# Patient Record
Sex: FEMALE | Race: WHITE | Employment: OTHER | URBAN - METROPOLITAN AREA
[De-identification: names, ages, dates, MRNs, and addresses within clinical notes are randomized per-mention and may not be internally consistent; named-entity substitution may affect disease eponyms.]

---

## 2017-02-07 ENCOUNTER — HOSPITAL ENCOUNTER (OUTPATIENT)
Dept: CT IMAGING | Age: 77
Discharge: HOME | End: 2017-02-07
Attending: INTERNAL MEDICINE
Payer: MEDICARE

## 2017-02-07 DIAGNOSIS — R05.9 COUGH: ICD-10-CM

## 2017-02-07 LAB — CREAT BLD-MCNC: 1 MG/DL (ref 0.6–1)

## 2017-02-07 PROCEDURE — 74011636320 HC RX REV CODE- 636/320: Performed by: INTERNAL MEDICINE

## 2017-02-07 PROCEDURE — 74011000258 HC RX REV CODE- 258: Performed by: INTERNAL MEDICINE

## 2017-02-07 PROCEDURE — 71260 CT THORAX DX C+: CPT

## 2017-02-07 PROCEDURE — 82565 ASSAY OF CREATININE: CPT

## 2017-02-07 RX ORDER — SODIUM CHLORIDE 0.9 % (FLUSH) 0.9 %
10 SYRINGE (ML) INJECTION
Status: COMPLETED | OUTPATIENT
Start: 2017-02-07 | End: 2017-02-07

## 2017-02-07 RX ADMIN — Medication 10 ML: at 09:21

## 2017-02-07 RX ADMIN — SODIUM CHLORIDE 80 ML: 900 INJECTION, SOLUTION INTRAVENOUS at 09:21

## 2017-02-07 RX ADMIN — IOPAMIDOL 80 ML: 755 INJECTION, SOLUTION INTRAVENOUS at 09:24

## 2017-07-14 PROBLEM — I51.89 DIASTOLIC DYSFUNCTION: Status: ACTIVE | Noted: 2017-07-14

## 2017-08-22 ENCOUNTER — HOSPITAL ENCOUNTER (OUTPATIENT)
Dept: PHYSICAL THERAPY | Age: 77
Discharge: HOME | End: 2017-08-22
Attending: INTERNAL MEDICINE
Payer: MEDICARE

## 2017-08-22 DIAGNOSIS — Z91.81 HISTORY OF FALL: ICD-10-CM

## 2017-08-22 PROCEDURE — 97016 VASOPNEUMATIC DEVICE THERAPY: CPT

## 2017-08-22 PROCEDURE — 97161 PT EVAL LOW COMPLEX 20 MIN: CPT

## 2017-08-22 PROCEDURE — G8978 MOBILITY CURRENT STATUS: HCPCS

## 2017-08-22 PROCEDURE — G8979 MOBILITY GOAL STATUS: HCPCS

## 2017-08-22 NOTE — PROGRESS NOTES
Douglas Nj  : [facility]  [address], [address], [place]  [phone]   [phone]          OUTPATIENT PHYSICAL THERAPY:Initial Assessment 2017    ICD-10: Treatment Diagnosis: Difficulty in walking, not elsewhere classified (R26.2); Pain in left knee (M25.562)  Precautions/Allergies:   Advair diskus [fluticasone-salmeterol]; Aspirin; and Codeine   Fall Risk Score: 5 (? 5 = High Risk)  MD Orders: evaluate and treat MEDICAL/REFERRING DIAGNOSIS:  History of fall [Z91.81]   DATE OF ONSET: fall 3 weeks ago  REFERRING PHYSICIAN:  Dr. Camilo Garza: not set     INITIAL ASSESSMENT:  Ms. Ben Byrd presents in therapy today roughly 3 weeks after a fall around her home. She was diagnosed with a L knee contusion, negative imaging for fracture. She will benefit from skilled PT in order to improve her L knee pain, strength, and ROM for optimum safety during functional mobility and ambulation. PROBLEM LIST (Impacting functional limitations):  1. Decreased Strength  2. Decreased ADL/Functional Activities  3. Decreased Ambulation Ability/Technique  4. Decreased Balance  5. Increased Pain  6. Decreased Activity Tolerance  7. Decreased Flexibility/Joint Mobility  8. Edema/Girth  9. Decreased Knowledge of Precautions  10. Decreased [place] with Home Exercise Program INTERVENTIONS PLANNED:  1. Balance Exercise  2. Cold  3. Cryotherapy  4. Gait Training  5. Home Exercise Program (HEP)  6. Manual Therapy  7. Neuromuscular Re-education/Strengthening  8. Range of Motion (ROM)  9. Therapeutic Activites  10. Therapeutic Exercise/Strengthening   TREATMENT PLAN:  Effective Dates: 17 TO 10/22/17. Frequency/Duration: 2 times a week for 6 weeks  GOALS: (Goals have been discussed and agreed upon with patient.)     SHORT-TERM FUNCTIONAL GOALS: Time Frame: 3 weeks  1.   Patient will be compliant with HEP focused on lower extremity strengthening and ROM. 2.  Patient will rate L LE pain no greater than 3/10 for improved tolerance to daily and work duties. DISCHARGE GOALS: Time Frame: 6 weeks  1. Patient will be independent with comprehensive HEP focused on continued performance of lower extremity strengthening and ROM activities. 2.  Patient will rate L LE pain no greater than 1-2/10 and which does not significantly interfere with daily or work duties for return to previous level of function. 3.  Patient will demonstrate near symmetrical bilateral LE AROM for optimum functional mobility with daily and work duties. 4.  Patient will demonstrate near symmetrical bilateral LE strength grades in the above tested planes for optimum performance and safety with daily and work duties. Rehabilitation Potential For Stated Goals: Good  Regarding Kamilla Scott's therapy, I certify that the treatment plan above will be carried out by a therapist or under their direction. Thank you for this referral,  Ruth Turner, PT, DPT     Referring Physician Signature:     Dr. Stephan Arroyo                Date                    The information in this section was collected on 8-22-17 (except where otherwise noted). HISTORY:   History of Present Injury/Illness (Reason for Referral):  Patient reports a fall roughly 3 weeks ago, while walking on her sidewalk. She denies immediate medical attention. She saw Dr. Stephan Arroyo, and was diagnosed with L knee contusion after her x-ray was negative for fracture. She reports a chronic history of falls and difficulty clearing her LLE when walking. Past Medical History/Comorbidities:   Ms. Corinne Monte  has a past medical history of Abdominal pain (9/29/2013); DJD (degenerative joint disease) (9/29/2013); Esophageal reflux (9/29/2013); HTN (hypertension) (9/29/2013); Hypercholesterolemia; Hypertension; Other and unspecified hyperlipidemia (9/29/2013);  Pyelonephritis (9/29/2013); and UTI (lower urinary tract infection) (9/29/2013). Ms. Ben Byrd  has a past surgical history that includes pacemaker; chest surgery procedure unlisted; lap,cholecystectomy; and hysterectomy. Social History/Living Environment:     Independent - lives alone   Prior Level of Function/Work/Activity:  Independent - Retired   Personal Factors:          Sex:  female        Age:  68 y.o. Current Medications:       Current Outpatient Prescriptions:     atorvastatin (LIPITOR) 80 mg tablet, Take 1 Tab by mouth daily. , Disp: 90 Tab, Rfl: 3    clorazepate (TRANXENE) 7.5 mg tablet, TAKE  ONE TABLET BY MOUTH TWICE DAILY, Disp: 60 Tab, Rfl: 5    lansoprazole (PREVACID) 30 mg capsule, Take 1 Cap by mouth Daily (before breakfast). , Disp: 30 Cap, Rfl: 5    losartan-hydroCHLOROthiazide (HYZAAR) 100-12.5 mg per tablet, Take 1 Tab by mouth daily. , Disp: 90 Tab, Rfl: 4    bumetanide (BUMEX) 1 mg tablet, Take  by mouth daily. , Disp: , Rfl:     albuterol (VENTOLIN HFA) 90 mcg/actuation inhaler, Take 2 Puffs by inhalation every four (4) hours as needed. , Disp: , Rfl:     mometasone-formoterol (DULERA) 100-5 mcg/actuation HFA inhaler, Take 2 Puffs by inhalation two (2) times a day., Disp: , Rfl:     metoprolol tartrate (LOPRESSOR) 25 mg tablet, Take 25 mg by mouth., Disp: , Rfl:     escitalopram oxalate (LEXAPRO) 10 mg tablet, Take 1 Tab by mouth daily. , Disp: 90 Tab, Rfl: 3    amLODIPine (NORVASC) 5 mg tablet, Take 1 Tab by mouth daily. , Disp: 90 Tab, Rfl: 3    flecainide (TAMBOCOR) 50 mg tablet, TAKE 1 TABLET TWICE DAILY, Disp: 180 Tab, Rfl: 3    allopurinol (ZYLOPRIM) 100 mg tablet, Take 1 Tab by mouth daily. , Disp: 90 Tab, Rfl: 3    fexofenadine (ALLEGRA) 180 mg tablet, Take 1 Tab by mouth daily. , Disp: 30 Tab, Rfl: 4    montelukast (SINGULAIR) 10 mg tablet, Take 1 Tab by mouth daily. , Disp: 90 Tab, Rfl: 4    aspirin delayed-release 81 mg tablet, Take  by mouth daily. , Disp: , Rfl:    Date Last Reviewed:  8/22/2017     Number of Personal Factors/Comorbidities that affect the Plan of Care: 1-2: MODERATE COMPLEXITY   EXAMINATION:   Observation/Orthostatic Postural Assessment:          Patient ambulates with rolling walker, good gait speed and quality, slight decrease in weight bearing through LLE with shortened R step length   Palpation:          Mild tenderness at L patellar region, no tenderness at L knee posterior capsule. Mild-to-moderate L patellar edema and increased warmth. ROM:          L knee AROM: 0-110; R knee AROM: 0-115  Strength:          L knee extension 4/5, L knee flexion 4/5, L knee DF 4/5; All R LE MMT are 4+/5     Body Structures Involved:  1. Nerves  2. Bones  3. Joints  4. Muscles  5. Ligaments Body Functions Affected:  1. Sensory/Pain  2. Movement Related Activities and Participation Affected:  1. General Tasks and Demands  2. Mobility  3. Domestic Life  4. Interpersonal Interactions and Relationships  5. Community, Social and [place]   Number of elements (examined above) that affect the Plan of Care: 1-2: LOW COMPLEXITY   CLINICAL PRESENTATION:   Presentation: Stable and uncomplicated: LOW COMPLEXITY   CLINICAL DECISION MAKING:   Outcome Measure: Tool Used: Lower Extremity Functional Scale (LEFS)  Score:  Initial: 24/80 Most Recent: X/80 (Date: -- )   Interpretation of Score: 20 questions each scored on a 5 point scale with 0 representing \"extreme difficulty or unable to perform\" and 4 representing \"no difficulty\". The lower the score, the greater the functional disability. 80/80 represents no disability. Minimal detectable change is 9 points. Score 80 79-63 62-48 47-32 31-16 15-1 0   Modifier CH CI CJ CK CL CM CN     ?  Mobility - Walking and Moving Around:     - CURRENT STATUS: CL - 60%-79% impaired, limited or restricted    - GOAL STATUS: CJ - 20%-39% impaired, limited or restricted    - D/C STATUS:  ---------------To be determined---------------      · Medical Necessity: Patient is expected to demonstrate progress in strength, range of motion and balance to improve safety during functional mobility. Reason for Services/Other Comments:  · Patient continues to require skilled intervention due to not reaching long term goals. Use of outcome tool(s) and clinical judgement create a POC that gives a: Clear prediction of patient's progress: LOW COMPLEXITY            TREATMENT:   (In addition to Assessment/Re-Assessment sessions the following treatments were rendered)  Pre-treatment Symptoms/Complaints:  Patient reports L knee pain and swelling, rated as 4-5/10 at worst. She states she was not walking with any assistive device prior to her fall 3 weeks ago. She states she does not walk with an AD around her home, but uses RW for long or community distances. Pain: Initial:   2/10 Post Session:  0-1/10     THERAPEUTIC EXERCISE: (0 minutes):  Exercises per grid below to improve mobility, strength and balance. Required minimal verbal cues to promote proper body alignment, promote proper body posture and promote proper body mechanics. Progressed complexity of movement as indicated. Date:   Date:   Date:     Activity/Exercise Parameters Parameters Parameters                                               Patient received vasopneumatic compression via GameReady to L knee, patient supine with LEs supported under wedge, medium compression, 40 degrees, 10 minutes, performed to decrease edema and inflammation at the LLE    Treatment/Session Assessment:    · Response to Treatment:  Patient responded well to vasopneumatic compression today, and verbalized understanding of HEP consisting of LLE strengthening exercises. Will await insurance authorization before scheduling follow-up visits. · Compliance with Program/Exercises: Will assess as treatment progresses. · Recommendations/Intent for next treatment session: \"Next visit will focus on advancements to more challenging activities\".   Total Treatment Duration:  PT Patient Time In/Time Out  Time In: 1430  Time Out: 1530    Roger Saint, PT, DPT

## 2017-08-22 NOTE — PROGRESS NOTES
Ambulatory/Rehab Services H2 Model Falls Risk Assessment    Risk Factor Pts. ·   Confusion/Disorientation/Impulsivity  []    4 ·   Symptomatic Depression  []   2 ·   Altered Elimination  []   1 ·   Dizziness/Vertigo  []   1 ·   Gender (Male)  []   1 ·   Any administered antiepileptics (anticonvulsants):  []   2 ·   Any administered benzodiazepines:  []   1 ·   Visual Impairment (specify):  []   1 ·   Portable Oxygen Use  []   1 ·   Orthostatic ? BP  []   1 ·   History of Recent Falls (within 3 mos.)  [x]   5     Ability to Rise from Chair (choose one) Pts. ·   Ability to rise in a single movement  [x]   0 ·   Pushes up, successful in one attempt  []   1 ·   Multiple attempts, but successful  []   3 ·   Unable to rise without assistance  []   4   Total: (5 or greater = High Risk) 5     Falls Prevention Plan:   []                Physical Limitations to Exercise (specify):   []                Mobility Assistance Device (type):   [x]                Exercise/Equipment Adaptation (specify): All standing exercises will be under close supervision/assistance by PT    ©2010 [facility] [address] [place] [address] Patent #[id number].  Federal Law prohibits the replication, distribution or use without written permission from [employer] of [employer]

## 2017-08-30 PROBLEM — I49.5 SICK SINUS SYNDROME (HCC): Status: ACTIVE | Noted: 2017-08-30

## 2017-08-30 PROBLEM — E66.9 OBESITY (BMI 30.0-34.9): Status: ACTIVE | Noted: 2017-08-30

## 2017-08-30 PROBLEM — Z95.0 STATUS CARDIAC PACEMAKER: Status: ACTIVE | Noted: 2017-08-30

## 2017-09-14 ENCOUNTER — HOSPITAL ENCOUNTER (OUTPATIENT)
Dept: PHYSICAL THERAPY | Age: 77
Discharge: HOME | End: 2017-09-14
Attending: INTERNAL MEDICINE
Payer: MEDICARE

## 2017-09-14 PROCEDURE — 97016 VASOPNEUMATIC DEVICE THERAPY: CPT

## 2017-09-14 PROCEDURE — 97110 THERAPEUTIC EXERCISES: CPT

## 2017-09-14 PROCEDURE — 97140 MANUAL THERAPY 1/> REGIONS: CPT

## 2017-09-14 NOTE — PROGRESS NOTES
Juan Cotto  : 1940 [facility]  [address], [address], [place]  [phone]   [phone]          OUTPATIENT PHYSICAL THERAPY:Daily Note 2017    ICD-10: Treatment Diagnosis: Difficulty in walking, not elsewhere classified (R26.2); Pain in left knee (M25.562)  Precautions/Allergies:   Advair diskus [fluticasone-salmeterol]; Aspirin; and Codeine   Fall Risk Score: 5 (? 5 = High Risk)  MD Orders: evaluate and treat MEDICAL/REFERRING DIAGNOSIS:  Left Knee Contusion    DATE OF ONSET: fall 3 weeks ago  REFERRING PHYSICIAN:  Dr. Kd Miranda: not set     INITIAL ASSESSMENT:  Ms. Alirio Guzmán presents in therapy today roughly 3 weeks after a fall around her home. She was diagnosed with a L knee contusion, negative imaging for fracture. She will benefit from skilled PT in order to improve her L knee pain, strength, and ROM for optimum safety during functional mobility and ambulation. PROBLEM LIST (Impacting functional limitations):  1. Decreased Strength  2. Decreased ADL/Functional Activities  3. Decreased Ambulation Ability/Technique  4. Decreased Balance  5. Increased Pain  6. Decreased Activity Tolerance  7. Decreased Flexibility/Joint Mobility  8. Edema/Girth  9. Decreased Knowledge of Precautions  10. Decreased [place] with Home Exercise Program INTERVENTIONS PLANNED:  1. Balance Exercise  2. Cold  3. Cryotherapy  4. Gait Training  5. Home Exercise Program (HEP)  6. Manual Therapy  7. Neuromuscular Re-education/Strengthening  8. Range of Motion (ROM)  9. Therapeutic Activites  10. Therapeutic Exercise/Strengthening   TREATMENT PLAN:  Effective Dates: 17 TO 10/22/17. Frequency/Duration: 2 times a week for 6 weeks  GOALS: (Goals have been discussed and agreed upon with patient.)     SHORT-TERM FUNCTIONAL GOALS: Time Frame: 3 weeks  1.   Patient will be compliant with HEP focused on lower extremity strengthening and ROM. 2.  Patient will rate L LE pain no greater than 3/10 for improved tolerance to daily and work duties. DISCHARGE GOALS: Time Frame: 6 weeks  1. Patient will be independent with comprehensive HEP focused on continued performance of lower extremity strengthening and ROM activities. 2.  Patient will rate L LE pain no greater than 1-2/10 and which does not significantly interfere with daily or work duties for return to previous level of function. 3.  Patient will demonstrate near symmetrical bilateral LE AROM for optimum functional mobility with daily and work duties. 4.  Patient will demonstrate near symmetrical bilateral LE strength grades in the above tested planes for optimum performance and safety with daily and work duties. Rehabilitation Potential For Stated Goals: Good  Regarding Lori Scott's therapy, I certify that the treatment plan above will be carried out by a therapist or under their direction. Thank you for this referral,  Alexander Kumar, PT, DPT     Referring Physician Signature:     Dr. Mariella Prather                Date                    The information in this section was collected on 8-22-17 (except where otherwise noted). HISTORY:   History of Present Injury/Illness (Reason for Referral):  Patient reports a fall roughly 3 weeks ago, while walking on her sidewalk. She denies immediate medical attention. She saw Dr. Mariella Prather, and was diagnosed with L knee contusion after her x-ray was negative for fracture. She reports a chronic history of falls and difficulty clearing her LLE when walking. Past Medical History/Comorbidities:   Ms. Celia Cuadra  has a past medical history of Abdominal pain (9/29/2013); COPD (chronic obstructive pulmonary disease) ([facility] [address] DJD (degenerative joint disease) (9/29/2013); Esophageal reflux (9/29/2013); HTN (hypertension) (9/29/2013); Hypercholesterolemia; Hypertension; Other and unspecified hyperlipidemia (9/29/2013);  Pyelonephritis (9/29/2013); and UTI (lower urinary tract infection) (9/29/2013). Ms. Rajesh Garrido  has a past surgical history that includes pacemaker; chest surgery procedure unlisted; lap,cholecystectomy; hysterectomy; and cardiac surg procedure unlist (2/05 8/2015). Social History/Living Environment:     Independent - lives alone   Prior Level of Function/Work/Activity:  Independent - Retired   Personal Factors:          Sex:  female        Age:  68 y.o. Current Medications:       Current Outpatient Prescriptions:     bumetanide (BUMEX) 1 mg tablet, Take 1 Tab by mouth five (5) days a week., Disp: 90 Tab, Rfl: 4    Oxygen, 2 liters at night only, Disp: , Rfl:     atorvastatin (LIPITOR) 80 mg tablet, Take 1 Tab by mouth daily. , Disp: 90 Tab, Rfl: 3    clorazepate (TRANXENE) 7.5 mg tablet, TAKE  ONE TABLET BY MOUTH TWICE DAILY, Disp: 60 Tab, Rfl: 5    lansoprazole (PREVACID) 30 mg capsule, Take 1 Cap by mouth Daily (before breakfast). , Disp: 30 Cap, Rfl: 5    losartan-hydroCHLOROthiazide (HYZAAR) 100-12.5 mg per tablet, Take 1 Tab by mouth daily. , Disp: 90 Tab, Rfl: 4    albuterol (VENTOLIN HFA) 90 mcg/actuation inhaler, Take 2 Puffs by inhalation every four (4) hours as needed. , Disp: , Rfl:     mometasone-formoterol (DULERA) 100-5 mcg/actuation HFA inhaler, Take 2 Puffs by inhalation two (2) times daily as needed. , Disp: , Rfl:     metoprolol tartrate (LOPRESSOR) 25 mg tablet, Take 25 mg by mouth., Disp: , Rfl:     escitalopram oxalate (LEXAPRO) 10 mg tablet, Take 1 Tab by mouth daily. , Disp: 90 Tab, Rfl: 3    amLODIPine (NORVASC) 5 mg tablet, Take 1 Tab by mouth daily. , Disp: 90 Tab, Rfl: 3    flecainide (TAMBOCOR) 50 mg tablet, TAKE 1 TABLET TWICE DAILY, Disp: 180 Tab, Rfl: 3    allopurinol (ZYLOPRIM) 100 mg tablet, Take 1 Tab by mouth daily. , Disp: 90 Tab, Rfl: 3    fexofenadine (ALLEGRA) 180 mg tablet, Take 1 Tab by mouth daily. , Disp: 30 Tab, Rfl: 4    montelukast (SINGULAIR) 10 mg tablet, Take 1 Tab by mouth daily. , Disp: 90 Tab, Rfl: 4    aspirin delayed-release 81 mg tablet, Take  by mouth daily. , Disp: , Rfl:    Date Last Reviewed:  9/14/2017     Number of Personal Factors/Comorbidities that affect the Plan of Care: 1-2: MODERATE COMPLEXITY   EXAMINATION:   Observation/Orthostatic Postural Assessment:          Patient ambulates with rolling walker, good gait speed and quality, slight decrease in weight bearing through LLE with shortened R step length   Palpation:          Mild tenderness at L patellar region, no tenderness at L knee posterior capsule. Mild-to-moderate L patellar edema and increased warmth. ROM:          L knee AROM: 0-110; R knee AROM: 0-115  Strength:          L knee extension 4/5, L knee flexion 4/5, L knee DF 4/5; All R LE MMT are 4+/5     Body Structures Involved:  1. Nerves  2. Bones  3. Joints  4. Muscles  5. Ligaments Body Functions Affected:  1. Sensory/Pain  2. Movement Related Activities and Participation Affected:  1. General Tasks and Demands  2. Mobility  3. Domestic Life  4. Interpersonal Interactions and Relationships  5. Community, Social and [place]   Number of elements (examined above) that affect the Plan of Care: 1-2: LOW COMPLEXITY   CLINICAL PRESENTATION:   Presentation: Stable and uncomplicated: LOW COMPLEXITY   CLINICAL DECISION MAKING:   Outcome Measure: Tool Used: Lower Extremity Functional Scale (LEFS)  Score:  Initial: 24/80 Most Recent: X/80 (Date: -- )   Interpretation of Score: 20 questions each scored on a 5 point scale with 0 representing \"extreme difficulty or unable to perform\" and 4 representing \"no difficulty\". The lower the score, the greater the functional disability. 80/80 represents no disability. Minimal detectable change is 9 points. Score 80 79-63 62-48 47-32 31-16 15-1 0   Modifier CH CI CJ CK CL CM CN     ?  Mobility - Walking and Moving Around:     - CURRENT STATUS: CL - 60%-79% impaired, limited or restricted    - GOAL STATUS: CJ - 20%-39% impaired, limited or restricted    - D/C STATUS:  ---------------To be determined---------------      · Medical Necessity: Patient is expected to demonstrate progress in strength, range of motion and balance to improve safety during functional mobility. Reason for Services/Other Comments:  · Patient continues to require skilled intervention due to not reaching long term goals. Use of outcome tool(s) and clinical judgement create a POC that gives a: Clear prediction of patient's progress: LOW COMPLEXITY            TREATMENT:   (In addition to Assessment/Re-Assessment sessions the following treatments were rendered)  Pre-treatment Symptoms/Complaints:  Patient reports her pain has improved, but is still present. Pain: Initial:   2/10 Post Session:  0-1/10     THERAPEUTIC EXERCISE: (15 minutes):  Exercises per grid below to improve mobility, strength and balance. Required minimal verbal cues to promote proper body alignment, promote proper body posture and promote proper body mechanics. Progressed complexity of movement as indicated. Date:  9-14-17 Date:   Date:     Activity/Exercise Parameters Parameters Parameters   Recumbent stepper   10 minutes  Level 1     Quad set   x10     Heel slides    x10                                       Patient received vasopneumatic compression via GameReady to L knee, patient supine with LEs supported under wedge, medium compression, 40 degrees, 10 minutes, performed to decrease edema and inflammation at the LLE    Manual therapy (15 minutes): Patient received STM to L knee and contusion site, gentle to medium pressure focused on edema reduction and muscle relaxation, patient supine    Treatment/Session Assessment:    · Response to Treatment:  Patient responded well to today's treatment and is progressing very well at this time. · Compliance with Program/Exercises: Will assess as treatment progresses.   · Recommendations/Intent for next treatment session: \"Next visit will focus on advancements to more challenging activities\".   Total Treatment Duration:  PT Patient Time In/Time Out  Time In: 1430  Time Out: 2745 Dre Pagan, PT, DPT

## 2017-09-21 PROBLEM — Z95.0 PACEMAKER: Status: ACTIVE | Noted: 2017-09-21

## 2018-01-03 PROBLEM — I25.10 ATHEROSCLEROSIS OF CORONARY ARTERY: Status: ACTIVE | Noted: 2017-05-18

## 2018-01-03 PROBLEM — I08.0 MITRAL AND AORTIC REGURGITATION: Status: ACTIVE | Noted: 2018-01-03

## 2018-01-03 PROBLEM — K21.9 LARYNGOPHARYNGEAL REFLUX (LPR): Status: ACTIVE | Noted: 2018-01-03

## 2018-01-03 PROBLEM — M10.9 GOUT INVOLVING TOE: Status: ACTIVE | Noted: 2018-01-03

## 2018-05-03 PROBLEM — J30.9 ALLERGIC RHINITIS: Status: ACTIVE | Noted: 2018-05-03

## 2018-05-03 PROBLEM — F32.A MILD DEPRESSION: Status: ACTIVE | Noted: 2018-05-03

## 2018-06-13 ENCOUNTER — HOSPITAL ENCOUNTER (OUTPATIENT)
Dept: GENERAL RADIOLOGY | Age: 78
Discharge: HOME | End: 2018-06-13
Payer: MEDICARE

## 2018-06-13 DIAGNOSIS — M70.72 BURSITIS OF LEFT HIP, UNSPECIFIED BURSA: ICD-10-CM

## 2018-06-13 PROCEDURE — 73502 X-RAY EXAM HIP UNI 2-3 VIEWS: CPT

## 2018-09-25 ENCOUNTER — HOSPITAL ENCOUNTER (OUTPATIENT)
Dept: MAMMOGRAPHY | Age: 78
Discharge: HOME | End: 2018-09-25
Attending: INTERNAL MEDICINE
Payer: MEDICARE

## 2018-09-25 DIAGNOSIS — M89.9 DISORDER OF BONE AND CARTILAGE: ICD-10-CM

## 2018-09-25 DIAGNOSIS — M94.9 DISORDER OF BONE AND CARTILAGE: ICD-10-CM

## 2018-09-25 PROCEDURE — 77080 DXA BONE DENSITY AXIAL: CPT

## 2018-12-13 PROBLEM — Z01.810 PREOP CARDIOVASCULAR EXAM: Status: ACTIVE | Noted: 2018-12-13

## 2019-01-24 PROBLEM — Z17.0 MALIGNANT NEOPLASM OF UPPER-OUTER QUADRANT OF RIGHT BREAST IN FEMALE, ESTROGEN RECEPTOR POSITIVE (HCC): Status: ACTIVE | Noted: 2018-12-31

## 2019-01-24 PROBLEM — F32.A MILD DEPRESSION: Status: RESOLVED | Noted: 2018-05-03 | Resolved: 2019-01-24

## 2019-01-24 PROBLEM — C50.411 MALIGNANT NEOPLASM OF UPPER-OUTER QUADRANT OF RIGHT BREAST IN FEMALE, ESTROGEN RECEPTOR POSITIVE (HCC): Status: ACTIVE | Noted: 2018-12-31

## 2019-01-24 PROBLEM — Z98.890 POST-OPERATIVE STATE: Status: RESOLVED | Noted: 2019-01-17 | Resolved: 2019-01-24

## 2019-01-24 PROBLEM — F33.0 MILD EPISODE OF RECURRENT MAJOR DEPRESSIVE DISORDER (HCC): Status: ACTIVE | Noted: 2019-01-24

## 2019-01-24 PROBLEM — Z01.810 PREOP CARDIOVASCULAR EXAM: Status: RESOLVED | Noted: 2018-12-13 | Resolved: 2019-01-24

## 2019-01-24 PROBLEM — Z98.890 POST-OPERATIVE STATE: Status: ACTIVE | Noted: 2019-01-17

## 2019-03-12 PROBLEM — F33.42 RECURRENT MAJOR DEPRESSIVE DISORDER, IN FULL REMISSION (HCC): Status: ACTIVE | Noted: 2019-03-12

## 2019-03-12 PROBLEM — R10.32 LLQ PAIN: Status: ACTIVE | Noted: 2019-03-09

## 2019-05-14 ENCOUNTER — HOSPITAL ENCOUNTER (OUTPATIENT)
Dept: GENERAL RADIOLOGY | Age: 79
Discharge: HOME | End: 2019-05-14

## 2019-05-14 DIAGNOSIS — R07.81 PLEURITIC CHEST PAIN: ICD-10-CM

## 2019-05-14 DIAGNOSIS — R05.9 COUGH: ICD-10-CM

## 2019-05-14 NOTE — PROGRESS NOTES
Patient notified of CXR results and already has a f/u with you in 10 days instead of a week, is this ok? /TD

## 2019-05-24 PROBLEM — R10.32 LLQ PAIN: Status: RESOLVED | Noted: 2019-03-09 | Resolved: 2019-05-24

## 2019-10-04 ENCOUNTER — HOSPITAL ENCOUNTER (OUTPATIENT)
Dept: GENERAL RADIOLOGY | Age: 79
Discharge: HOME | End: 2019-10-04

## 2019-10-04 DIAGNOSIS — M25.561 ACUTE PAIN OF RIGHT KNEE: ICD-10-CM

## 2020-07-30 ENCOUNTER — HOSPITAL ENCOUNTER (OUTPATIENT)
Dept: CT IMAGING | Age: 80
Discharge: HOME | End: 2020-07-30
Attending: NURSE PRACTITIONER
Payer: MEDICARE

## 2020-07-30 DIAGNOSIS — R93.89 ABNORMAL CT OF THE CHEST: ICD-10-CM

## 2020-07-30 PROCEDURE — 71250 CT THORAX DX C-: CPT

## 2020-07-31 NOTE — PROGRESS NOTES
CT shows improvement, only few densities remaining and those are smaller or stable. I called her with result, left message on VM.

## 2020-08-26 ENCOUNTER — HOSPITAL ENCOUNTER (OUTPATIENT)
Dept: MRI IMAGING | Age: 80
Discharge: HOME | End: 2020-08-26
Attending: INTERNAL MEDICINE
Payer: MEDICARE

## 2020-08-26 DIAGNOSIS — R26.9 GAIT ABNORMALITY: ICD-10-CM

## 2020-08-26 DIAGNOSIS — R25.1 TREMOR: ICD-10-CM

## 2020-08-26 PROCEDURE — 70551 MRI BRAIN STEM W/O DYE: CPT

## 2020-08-26 NOTE — PROGRESS NOTES
Some changes of small vessel circulation common for age and thinning of brain tissue--no changes of stroke or tumor in balance center

## 2020-11-09 ENCOUNTER — HOSPITAL ENCOUNTER (OUTPATIENT)
Dept: LAB | Age: 80
Discharge: HOME | End: 2020-11-09
Payer: MEDICARE

## 2020-11-09 DIAGNOSIS — I48.0 PAROXYSMAL ATRIAL FIBRILLATION (HCC): ICD-10-CM

## 2020-11-09 LAB
ALBUMIN SERPL-MCNC: 3.5 G/DL (ref 3.2–4.6)
ALBUMIN/GLOB SERPL: 1 {RATIO} (ref 1.2–3.5)
ALP SERPL-CCNC: 56 U/L (ref 50–136)
ALT SERPL-CCNC: 15 U/L (ref 12–65)
AST SERPL-CCNC: 12 U/L (ref 15–37)
BILIRUB DIRECT SERPL-MCNC: <0.1 MG/DL
BILIRUB SERPL-MCNC: 0.3 MG/DL (ref 0.2–1.1)
GLOBULIN SER CALC-MCNC: 3.4 G/DL (ref 2.3–3.5)
PROT SERPL-MCNC: 6.9 G/DL (ref 6.3–8.2)

## 2020-11-09 PROCEDURE — 80076 HEPATIC FUNCTION PANEL: CPT

## 2020-11-09 PROCEDURE — 36415 COLL VENOUS BLD VENIPUNCTURE: CPT

## 2020-11-23 ENCOUNTER — HOSPITAL ENCOUNTER (EMERGENCY)
Age: 80
Discharge: HOME | End: 2020-11-23
Attending: EMERGENCY MEDICINE
Payer: MEDICARE

## 2020-11-23 ENCOUNTER — APPOINTMENT (OUTPATIENT)
Dept: CT IMAGING | Age: 80
End: 2020-11-23
Attending: EMERGENCY MEDICINE
Payer: MEDICARE

## 2020-11-23 VITALS
OXYGEN SATURATION: 95 % | WEIGHT: 161 LBS | RESPIRATION RATE: 16 BRPM | SYSTOLIC BLOOD PRESSURE: 201 MMHG | TEMPERATURE: 98.2 F | HEART RATE: 70 BPM | DIASTOLIC BLOOD PRESSURE: 83 MMHG | HEIGHT: 62 IN | BODY MASS INDEX: 29.63 KG/M2

## 2020-11-23 DIAGNOSIS — S00.03XA CONTUSION OF SCALP, INITIAL ENCOUNTER: Primary | ICD-10-CM

## 2020-11-23 DIAGNOSIS — I10 HYPERTENSION, UNSPECIFIED TYPE: ICD-10-CM

## 2020-11-23 PROCEDURE — 70450 CT HEAD/BRAIN W/O DYE: CPT

## 2020-11-23 PROCEDURE — 99282 EMERGENCY DEPT VISIT SF MDM: CPT

## 2020-11-23 PROCEDURE — 99283 EMERGENCY DEPT VISIT LOW MDM: CPT

## 2020-11-23 NOTE — ED NOTES
Pt alert and orientedx4 with even and unlabored respirations. Denies pain at this time. No acute signs of symptoms of distress witnessed. Pt states that she is going to take her b/P medications after leaving MD. MD notified of discharge vitals. Md verbalized understanding.

## 2020-11-23 NOTE — ED TRIAGE NOTES
Pt states she fell getting out of the bathtub on Sat and hit left side of head. Bruising to head and now having some headaches. States he daughter wanted her to come when she saw it today. No open wounds. Pt has a mask in triage.

## 2020-11-23 NOTE — ED PROVIDER NOTES
Patient has a history of hypertension, hyperlipidemia, COPD and GERD complaining that she fell in her bathtub on Saturday. She states she struck her head, denies any loss of consciousness. She says she was recently started on a blood thinner though she does not know which one. She denies any loss of consciousness, states she has had a headache since her fall. A family member came over today and saw her head bruising and wanted to come here and get evaluated.            Past Medical History:   Diagnosis Date    Abdominal pain 9/29/2013    COPD (chronic obstructive pulmonary disease) (HCC)     DJD (degenerative joint disease) 9/29/2013    Esophageal reflux 9/29/2013    HTN (hypertension) 9/29/2013    Hypercholesterolemia     Hypertension     Other and unspecified hyperlipidemia 9/29/2013    Pyelonephritis 9/29/2013    UTI (lower urinary tract infection) 9/29/2013       Past Surgical History:   Procedure Laterality Date    BREAST SURGERY PROCEDURE UNLISTED  01/07/2019    right breast cancer-lumpectomy    CARDIAC SURG PROCEDURE UNLIST  2/05 8/2015    pacemaker    CHEST SURGERY PROCEDURE UNLISTED      HX HYSTERECTOMY      HX PACEMAKER      LAP,CHOLECYSTECTOMY           Family History:   Problem Relation Age of Onset    Heart Disease Mother     Hypertension Mother     Heart Disease Father     Hypertension Father        Social History     Socioeconomic History    Marital status:      Spouse name: Not on file    Number of children: Not on file    Years of education: Not on file    Highest education level: Not on file   Occupational History    Not on file   Social Needs    Financial resource strain: Not on file    Food insecurity     Worry: Not on file     Inability: Not on file    Transportation needs     Medical: Not on file     Non-medical: Not on file   Tobacco Use    Smoking status: Former Smoker     Packs/day: 1.00     Years: 22.00     Pack years: 22.00     Types: Cigarettes Last attempt to quit: 1989     Years since quittin.9    Smokeless tobacco: Never Used   Substance and Sexual Activity    Alcohol use: No    Drug use: No    Sexual activity: Never   Lifestyle    Physical activity     Days per week: Not on file     Minutes per session: Not on file    Stress: Not on file   Relationships    Social connections     Talks on phone: Not on file     Gets together: Not on file     Attends Mormonism service: Not on file     Active member of club or organization: Not on file     Attends meetings of clubs or organizations: Not on file     Relationship status: Not on file    Intimate partner violence     Fear of current or ex partner: Not on file     Emotionally abused: Not on file     Physically abused: Not on file     Forced sexual activity: Not on file   Other Topics Concern    Not on file   Social History Narrative    Not on file         ALLERGIES: Advair diskus [fluticasone propion-salmeterol]; Aspirin; Codeine; and Lipitor [atorvastatin]    Review of Systems   Constitutional: Negative for chills and fever. Gastrointestinal: Negative for nausea and vomiting. All other systems reviewed and are negative. Vitals:    20 1630   BP: (!) 198/96   Pulse: 84   Resp: 16   Temp: 98.2 °F (36.8 °C)   SpO2: 95%   Weight: 73 kg (161 lb)   Height: 5' 2\" (1.575 m)            Physical Exam  Vitals signs and nursing note reviewed. Constitutional:       Appearance: Normal appearance. She is well-developed. HENT:      Head: Normocephalic. Comments: Ecchymosis with mild swelling left frontal scalp as indicated  Eyes:      Conjunctiva/sclera: Conjunctivae normal.      Pupils: Pupils are equal, round, and reactive to light. Neck:      Musculoskeletal: Normal range of motion and neck supple. Pulmonary:      Effort: Pulmonary effort is normal. No respiratory distress. Musculoskeletal:         General: No tenderness. Skin:     General: Skin is warm and dry. Neurological:      Mental Status: She is alert and oriented to person, place, and time. Psychiatric:         Behavior: Behavior normal.          MDM  Number of Diagnoses or Management Options  Contusion of scalp, initial encounter: new and does not require workup  Hypertension, unspecified type:   Diagnosis management comments: 6:02 PM discussed results with patient, unremarkable CT findings.        Amount and/or Complexity of Data Reviewed  Tests in the radiology section of CPT®: ordered and reviewed    Risk of Complications, Morbidity, and/or Mortality  Presenting problems: moderate  Diagnostic procedures: low  Management options: low    Patient Progress  Patient progress: stable         Procedures

## 2020-11-23 NOTE — ED NOTES
I have reviewed discharge instructions with the patient. The patient verbalized understanding. Patient left ED via Discharge Method: ambulatory to Home with family. Opportunity for questions and clarification provided. Patient given 0 scripts. To continue your aftercare when you leave the hospital, you may receive an automated call from our care team to check in on how you are doing. This is a free service and part of our promise to provide the best care and service to meet your aftercare needs.  If you have questions, or wish to unsubscribe from this service please call [phone]. Thank you for Choosing our [facility] Emergency Department.

## 2020-11-25 ENCOUNTER — PATIENT OUTREACH (OUTPATIENT)
Dept: CASE MANAGEMENT | Age: 80
End: 2020-11-25

## 2020-11-25 NOTE — PROGRESS NOTES
Transitions of Care outreach in follow up to ED visit 11/23 - Monday  Presented to ED subsequent to a fall in the bathroom/bathtub  Imaging studies unremarkable    Patient states this morning that the \"problem is my legs. \"  Reports 3 [additional] falls yesterday. When clarifying patient confirms that her legs are wobbly underneath her. She has a lot of trouble getting up when she falls - must call for help. Subsequent call to patient's daughter - Brett Chavarria has been in touch with neurologist and is interested in getting some PT for her mother/patient  Neurology appointment set for Monday.   Daughter agreeable to [facility] physical therapy referral    PLAN:  Message sent to PCP's nurse - Donald Fernando LPN requesting PCP to place referral to [facility] for PT  Concerns for:  · High fall risk  · Home safety - lives alone, rarely drives  · Patient has a walker

## 2020-12-03 ENCOUNTER — HOME HEALTH ADMISSION (OUTPATIENT)
Dept: HOME HEALTH SERVICES | Facility: HOME HEALTH | Age: 80
End: 2020-12-03
Payer: MEDICARE

## 2020-12-03 PROBLEM — Z74.09 IMPAIRED FUNCTIONAL MOBILITY, BALANCE, GAIT, AND ENDURANCE: Status: ACTIVE | Noted: 2020-12-03

## 2020-12-04 ENCOUNTER — HOME CARE VISIT (OUTPATIENT)
Dept: SCHEDULING | Facility: HOME HEALTH | Age: 80
End: 2020-12-04
Payer: MEDICARE

## 2020-12-04 VITALS
DIASTOLIC BLOOD PRESSURE: 88 MMHG | SYSTOLIC BLOOD PRESSURE: 134 MMHG | OXYGEN SATURATION: 94 % | HEART RATE: 75 BPM | TEMPERATURE: 98.4 F | RESPIRATION RATE: 18 BRPM

## 2020-12-04 PROCEDURE — 3331090001 HH PPS REVENUE CREDIT

## 2020-12-04 PROCEDURE — G0151 HHCP-SERV OF PT,EA 15 MIN: HCPCS

## 2020-12-04 PROCEDURE — 400013 HH SOC

## 2020-12-04 PROCEDURE — 3331090002 HH PPS REVENUE DEBIT

## 2020-12-05 PROCEDURE — 3331090001 HH PPS REVENUE CREDIT

## 2020-12-05 PROCEDURE — 3331090002 HH PPS REVENUE DEBIT

## 2020-12-06 PROCEDURE — 3331090002 HH PPS REVENUE DEBIT

## 2020-12-06 PROCEDURE — 3331090001 HH PPS REVENUE CREDIT

## 2020-12-07 PROCEDURE — 3331090001 HH PPS REVENUE CREDIT

## 2020-12-07 PROCEDURE — 3331090002 HH PPS REVENUE DEBIT

## 2020-12-08 ENCOUNTER — HOME CARE VISIT (OUTPATIENT)
Dept: SCHEDULING | Facility: HOME HEALTH | Age: 80
End: 2020-12-08
Payer: MEDICARE

## 2020-12-08 VITALS
RESPIRATION RATE: 18 BRPM | TEMPERATURE: 97.2 F | DIASTOLIC BLOOD PRESSURE: 68 MMHG | OXYGEN SATURATION: 96 % | HEART RATE: 76 BPM | SYSTOLIC BLOOD PRESSURE: 136 MMHG

## 2020-12-08 PROCEDURE — 3331090002 HH PPS REVENUE DEBIT

## 2020-12-08 PROCEDURE — G0157 HHC PT ASSISTANT EA 15: HCPCS

## 2020-12-08 PROCEDURE — 3331090001 HH PPS REVENUE CREDIT

## 2020-12-09 PROCEDURE — 3331090001 HH PPS REVENUE CREDIT

## 2020-12-09 PROCEDURE — 3331090002 HH PPS REVENUE DEBIT

## 2020-12-10 ENCOUNTER — HOME CARE VISIT (OUTPATIENT)
Dept: SCHEDULING | Facility: HOME HEALTH | Age: 80
End: 2020-12-10
Payer: MEDICARE

## 2020-12-10 VITALS
SYSTOLIC BLOOD PRESSURE: 104 MMHG | TEMPERATURE: 97.3 F | OXYGEN SATURATION: 97 % | HEART RATE: 71 BPM | RESPIRATION RATE: 18 BRPM | DIASTOLIC BLOOD PRESSURE: 60 MMHG

## 2020-12-10 PROCEDURE — G0157 HHC PT ASSISTANT EA 15: HCPCS

## 2020-12-10 PROCEDURE — 3331090002 HH PPS REVENUE DEBIT

## 2020-12-10 PROCEDURE — 3331090001 HH PPS REVENUE CREDIT

## 2020-12-11 PROCEDURE — 3331090002 HH PPS REVENUE DEBIT

## 2020-12-11 PROCEDURE — 3331090001 HH PPS REVENUE CREDIT

## 2020-12-12 PROCEDURE — 3331090002 HH PPS REVENUE DEBIT

## 2020-12-12 PROCEDURE — 3331090001 HH PPS REVENUE CREDIT

## 2020-12-13 PROCEDURE — 3331090002 HH PPS REVENUE DEBIT

## 2020-12-13 PROCEDURE — 3331090001 HH PPS REVENUE CREDIT

## 2020-12-14 PROCEDURE — 3331090002 HH PPS REVENUE DEBIT

## 2020-12-14 PROCEDURE — 3331090001 HH PPS REVENUE CREDIT

## 2020-12-15 ENCOUNTER — HOME CARE VISIT (OUTPATIENT)
Dept: SCHEDULING | Facility: HOME HEALTH | Age: 80
End: 2020-12-15
Payer: MEDICARE

## 2020-12-15 VITALS
DIASTOLIC BLOOD PRESSURE: 60 MMHG | HEART RATE: 73 BPM | TEMPERATURE: 97.2 F | SYSTOLIC BLOOD PRESSURE: 118 MMHG | RESPIRATION RATE: 18 BRPM | OXYGEN SATURATION: 96 %

## 2020-12-15 PROCEDURE — 3331090001 HH PPS REVENUE CREDIT

## 2020-12-15 PROCEDURE — G0157 HHC PT ASSISTANT EA 15: HCPCS

## 2020-12-15 PROCEDURE — 3331090002 HH PPS REVENUE DEBIT

## 2020-12-16 PROCEDURE — 3331090002 HH PPS REVENUE DEBIT

## 2020-12-16 PROCEDURE — 3331090001 HH PPS REVENUE CREDIT

## 2020-12-17 PROCEDURE — 3331090002 HH PPS REVENUE DEBIT

## 2020-12-17 PROCEDURE — 3331090001 HH PPS REVENUE CREDIT

## 2020-12-18 PROCEDURE — 3331090002 HH PPS REVENUE DEBIT

## 2020-12-18 PROCEDURE — 3331090001 HH PPS REVENUE CREDIT

## 2020-12-19 PROCEDURE — 3331090001 HH PPS REVENUE CREDIT

## 2020-12-19 PROCEDURE — 3331090002 HH PPS REVENUE DEBIT

## 2020-12-20 PROCEDURE — 3331090001 HH PPS REVENUE CREDIT

## 2020-12-20 PROCEDURE — 3331090002 HH PPS REVENUE DEBIT

## 2020-12-21 ENCOUNTER — HOME CARE VISIT (OUTPATIENT)
Dept: SCHEDULING | Facility: HOME HEALTH | Age: 80
End: 2020-12-21
Payer: MEDICARE

## 2020-12-21 VITALS
HEART RATE: 74 BPM | DIASTOLIC BLOOD PRESSURE: 76 MMHG | RESPIRATION RATE: 18 BRPM | OXYGEN SATURATION: 98 % | TEMPERATURE: 98.2 F | SYSTOLIC BLOOD PRESSURE: 116 MMHG

## 2020-12-21 PROCEDURE — 3331090002 HH PPS REVENUE DEBIT

## 2020-12-21 PROCEDURE — 3331090001 HH PPS REVENUE CREDIT

## 2020-12-21 PROCEDURE — G0151 HHCP-SERV OF PT,EA 15 MIN: HCPCS

## 2020-12-23 PROBLEM — G62.9 SENSORY NEUROPATHY: Status: ACTIVE | Noted: 2020-12-23

## 2021-01-20 ENCOUNTER — HOSPITAL ENCOUNTER (OUTPATIENT)
Dept: GENERAL RADIOLOGY | Age: 81
Discharge: HOME | End: 2021-01-20

## 2021-01-20 DIAGNOSIS — M79.604 PAIN OF RIGHT LOWER EXTREMITY: ICD-10-CM

## 2021-02-01 ENCOUNTER — TRANSCRIBE ORDER (OUTPATIENT)
Dept: SCHEDULING | Age: 81
End: 2021-02-01

## 2021-02-01 DIAGNOSIS — M25.561 RIGHT KNEE PAIN: Primary | ICD-10-CM

## 2021-02-24 ENCOUNTER — HOSPITAL ENCOUNTER (OUTPATIENT)
Dept: MRI IMAGING | Age: 81
Discharge: HOME | End: 2021-02-24
Attending: ORTHOPAEDIC SURGERY
Payer: MEDICARE

## 2021-02-24 DIAGNOSIS — M25.561 RIGHT KNEE PAIN: ICD-10-CM

## 2021-02-24 PROCEDURE — 73721 MRI JNT OF LWR EXTRE W/O DYE: CPT

## 2021-04-15 PROBLEM — Z01.810 PREOPERATIVE CARDIOVASCULAR EXAMINATION: Status: RESOLVED | Noted: 2018-12-13 | Resolved: 2021-04-15

## 2021-04-18 ENCOUNTER — APPOINTMENT (OUTPATIENT)
Dept: GENERAL RADIOLOGY | Age: 81
End: 2021-04-18
Attending: EMERGENCY MEDICINE
Payer: MEDICARE

## 2021-04-18 ENCOUNTER — HOSPITAL ENCOUNTER (EMERGENCY)
Age: 81
Discharge: HOME | End: 2021-04-18
Attending: EMERGENCY MEDICINE
Payer: MEDICARE

## 2021-04-18 VITALS
HEIGHT: 62 IN | RESPIRATION RATE: 16 BRPM | WEIGHT: 169 LBS | HEART RATE: 73 BPM | OXYGEN SATURATION: 95 % | DIASTOLIC BLOOD PRESSURE: 68 MMHG | TEMPERATURE: 98.7 F | SYSTOLIC BLOOD PRESSURE: 118 MMHG | BODY MASS INDEX: 31.1 KG/M2

## 2021-04-18 DIAGNOSIS — M54.50 ACUTE MIDLINE LOW BACK PAIN WITHOUT SCIATICA: ICD-10-CM
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
## 2021-04-18 PROCEDURE — 74011250637 HC RX REV CODE- 250/637: Performed by: EMERGENCY MEDICINE

## 2021-04-18 PROCEDURE — 99283 EMERGENCY DEPT VISIT LOW MDM: CPT

## 2021-04-18 PROCEDURE — 72100 X-RAY EXAM L-S SPINE 2/3 VWS: CPT

## 2021-04-18 RX ORDER — TRAMADOL HYDROCHLORIDE 50 MG/1
50 TABLET ORAL
Status: COMPLETED | OUTPATIENT
Start: 2021-04-18 | End: 2021-04-18

## 2021-04-18 RX ORDER — TRAMADOL HYDROCHLORIDE 50 MG/1
50 TABLET ORAL
Qty: 11 TAB | Refills: 0 | Status: SHIPPED | OUTPATIENT
Start: 2021-04-18 | End: 2021-04-20 | Stop reason: ALTCHOICE

## 2021-04-18 RX ADMIN — TRAMADOL HYDROCHLORIDE 50 MG: 50 TABLET, FILM COATED ORAL at 13:07

## 2021-04-18 NOTE — ED NOTES
I have reviewed discharge instructions with the patient. The patient verbalized understanding. Patient left ED via Discharge Method: ambulatory to Home with family. Opportunity for questions and clarification provided. Patient given 1 scripts. To continue your aftercare when you leave the hospital, you may receive an automated call from our care team to check in on how you are doing. This is a free service and part of our promise to provide the best care and service to meet your aftercare needs.  If you have questions, or wish to unsubscribe from this service please call [phone]. Thank you for Choosing our [facility] Emergency Department.

## 2021-04-18 NOTE — ED PROVIDER NOTES
63-year-old lady presents with concerns about pain in her left lower back and some in her right lower back after falling yesterday while trying to lift up a couch. She says she did not hit her head and had no loss of consciousness. She has no neck pain. She has had no bowel or bladder problems and no weakness or numbness in her legs. She says that the pain since yesterday has gotten worse. She notes that she took some sort of pain medicine last night that she had leftover from a knee surgery and it did not help her. She is not certain what that medicine was. No other associated symptoms. Elements of this note were created using speech recognition software. As such, errors of speech recognition may be present.            Past Medical History:   Diagnosis Date    Abdominal pain 9/29/2013    COPD (chronic obstructive pulmonary disease) (HCC)     DJD (degenerative joint disease) 9/29/2013    Esophageal reflux 9/29/2013    HTN (hypertension) 9/29/2013    Hypercholesterolemia     Hypertension     Other and unspecified hyperlipidemia 9/29/2013    Pyelonephritis 9/29/2013    Sensory neuropathy 12/23/2020    UTI (lower urinary tract infection) 9/29/2013       Past Surgical History:   Procedure Laterality Date    HX HYSTERECTOMY      HX PACEMAKER      [place] BREAST SURGERY PROCEDURE UNLISTED  01/07/2019    right breast cancer-lumpectomy    [place] CARDIAC SURG PROCEDURE UNLIST  2/05 8/2015    pacemaker    [place] CHEST SURGERY PROCEDURE UNLISTED      [place] LAP,CHOLECYSTECTOMY           Family History:   Problem Relation Age of Onset    Heart Disease Mother     Hypertension Mother     Heart Disease Father     Hypertension Father        Social History     Socioeconomic History    Marital status:      Spouse name: Not on file    Number of children: Not on file    Years of education: Not on file    Highest education level: Not on file   Occupational History    Not on file   Social Needs    Financial resource strain: Not on file    Food insecurity     Worry: Not on file     Inability: Not on file    Transportation needs     Medical: Not on file     Non-medical: Not on file   Tobacco Use    Smoking status: Former Smoker     Packs/day: 1.00     Years: 22.00     Pack years: 22.00     Types: Cigarettes     Quit date: 1989     Years since quittin.3    Smokeless tobacco: Never Used   Substance and Sexual Activity    Alcohol use: No    Drug use: No    Sexual activity: Never   Lifestyle    Physical activity     Days per week: Not on file     Minutes per session: Not on file    Stress: Not on file   Relationships    Social connections     Talks on phone: Not on file     Gets together: Not on file     Attends Amish service: Not on file     Active member of club or organization: Not on file     Attends meetings of clubs or organizations: Not on file     Relationship status: Not on file    Intimate partner violence     Fear of current or ex partner: Not on file     Emotionally abused: Not on file     Physically abused: Not on file     Forced sexual activity: Not on file   Other Topics Concern    Not on file   Social History Narrative    Not on file         ALLERGIES: Advair diskus [fluticasone propion-salmeterol], Aspirin, Codeine, and Lipitor [atorvastatin]    Review of Systems   Constitutional: Negative for chills, diaphoresis and fever. Respiratory: Negative for cough, chest tightness, shortness of breath and wheezing. Cardiovascular: Negative for chest pain and palpitations. Gastrointestinal: Negative for abdominal pain, blood in stool, diarrhea, nausea and vomiting. Genitourinary: Negative for dysuria and hematuria. Musculoskeletal: Positive for back pain and myalgias. Negative for arthralgias and neck stiffness. Skin: Negative for rash. Neurological: Negative for dizziness, weakness and headaches. Psychiatric/Behavioral: Negative for confusion and sleep disturbance.  The patient is not nervous/anxious. Vitals:    04/18/21 1208   BP: 118/68   Pulse: 73   Resp: 16   Temp: 98.7 °F (37.1 °C)   SpO2: 95%   Weight: 76.7 kg (169 lb)   Height: 5' 2\" (1.575 m)            Physical Exam  Vitals signs and nursing note reviewed. Constitutional:       Appearance: Normal appearance. HENT:      Head: Normocephalic and atraumatic. Cardiovascular:      Rate and Rhythm: Normal rate and regular rhythm. Pulmonary:      Effort: Pulmonary effort is normal.      Breath sounds: Normal breath sounds. Musculoskeletal:      Comments: Patient has tenderness in her left paraspinal area. No specific focal bony spinal tenderness. Neurological:      General: No focal deficit present. Mental Status: She is alert and oriented to person, place, and time. MDM  Number of Diagnoses or Management Options  Diagnosis management comments: Given her fall and tenderness I will get an x-ray of her low back.          Procedures

## 2021-04-18 NOTE — ED TRIAGE NOTES
Pt states she was trying to lift couch to fix the bottom and it fell on her yesterday. States she is having pain in lower back and left flank since the incident and urinating more often than normal. Hematoma to right lower leg since the fall also. Masked for triage.

## 2021-04-20 PROBLEM — F09 COGNITIVE DISORDER: Status: ACTIVE | Noted: 2021-04-20

## 2021-07-03 ENCOUNTER — HOSPITAL ENCOUNTER (EMERGENCY)
Age: 81
Discharge: HOME | End: 2021-07-03
Attending: EMERGENCY MEDICINE
Payer: MEDICARE

## 2021-07-03 VITALS
BODY MASS INDEX: 32.1 KG/M2 | TEMPERATURE: 98 F | HEIGHT: 61 IN | OXYGEN SATURATION: 94 % | WEIGHT: 170 LBS | SYSTOLIC BLOOD PRESSURE: 108 MMHG | HEART RATE: 70 BPM | RESPIRATION RATE: 16 BRPM | DIASTOLIC BLOOD PRESSURE: 69 MMHG

## 2021-07-03 DIAGNOSIS — N30.00 ACUTE CYSTITIS WITHOUT HEMATURIA: Primary | ICD-10-CM

## 2021-07-03 LAB
ANION GAP SERPL CALC-SCNC: 6 MMOL/L (ref 7–16)
BACTERIA URNS QL MICRO: ABNORMAL /HPF
BASOPHILS # BLD: 0.1 K/UL (ref 0–0.2)
BASOPHILS NFR BLD: 1 % (ref 0–2)
BUN SERPL-MCNC: 25 MG/DL (ref 8–23)
CALCIUM SERPL-MCNC: 9.7 MG/DL (ref 8.3–10.4)
CASTS URNS QL MICRO: ABNORMAL /LPF
CHLORIDE SERPL-SCNC: 107 MMOL/L (ref 98–107)
CO2 SERPL-SCNC: 27 MMOL/L (ref 21–32)
CREAT SERPL-MCNC: 1.65 MG/DL (ref 0.6–1)
DIFFERENTIAL METHOD BLD: ABNORMAL
EOSINOPHIL # BLD: 0.5 K/UL (ref 0–0.8)
EOSINOPHIL NFR BLD: 7 % (ref 0.5–7.8)
EPI CELLS #/AREA URNS HPF: ABNORMAL /HPF
ERYTHROCYTE [DISTWIDTH] IN BLOOD BY AUTOMATED COUNT: 13.7 % (ref 11.9–14.6)
GLUCOSE SERPL-MCNC: 102 MG/DL (ref 65–100)
HCT VFR BLD AUTO: 34.9 % (ref 35.8–46.3)
HGB BLD-MCNC: 11.2 G/DL (ref 11.7–15.4)
IMM GRANULOCYTES # BLD AUTO: 0.1 K/UL (ref 0–0.5)
IMM GRANULOCYTES NFR BLD AUTO: 1 % (ref 0–5)
LYMPHOCYTES # BLD: 2 K/UL (ref 0.5–4.6)
LYMPHOCYTES NFR BLD: 27 % (ref 13–44)
MAGNESIUM SERPL-MCNC: 1.6 MG/DL (ref 1.8–2.4)
MCH RBC QN AUTO: 29.5 PG (ref 26.1–32.9)
MCHC RBC AUTO-ENTMCNC: 32.1 G/DL (ref 31.4–35)
MCV RBC AUTO: 91.8 FL (ref 79.6–97.8)
MONOCYTES # BLD: 0.6 K/UL (ref 0.1–1.3)
MONOCYTES NFR BLD: 9 % (ref 4–12)
NEUTS SEG # BLD: 4.2 K/UL (ref 1.7–8.2)
NEUTS SEG NFR BLD: 56 % (ref 43–78)
NRBC # BLD: 0 K/UL (ref 0–0.2)
PLATELET # BLD AUTO: 229 K/UL (ref 150–450)
PMV BLD AUTO: 9.5 FL (ref 9.4–12.3)
POTASSIUM SERPL-SCNC: 4 MMOL/L (ref 3.5–5.1)
RBC # BLD AUTO: 3.8 M/UL (ref 4.05–5.2)
RBC #/AREA URNS HPF: ABNORMAL /HPF
SODIUM SERPL-SCNC: 140 MMOL/L (ref 136–145)
WBC # BLD AUTO: 7.6 K/UL (ref 4.3–11.1)
WBC URNS QL MICRO: ABNORMAL /HPF

## 2021-07-03 PROCEDURE — 85025 COMPLETE CBC W/AUTO DIFF WBC: CPT

## 2021-07-03 PROCEDURE — 81003 URINALYSIS AUTO W/O SCOPE: CPT

## 2021-07-03 PROCEDURE — 83735 ASSAY OF MAGNESIUM: CPT

## 2021-07-03 PROCEDURE — 99283 EMERGENCY DEPT VISIT LOW MDM: CPT

## 2021-07-03 PROCEDURE — 80048 BASIC METABOLIC PNL TOTAL CA: CPT

## 2021-07-03 PROCEDURE — 81015 MICROSCOPIC EXAM OF URINE: CPT

## 2021-07-03 RX ORDER — NITROFURANTOIN (MACROCRYSTALS) 100 MG/1
100 CAPSULE ORAL 2 TIMES DAILY
Qty: 14 CAPSULE | Refills: 0 | Status: SHIPPED | OUTPATIENT
Start: 2021-07-03 | End: 2021-07-10

## 2021-07-03 NOTE — ED TRIAGE NOTES
Daughter states pt is being treated for tremors and hallucinations by neurologist but has been having issues with hallucinations for about one week. Increased dose of seroquel but today she started shuffling feet while at [facility]. Pt is able to answer questions and denies hallucinations at this time. Masked for triage.

## 2021-07-03 NOTE — ED NOTES
I have reviewed discharge instructions with the patient. The patient verbalized understanding. Patient left ED via Discharge Method: wheelchair to Home with daughter. Opportunity for questions and clarification provided. Patient given 1 scripts. To continue your aftercare when you leave the hospital, you may receive an automated call from our care team to check in on how you are doing. This is a free service and part of our promise to provide the best care and service to meet your aftercare needs.  If you have questions, or wish to unsubscribe from this service please call [phone]. Thank you for Choosing our [facility] Emergency Department.

## 2021-07-03 NOTE — DISCHARGE INSTRUCTIONS
Return with any fevers, vomiting, difficulty breathing, worsening symptoms, or additional concerns. Follow-up with your primary care doctor, and neurologist, for reevaluation as needed.

## 2021-07-03 NOTE — ED PROVIDER NOTES
49-year-old lady presents with concerns about shuffling her feet while at [employer] today. Patient is here with her daughter. Patient has a history of some mild dementia and is recently an increase in her Seroquel dose related to auditory hallucinations. Daughter says that when they went to [employer] [facility] today her mother began having some shuffling gait. She did not appear to have any other symptoms. She has no weakness. She had no balance problems. Her language and speech comprehension are normal.  She denies any other symptoms. Elements of this note were created using speech recognition software. As such, errors of speech recognition may be present.            Past Medical History:   Diagnosis Date    Abdominal pain 9/29/2013    COPD (chronic obstructive pulmonary disease) (HCC)     DJD (degenerative joint disease) 9/29/2013    Esophageal reflux 9/29/2013    HTN (hypertension) 9/29/2013    Hypercholesterolemia     Hypertension     Other and unspecified hyperlipidemia 9/29/2013    Pyelonephritis 9/29/2013    Sensory neuropathy 12/23/2020    UTI (lower urinary tract infection) 9/29/2013       Past Surgical History:   Procedure Laterality Date    HX HYSTERECTOMY      HX PACEMAKER      [place] BREAST SURGERY PROCEDURE UNLISTED  01/07/2019    right breast cancer-lumpectomy    [place] CARDIAC SUR[place] PROCEDURE UNLIST  2/05 8/2015    pacemaker    [place] CHEST SURGERY PROCEDURE [place]LISTED      [place] LAP,CHOLECYSTECTOMY           Family History:   Problem Relation Age of Onset    Heart Disease Mother     Hypertension Mother     Heart Disease Father     Hypertension Father        Social History     Socioeconomic History    Marital status:      Spouse name: Not on file    Number of children: Not on file    Years of education: Not on file    Highest education level: Not on file   Occupational History    Not on file   Tobacco Use    Smoking status: Former Smoker     Packs/day: 1.00     Years: 22.00     Pack years: 22.00     Types: Cigarettes     Quit date: 1989     Years since quittin.5    Smokeless tobacco: Never Used   Substance and Sexual Activity    Alcohol use: No    Drug use: No    Sexual activity: Never   Other Topics Concern    Not on file   Social History Narrative    Not on file     Social Determinants of Health     Financial Resource Strain:     Difficulty of Paying Living Expenses:    Food Insecurity:     Worried About Running Out of Food in the Last Year:     [address] in the Last Year:    Transportation Needs:     Lack of Transportation (Medical):  Lack of Transportation (Non-Medical):    Physical Activity:     Days of Exercise per Week:     Minutes of Exercise per Session:    Stress:     Feeling of Stress :    Social Connections:     Frequency of Communication with Friends and Family:     Frequency of Social Gatherings with Friends and Family:     Attends Anglican Services:     Active Member of Clubs or Organizations:     Attends Club or Organization Meetings:     Marital Status:    Intimate Partner Violence:     Fear of Current or Ex-Partner:     Emotionally Abused:     Physically Abused:     Sexually Abused: ALLERGIES: Advair diskus [fluticasone propion-salmeterol], Aspirin, Codeine, and Lipitor [atorvastatin]    Review of Systems   Constitutional: Negative for chills, diaphoresis and fever. HENT: Negative for congestion, rhinorrhea and sore throat. Eyes: Negative for redness and visual disturbance. Respiratory: Negative for cough, chest tightness, shortness of breath and wheezing. Cardiovascular: Negative for chest pain and palpitations. Gastrointestinal: Negative for abdominal pain, blood in stool, diarrhea, nausea and vomiting. Endocrine: Negative for polydipsia and polyuria. Genitourinary: Negative for dysuria and hematuria. Musculoskeletal: Positive for gait problem. Negative for arthralgias, myalgias and neck stiffness.    Skin: Negative for rash. Allergic/Immunologic: Negative for environmental allergies and food allergies. Neurological: Negative for dizziness, weakness and headaches. Hematological: Negative for adenopathy. Does not bruise/bleed easily. Psychiatric/Behavioral: Positive for hallucinations. Negative for confusion and sleep disturbance. The patient is not nervous/anxious. Vitals:    07/03/21 1416   BP: 108/69   Pulse: 70   Resp: 16   Temp: 98 °F (36.7 °C)   SpO2: 94%   Weight: 77.1 kg (170 lb)   Height: 5' 1\" (1.549 m)            Physical Exam  Vitals and nursing note reviewed. Constitutional:       Appearance: Normal appearance. HENT:      Head: Normocephalic and atraumatic. Eyes:      Conjunctiva/sclera: Conjunctivae normal.   Cardiovascular:      Rate and Rhythm: Normal rate and regular rhythm. Pulmonary:      Effort: Pulmonary effort is normal.      Breath sounds: Normal breath sounds. Abdominal:      General: Bowel sounds are normal.      Palpations: Abdomen is soft. Skin:     General: Skin is warm. Findings: No rash. Neurological:      General: No focal deficit present. Mental Status: She is alert and oriented to person, place, and time. MDM  Number of Diagnoses or Management Options  Diagnosis management comments: Her symptoms may potentially be related to a side effect from the increase in Seroquel. I will have her check a metabolic panel to screen for electrolyte abnormality. I will check a urine to look for signs of UTI. Do not think she is having an acute ischemic event. ED Course as of Jul 03 1651   Sat Jul 03, 2021   1648 Patient's urine was nitrite positive. No significant white cells on the micro.   Given her age and the nitrite positive I will treat with antibiotics and sent for a culture.    [AC]      ED Course User Index  [AC] Malaika Carrillo MD       Procedures

## 2021-08-09 ENCOUNTER — HOSPITAL ENCOUNTER (OUTPATIENT)
Dept: MRI IMAGING | Age: 81
Discharge: HOME | End: 2021-08-09
Attending: PSYCHIATRY & NEUROLOGY
Payer: MEDICARE
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
## 2021-08-09 DIAGNOSIS — R41.82 ALTERED MENTAL STATUS, UNSPECIFIED ALTERED MENTAL STATUS TYPE: ICD-10-CM

## 2021-08-09 DIAGNOSIS — R26.89 ABNORMALITY OF GAIT DUE TO IMPAIRMENT OF BALANCE: ICD-10-CM

## 2021-08-09 PROCEDURE — 70551 MRI BRAIN STEM W/O DYE: CPT

## 2021-08-18 ENCOUNTER — APPOINTMENT (OUTPATIENT)
Dept: GENERAL RADIOLOGY | Age: 81
End: 2021-08-18
Attending: PHYSICIAN ASSISTANT
Payer: MEDICARE

## 2021-08-18 ENCOUNTER — APPOINTMENT (OUTPATIENT)
Dept: CT IMAGING | Age: 81
End: 2021-08-18
Attending: PHYSICIAN ASSISTANT
Payer: MEDICARE

## 2021-08-18 ENCOUNTER — HOSPITAL ENCOUNTER (OUTPATIENT)
Age: 81
Setting detail: OBSERVATION
Discharge: NURSING, REHAB OR LONG-TERM CARE | End: 2021-08-23
Attending: STUDENT IN AN ORGANIZED HEALTH CARE EDUCATION/TRAINING PROGRAM | Admitting: FAMILY MEDICINE
Payer: MEDICARE

## 2021-08-18 ENCOUNTER — APPOINTMENT (OUTPATIENT)
Dept: CT IMAGING | Age: 81
End: 2021-08-18
Attending: STUDENT IN AN ORGANIZED HEALTH CARE EDUCATION/TRAINING PROGRAM
Payer: MEDICARE

## 2021-08-18 DIAGNOSIS — N39.0 URINARY TRACT INFECTION WITHOUT HEMATURIA, SITE UNSPECIFIED: ICD-10-CM

## 2021-08-18 DIAGNOSIS — N17.9 AKI (ACUTE KIDNEY INJURY) (HCC): ICD-10-CM

## 2021-08-18 DIAGNOSIS — S70.02XA CONTUSION OF LEFT HIP, INITIAL ENCOUNTER: Primary | ICD-10-CM

## 2021-08-18 DIAGNOSIS — S00.83XA CONTUSION OF FACE, INITIAL ENCOUNTER: ICD-10-CM

## 2021-08-18 PROBLEM — R29.6 FREQUENT FALLS: Chronic | Status: ACTIVE | Noted: 2021-08-18

## 2021-08-18 PROBLEM — Z79.01 CURRENT USE OF LONG TERM ANTICOAGULATION: Chronic | Status: ACTIVE | Noted: 2021-08-18

## 2021-08-18 PROBLEM — Z95.0 STATUS CARDIAC PACEMAKER: Chronic | Status: ACTIVE | Noted: 2017-08-30

## 2021-08-18 PROBLEM — F09 COGNITIVE DISORDER: Chronic | Status: ACTIVE | Noted: 2021-04-20

## 2021-08-18 LAB
ALBUMIN SERPL-MCNC: 3.4 G/DL (ref 3.2–4.6)
ALBUMIN/GLOB SERPL: 1 {RATIO} (ref 1.2–3.5)
ALP SERPL-CCNC: 64 U/L (ref 50–136)
ALT SERPL-CCNC: 17 U/L (ref 12–65)
ANION GAP SERPL CALC-SCNC: 6 MMOL/L (ref 7–16)
AST SERPL-CCNC: 15 U/L (ref 15–37)
BACTERIA URNS QL MICRO: ABNORMAL /HPF
BASOPHILS # BLD: 0.1 K/UL (ref 0–0.2)
BASOPHILS NFR BLD: 1 % (ref 0–2)
BILIRUB SERPL-MCNC: 0.4 MG/DL (ref 0.2–1.1)
BUN SERPL-MCNC: 24 MG/DL (ref 8–23)
CALCIUM SERPL-MCNC: 9.5 MG/DL (ref 8.3–10.4)
CASTS URNS QL MICRO: ABNORMAL /LPF
CHLORIDE SERPL-SCNC: 102 MMOL/L (ref 98–107)
CO2 SERPL-SCNC: 30 MMOL/L (ref 21–32)
CREAT SERPL-MCNC: 2.16 MG/DL (ref 0.6–1)
DIFFERENTIAL METHOD BLD: ABNORMAL
EOSINOPHIL # BLD: 0.5 K/UL (ref 0–0.8)
EOSINOPHIL NFR BLD: 5 % (ref 0.5–7.8)
EPI CELLS #/AREA URNS HPF: ABNORMAL /HPF
ERYTHROCYTE [DISTWIDTH] IN BLOOD BY AUTOMATED COUNT: 13.8 % (ref 11.9–14.6)
GLOBULIN SER CALC-MCNC: 3.5 G/DL (ref 2.3–3.5)
GLUCOSE SERPL-MCNC: 99 MG/DL (ref 65–100)
HCT VFR BLD AUTO: 34.2 % (ref 35.8–46.3)
HGB BLD-MCNC: 10.9 G/DL (ref 11.7–15.4)
IMM GRANULOCYTES # BLD AUTO: 0 K/UL (ref 0–0.5)
IMM GRANULOCYTES NFR BLD AUTO: 0 % (ref 0–5)
LYMPHOCYTES # BLD: 2.4 K/UL (ref 0.5–4.6)
LYMPHOCYTES NFR BLD: 24 % (ref 13–44)
MAGNESIUM SERPL-MCNC: 1.6 MG/DL (ref 1.8–2.4)
MCH RBC QN AUTO: 29.3 PG (ref 26.1–32.9)
MCHC RBC AUTO-ENTMCNC: 31.9 G/DL (ref 31.4–35)
MCV RBC AUTO: 91.9 FL (ref 79.6–97.8)
MONOCYTES # BLD: 0.7 K/UL (ref 0.1–1.3)
MONOCYTES NFR BLD: 7 % (ref 4–12)
NEUTS SEG # BLD: 6.4 K/UL (ref 1.7–8.2)
NEUTS SEG NFR BLD: 63 % (ref 43–78)
NRBC # BLD: 0 K/UL (ref 0–0.2)
PLATELET # BLD AUTO: 225 K/UL (ref 150–450)
PMV BLD AUTO: 9.6 FL (ref 9.4–12.3)
POTASSIUM SERPL-SCNC: 4 MMOL/L (ref 3.5–5.1)
PROT SERPL-MCNC: 6.9 G/DL (ref 6.3–8.2)
RBC # BLD AUTO: 3.72 M/UL (ref 4.05–5.2)
RBC #/AREA URNS HPF: ABNORMAL /HPF
SODIUM SERPL-SCNC: 138 MMOL/L (ref 136–145)
TROPONIN-HIGH SENSITIVITY: 11 PG/ML (ref 0–14)
TROPONIN-HIGH SENSITIVITY: 12.8 PG/ML (ref 0–14)
WBC # BLD AUTO: 10.2 K/UL (ref 4.3–11.1)
WBC URNS QL MICRO: ABNORMAL /HPF

## 2021-08-18 PROCEDURE — 87040 BLOOD CULTURE FOR BACTERIA: CPT

## 2021-08-18 PROCEDURE — 80053 COMPREHEN METABOLIC PANEL: CPT

## 2021-08-18 PROCEDURE — 65270000029 HC RM PRIVATE

## 2021-08-18 PROCEDURE — 73030 X-RAY EXAM OF SHOULDER: CPT

## 2021-08-18 PROCEDURE — 83735 ASSAY OF MAGNESIUM: CPT

## 2021-08-18 PROCEDURE — 81003 URINALYSIS AUTO W/O SCOPE: CPT

## 2021-08-18 PROCEDURE — 99283 EMERGENCY DEPT VISIT LOW MDM: CPT

## 2021-08-18 PROCEDURE — 73700 CT LOWER EXTREMITY W/O DYE: CPT

## 2021-08-18 PROCEDURE — 81015 MICROSCOPIC EXAM OF URINE: CPT

## 2021-08-18 PROCEDURE — 65390000012 HC CONDITION CODE 44 OBSERVATION

## 2021-08-18 PROCEDURE — 70450 CT HEAD/BRAIN W/O DYE: CPT

## 2021-08-18 PROCEDURE — 71046 X-RAY EXAM CHEST 2 VIEWS: CPT

## 2021-08-18 PROCEDURE — 73502 X-RAY EXAM HIP UNI 2-3 VIEWS: CPT

## 2021-08-18 PROCEDURE — 72125 CT NECK SPINE W/O DYE: CPT

## 2021-08-18 PROCEDURE — 96365 THER/PROPH/DIAG IV INF INIT: CPT

## 2021-08-18 PROCEDURE — 70486 CT MAXILLOFACIAL W/O DYE: CPT

## 2021-08-18 PROCEDURE — 84484 ASSAY OF TROPONIN QUANT: CPT

## 2021-08-18 PROCEDURE — 85025 COMPLETE CBC W/AUTO DIFF WBC: CPT

## 2021-08-18 PROCEDURE — 74011000258 HC RX REV CODE- 258: Performed by: STUDENT IN AN ORGANIZED HEALTH CARE EDUCATION/TRAINING PROGRAM

## 2021-08-18 PROCEDURE — 74011250636 HC RX REV CODE- 250/636: Performed by: STUDENT IN AN ORGANIZED HEALTH CARE EDUCATION/TRAINING PROGRAM

## 2021-08-18 PROCEDURE — 93005 ELECTROCARDIOGRAM TRACING: CPT | Performed by: PHYSICIAN ASSISTANT

## 2021-08-18 PROCEDURE — 74011250636 HC RX REV CODE- 250/636: Performed by: FAMILY MEDICINE

## 2021-08-18 RX ORDER — METOPROLOL TARTRATE 25 MG/1
25 TABLET, FILM COATED ORAL EVERY 12 HOURS
Status: DISCONTINUED | OUTPATIENT
Start: 2021-08-18 | End: 2021-08-23 | Stop reason: HOSPADM

## 2021-08-18 RX ORDER — AMLODIPINE BESYLATE 5 MG/1
5 TABLET ORAL DAILY
Status: DISCONTINUED | OUTPATIENT
Start: 2021-08-19 | End: 2021-08-23 | Stop reason: HOSPADM

## 2021-08-18 RX ORDER — MEMANTINE HYDROCHLORIDE 5 MG/1
5 TABLET ORAL EVERY 12 HOURS
Status: DISCONTINUED | OUTPATIENT
Start: 2021-08-19 | End: 2021-08-23 | Stop reason: HOSPADM

## 2021-08-18 RX ORDER — ALBUTEROL SULFATE 0.83 MG/ML
2.5 SOLUTION RESPIRATORY (INHALATION)
Status: DISCONTINUED | OUTPATIENT
Start: 2021-08-18 | End: 2021-08-23 | Stop reason: HOSPADM

## 2021-08-18 RX ORDER — FLECAINIDE ACETATE 100 MG/1
50 TABLET ORAL 2 TIMES DAILY
Status: DISCONTINUED | OUTPATIENT
Start: 2021-08-19 | End: 2021-08-18

## 2021-08-18 RX ORDER — RISPERIDONE 0.5 MG/1
1 TABLET, FILM COATED ORAL EVERY 12 HOURS
Status: DISCONTINUED | OUTPATIENT
Start: 2021-08-19 | End: 2021-08-18

## 2021-08-18 RX ORDER — LOSARTAN POTASSIUM 50 MG/1
50 TABLET ORAL DAILY
Status: DISCONTINUED | OUTPATIENT
Start: 2021-08-19 | End: 2021-08-23 | Stop reason: HOSPADM

## 2021-08-18 RX ORDER — MONTELUKAST SODIUM 10 MG/1
10 TABLET ORAL DAILY
Status: DISCONTINUED | OUTPATIENT
Start: 2021-08-19 | End: 2021-08-23 | Stop reason: HOSPADM

## 2021-08-18 RX ORDER — SERTRALINE HYDROCHLORIDE 50 MG/1
50 TABLET, FILM COATED ORAL DAILY
Status: DISCONTINUED | OUTPATIENT
Start: 2021-08-19 | End: 2021-08-23 | Stop reason: HOSPADM

## 2021-08-18 RX ORDER — GABAPENTIN 400 MG/1
400 CAPSULE ORAL 3 TIMES DAILY
Status: DISCONTINUED | OUTPATIENT
Start: 2021-08-19 | End: 2021-08-18

## 2021-08-18 RX ORDER — ALLOPURINOL 100 MG/1
100 TABLET ORAL EVERY 12 HOURS
Status: DISCONTINUED | OUTPATIENT
Start: 2021-08-19 | End: 2021-08-23 | Stop reason: HOSPADM

## 2021-08-18 RX ORDER — SODIUM CHLORIDE 0.9 % (FLUSH) 0.9 %
5-40 SYRINGE (ML) INJECTION AS NEEDED
Status: DISCONTINUED | OUTPATIENT
Start: 2021-08-18 | End: 2021-08-23 | Stop reason: HOSPADM

## 2021-08-18 RX ORDER — CLORAZEPATE DIPOTASSIUM 7.5 MG/1
3.75 TABLET ORAL
Status: DISCONTINUED | OUTPATIENT
Start: 2021-08-18 | End: 2021-08-23 | Stop reason: HOSPADM

## 2021-08-18 RX ORDER — MEMANTINE HYDROCHLORIDE 5 MG/1
5 TABLET ORAL 2 TIMES DAILY
Status: DISCONTINUED | OUTPATIENT
Start: 2021-08-19 | End: 2021-08-18

## 2021-08-18 RX ORDER — ACETAMINOPHEN 325 MG/1
650 TABLET ORAL
Status: DISCONTINUED | OUTPATIENT
Start: 2021-08-18 | End: 2021-08-23 | Stop reason: HOSPADM

## 2021-08-18 RX ORDER — PANTOPRAZOLE SODIUM 40 MG/1
40 TABLET, DELAYED RELEASE ORAL
Status: DISCONTINUED | OUTPATIENT
Start: 2021-08-19 | End: 2021-08-18

## 2021-08-18 RX ORDER — FLECAINIDE ACETATE 100 MG/1
50 TABLET ORAL EVERY 12 HOURS
Status: DISCONTINUED | OUTPATIENT
Start: 2021-08-19 | End: 2021-08-23 | Stop reason: HOSPADM

## 2021-08-18 RX ORDER — RISPERIDONE 0.5 MG/1
1 TABLET, FILM COATED ORAL EVERY 12 HOURS
Status: DISCONTINUED | OUTPATIENT
Start: 2021-08-19 | End: 2021-08-23 | Stop reason: HOSPADM

## 2021-08-18 RX ORDER — HYDROCHLOROTHIAZIDE 12.5 MG/1
12.5 CAPSULE ORAL DAILY
Status: DISCONTINUED | OUTPATIENT
Start: 2021-08-19 | End: 2021-08-23 | Stop reason: HOSPADM

## 2021-08-18 RX ORDER — SODIUM CHLORIDE 9 MG/ML
75 INJECTION, SOLUTION INTRAVENOUS CONTINUOUS
Status: DISPENSED | OUTPATIENT
Start: 2021-08-18 | End: 2021-08-19

## 2021-08-18 RX ORDER — POLYETHYLENE GLYCOL 3350 17 G/17G
17 POWDER, FOR SOLUTION ORAL DAILY
Status: DISCONTINUED | OUTPATIENT
Start: 2021-08-19 | End: 2021-08-23 | Stop reason: HOSPADM

## 2021-08-18 RX ORDER — TAMOXIFEN CITRATE 10 MG/1
20 TABLET, FILM COATED ORAL DAILY
Status: DISCONTINUED | OUTPATIENT
Start: 2021-08-19 | End: 2021-08-23 | Stop reason: HOSPADM

## 2021-08-18 RX ORDER — ALLOPURINOL 100 MG/1
100 TABLET ORAL DAILY
Status: DISCONTINUED | OUTPATIENT
Start: 2021-08-19 | End: 2021-08-18

## 2021-08-18 RX ORDER — FAMOTIDINE 20 MG/1
40 TABLET, FILM COATED ORAL EVERY EVENING
Status: DISCONTINUED | OUTPATIENT
Start: 2021-08-19 | End: 2021-08-23 | Stop reason: HOSPADM

## 2021-08-18 RX ORDER — ASPIRIN 81 MG/1
81 TABLET ORAL DAILY
Status: DISCONTINUED | OUTPATIENT
Start: 2021-08-19 | End: 2021-08-23 | Stop reason: HOSPADM

## 2021-08-18 RX ORDER — SODIUM CHLORIDE 0.9 % (FLUSH) 0.9 %
5-40 SYRINGE (ML) INJECTION EVERY 8 HOURS
Status: DISCONTINUED | OUTPATIENT
Start: 2021-08-18 | End: 2021-08-23 | Stop reason: HOSPADM

## 2021-08-18 RX ORDER — OXYCODONE HYDROCHLORIDE 5 MG/1
5 TABLET ORAL
Qty: 10 TABLET | Refills: 0 | Status: SHIPPED | OUTPATIENT
Start: 2021-08-18 | End: 2021-08-21

## 2021-08-18 RX ORDER — BUMETANIDE 1 MG/1
1 TABLET ORAL
Status: DISCONTINUED | OUTPATIENT
Start: 2021-08-19 | End: 2021-08-23 | Stop reason: HOSPADM

## 2021-08-18 RX ORDER — PANTOPRAZOLE SODIUM 40 MG/1
40 TABLET, DELAYED RELEASE ORAL
Status: DISCONTINUED | OUTPATIENT
Start: 2021-08-19 | End: 2021-08-23 | Stop reason: HOSPADM

## 2021-08-18 RX ORDER — ACETAMINOPHEN 650 MG/1
650 SUPPOSITORY RECTAL
Status: DISCONTINUED | OUTPATIENT
Start: 2021-08-18 | End: 2021-08-23 | Stop reason: HOSPADM

## 2021-08-18 RX ORDER — GABAPENTIN 400 MG/1
400 CAPSULE ORAL 3 TIMES DAILY
Status: DISCONTINUED | OUTPATIENT
Start: 2021-08-19 | End: 2021-08-23 | Stop reason: HOSPADM

## 2021-08-18 RX ADMIN — SODIUM CHLORIDE 500 ML: 900 INJECTION, SOLUTION INTRAVENOUS at 21:10

## 2021-08-18 RX ADMIN — CEFTRIAXONE 1 G: 1 INJECTION, POWDER, FOR SOLUTION INTRAMUSCULAR; INTRAVENOUS at 21:11

## 2021-08-18 RX ADMIN — SODIUM CHLORIDE 75 ML/HR: 900 INJECTION, SOLUTION INTRAVENOUS at 23:14

## 2021-08-18 NOTE — ED TRIAGE NOTES
Patient with fall earlier today unwitnessed. Patient fell outside. Patient with some confusion normal per family. Patient with bruising and swelling to right lower lip. Patient complaining of left hip pain, left upper leg, and left shoulder. Mask on during triage. Patient is on eliquis.

## 2021-08-18 NOTE — ED PROVIDER NOTES
Patient is here with fall earlier today. Hit chin, head, left hip and shoulder. On Eliquis. Pain to aforementioned area's. Patient evaluated initially in triage. Rapid Medical Evaluation was conducted and necessary orders have been placed. I have performed a medical screening exam.  Care will now be transferred to the attending physician in the emergency department. MYKE Lopez 4:47 PM      80year-old seen initially by provider in triage, sustained a fall earlier today while ambulating outside of her home. This was unwitnessed, patient suffers from baseline dementia but recalls stumbling forward and striking her head against a concrete surface. Patient does not believe she lost consciousness. Arrives with complaints of shoulder and hip pain on the left side. Family at bedside did not witness fall but states patient has been unable to really ambulate since. She has been able to bear weight but not take steps. Patient is anticoagulated on Eliquis. Fall  Pertinent negatives include no fever, no numbness, no abdominal pain, no nausea, no vomiting, no hematuria and no headaches.         Past Medical History:   Diagnosis Date    Abdominal pain 9/29/2013    COPD (chronic obstructive pulmonary disease) (HCC)     DJD (degenerative joint disease) 9/29/2013    Esophageal reflux 9/29/2013    HTN (hypertension) 9/29/2013    Hypercholesterolemia     Hypertension     Other and unspecified hyperlipidemia 9/29/2013    Pyelonephritis 9/29/2013    Sensory neuropathy 12/23/2020    UTI (lower urinary tract infection) 9/29/2013       Past Surgical History:   Procedure Laterality Date    HX HYSTERECTOMY      HX PACEMAKER      IN BREAST SURGERY PROCEDURE UNLISTED  01/07/2019    right breast cancer-lumpectomy    IN CARDIAC SURG PROCEDURE UNLIST  2/05 8/2015    pacemaker    IN CHEST SURGERY PROCEDURE UNLISTED      IN LAP,CHOLECYSTECTOMY           Family History:   Problem Relation Age of Onset  Heart Disease Mother     Hypertension Mother     Heart Disease Father     Hypertension Father        Social History     Socioeconomic History    Marital status:      Spouse name: Not on file    Number of children: Not on file    Years of education: Not on file    Highest education level: Not on file   Occupational History    Not on file   Tobacco Use    Smoking status: Former Smoker     Packs/day: 1.00     Years: 22.00     Pack years: 22.00     Types: Cigarettes     Quit date: 1989     Years since quittin.6    Smokeless tobacco: Never Used   Substance and Sexual Activity    Alcohol use: No    Drug use: No    Sexual activity: Never   Other Topics Concern    Not on file   Social History Narrative    Not on file     Social Determinants of Health     Financial Resource Strain:     Difficulty of Paying Living Expenses:    Food Insecurity:     Worried About Running Out of Food in the Last Year:     [address] in the Last Year:    Transportation Needs:     Lack of Transportation (Medical):  Lack of Transportation (Non-Medical):    Physical Activity:     Days of Exercise per Week:     Minutes of Exercise per Session:    Stress:     Feeling of Stress :    Social Connections:     Frequency of Communication with Friends and Family:     Frequency of Social Gatherings with Friends and Family:     Attends [employer] Services:     Active Member of Clubs or Organizations:     Attends Club or Organization Meetings:     Marital Status:    Intimate Partner Violence:     Fear of Current or Ex-Partner:     Emotionally Abused:     Physically Abused:     Sexually Abused: ALLERGIES: Advair diskus [fluticasone propion-salmeterol], Aspirin, Codeine, and Lipitor [atorvastatin]    Review of Systems   Unable to perform ROS: Dementia   Constitutional: Negative for chills, diaphoresis and fever. HENT: Negative for congestion, sneezing and sore throat.     Eyes: Negative for visual disturbance. Respiratory: Negative for cough, chest tightness, shortness of breath and wheezing. Cardiovascular: Negative for chest pain and leg swelling. Gastrointestinal: Negative for abdominal pain, blood in stool, diarrhea, nausea and vomiting. Endocrine: Negative for polyuria. Genitourinary: Negative for difficulty urinating, dysuria, flank pain, hematuria and urgency. Musculoskeletal: Positive for arthralgias, gait problem and myalgias. Negative for back pain, neck pain and neck stiffness. Skin: Negative for color change and rash. Neurological: Negative for dizziness, syncope, speech difficulty, weakness, light-headedness, numbness and headaches. Psychiatric/Behavioral: Negative for behavioral problems. All other systems reviewed and are negative. Vitals:    08/18/21 1640   BP: (!) 104/56   Pulse: 72   Resp: 16   Temp: 99.3 °F (37.4 °C)   SpO2: 92%   Weight: 81.6 kg (180 lb)   Height: 5' 2\" (1.575 m)            Physical Exam  Vitals and nursing note reviewed. Constitutional:       General: She is not in acute distress. Appearance: She is well-developed. She is not diaphoretic. Comments: Alert and oriented to person place and time. No acute distress, speaks in clear, fluid sentences. HENT:      Head: Normocephalic. Comments: Large bruise to the lower lip and jaw with normal intraoral exam.  No obvious hemotympanum or cano sign. No raccoon eyes. No other signs of trauma about the head or face. Right Ear: Tympanic membrane, ear canal and external ear normal.      Left Ear: Tympanic membrane, ear canal and external ear normal.      Nose: Nose normal.   Eyes:      Pupils: Pupils are equal, round, and reactive to light. Cardiovascular:      Rate and Rhythm: Normal rate and regular rhythm. Heart sounds: Normal heart sounds. No murmur heard. No friction rub. No gallop. Pulmonary:      Effort: Pulmonary effort is normal. No respiratory distress. Breath sounds: Normal breath sounds. No stridor. No decreased breath sounds, wheezing, rhonchi or rales. Chest:      Chest wall: No tenderness. Comments: Some bruising noted to the left lateral chest wall, tender to palpation without evidence of crepitus or subcu emphysema noted. Abdominal:      General: There is no distension. Palpations: Abdomen is soft. There is no mass. Tenderness: There is no abdominal tenderness. There is no guarding or rebound. Hernia: No hernia is present. Musculoskeletal:         General: No tenderness or deformity. Normal range of motion. Cervical back: Normal range of motion. Comments: Some discomfort with range of motion testing on the left upper extremity in the shoulder. There is no obvious step-off or deformity or signs of trauma otherwise. Left lower extremity is slightly externally rotated on exam with some fullness over the lateral aspect of the hip. She reports pain in this area. Skin:     General: Skin is warm and dry. Neurological:      Mental Status: She is alert and oriented to person, place, and time. Cranial Nerves: No cranial nerve deficit.           MDM       Procedures

## 2021-08-18 NOTE — ED NOTES
Ambulated patient. Pt was able to stand after a moment of reorientation from laying down. Pt had no problem standing up. Pt had problem transferring weight to the left leg and appeared to have some pain.   Transferring weight to the right leg was normal.

## 2021-08-19 LAB
ANION GAP SERPL CALC-SCNC: 4 MMOL/L (ref 7–16)
ATRIAL RATE: 72 BPM
BASOPHILS # BLD: 0 K/UL (ref 0–0.2)
BASOPHILS NFR BLD: 1 % (ref 0–2)
BUN SERPL-MCNC: 23 MG/DL (ref 8–23)
CALCIUM SERPL-MCNC: 9 MG/DL (ref 8.3–10.4)
CALCULATED R AXIS, ECG10: 1 DEGREES
CALCULATED T AXIS, ECG11: 31 DEGREES
CHLORIDE SERPL-SCNC: 107 MMOL/L (ref 98–107)
CO2 SERPL-SCNC: 30 MMOL/L (ref 21–32)
CREAT SERPL-MCNC: 1.71 MG/DL (ref 0.6–1)
DIAGNOSIS, 93000: NORMAL
DIFFERENTIAL METHOD BLD: ABNORMAL
EOSINOPHIL # BLD: 0.7 K/UL (ref 0–0.8)
EOSINOPHIL NFR BLD: 9 % (ref 0.5–7.8)
ERYTHROCYTE [DISTWIDTH] IN BLOOD BY AUTOMATED COUNT: 13.6 % (ref 11.9–14.6)
GLUCOSE SERPL-MCNC: 123 MG/DL (ref 65–100)
HCT VFR BLD AUTO: 31.8 % (ref 35.8–46.3)
HGB BLD-MCNC: 9.9 G/DL (ref 11.7–15.4)
IMM GRANULOCYTES # BLD AUTO: 0 K/UL (ref 0–0.5)
IMM GRANULOCYTES NFR BLD AUTO: 0 % (ref 0–5)
LYMPHOCYTES # BLD: 2.2 K/UL (ref 0.5–4.6)
LYMPHOCYTES NFR BLD: 30 % (ref 13–44)
MAGNESIUM SERPL-MCNC: 1.7 MG/DL (ref 1.8–2.4)
MCH RBC QN AUTO: 28.7 PG (ref 26.1–32.9)
MCHC RBC AUTO-ENTMCNC: 31.1 G/DL (ref 31.4–35)
MCV RBC AUTO: 92.2 FL (ref 79.6–97.8)
MONOCYTES # BLD: 0.8 K/UL (ref 0.1–1.3)
MONOCYTES NFR BLD: 11 % (ref 4–12)
NEUTS SEG # BLD: 3.7 K/UL (ref 1.7–8.2)
NEUTS SEG NFR BLD: 50 % (ref 43–78)
NRBC # BLD: 0 K/UL (ref 0–0.2)
PLATELET # BLD AUTO: 193 K/UL (ref 150–450)
PMV BLD AUTO: 9.7 FL (ref 9.4–12.3)
POTASSIUM SERPL-SCNC: 3.6 MMOL/L (ref 3.5–5.1)
Q-T INTERVAL, ECG07: 448 MS
QRS DURATION, ECG06: 108 MS
QTC CALCULATION (BEZET), ECG08: 483 MS
RBC # BLD AUTO: 3.45 M/UL (ref 4.05–5.2)
SODIUM SERPL-SCNC: 141 MMOL/L (ref 136–145)
VENTRICULAR RATE, ECG03: 70 BPM
WBC # BLD AUTO: 7.4 K/UL (ref 4.3–11.1)

## 2021-08-19 PROCEDURE — 85025 COMPLETE CBC W/AUTO DIFF WBC: CPT

## 2021-08-19 PROCEDURE — 99218 HC RM OBSERVATION: CPT

## 2021-08-19 PROCEDURE — 2709999900 HC NON-CHARGEABLE SUPPLY

## 2021-08-19 PROCEDURE — 97535 SELF CARE MNGMENT TRAINING: CPT

## 2021-08-19 PROCEDURE — 97165 OT EVAL LOW COMPLEX 30 MIN: CPT

## 2021-08-19 PROCEDURE — 83735 ASSAY OF MAGNESIUM: CPT

## 2021-08-19 PROCEDURE — 65390000012 HC CONDITION CODE 44 OBSERVATION

## 2021-08-19 PROCEDURE — 74011250636 HC RX REV CODE- 250/636: Performed by: FAMILY MEDICINE

## 2021-08-19 PROCEDURE — 80048 BASIC METABOLIC PNL TOTAL CA: CPT

## 2021-08-19 PROCEDURE — 97161 PT EVAL LOW COMPLEX 20 MIN: CPT

## 2021-08-19 PROCEDURE — 97530 THERAPEUTIC ACTIVITIES: CPT

## 2021-08-19 PROCEDURE — 74011250637 HC RX REV CODE- 250/637: Performed by: FAMILY MEDICINE

## 2021-08-19 PROCEDURE — 74011000250 HC RX REV CODE- 250: Performed by: FAMILY MEDICINE

## 2021-08-19 PROCEDURE — 36415 COLL VENOUS BLD VENIPUNCTURE: CPT

## 2021-08-19 PROCEDURE — 86580 TB INTRADERMAL TEST: CPT | Performed by: FAMILY MEDICINE

## 2021-08-19 RX ADMIN — Medication 10 ML: at 21:06

## 2021-08-19 RX ADMIN — Medication 81 MG: at 08:39

## 2021-08-19 RX ADMIN — APIXABAN 2.5 MG: 2.5 TABLET, FILM COATED ORAL at 20:22

## 2021-08-19 RX ADMIN — METOPROLOL TARTRATE 25 MG: 25 TABLET, FILM COATED ORAL at 08:39

## 2021-08-19 RX ADMIN — MEMANTINE 5 MG: 5 TABLET ORAL at 20:23

## 2021-08-19 RX ADMIN — Medication 10 ML: at 01:06

## 2021-08-19 RX ADMIN — APIXABAN 2.5 MG: 2.5 TABLET, FILM COATED ORAL at 08:41

## 2021-08-19 RX ADMIN — LOSARTAN POTASSIUM 50 MG: 50 TABLET, FILM COATED ORAL at 08:39

## 2021-08-19 RX ADMIN — HYDROCHLOROTHIAZIDE 12.5 MG: 12.5 CAPSULE ORAL at 08:38

## 2021-08-19 RX ADMIN — AMLODIPINE BESYLATE 5 MG: 5 TABLET ORAL at 08:40

## 2021-08-19 RX ADMIN — PANTOPRAZOLE SODIUM 40 MG: 40 TABLET, DELAYED RELEASE ORAL at 00:21

## 2021-08-19 RX ADMIN — POLYETHYLENE GLYCOL 3350 17 G: 17 POWDER, FOR SOLUTION ORAL at 08:47

## 2021-08-19 RX ADMIN — GABAPENTIN 400 MG: 400 CAPSULE ORAL at 17:30

## 2021-08-19 RX ADMIN — CLORAZEPATE DIPOTASSIUM 3.75 MG: 7.5 TABLET ORAL at 20:22

## 2021-08-19 RX ADMIN — FAMOTIDINE 40 MG: 20 TABLET ORAL at 17:29

## 2021-08-19 RX ADMIN — GABAPENTIN 400 MG: 400 CAPSULE ORAL at 00:21

## 2021-08-19 RX ADMIN — MEMANTINE 5 MG: 5 TABLET ORAL at 00:21

## 2021-08-19 RX ADMIN — FLECAINIDE ACETATE 50 MG: 100 TABLET ORAL at 00:22

## 2021-08-19 RX ADMIN — METOPROLOL TARTRATE 25 MG: 25 TABLET, FILM COATED ORAL at 20:23

## 2021-08-19 RX ADMIN — ALLOPURINOL 100 MG: 100 TABLET ORAL at 08:41

## 2021-08-19 RX ADMIN — TUBERCULIN PURIFIED PROTEIN DERIVATIVE 5 UNITS: 5 INJECTION, SOLUTION INTRADERMAL at 08:51

## 2021-08-19 RX ADMIN — ALLOPURINOL 100 MG: 100 TABLET ORAL at 20:23

## 2021-08-19 RX ADMIN — FLECAINIDE ACETATE 50 MG: 100 TABLET ORAL at 08:40

## 2021-08-19 RX ADMIN — TAMOXIFEN CITRATE 20 MG: 10 TABLET, FILM COATED ORAL at 08:40

## 2021-08-19 RX ADMIN — METOPROLOL TARTRATE 25 MG: 25 TABLET, FILM COATED ORAL at 00:21

## 2021-08-19 RX ADMIN — BUMETANIDE 1 MG: 1 TABLET ORAL at 17:30

## 2021-08-19 RX ADMIN — MONTELUKAST 10 MG: 10 TABLET, FILM COATED ORAL at 08:40

## 2021-08-19 RX ADMIN — APIXABAN 2.5 MG: 2.5 TABLET, FILM COATED ORAL at 00:21

## 2021-08-19 RX ADMIN — PANTOPRAZOLE SODIUM 40 MG: 40 TABLET, DELAYED RELEASE ORAL at 20:23

## 2021-08-19 RX ADMIN — RISPERIDONE 1 MG: 0.5 TABLET ORAL at 08:39

## 2021-08-19 RX ADMIN — MEMANTINE 5 MG: 5 TABLET ORAL at 08:39

## 2021-08-19 RX ADMIN — FLECAINIDE ACETATE 50 MG: 100 TABLET ORAL at 20:22

## 2021-08-19 RX ADMIN — GABAPENTIN 400 MG: 400 CAPSULE ORAL at 22:00

## 2021-08-19 RX ADMIN — Medication 10 ML: at 06:22

## 2021-08-19 RX ADMIN — RISPERIDONE 1 MG: 0.5 TABLET ORAL at 00:21

## 2021-08-19 RX ADMIN — SERTRALINE HYDROCHLORIDE 50 MG: 50 TABLET ORAL at 08:40

## 2021-08-19 RX ADMIN — ACETAMINOPHEN 650 MG: 325 TABLET, FILM COATED ORAL at 20:22

## 2021-08-19 RX ADMIN — CLORAZEPATE DIPOTASSIUM 3.75 MG: 7.5 TABLET ORAL at 00:20

## 2021-08-19 RX ADMIN — ALLOPURINOL 100 MG: 100 TABLET ORAL at 00:21

## 2021-08-19 RX ADMIN — RISPERIDONE 1 MG: 0.5 TABLET ORAL at 20:22

## 2021-08-19 RX ADMIN — GABAPENTIN 400 MG: 400 CAPSULE ORAL at 08:40

## 2021-08-19 NOTE — PROGRESS NOTES
Problem: Self Care Deficits Care Plan (Adult)  Goal: *Acute Goals and Plan of Care (Insert Text)  Outcome: Progressing Towards Goal  Note: 1. Patient will perform grooming with supervision. 2. Patient will perform Upper body dressing with supervision  3. Patient will perform lower body dressing with min assist  4. Patient will perform upper and lower body bathing with contact guard assist.  5. Patient will perform toilet transfers with CGA. 6. Patient will perform shower transfer with CGA. 7. Patient will participate in 30 + minutes of ADL/ therapeutic exercise/therapeutic activity with min rest breaks to increase activity tolerance for self care. 8. Patient will perform ADL functional mobility in room with CGA. Goals to be achieved in 7 days. OCCUPATIONAL THERAPY: Initial Assessment, Daily Note, and AM 8/19/2021  INPATIENT:    Payor: Yuri Tamez / Plan: [employer] MEDICARE CHOICE PPO/PFFS / Product Type: [employer] /      NAME/AGE/GENDER: Teo Mcgee is a 80 y.o. female   PRIMARY DIAGNOSIS:  KATEY (acute kidney injury) ([facility] [address] [N17.9] KATEY (acute kidney injury) ([facility] [address] KATEY (acute kidney injury) ([facility] [address]        ICD-10: Treatment Diagnosis:    · Generalized Muscle Weakness (M62.81)  · Other lack of cordination (R27.8)   Precautions/Allergies:     Advair diskus [fluticasone propion-salmeterol], Aspirin, Codeine, and Lipitor [atorvastatin]      ASSESSMENT:     Ms. Abebe Mason presents with above diagnosis. Patient with a recent fall outside. She does have a history of memory issues but does live alone. Patient would benefit from therapy to address strength, self care and mobility. Would likely benefit from [facility]/Memory Care as she doesn't appear to safe to live alone. May benefit from STR to maximize strength, balance, and mobility. Patient participated well with assessment. Pt setup for toileting and hygiene. Pt completed functional transfers with CGA.      This section established at most recent assessment   PROBLEM LIST (Impairments causing functional limitations):  1. Decreased Strength  2. Decreased ADL/Functional Activities  3. Decreased Transfer Abilities  4. Decreased Ambulation Ability/Technique  5. Decreased Balance  6. Decreased Activity Tolerance   INTERVENTIONS PLANNED: (Benefits and precautions of occupational therapy have been discussed with the patient.)  1. Activities of daily living training  2. Adaptive equipment training  3. Balance training  4. Clothing management  5. Therapeutic activity  6. Therapeutic exercise     TREATMENT PLAN: Frequency/Duration: Follow patient 3x/week to address above goals. Rehabilitation Potential For Stated Goals: Good     REHAB RECOMMENDATIONS (at time of discharge pending progress):    Placement: It is my opinion, based on this patient's performance to date, that Ms. Makeda Garcia may benefit from participating in 1-2 additional therapy sessions in order to continue to assess for rehab potential and then make recommendation for disposition at discharge. Equipment:    None at this time              OCCUPATIONAL PROFILE AND HISTORY:   History of Present Injury/Illness (Reason for Referral): KATEY  Past Medical History/Comorbidities:   Ms. Makeda Garcia  has a past medical history of Abdominal pain (9/29/2013), COPD (chronic obstructive pulmonary disease) ([facility] [address] DJD (degenerative joint disease) (9/29/2013), Esophageal reflux (9/29/2013), HTN (hypertension) (9/29/2013), Hypercholesterolemia, Hypertension, Other and unspecified hyperlipidemia (9/29/2013), Pyelonephritis (9/29/2013), Sensory neuropathy (12/23/2020), and UTI (lower urinary tract infection) (9/29/2013). Ms. Makeda Garcia  has a past surgical history that includes hx pacemaker; pr chest surgery procedure unlisted; pr lap,cholecystectomy; hx hysterectomy; pr cardiac surg procedure unlist (2/05 8/2015); and pr breast surgery procedure unlisted (01/07/2019).   Social History/Living Environment:   Home Environment: Private residence  One/Two Story Residence: One story  Living Alone: Yes  Support Systems: Child(viktor)  Patient Expects to be Discharged to[de-identified] Unknown  Current DME Used/Available at Home: Walker, rollator  Prior Level of Function/Work/Activity:  Ambulated with rollator; assist with ADLs   Number of Personal Factors/Comorbidities that affect the Plan of Care: Brief history (0):  LOW COMPLEXITY   ASSESSMENT OF OCCUPATIONAL PERFORMANCE[de-identified]   Activities of Daily Living:   Basic ADLs (From Assessment) Complex ADLs (From Assessment)   Feeding: Setup  Oral Facial Hygiene/Grooming: Setup  Bathing: Minimum assistance, Moderate assistance  Upper Body Dressing: Setup  Lower Body Dressing: Minimum assistance, Moderate assistance  Toileting: Setup     Grooming/Bathing/Dressing Activities of Daily Living     Cognitive Retraining  Safety/Judgement: Fall prevention                 Functional Transfers  Bathroom Mobility: Contact guard assistance  Toilet Transfer : Contact guard assistance  Shower Transfer: Contact guard assistance     Bed/Mat Mobility  Supine to Sit: Minimum assistance  Sit to Supine: Minimum assistance  Sit to Stand: Minimum assistance  Stand to Sit: Contact guard assistance  Bed to Chair: Contact guard assistance  Scooting: Minimum assistance     Most Recent Physical Functioning:   Gross Assessment:  AROM: Generally decreased, functional (BUE)  Strength: Generally decreased, functional (BUE)  Coordination: Generally decreased, functional (BUE)  Tone: Normal  Sensation: Intact               Posture:     Balance:  Sitting: Intact  Standing: With support Bed Mobility:  Supine to Sit: Minimum assistance  Sit to Supine: Minimum assistance  Scooting: Minimum assistance  Wheelchair Mobility:     Transfers:  Sit to Stand: Minimum assistance  Stand to Sit: Contact guard assistance  Bed to Chair: Contact guard assistance            Patient Vitals for the past 6 hrs:   BP BP Patient Position SpO2 Pulse   08/19/21 2568 (!) 146/66 At rest 98 % 69       Mental Status  Neurologic State: Alert  Orientation Level: Oriented to person  Cognition: Follows commands  Perception: Appears intact  Perseveration: No perseveration noted  Safety/Judgement: Fall prevention                          Physical Skills Involved:  1. Balance  2. Strength  3. Activity Tolerance Cognitive Skills Affected (resulting in the inability to perform in a timely and safe manner):  1. Perception  2. Executive Function  3. Long Term Memory Psychosocial Skills Affected:  1. Habits/Routines   Number of elements that affect the Plan of Care: 5+:  HIGH COMPLEXITY   CLINICAL DECISION MAKIN[address] [place] [address] AM-PAC 6 Clicks   Daily Activity Inpatient Short Form  How much help from another person does the patient currently need. .. Total A Lot A Little None   1. Putting on and taking off regular lower body clothing? [] 1   [x] 2   [] 3   [] 4   2. Bathing (including washing, rinsing, drying)? [] 1   [x] 2   [] 3   [] 4   3. Toileting, which includes using toilet, bedpan or urinal?   [] 1   [] 2   [x] 3   [] 4   4. Putting on and taking off regular upper body clothing? [] 1   [] 2   [] 3   [x] 4   5. Taking care of personal grooming such as brushing teeth? [] 1   [] 2   [] 3   [x] 4   6. Eating meals? [] 1   [] 2   [] 3   [x] 4   © , Trustees of [address] [place] [address], under license to [employer]. All rights reserved      Score:  Initial: 20 Most Recent: X (Date: -- )    Interpretation of Tool:  Represents activities that are increasingly more difficult (i.e. Bed mobility, Transfers, Gait). Medical Necessity:     · Patient is expected to demonstrate progress in   · strength, balance, coordination, and functional technique  ·  to   · increase independence with self care and functional mobility   · . Reason for Services/Other Comments:  · Patient continues to require skilled intervention due to   · Decrease self care and mobility   · .    Use of outcome tool(s) and clinical judgement create a POC that gives a: LOW COMPLEXITY         TREATMENT:   (In addition to Assessment/Re-Assessment sessions the following treatments were rendered)     Pre-treatment Symptoms/Complaints:  tolerated sitting up in recliner  Pain: Initial:   Pain Intensity 1: 0  Post Session:  0     Self Care: (10): Procedure(s) (per grid) utilized to improve and/or restore self-care/home management as related to toileting, grooming, and functional transfers . Required minimal verbal and   cueing to facilitate activities of daily living skills and compensatory activities. Assessment/Reassessment     Braces/Orthotics/Lines/Etc:   · IV  · O2 Device: None (Room air)  Treatment/Session Assessment:    · Response to Treatment:  tolerated well  · Interdisciplinary Collaboration:   o Physical Therapist  o Occupational Therapist  o Registered Nurse  · After treatment position/precautions:   o Up in chair  o Bed alarm/tab alert on  o Bed/Chair-wheels locked  o Call light within reach  o RN notified  o LEs reclined, food tray setup    · Compliance with Program/Exercises: Compliant all of the time. · Recommendations/Intent for next treatment session: \"Next visit will focus on advancements to more challenging activities and reduction in assistance provided\".   Total Treatment Duration:  OT Patient Time In/Time Out  Time In: 0910  Time Out: [id number] [employer] [place]

## 2021-08-19 NOTE — PROGRESS NOTES
Care Management Interventions  PCP Verified by CM: Yes  Last Visit to PCP: 06/17/21  Discharge Location  Discharge Placement: Unable to determine at this time    SW spoke w/ pt and pt's daughter Jessica Harris). Pt came in w/ KATEY. Pt lives alone, daughter comes every night and stay w/ her and her neighbors come during the day. Sometimes pt uses a walker, cane, wheelchair, bathroom chair and uses O2 when she sleeps. Pt sometimes, depending on how she is feeling bathes and dresses herself. Pt's daughter stated that she is looking for a long-term facility for pt. SW explained to pt and pt's daughter that she can assist her w/ finding short-term and gave pt's daughter pt's choice Rehab Facilities. Pt's daughter stated that she sent of Medicaid paperwork on Monday and ow waiting to hear back. Pt's daughter would like to have pt placed at a facility where she could go from rehab to longterm w/o having to change facilities. Pt's daughter would like a moment to speak w/ other family members and reach back out to SW at a later time.  SW will follow-up w/ pt and pt's daughter    TRE Sosa

## 2021-08-19 NOTE — PROGRESS NOTES
08/18/21 2230   Dual Skin Pressure Injury Assessment   Dual Skin Pressure Injury Assessment WDL   Second Care Provider (Based on [address] [place] [address]) Tutu Lozoya RN    Skin Integumentary   Skin Integumentary (WDL) X    Pressure  Injury Documentation No Pressure Injury Noted-Pressure Ulcer Prevention Initiated   Skin Color Ecchymosis (comment)  (left side of body, chin, back, buttocks, coccyx/sacrum )   Skin Condition/Temp Dry; Warm

## 2021-08-19 NOTE — PROGRESS NOTES
[facility]ist Note     Admit Date:  2021  4:58 PM   Name:  Harpal Figueroa   Age:  80 y.o.  :  1940   MRN:  [id number]   PCP:  Aashish Marshall MD  Treatment Team: Attending Provider: Serge Adler MD; Physical Therapist: Kathryn Hernandez PT; Staff Nurse: Cliff Mathews RN; Utilization Review: Natalia London RN; : Charlene Figueroa; Primary Nurse: Lindsay Acosta RN    HPI/Subjective:   Patient is a 80-year-old female with no significant past medical history presented to ED after fall. Denies loss of consciousness. In the ED all relevant imaging studies performed and no significant injuries discovered. Labs noted for KATEY. Patient admitted for observation. : Patient seen at the bedside. Reports she is feeling a lot better. Denies chest pain, palpitation, nausea, vomiting or abdominal pain. No other concerns. Patient reports she lives alone at home. Recently has more frequent falls. Assessment and Plan:     KATEY:  Continue maintenance IV fluid  Improving    Frequent falls:  PT/OT  fall precautions    Current use of long-term anticoagulation:  On Eliquis and baby aspirin  Discussed risk of bleeding due to patient having multiple falls within the last 30 days. Advised daughter to discuss anticoagulation with the cardiologist due to these frequent falls    A. Fib:  Rate controlled  Continue flecainide and metoprolol    Hypertension:  Continue Norvasc, Bumex, hydrochlorothiazide, Cozaar and metoprolol    Status cardiac pacemaker:  Noted    Cognitive disorder:  Most likely Alzheimer's, outpatient notes indicate that she has been worsening with some hallucination and behavioral disorders  She has been placed on medications for this with some success. She is at high risk of worsening while in the hospital.     Discharge planning: PT/OT consulted, may need placement.     DVT ppx ordered  Code status:  Full  Estimated LOS:  Greater than 2 midnights  Risk: high    Hospital Problems as of 8/19/2021 Date Reviewed: 7/22/2021        Codes Class Noted - Resolved POA    * (Principal) KATEY (acute kidney injury) ([facility] [address] ICD-10-CM: N17.9  ICD-9-CM: 584.9  8/18/2021 - Present Yes        Current use of long term anticoagulation (Chronic) ICD-10-CM: Z79.01  ICD-9-CM: V58.61  8/18/2021 - Present Yes    Overview Signed 8/18/2021 10:04 PM by MD Alie Sanchez             Frequent falls (Chronic) ICD-10-CM: R29.6  ICD-9-CM: V15.88  8/18/2021 - Present Yes        Cognitive disorder (Chronic) ICD-10-CM: F09  ICD-9-CM: 294.9  4/20/2021 - Present Yes        Status cardiac pacemaker (Chronic) ICD-10-CM: Z95.0  ICD-9-CM: V45.01  8/30/2017 - Present Yes        Atrial fibrillation (HCC) (Chronic) ICD-10-CM: I48.91  ICD-9-CM: 427.31  1/22/2014 - Present Yes        HTN (hypertension) (Chronic) ICD-10-CM: I10  ICD-9-CM: 401.9  9/29/2013 - Present Yes        Primary pulmonary hypertension (HCC) (Chronic) ICD-10-CM: I27.0  ICD-9-CM: 416.0  10/30/2000 - Present Yes                10 systems reviewed and negative except as noted in HPI.   Past Medical History:   Diagnosis Date    Abdominal pain 9/29/2013    COPD (chronic obstructive pulmonary disease) (HCC)     DJD (degenerative joint disease) 9/29/2013    Esophageal reflux 9/29/2013    HTN (hypertension) 9/29/2013    Hypercholesterolemia     Hypertension     Other and unspecified hyperlipidemia 9/29/2013    Pyelonephritis 9/29/2013    Sensory neuropathy 12/23/2020    UTI (lower urinary tract infection) 9/29/2013      Past Surgical History:   Procedure Laterality Date    HX HYSTERECTOMY      HX PACEMAKER      CT BREAST SURGERY PROCEDURE UNLISTED  01/07/2019    right breast cancer-lumpectomy    CT CARDIAC SURG PROCEDURE UNLIST  2/05 8/2015    pacemaker    CT CHEST SURGERY PROCEDURE UNLISTED      CT LAP,CHOLECYSTECTOMY        Allergies   Allergen Reactions    Advair Diskus [Fluticasone Propion-Salmeterol] Other (comments)     shakes    Aspirin Nausea Only     Pt can take aspirin 81mg Pt c/o upset stomach with higher dose    Codeine Nausea Only    Lipitor [Atorvastatin] Other (comments)     Leg weakness      Social History     Tobacco Use    Smoking status: Former Smoker     Packs/day: 1.00     Years: 22.00     Pack years: 22.00     Types: Cigarettes     Quit date: 1989     Years since quittin.6    Smokeless tobacco: Never Used   Substance Use Topics    Alcohol use: No      Family History   Problem Relation Age of Onset    Heart Disease Mother     Hypertension Mother     Heart Disease Father     Hypertension Father       Family history reviewed and noncontributory. Immunization History   Administered Date(s) Administered    COVID-19, PFIZER, MRNA, LNP-S, PF, 30MCG/0.3ML DOSE 2021, 2021    Influenza High Dose Vaccine PF 10/09/2017, 10/19/2018    Influenza Vaccine 10/01/2012    Influenza Vaccine (Quad) PF (>6 Mo Flulaval, Fluarix, and >3 Yrs Afluria, Fluzone 95629) 2016    Influenza Vaccine (Tri) Adjuvanted (>65 Yrs FLUAD TRI [id number]) 10/28/2019    Influenza, Quadrivalent, Adjuvanted (>65 Yrs FLUAD QUAD [id number]) 09/10/2020    Pneumococcal Conjugate (PCV-13) 2018    Pneumococcal Vaccine (Unspecified Type) 2002, 10/01/2007, 2016    TB Skin Test (PPD) Intradermal 2021    Tdap 2017    Zoster Vaccine, Live 2016     PTA Medications:  Prior to Admission Medications   Prescriptions Last Dose Informant Patient Reported? Taking? DISABLED PLACARD (DISABLED PLACARD) DMV   No No   Sig: As directed   Oxygen   Yes No   Si liters at night only   albuterol (VENTOLIN HFA) 90 mcg/actuation inhaler   No No   Sig: Take 2 Puffs by inhalation every four (4) hours as needed for Shortness of Breath.    allopurinoL (ZYLOPRIM) 100 mg tablet   No No   Sig: TAKE 1 TABLET BY MOUTH EVERY DAY   amLODIPine (NORVASC) 5 mg tablet 2021 at Unknown time  No Yes   Sig: Take 1 Tab by mouth daily. apixaban (Eliquis) 5 mg tablet 8/18/2021 at Unknown time  No Yes   Sig: Take 1 Tab by mouth two (2) times a day. aspirin delayed-release 81 mg tablet 8/18/2021 at Unknown time  No Yes   Sig: Take 1 Tab by mouth daily. bumetanide (BUMEX) 1 mg tablet 8/18/2021 at Unknown time  No Yes   Sig: Take 1 Tab by mouth five (5) days a week. clorazepate (TRANXENE) 7.5 mg tablet   No No   Sig: TAKE 1/2 TABLET BY MOUTH NIGHTLY   diclofenac (VOLTAREN) 1 % gel   No No   Sig: Apply  to affected area four (4) times daily. Patient taking differently: Apply 2 g to affected area four (4) times daily. Applies to BUEs   dicyclomine (BENTYL) 10 mg capsule   No No   Sig: TAKE 1 CAPSULE BY MOUTH 3 TIMES A DAY   famotidine (PEPCID) 40 mg tablet 8/17/2021 at Unknown time  No Yes   Sig: TAKE 1 TABLET BY MOUTH EVERY DAY IN THE EVENING   flecainide (TAMBOCOR) 50 mg tablet 8/18/2021 at Unknown time  No Yes   Sig: Take 1 Tab by mouth two (2) times a day.   gabapentin (NEURONTIN) 400 mg capsule 8/18/2021 at Unknown time  No Yes   Sig: Take 1 Cap by mouth three (3) times daily. lansoprazole (PREVACID) 30 mg capsule 8/18/2021 at Unknown time  No Yes   Sig: Take 1 Cap by mouth two (2) times a day. losartan-hydroCHLOROthiazide (HYZAAR) 50-12.5 mg per tablet 8/18/2021 at Unknown time  No Yes   Sig: Take 1 Tab by mouth daily. Patient taking differently: Take 1 Tab by mouth daily. Take 1/2 tab for 6 days (starting 12/4) then 1 tab every day   memantine (NAMENDA) 5 mg tablet 8/18/2021 at Unknown time  No Yes   Sig: Take 1 Tablet by mouth two (2) times a day. metoprolol tartrate (LOPRESSOR) 25 mg tablet   No No   Sig: TAKE 1 TABLET BY MOUTH TWICE A DAY   montelukast (SINGULAIR) 10 mg tablet 8/18/2021 at Unknown time  No Yes   Sig: Take 1 Tablet by mouth daily. nitrofurantoin (Macrodantin) 100 mg capsule Not Taking at Unknown time  Yes No   Sig: Take  by mouth nightly.    Patient not taking: Reported on 8/18/2021 risperiDONE (RisperDAL) 1 mg tablet   No No   Sig: Take 1 Tablet by mouth two (2) times a day. risperiDONE (RisperDAL) 1 mg tablet   Yes No   Sig: Take  by mouth two (2) times a day. Patient not taking: Reported on 7/22/2021   sertraline (ZOLOFT) 50 mg tablet 8/18/2021 at Unknown time  No Yes   Sig: TAKE 1 TABLET BY MOUTH EVERY DAY   tamoxifen (NOLVADEX) 20 mg tablet 8/18/2021 at Unknown time  Yes Yes   Sig: TAKE 1 TABLET BY MOUTH EVERY DAY      Facility-Administered Medications: None       Objective:     Patient Vitals for the past 24 hrs:   Temp Pulse Resp BP SpO2   08/19/21 1144 97.4 °F (36.3 °C) 70 16 107/64 93 %   08/19/21 0726 97.4 °F (36.3 °C) 69 16 (!) 146/66 98 %   08/19/21 0355 97.4 °F (36.3 °C) 70 16 123/75 94 %   08/18/21 2240 97 °F (36.1 °C) 96 16 (!) 163/78 91 %   08/18/21 2200    (!) 154/70 93 %   08/18/21 1640 99.3 °F (37.4 °C) 72 16 (!) 104/56 92 %     Oxygen Therapy  O2 Sat (%): 93 % (08/19/21 1144)  Pulse via Oximetry: 69 beats per minute (08/18/21 2200)  O2 Device: None (Room air) (08/18/21 1640)    Estimated body mass index is 32.92 kg/m² as calculated from the following:    Height as of this encounter: 5' 2\" (1.575 m). Weight as of this encounter: 81.6 kg (180 lb). No intake or output data in the 24 hours ending 08/19/21 1444    *Note that automatically entered I/Os may not be accurate; dependent on patient compliance with collection and accurate  by assistants. Physical Exam:  General:    Alert. Eyes:   Normal sclerae. Extraocular movements intact. HENT:  Normocephalic, atraumatic. Moist mucous membranes, bruise on the lower jaw  CV:   RRR. No m/r/g. Lungs:  CTAB. No wheezing, rhonchi, or rales. Abdomen: Soft, nontender, nondistended. Extremities: Warm and dry. No cyanosis or edema. Neurologic: CN II-XII grossly intact. Sensation intact. Skin:     No rashes or jaundice. Normal coloration  Psych:  Normal mood and affect.     I reviewed the labs, imaging, EKGs, telemetry, and other studies done this admission. Data Reviewed:   Recent Results (from the past 24 hour(s))   CBC WITH AUTOMATED DIFF    Collection Time: 08/18/21  5:00 PM   Result Value Ref Range    WBC 10.2 4.3 - 11.1 K/uL    RBC 3.72 (L) 4.05 - 5.2 M/uL    HGB 10.9 (L) 11.7 - 15.4 g/dL    HCT 34.2 (L) 35.8 - 46.3 %    MCV 91.9 79.6 - 97.8 FL    MCH 29.3 26.1 - 32.9 PG    MCHC 31.9 31.4 - 35.0 g/dL    RDW 13.8 11.9 - 14.6 %    PLATELET 433 367 - 723 K/uL    MPV 9.6 9.4 - 12.3 FL    ABSOLUTE NRBC 0.00 0.0 - 0.2 K/uL    DF AUTOMATED      NEUTROPHILS 63 43 - 78 %    LYMPHOCYTES 24 13 - 44 %    MONOCYTES 7 4.0 - 12.0 %    EOSINOPHILS 5 0.5 - 7.8 %    BASOPHILS 1 0.0 - 2.0 %    IMMATURE GRANULOCYTES 0 0.0 - 5.0 %    ABS. NEUTROPHILS 6.4 1.7 - 8.2 K/UL    ABS. LYMPHOCYTES 2.4 0.5 - 4.6 K/UL    ABS. MONOCYTES 0.7 0.1 - 1.3 K/UL    ABS. EOSINOPHILS 0.5 0.0 - 0.8 K/UL    ABS. BASOPHILS 0.1 0.0 - 0.2 K/UL    ABS. IMM. GRANS. 0.0 0.0 - 0.5 K/UL   METABOLIC PANEL, COMPREHENSIVE    Collection Time: 08/18/21  5:00 PM   Result Value Ref Range    Sodium 138 136 - 145 mmol/L    Potassium 4.0 3.5 - 5.1 mmol/L    Chloride 102 98 - 107 mmol/L    CO2 30 21 - 32 mmol/L    Anion gap 6 (L) 7 - 16 mmol/L    Glucose 99 65 - 100 mg/dL    BUN 24 (H) 8 - 23 MG/DL    Creatinine 2.16 (H) 0.6 - 1.0 MG/DL    GFR est AA 28 (L) >60 ml/min/1.73m2    GFR est non-AA 23 (L) >60 ml/min/1.73m2    Calcium 9.5 8.3 - 10.4 MG/DL    Bilirubin, total 0.4 0.2 - 1.1 MG/DL    ALT (SGPT) 17 12 - 65 U/L    AST (SGOT) 15 15 - 37 U/L    Alk.  phosphatase 64 50 - 136 U/L    Protein, total 6.9 6.3 - 8.2 g/dL    Albumin 3.4 3.2 - 4.6 g/dL    Globulin 3.5 2.3 - 3.5 g/dL    A-G Ratio 1.0 (L) 1.2 - 3.5     TROPONIN-HIGH SENSITIVITY    Collection Time: 08/18/21  5:00 PM   Result Value Ref Range    Troponin-High Sensitivity 11.0 0 - 14 pg/mL   MAGNESIUM    Collection Time: 08/18/21  5:00 PM   Result Value Ref Range    Magnesium 1.6 (L) 1.8 - 2.4 mg/dL   EKG, 12 LEAD, INITIAL    Collection Time: 08/18/21  5:14 PM   Result Value Ref Range    Ventricular Rate 70 BPM    Atrial Rate 72 BPM    QRS Duration 108 ms    Q-T Interval 448 ms    QTC Calculation (Bezet) 483 ms    Calculated R Axis 1 degrees    Calculated T Axis 31 degrees    Diagnosis       Electronic atrial pacemaker  Abnormal ECG  When compared with ECG of 18-AUG-2021 17:11,  Junctional rhythm has replaced Sinus rhythm  Confirmed by Jose Viera MD (), SHELBY BILL ([id number]) on 8/19/2021 6:42:13 AM     TROPONIN-HIGH SENSITIVITY    Collection Time: 08/18/21  6:56 PM   Result Value Ref Range    Troponin-High Sensitivity 12.8 0 - 14 pg/mL   URINE MICROSCOPIC    Collection Time: 08/18/21  7:49 PM   Result Value Ref Range    WBC 10-20 0 /hpf    RBC 0-3 0 /hpf    Epithelial cells 5-10 0 /hpf    Bacteria 4+ (H) 0 /hpf    Casts 20-50 0 /lpf   CULTURE, BLOOD    Collection Time: 08/18/21  9:04 PM    Specimen: Blood   Result Value Ref Range    Special Requests: LEFT  HAND        Culture result: NO GROWTH AFTER 9 HOURS     CULTURE, BLOOD    Collection Time: 08/18/21  9:07 PM    Specimen: Blood   Result Value Ref Range    Special Requests: RIGHT  Antecubital        Culture result: NO GROWTH AFTER 9 HOURS     METABOLIC PANEL, BASIC    Collection Time: 08/19/21  3:58 AM   Result Value Ref Range    Sodium 141 136 - 145 mmol/L    Potassium 3.6 3.5 - 5.1 mmol/L    Chloride 107 98 - 107 mmol/L    CO2 30 21 - 32 mmol/L    Anion gap 4 (L) 7 - 16 mmol/L    Glucose 123 (H) 65 - 100 mg/dL    BUN 23 8 - 23 MG/DL    Creatinine 1.71 (H) 0.6 - 1.0 MG/DL    GFR est AA 37 (L) >60 ml/min/1.73m2    GFR est non-AA 30 (L) >60 ml/min/1.73m2    Calcium 9.0 8.3 - 10.4 MG/DL   MAGNESIUM    Collection Time: 08/19/21  3:58 AM   Result Value Ref Range    Magnesium 1.7 (L) 1.8 - 2.4 mg/dL   CBC WITH AUTOMATED DIFF    Collection Time: 08/19/21  3:58 AM   Result Value Ref Range    WBC 7.4 4.3 - 11.1 K/uL    RBC 3.45 (L) 4.05 - 5.2 M/uL    HGB 9.9 (L) 11.7 - 15.4 g/dL HCT 31.8 (L) 35.8 - 46.3 %    MCV 92.2 79.6 - 97.8 FL    MCH 28.7 26.1 - 32.9 PG    MCHC 31.1 (L) 31.4 - 35.0 g/dL    RDW 13.6 11.9 - 14.6 %    PLATELET 387 837 - 314 K/uL    MPV 9.7 9.4 - 12.3 FL    ABSOLUTE NRBC 0.00 0.0 - 0.2 K/uL    DF AUTOMATED      NEUTROPHILS 50 43 - 78 %    LYMPHOCYTES 30 13 - 44 %    MONOCYTES 11 4.0 - 12.0 %    EOSINOPHILS 9 (H) 0.5 - 7.8 %    BASOPHILS 1 0.0 - 2.0 %    IMMATURE GRANULOCYTES 0 0.0 - 5.0 %    ABS. NEUTROPHILS 3.7 1.7 - 8.2 K/UL    ABS. LYMPHOCYTES 2.2 0.5 - 4.6 K/UL    ABS. MONOCYTES 0.8 0.1 - 1.3 K/UL    ABS. EOSINOPHILS 0.7 0.0 - 0.8 K/UL    ABS. BASOPHILS 0.0 0.0 - 0.2 K/UL    ABS. IMM.  GRANS. 0.0 0.0 - 0.5 K/UL       All Micro Results     Procedure Component Value Units Date/Time    CULTURE, BLOOD [[id number]] Collected: 08/18/21 2107    Order Status: Completed Specimen: Blood Updated: 08/19/21 0710     Special Requests: --        RIGHT  Antecubital       Culture result: NO GROWTH AFTER 9 HOURS       CULTURE, BLOOD [[id number]] Collected: 08/18/21 2104    Order Status: Completed Specimen: Blood Updated: 08/19/21 0710     Special Requests: --        LEFT  HAND       Culture result: NO GROWTH AFTER 9 HOURS             Current Facility-Administered Medications   Medication Dose Route Frequency    albuterol (PROVENTIL VENTOLIN) nebulizer solution 2.5 mg  2.5 mg Nebulization Q6H PRN    amLODIPine (NORVASC) tablet 5 mg  5 mg Oral DAILY    aspirin delayed-release tablet 81 mg  81 mg Oral DAILY    bumetanide (BUMEX) tablet 1 mg  1 mg Oral Once per day on Mon Tue Wed Thu Fri    clorazepate (TRANXENE) tablet 3.75 mg  3.75 mg Oral QHS    famotidine (PEPCID) tablet 40 mg  40 mg Oral QPM    losartan (COZAAR) tablet 50 mg  50 mg Oral DAILY    metoprolol tartrate (LOPRESSOR) tablet 25 mg  25 mg Oral Q12H    montelukast (SINGULAIR) tablet 10 mg  10 mg Oral DAILY    sertraline (ZOLOFT) tablet 50 mg  50 mg Oral DAILY    tamoxifen (NOLVADEX) tablet 20 mg  20 mg Oral DAILY    0.9% sodium chloride infusion  75 mL/hr IntraVENous CONTINUOUS    sodium chloride (NS) flush 5-40 mL  5-40 mL IntraVENous Q8H    sodium chloride (NS) flush 5-40 mL  5-40 mL IntraVENous PRN    acetaminophen (TYLENOL) tablet 650 mg  650 mg Oral Q6H PRN    Or    acetaminophen (TYLENOL) suppository 650 mg  650 mg Rectal Q6H PRN    polyethylene glycol (MIRALAX) packet 17 g  17 g Oral DAILY    tuberculin injection 5 Units  5 Units IntraDERMal ONCE    hydroCHLOROthiazide (MICROZIDE) capsule 12.5 mg  12.5 mg Oral DAILY    gabapentin (NEURONTIN) capsule 400 mg  400 mg Oral TID    risperiDONE (RisperDAL) tablet 1 mg  1 mg Oral Q12H    apixaban (ELIQUIS) tablet 2.5 mg  2.5 mg Oral Q12H    allopurinoL (ZYLOPRIM) tablet 100 mg  100 mg Oral Q12H    flecainide (TAMBOCOR) tablet 50 mg  50 mg Oral Q12H    memantine (NAMENDA) tablet 5 mg  5 mg Oral Q12H    pantoprazole (PROTONIX) tablet 40 mg  40 mg Oral QHS       Other Studies:  No results found for this visit on 08/18/21. XR CHEST PA LAT    Result Date: 8/18/2021  EXAM: XR CHEST PA LAT INDICATION: Fall, weakness COMPARISON: None FINDINGS: The cardiomediastinal silhouette is enlarged. Dual-chamber pacemaker leads are intact. Trace bilateral pleural effusions. No pneumothorax. Pulmonary venous congestion with suggestion of interstitial pulmonary edema. Subtle linear lucency through the right lateral second rib may represent a nondisplaced fracture. 1. Possible nondisplaced right lateral second rib fracture. No pneumothorax. 2. Cardiomegaly with trace bilateral pleural effusions and mild interstitial pulmonary edema. XR SHOULDER LT AP/LAT MIN 2 V    Result Date: 8/18/2021  Examination: XR SHOULDER LT AP/LAT MIN 2 V, XR HIP LT W OR WO PELV 2-3 VWS INDICATION: Left shoulder and pelvic pain after fall COMPARISON: None FINDINGS: Left shoulder: No fracture or malalignment. Mild degenerative changes of the glenohumeral and AC joints.  Bone mineralization is decreased. See separate chest radiograph report for details involving the lungs. Pelvis and left hip: Portions of the sacrum are obscured by overlying bowel gas and stool. No evidence of fracture or malalignment. Mild bilateral hip and SI joint degenerative changes. Advanced lower lumbar spondylosis. Decreased bone mineralization. No focal soft tissue abnormality. Left shoulder: 1. No acute osseous abnormality. Pelvis and left hip: 1. No acute osseous abnormality, however, osteopenia limits evaluation for subtle nondisplaced fractures. If there is persistent concern for pelvic fracture, consider MRI. XR HIP LT W OR WO PELV 2-3 VWS    Result Date: 8/18/2021  Examination: XR SHOULDER LT AP/LAT MIN 2 V, XR HIP LT W OR WO PELV 2-3 VWS INDICATION: Left shoulder and pelvic pain after fall COMPARISON: None FINDINGS: Left shoulder: No fracture or malalignment. Mild degenerative changes of the glenohumeral and AC joints. Bone mineralization is decreased. See separate chest radiograph report for details involving the lungs. Pelvis and left hip: Portions of the sacrum are obscured by overlying bowel gas and stool. No evidence of fracture or malalignment. Mild bilateral hip and SI joint degenerative changes. Advanced lower lumbar spondylosis. Decreased bone mineralization. No focal soft tissue abnormality. Left shoulder: 1. No acute osseous abnormality. Pelvis and left hip: 1. No acute osseous abnormality, however, osteopenia limits evaluation for subtle nondisplaced fractures. If there is persistent concern for pelvic fracture, consider MRI.      CT HEAD WO CONT    Result Date: 8/18/2021  EXAMINATION: HEAD CT WITHOUT CONTRAST 8/18/2021 5:49 PM ACCESSION NUMBER: [id number] INDICATION: Fall, hit head, on eliquis COMPARISON: CT head, 11/23/2020 TECHNIQUE: Multiple-row detector helical CT examination of the head without intravenous contrast. Radiation dose reduction techniques were used for this study:  Our CT scanners use one or all of the following: Automated exposure control, adjustment of the mA and/or kVp according to patient's size, iterative reconstruction. FINDINGS: No evidence of intracranial mass, hemorrhage, or large territorial infarct. Generalized parenchymal volume loss with commensurate enlargement of the ventricles and sulci. The ventricles remain midline and symmetric. Basal cisterns are patent. There is scattered deep and periventricular white matter lucency which is nonspecific but unchanged and most compatible with chronic microvascular ischemic changes. No extra-axial fluid collection or mass effect. The orbital contents are within normal limits. The paranasal sinuses are clear. The mastoid air cells and middle ears are clear. No significant osseous or extracranial soft tissue lesions. Stable chronic changes without acute intracranial abnormality. CT MAXILLOFACIAL WO CONT    Result Date: 8/18/2021  CT facial bones without contrast HISTORY: [facility] ER, fall, hit head on Eloquis, CT HEAD/C-SPINE/FACIAL Technique: Helically acquired images were obtained through the facial bones reconstructed at 2.5 mm thickness. Reformatted images were submitted. Radiation dose reduction techniques were used for this study:  Our CT scanners use one or all of the following: Automated exposure control, adjustment of the mA and/or kVp according to patient's size, iterative reconstruction. COMPARISON: None. FINDINGS: The mastoid air cells and paranasal sinuses are well pneumatized and aerated. There is no evidence of acute facial bone fracture. The zygomatic arches and pterygoid plates are intact. The globes are intact. There is no significant soft tissue swelling. There are degenerative changes at the temporomandibular joints. There is partial imaged cervical spondylosis. No evidence of acute facial bone fracture.      CT SPINE CERV WO CONT    Result Date: 8/18/2021  CT cervical spine without contrast: HISTORY: [facility], fall, hit head on oquis, CT HEAD/C-SPINE/FACIAL Helically acquired images were obtained reconstructed at 2.5 mm thickness. Sagittal and coronal reformatted images were submitted. All CT scans at this facility are performed using dose optimization technique as appropriate to a performed exam, to include on automated exposure control, adjustment of the mA and/or KV according to patient's size (including appropriate matching for site-specific examinations), or use of iterative reconstruction technique. COMPARISON: CT myelogram 11/19/2008 FINDINGS: The vertebral body height and alignment are well maintained. There is moderate disc space narrowing at C5-6 and C6-7. Multilevel endplate spurring is present. There is moderate facet arthropathy on the left at C3 4-5. There is no evidence of acute fracture or subluxation. The prevertebral soft tissues are unremarkable. Carotid atherosclerotic changes are present. 1. No evidence of acute cervical spine fracture. 2. Progression of cervical spondylosis. CT HIP LT WO CONT    Result Date: 8/18/2021  CT left hip without IV contrast INDICATION: Left hip pain after fall COMPARISON: Radiographs performed earlier today TECHNIQUE: Axial CT images were obtained through the left hip without IV contrast. Coronal and sagittal reformats were provided. Radiation dose reduction techniques were used for this study. Our CT scanners use one or all of the following: Automated exposure control, adjustment of the mA and/or kV according to patient size, iterative reconstruction. FINDINGS: The bones are diffusely demineralized. Within this limitation, no acute fracture is discerned. Mild-moderate osteoarthritic changes of the left hip characterized by joint space narrowing and marginal osteophyte development. No effusion is present. The soft tissues about the left hip are within normal limits. Colonic diverticulosis.  Visualized structures within the imaged abdomen and pelvis otherwise appear normal. The uterus is surgically absent. No acute abnormality within the incidentally imaged right hip. No acute osteoarticular process of the pelvis or left hip. [unfilled]       Part of this note was written by using a voice dictation software and the note has been proof read but may still contain some grammatical/other typographical errors.     Signed:  Hilton Santana MD

## 2021-08-19 NOTE — PROGRESS NOTES
Problem: Falls - Risk of  Goal: *Absence of Falls  Description: Document Lexy Russell Fall Risk and appropriate interventions in the flowsheet.   Outcome: Progressing Towards Goal  Note: Fall Risk Interventions:  Mobility Interventions: Bed/chair exit alarm, Communicate number of staff needed for ambulation/transfer, OT consult for ADLs, Patient to call before getting OOB, PT Consult for mobility concerns, PT Consult for assist device competence, Strengthening exercises (ROM-active/passive), Utilize walker, cane, or other assistive device    Mentation Interventions: Bed/chair exit alarm    Medication Interventions: Bed/chair exit alarm, Patient to call before getting OOB, Teach patient to arise slowly    Elimination Interventions: Bed/chair exit alarm, Call light in reach, Elevated toilet seat, Patient to call for help with toileting needs    History of Falls Interventions: Bed/chair exit alarm, Consult care management for discharge planning, Door open when patient unattended, Evaluate medications/consider consulting pharmacy, Investigate reason for fall, Room close to nurse's station         Problem: Patient Education: Go to Patient Education Activity  Goal: Patient/Family Education  Outcome: Progressing Towards Goal     Problem: Urinary Tract Infection - Adult  Goal: *Absence of infection signs and symptoms  Outcome: Progressing Towards Goal     Problem: Patient Education: Go to Patient Education Activity  Goal: Patient/Family Education  Outcome: Progressing Towards Goal     Problem: Pain  Goal: *Control of Pain  Outcome: Progressing Towards Goal  Goal: *PALLIATIVE CARE:  Alleviation of Pain  Outcome: Progressing Towards Goal     Problem: Patient Education: Go to Patient Education Activity  Goal: Patient/Family Education  Outcome: Progressing Towards Goal     Problem: [address] (Adult/Pediatric)  Goal: *STG: Participates in treatment plan  Outcome: Progressing Towards Goal  Goal: *STG: Seeks staff when feelings of anxiety and fear arise  Outcome: Progressing Towards Goal  Goal: *STG: Attends activities and groups  Outcome: Progressing Towards Goal  Goal: *STG: Absence of lethality  Outcome: Progressing Towards Goal  Goal: *STG: Demonstrates ability to understand and use improved judgment in daily activities and relationships  Outcome: Progressing Towards Goal  Goal: *STG: Remains safe in hospital  Outcome: Progressing Towards Goal  Goal: *LTG: Obtains optimum level of functioning  Outcome: Progressing Towards Goal  Goal: *STG/LTG: Maintain structure for daily activities  Outcome: Progressing Towards Goal  Goal: *STG/LTG: Complies with medication therapy  Outcome: Progressing Towards Goal  Goal: Interventions  Outcome: Progressing Towards Goal     Problem: Patient Education: Go to Patient Education Activity  Goal: Patient/Family Education  Outcome: Progressing Towards Goal

## 2021-08-19 NOTE — PROGRESS NOTES
Admission assessment completed via flow sheet. Pt oriented to person, month/year only. Bruises noted on patient's chin, back, buttocks, sacrum/coccyx and legs. No signs of distress or c/o pain at this time. Bed alarm set, bed low and locked, side rails up x 3, gripper socks on, and call light within reach.

## 2021-08-19 NOTE — H&P
[facility]  History and Physical    Patient ID:  Dustin Anand  female  1940  [id number]    Admission Date: 8/18/2021  Chief Complaint: Fall  Reason for Admission: KATEY    ASSESSMENT & PLAN:    Active Hospital Problems    Diagnosis Date Noted    KATEY (acute kidney injury) ([address] 08/18/2021    Current use of long term anticoagulation 08/18/2021     Eliquis      Frequent falls 08/18/2021    Cognitive disorder 04/20/2021    Status cardiac pacemaker 08/30/2017    Atrial fibrillation ([address] 01/22/2014    HTN (hypertension) 09/29/2013    Primary pulmonary hypertension ([address] 10/30/2000     Principal Problem:    KATEY (acute kidney injury) ([address] (8/18/2021)  IV fluids, monitor labs    Active Problems:    HTN (hypertension) (9/29/2013)  Continue home medications, Norvasc, Bumex, HCTZ, Cozaar, metoprolol      Atrial fibrillation ([address] (1/22/2014)  Continue home medications of flecainide and metoprolol      Status cardiac pacemaker (8/30/2017)        Primary pulmonary hypertension ([address] (10/30/2000)        Cognitive disorder (4/20/2021)  Most likely Alzheimer's, outpatient notes indicate that she has been worsening with some hallucinations and behavior disorders. She has been placed on medications for this with some success. She is at high risk of worsening while in the hospital.      Current use of long term anticoagulation (8/18/2021)  On Eliquis, and baby aspirin. Discussed risk of bleeding due to patient having multiple falls within the last 30 days. Advised daughter to discuss anticoagulation with the cardiologist due to these frequent falls. Frequent falls (8/18/2021)  We will get PT/OT evaluations, place PPD in case of rehab placement or BR[facility]TNEE. It appears from outpatient notes that daughter was already considering placement. Will consult case management for assistance.       Disposition: admit to inpatient, MedSurg  Diet: Regular  VTE ppx: Eliquis  CODE STATUS: Full    Surrogate decision-maker: Daughter    HISTORY OF PRESENT ILLNESS:  Patient was discussed with the ER provider prior to seeing the patient. Patient is a 80 y.o. female with who presented to the ED with multiple sites of pain after a fall. She had an unwitnessed fall outside of her home. She states she fell forward and believes she hit her head on the concrete surface. She does not think she lost consciousness. Family reports that she has been able to bear weight since the fall, but is not taking any steps. She is on anticoagulation with Eliquis. ER is performed all relevant imaging studies and no significant injuries discovered. Patient denies any symptoms prior to the fall, she has been in her usual state of health. Denies fever/chills, NVD, abdominal pain, chest pain, shortness of breath. REVIEW OF SYSTEMS:  A 14 point review of systems was taken and pertinent positive and negative as per HPI. Allergies   Allergen Reactions    Advair Diskus [Fluticasone Propion-Salmeterol] Other (comments)     shakes    Aspirin Nausea Only     Pt can take aspirin 81mg Pt c/o upset stomach with higher dose    Codeine Nausea Only    Lipitor [Atorvastatin] Other (comments)     Leg weakness       Prior to Admission Medications   Prescriptions Last Dose Informant Patient Reported? Taking? DISABLED PLACARD (DISABLED PLACARD) DMV   No No   Sig: As directed   Oxygen   Yes No   Si liters at night only   albuterol (VENTOLIN HFA) 90 mcg/actuation inhaler   No No   Sig: Take 2 Puffs by inhalation every four (4) hours as needed for Shortness of Breath. allopurinoL (ZYLOPRIM) 100 mg tablet   No No   Sig: TAKE 1 TABLET BY MOUTH EVERY DAY   amLODIPine (NORVASC) 5 mg tablet   No No   Sig: Take 1 Tab by mouth daily. apixaban (Eliquis) 5 mg tablet   No No   Sig: Take 1 Tab by mouth two (2) times a day. aspirin delayed-release 81 mg tablet   No No   Sig: Take 1 Tab by mouth daily.    bumetanide (BUMEX) 1 mg tablet   No No   Sig: Take 1 Tab by mouth five (5) days a week. clorazepate (TRANXENE) 7.5 mg tablet   No No   Sig: TAKE 1/2 TABLET BY MOUTH NIGHTLY   diclofenac (VOLTAREN) 1 % gel   No No   Sig: Apply  to affected area four (4) times daily. Patient taking differently: Apply 2 g to affected area four (4) times daily. Applies to BUEs   dicyclomine (BENTYL) 10 mg capsule   No No   Sig: TAKE 1 CAPSULE BY MOUTH 3 TIMES A DAY   famotidine (PEPCID) 40 mg tablet   No No   Sig: TAKE 1 TABLET BY MOUTH EVERY DAY IN THE EVENING   flecainide (TAMBOCOR) 50 mg tablet   No No   Sig: Take 1 Tab by mouth two (2) times a day.   gabapentin (NEURONTIN) 400 mg capsule   No No   Sig: Take 1 Cap by mouth three (3) times daily. lansoprazole (PREVACID) 30 mg capsule   No No   Sig: Take 1 Cap by mouth two (2) times a day. losartan-hydroCHLOROthiazide (HYZAAR) 50-12.5 mg per tablet   No No   Sig: Take 1 Tab by mouth daily. Patient taking differently: Take 1 Tab by mouth daily. Take 1/2 tab for 6 days (starting 12/4) then 1 tab every day   memantine (NAMENDA) 5 mg tablet   No No   Sig: Take 1 Tablet by mouth two (2) times a day. metoprolol tartrate (LOPRESSOR) 25 mg tablet   No No   Sig: TAKE 1 TABLET BY MOUTH TWICE A DAY   montelukast (SINGULAIR) 10 mg tablet   No No   Sig: Take 1 Tablet by mouth daily. nitrofurantoin (Macrodantin) 100 mg capsule   Yes No   Sig: Take  by mouth nightly. risperiDONE (RisperDAL) 1 mg tablet   No No   Sig: Take 1 Tablet by mouth two (2) times a day. risperiDONE (RisperDAL) 1 mg tablet   Yes No   Sig: Take  by mouth two (2) times a day.    Patient not taking: Reported on 7/22/2021   sertraline (ZOLOFT) 50 mg tablet   No No   Sig: TAKE 1 TABLET BY MOUTH EVERY DAY   tamoxifen (NOLVADEX) 20 mg tablet   Yes No   Sig: TAKE 1 TABLET BY MOUTH EVERY DAY      Facility-Administered Medications: None       Past Medical History:   Diagnosis Date    Abdominal pain 9/29/2013    COPD [facility]chronic obstructive pulmonary disease[facility] ([facility]  DJD (degenerative joint disease) 2013    Esophageal reflux 2013    HTN (hypertension) 2013    Hypercholesterolemia     Hypertension     Other and unspecified hyperlipidemia 2013    Pyelonephritis 2013    Sensory neuropathy 2020    UTI (lower urinary tract infection) 2013     Past Surgical History:   Procedure Laterality Date    HX HYSTERECTOMY      HX PACEMAKER      [place] BREAST SURGERY PROCEDURE UNLISTED  2019    right breast cancer-lumpectomy    [place] CARDIAC SURG PROCEDURE UNLIST      pacemaker    KS CHEST SURGERY PROCEDURE UNLISTED      [place] LAP,CHOLECYSTECTOMY         Social History     Tobacco Use    Smoking status: Former Smoker     Packs/day: 1.00     Years: 22.00     Pack years: 22.00     Types: Cigarettes     Quit date: 1989     Years since quittin.6    Smokeless tobacco: Never Used   Substance Use Topics    Alcohol use: No       FAMILY HISTORY:  Reviewed and non-contributory to admitting problem, unless otherwise stated in the HPI    Family History   Problem Relation Age of Onset    Heart Disease Mother     Hypertension Mother     Heart Disease Father     Hypertension Father              PHYSICAL EXAM:    Patient Vitals for the past 24 hrs:   Temp Pulse Resp BP SpO2   21 2200    (!) 154/70 93 %   21 1640 99.3 °F (37.4 °C) 72 16 (!) 104/56 92 %     Visit Vitals  BP (!) 154/70   Pulse 72   Temp 99.3 °F (37.4 °C)   Resp 16   Ht 5' 2\" (1.575 m)   Wt 81.6 kg (180 lb)   SpO2 93%   BMI 32.92 kg/m²       General:    WD and WN, No apparent distress. Eyes:  PERRL; EOMI; sclera normal/non-icteric  HENT:  Normocephalic, without obvious abnormality; Oropharynx is clear with tacky mucous membranes   Resp:    Clear to auscultation bilaterally. Resp are even and unlabored  CVS/Heart: Regular rate and rhythm,  No LE edema    GI:    Soft, non-tender. Not distended.   Bowel sounds normal.     Musc/SK: Muscle strength is appropriate for age/condition; No cyanosis. No clubbing  Skin:  No obvious rash/lesions  Neurologic: CN II - XII are grossly intact - Eye exam as noted above; non focal  Psych: Alert and oriented x 4;  Judgement and insight are normal      Intake/Output last 3 shifts:      Labs:  CMP:   Lab Results   Component Value Date/Time     08/18/2021 05:00 PM    K 4.0 08/18/2021 05:00 PM     08/18/2021 05:00 PM    CO2 30 08/18/2021 05:00 PM    AGAP 6 (L) 08/18/2021 05:00 PM    GLU 99 08/18/2021 05:00 PM    BUN 24 (H) 08/18/2021 05:00 PM    CREA 2.16 (H) 08/18/2021 05:00 PM    GFRAA 28 (L) 08/18/2021 05:00 PM    GFRNA 23 (L) 08/18/2021 05:00 PM    CA 9.5 08/18/2021 05:00 PM    MG 1.6 (L) 08/18/2021 05:00 PM    ALB 3.4 08/18/2021 05:00 PM    TBILI 0.4 08/18/2021 05:00 PM    TP 6.9 08/18/2021 05:00 PM    GLOB 3.5 08/18/2021 05:00 PM    AGRAT 1.0 (L) 08/18/2021 05:00 PM    ALT 17 08/18/2021 05:00 PM         CBC:    Lab Results   Component Value Date/Time    WBC 10.2 08/18/2021 05:00 PM    HGB 10.9 (L) 08/18/2021 05:00 PM    HCT 34.2 (L) 08/18/2021 05:00 PM     08/18/2021 05:00 PM       No results found for: INR, PTMR, PTP, PT1, PT2, INREXT    ABG:  No results found for: PH, PHI, PCO2, PCO2I, PO2, PO2I, HCO3, HCO3I, FIO2, FIO2I        No results found for: CPK, RCK1, RCK2, RCK3, RCK4, CKMB, CKNDX, CKND1, TROPT, TROIQ, BNPP, BNP    Imaging & Other Studies:  XR CHEST PA LAT    Result Date: 8/18/2021  EXAM: XR CHEST PA LAT INDICATION: Fall, weakness COMPARISON: None FINDINGS: The cardiomediastinal silhouette is enlarged. Dual-chamber pacemaker leads are intact. Trace bilateral pleural effusions. No pneumothorax. Pulmonary venous congestion with suggestion of interstitial pulmonary edema. Subtle linear lucency through the right lateral second rib may represent a nondisplaced fracture. 1. Possible nondisplaced right lateral second rib fracture. No pneumothorax.  2. Cardiomegaly with trace bilateral pleural effusions and mild interstitial pulmonary edema. XR SHOULDER LT AP/LAT MIN 2 V    Result Date: 8/18/2021  Examination: XR SHOULDER LT AP/LAT MIN 2 V, XR HIP LT W OR WO PELV 2-3 VWS INDICATION: Left shoulder and pelvic pain after fall COMPARISON: None FINDINGS: Left shoulder: No fracture or malalignment. Mild degenerative changes of the glenohumeral and AC joints. Bone mineralization is decreased. See separate chest radiograph report for details involving the lungs. Pelvis and left hip: Portions of the sacrum are obscured by overlying bowel gas and stool. No evidence of fracture or malalignment. Mild bilateral hip and SI joint degenerative changes. Advanced lower lumbar spondylosis. Decreased bone mineralization. No focal soft tissue abnormality. Left shoulder: 1. No acute osseous abnormality. Pelvis and left hip: 1. No acute osseous abnormality, however, osteopenia limits evaluation for subtle nondisplaced fractures. If there is persistent concern for pelvic fracture, consider MRI. XR HIP LT W OR WO PELV 2-3 VWS    Result Date: 8/18/2021  Examination: XR SHOULDER LT AP/LAT MIN 2 V, XR HIP LT W OR WO PELV 2-3 VWS INDICATION: Left shoulder and pelvic pain after fall COMPARISON: None FINDINGS: Left shoulder: No fracture or malalignment. Mild degenerative changes of the glenohumeral and AC joints. Bone mineralization is decreased. See separate chest radiograph report for details involving the lungs. Pelvis and left hip: Portions of the sacrum are obscured by overlying bowel gas and stool. No evidence of fracture or malalignment. Mild bilateral hip and SI joint degenerative changes. Advanced lower lumbar spondylosis. Decreased bone mineralization. No focal soft tissue abnormality. Left shoulder: 1. No acute osseous abnormality. Pelvis and left hip: 1. No acute osseous abnormality, however, osteopenia limits evaluation for subtle nondisplaced fractures.  If there is persistent concern for pelvic fracture, consider MRI. MRI BRAIN WO CONT    Result Date: 8/9/2021  Exam: MRI BRAIN WO CONT on 8/9/2021 3:09 PM Clinical History: The Female patient is 80years old presenting for AMS with worsening memory, confusion, tremor; abnormal gait with multiple falls x 3 years; hx breast CA 2015; prior MRI on pacs; [employer] pacemaker. Comparison:  Head CT 5/23/2020, brain MRI 8/26/2020 Technique:  Axial T2, axial FLAIR, axial diffusion-weighted,  sagittal T1 and coronal gradient-echo scans were performed. Findings: Chronic age-related senescent changes are again seen with confluent periventricular white matter disease and lacunae throughout the corona radiata as well as centrum semiovale. . There is no evidence of restricted diffusion to suggest acute ischemia. There are no abnormal extra-axial fluid collections. No evidence of mass or mass effect is seen. There is no diffusion signal abnormality. Expected flow voids are maintained in the major intracranial vessels. Involutional changes. Visualized brainstem structures are unremarkable. There is no evidence of Chiari malformation. The ventricular system and CSF containing spaces are unremarkable in appearance. Visualized extracranial soft tissues are unremarkable. The paranasal sinuses are well pneumatized and aerated. Minimal fluid signal throughout the dependent mastoid air cells bilaterally     1. Age-related senescent changes and chronic microvascular disease without acute intracranial abnormality.  CPT code(s) [id number]     CT HEAD WO CONT    Result Date: 8/18/2021  EXAMINATION: HEAD CT WITHOUT CONTRAST 8/18/2021 5:49 PM ACCESSION NUMBER: [id number] INDICATION: Fall, hit head, on eliquis COMPARISON: CT head, 11/23/2020 TECHNIQUE: Multiple-row detector helical CT examination of the head without intravenous contrast. Radiation dose reduction techniques were used for this study:  Our CT scanners use one or all of the following: Automated exposure control, adjustment of the mA and/or kVp according to patient's size, iterative reconstruction. FINDINGS: No evidence of intracranial mass, hemorrhage, or large territorial infarct. Generalized parenchymal volume loss with commensurate enlargement of the ventricles and sulci. The ventricles remain midline and symmetric. Basal cisterns are patent. There is scattered deep and periventricular white matter lucency which is nonspecific but unchanged and most compatible with chronic microvascular ischemic changes. No extra-axial fluid collection or mass effect. The orbital contents are within normal limits. The paranasal sinuses are clear. The mastoid air cells and middle ears are clear. No significant osseous or extracranial soft tissue lesions. Stable chronic changes without acute intracranial abnormality. CT MAXILLOFACIAL WO CONT    Result Date: 8/18/2021  CT facial bones without contrast HISTORY: ES ER, fall, hit head on Eloquis, CT HEAD/C-SPINE/FACIAL Technique: Helically acquired images were obtained through the facial bones reconstructed at 2.5 mm thickness. Reformatted images were submitted. Radiation dose reduction techniques were used for this study:  Our CT scanners use one or all of the following: Automated exposure control, adjustment of the mA and/or kVp according to patient's size, iterative reconstruction. COMPARISON: None. FINDINGS: The mastoid air cells and paranasal sinuses are well pneumatized and aerated. There is no evidence of acute facial bone fracture. The zygomatic arches and pterygoid plates are intact. The globes are intact. There is no significant soft tissue swelling. There are degenerative changes at the temporomandibular joints. There is partial imaged cervical spondylosis. No evidence of acute facial bone fracture.      CT SPINE CERV WO CONT    Result Date: 8/18/2021  CT cervical spine without contrast: HISTORY: ES ER, fall, hit head on Eloquis, CT HEAD/C-SPINE/FACIAL Helically acquired images were obtained reconstructed at 2.5 mm thickness. Sagittal and coronal reformatted images were submitted. All CT scans at this facility are performed using dose optimization technique as appropriate to a performed exam, to include on automated exposure control, adjustment of the mA and/or KV according to patient's size (including appropriate matching for site-specific examinations), or use of iterative reconstruction technique. COMPARISON: CT myelogram 11/19/2008 FINDINGS: The vertebral body height and alignment are well maintained. There is moderate disc space narrowing at C5-6 and C6-7. Multilevel endplate spurring is present. There is moderate facet arthropathy on the left at C3 4-5. There is no evidence of acute fracture or subluxation. The prevertebral soft tissues are unremarkable. Carotid atherosclerotic changes are present. 1. No evidence of acute cervical spine fracture. 2. Progression of cervical spondylosis. CT HIP LT WO CONT    Result Date: 8/18/2021  CT left hip without IV contrast INDICATION: Left hip pain after fall COMPARISON: Radiographs performed earlier today TECHNIQUE: Axial CT images were obtained through the left hip without IV contrast. Coronal and sagittal reformats were provided. Radiation dose reduction techniques were used for this study. Our CT scanners use one or all of the following: Automated exposure control, adjustment of the mA and/or kV according to patient size, iterative reconstruction. FINDINGS: The bones are diffusely demineralized. Within this limitation, no acute fracture is discerned. Mild-moderate osteoarthritic changes of the left hip characterized by joint space narrowing and marginal osteophyte development. No effusion is present. The soft tissues about the left hip are within normal limits. Colonic diverticulosis. Visualized structures within the imaged abdomen and pelvis otherwise appear normal. The uterus is surgically absent.  No acute abnormality within the incidentally imaged right hip. No acute osteoarticular process of the pelvis or left hip.      EKG Results     Procedure 720 Value Units Date/Time    EKG, 12 LEAD, INITIAL [[id number]] Collected: 08/18/21 1714    Order Status: Completed Updated: 08/18/21 1725     Ventricular Rate 70 BPM      Atrial Rate 72 BPM      QRS Duration 108 ms      Q-T Interval 448 ms      QTC Calculation (Bezet) 483 ms      Calculated R Axis 1 degrees      Calculated T Axis 31 degrees      Diagnosis --     Accelerated Junctional rhythm  Cannot rule out Anterior infarct (cited on or before 18-AUG-2021)  Abnormal ECG  When compared with ECG of 18-AUG-2021 17:11,  Junctional rhythm has replaced Sinus rhythm            Active Problems:  Patient Active Problem List    Diagnosis Date Noted    KATEY (acute kidney injury) ([facility] 08/18/2021    Current use of long term anticoagulation 08/18/2021    Frequent falls 08/18/2021    Cognitive disorder 04/20/2021    Sensory neuropathy 12/23/2020    Impaired functional mobility, balance, gait, and endurance 12/03/2020    Recurrent major depressive disorder, in full remission ([address] 03/12/2019    Mild episode of recurrent major depressive disorder ([address] 01/24/2019    Malignant neoplasm of upper-outer quadrant of right breast in female, estrogen receptor positive ([address] 12/31/2018    Allergic rhinitis 05/03/2018    Mitral and aortic regurgitation 01/03/2018    Laryngopharyngeal reflux (LPR) 01/03/2018    Gout involving toe 01/03/2018    Presence of cardiac pacemaker 09/21/2017    Status cardiac pacemaker 08/30/2017    Sick sinus syndrome ([address] 08/30/2017    Obesity (BMI 30.0-34.9) 15/76/0310    Diastolic dysfunction 67/10/8131    CAD in native artery 05/18/2017    Cataract, bilateral 02/11/2016    Bradycardia 07/28/2015    Dyslipidemia 07/28/2015    Edema of left lower extremity 07/28/2015    Pacemaker generator end of life 07/28/2015    Atherosclerosis of native coronary artery of native heart without angina pectoris 01/22/2014    Pulmonary HTN ([facility] 01/22/2014    Atrial fibrillation ([facility] 01/22/2014    Hypoxemia 01/22/2014    Personal history of tobacco use, presenting hazards to health 01/22/2014    HTN (hypertension) 09/29/2013    DJD (degenerative joint disease) 09/29/2013    Primary pulmonary hypertension ([facility] 10/30/2000    Shortness of breath 10/12/2000         Gerardo Nixon MD  [facility]  8/18/2021 10:30 PM

## 2021-08-19 NOTE — PROGRESS NOTES
TRANSFER - IN REPORT:    Verbal report received from [facility] on Verba Opitz  being received from ED for routine progression of care      Report consisted of patients Situation, Background, Assessment and   Recommendations(SBAR). Information from the following report(s) SBAR, Kardex, ED Summary, Intake/Output, MAR and Recent Results was reviewed with the receiving nurse. Opportunity for questions and clarification was provided.

## 2021-08-19 NOTE — ED NOTES
TRANSFER - OUT REPORT:    Verbal report given to Katherin Quinteros on Irwin Simpson  being transferred to [facility] for routine progression of care       Report consisted of patients Situation, Background, Assessment and   Recommendations(SBAR). Information from the following report(s) ED Summary was reviewed with the receiving nurse. Lines:   Peripheral IV 08/18/21 Left Antecubital (Active)   Site Assessment Clean, dry, & intact 08/18/21 1658   Phlebitis Assessment 0 08/18/21 1658   Infiltration Assessment 0 08/18/21 1658   Dressing Status Clean, dry, & intact 08/18/21 1658   Dressing Type Tape;Transparent 08/18/21 1658   Hub Color/Line Status Pink;Flushed;Patent 08/18/21 1658   Action Taken Blood drawn 08/18/21 1658       Peripheral IV 08/18/21 Left Hand (Active)   Site Assessment Clean, dry, & intact 08/18/21 2109   Phlebitis Assessment 0 08/18/21 2109   Infiltration Assessment 0 08/18/21 2109   Dressing Status Clean, dry, & intact 08/18/21 2109   Dressing Type Tape;Transparent 08/18/21 2109   Hub Color/Line Status Pink;Flushed;Patent 08/18/21 2109   Action Taken Blood drawn 08/18/21 2109        Opportunity for questions and clarification was provided.       Patient transported with:   Registered Nurse

## 2021-08-19 NOTE — PROGRESS NOTES
Problem: Mobility Impaired (Adult and Pediatric)  Goal: *Acute Goals and Plan of Care (Insert Text)  Outcome: Progressing Towards Goal  Note:   LTG:  (1.)Ms. Milagro Tijerina will move from supine to sit and sit to supine  in bed with STAND BY ASSIST within 5 treatment day(s). (2.)Ms. Milagro Tijerina will transfer from bed to chair and chair to bed with STAND BY ASSIST using the least restrictive device within 5 treatment day(s). (3.)Ms. Mialgro Tijerina will ambulate with STAND BY ASSIST for 100 feet with the least restrictive device within 5 treatment day(s). ________________________________________________________________________________________________      PHYSICAL THERAPY: Initial Assessment and AM 8/19/2021  INPATIENT: PT Visit Days : 1  Payor: [employer] / Plan: [id number] PPO/PFFS / Product Type: [employer] /       NAME/AGE/GENDER: Dustin Anand is a 80 y.o. female   PRIMARY DIAGNOSIS: KATEY (acute kidney injury) ([facility] [address] [N17.9] KATEY (acute kidney injury) ([facility] [address] KATEY (acute kidney injury) ([facility] [address]        ICD-10: Treatment Diagnosis:    · Generalized Muscle Weakness (M62.81)  · Difficulty in walking, Not elsewhere classified (R26.2)  · History of falling (Z91.81)   Precaution/Allergies:  Advair diskus [fluticasone propion-salmeterol], Aspirin, Codeine, and Lipitor [atorvastatin]      ASSESSMENT:     Ms. Milagro Tijerina presents with above diagnosis. Patient with a recent fall outside. She does have a history of memory issues but does live alone. Patient would benefit from therapy to address strength, balance, and mobility. Would likely benefit from [facility]/Memory Care as she doesn't appear to safe to live alone. May benefit from STR to maximize strength, balance, and mobility. Patient participated well with assessment. Needed most assist for bed mobility. Ambulated within room with RW but difficulty advancing L LE.       This section established at most recent assessment   PROBLEM LIST (Impairments causing functional limitations):  1. Decreased Strength  2. Decreased ADL/Functional Activities  3. Decreased Transfer Abilities  4. Decreased Ambulation Ability/Technique  5. Decreased Balance  6. Decreased Activity Tolerance  7. Decreased Cognition   INTERVENTIONS PLANNED: (Benefits and precautions of physical therapy have been discussed with the patient.)  1. Balance Exercise  2. Bed Mobility  3. Family Education  4. Gait Training  5. Home Exercise Program (HEP)  6. Therapeutic Activites  7. Therapeutic Exercise/Strengthening  8. Transfer Training     TREATMENT PLAN: Frequency/Duration: daily for duration of hospital stay  Rehabilitation Potential For Stated Goals: Good     REHAB RECOMMENDATIONS (at time of discharge pending progress):    Placement: It is my opinion, based on this patient's performance to date, that Ms. Rodolfo Hoffman may benefit from participating in 1-2 additional therapy sessions in order to continue to assess for rehab potential and then make recommendation for disposition at discharge. Equipment:    None at this time              HISTORY:   History of Present Injury/Illness (Reason for Referral):  Patient is a 80 y.o. female with who presented to the ED with multiple sites of pain after a fall. She had an unwitnessed fall outside of her home. She states she fell forward and believes she hit her head on the concrete surface. She does not think she lost consciousness. Family reports that she has been able to bear weight since the fall, but is not taking any steps. She is on anticoagulation with Eliquis. ER is performed all relevant imaging studies and no significant injuries discovered. Patient denies any symptoms prior to the fall, she has been in her usual state of health. Denies fever/chills, NVD, abdominal pain, chest pain, shortness of breath.   Past Medical History/Comorbidities:   Ms. Rodolfo Hoffman  has a past medical history of Abdominal pain (9/29/2013), [facility] DJD (degenerative joint disease) (9/29/2013), Esophageal reflux (9/29/2013), HTN (hypertension) (9/29/2013), Hypercholesterolemia, Hypertension, Other and unspecified hyperlipidemia (9/29/2013), Pyelonephritis (9/29/2013), Sensory neuropathy (12/23/2020), and UTI (lower urinary tract infection) (9/29/2013). Ms. Corinne Mattson  has a past surgical history that includes hx pacemaker; pr chest surgery procedure unlisted; pr lap,cholecystectomy; hx hysterectomy; pr cardiac surg procedure unlist (2/05 8/2015); and pr breast surgery procedure unlisted (01/07/2019). Social History/Living Environment:   Home Environment: Private residence  One/Two Story Residence: One story  Living Alone: Yes  Support Systems: Child(viktor)  Patient Expects to be Discharged to[de-identified] Unknown  Current DME Used/Available at Home: Walker, rollator  Prior Level of Function/Work/Activity:  Ambulatory but unsure of consistent use of AD. Number of Personal Factors/Comorbidities that affect the Plan of Care: 1-2: MODERATE COMPLEXITY   EXAMINATION:   Most Recent Physical Functioning:   Gross Assessment:  AROM: Generally decreased, functional  Strength: Generally decreased, functional  Coordination: Generally decreased, functional               Posture:     Balance:  Sitting: Intact  Standing: With support Bed Mobility:  Supine to Sit: Minimum assistance  Scooting: Minimum assistance  Wheelchair Mobility:     Transfers:  Sit to Stand: Minimum assistance  Stand to Sit: Contact guard assistance  Bed to Chair: Contact guard assistance;Minimum assistance  Gait:     Speed/Cheryl: Pace decreased (<100 feet/min)  Step Length: Left shortened;Right shortened  Gait Abnormalities: Decreased step clearance  Distance (ft): 15 Feet (ft)  Assistive Device: Walker, rolling  Ambulation - Level of Assistance: Contact guard assistance;Minimal assistance  Interventions: Safety awareness training;Verbal cues      Body Structures Involved:  1.  Muscles Body Functions Affected:  1. Movement Related Activities and Participation Affected:  1. Mobility   Number of elements that affect the Plan of Care: 3: MODERATE COMPLEXITY   CLINICAL PRESENTATION:   Presentation: Stable and uncomplicated: LOW COMPLEXITY   CLINICAL DECISION MAKIN [facility] [address] [id number] AM-PAC 6 Clicks   Basic Mobility Inpatient Short Form  How much difficulty does the patient currently have. .. Unable A Lot A Little None   1. Turning over in bed (including adjusting bedclothes, sheets and blankets)? [] 1   [] 2   [x] 3   [] 4   2. Sitting down on and standing up from a chair with arms ( e.g., wheelchair, bedside commode, etc.)   [] 1   [] 2   [x] 3   [] 4   3. Moving from lying on back to sitting on the side of the bed? [] 1   [] 2   [x] 3   [] 4   How much help from another person does the patient currently need. .. Total A Lot A Little None   4. Moving to and from a bed to a chair (including a wheelchair)? [] 1   [] 2   [x] 3   [] 4   5. Need to walk in hospital room? [] 1   [] 2   [x] 3   [] 4   6. Climbing 3-5 steps with a railing? [] 1   [] 2   [x] 3   [] 4   © , Trustees of [address] [place] [address] [address] [id number], under license to [employer]. All rights reserved      Score:  Initial: 18 Most Recent: X (Date: -- )    Interpretation of Tool:  Represents activities that are increasingly more difficult (i.e. Bed mobility, Transfers, Gait). Medical Necessity:     · Patient is expected to demonstrate progress in   · strength, balance, and coordination  ·  to   · increase independence with mobility. · .  Reason for Services/Other Comments:  · Patient continues to require skilled intervention due to   · Decreased strength, balance, and mobility.   · .   Use of outcome tool(s) and clinical judgement create a POC that gives a: Clear prediction of patient's progress: LOW COMPLEXITY            TREATMENT:   (In addition to Assessment/Re-Assessment sessions the following treatments were rendered) Pre-treatment Symptoms/Complaints:  Patient agreeable. Needing to toilet. Pain: Initial:      Post Session:  0     Therapeutic Activity: (    10 minutes): Therapeutic activities including Bed transfers, Chair transfers, Toilet transfers, and Ambulation on level ground to improve mobility, strength, balance, and coordination. Required minimal Safety awareness training;Verbal cues to promote static and dynamic balance in standing. Braces/Orthotics/Lines/Etc:   · IV  · O2 Device: None (Room air)  Treatment/Session Assessment:    · Response to Treatment:  Patient participated well and moved better than anticipated. Not safe to live alone. · Interdisciplinary Collaboration:   o Physical Therapist  o Occupational Therapist  o Registered Nurse  · After treatment position/precautions:   o Up in chair  o Bed alarm/tab alert on  o Bed/Chair-wheels locked  o Call light within reach  o RN notified   · Compliance with Program/Exercises: Will assess as treatment progresses  · Recommendations/Intent for next treatment session: \"Next visit will focus on advancements to more challenging activities and reduction in assistance provided\".   Total Treatment Duration:  PT Patient Time In/Time Out  Time In: 0845  Time Out: [facility]

## 2021-08-20 LAB
ANION GAP SERPL CALC-SCNC: 3 MMOL/L (ref 7–16)
BASOPHILS # BLD: 0 K/UL (ref 0–0.2)
BASOPHILS NFR BLD: 1 % (ref 0–2)
BUN SERPL-MCNC: 17 MG/DL (ref 8–23)
CALCIUM SERPL-MCNC: 9.4 MG/DL (ref 8.3–10.4)
CHLORIDE SERPL-SCNC: 108 MMOL/L (ref 98–107)
CO2 SERPL-SCNC: 32 MMOL/L (ref 21–32)
CREAT SERPL-MCNC: 1.29 MG/DL (ref 0.6–1)
DIFFERENTIAL METHOD BLD: ABNORMAL
EOSINOPHIL # BLD: 0.8 K/UL (ref 0–0.8)
EOSINOPHIL NFR BLD: 12 % (ref 0.5–7.8)
ERYTHROCYTE [DISTWIDTH] IN BLOOD BY AUTOMATED COUNT: 13.7 % (ref 11.9–14.6)
GLUCOSE SERPL-MCNC: 93 MG/DL (ref 65–100)
HCT VFR BLD AUTO: 32.4 % (ref 35.8–46.3)
HGB BLD-MCNC: 10.1 G/DL (ref 11.7–15.4)
IMM GRANULOCYTES # BLD AUTO: 0 K/UL (ref 0–0.5)
IMM GRANULOCYTES NFR BLD AUTO: 1 % (ref 0–5)
LYMPHOCYTES # BLD: 1.9 K/UL (ref 0.5–4.6)
LYMPHOCYTES NFR BLD: 28 % (ref 13–44)
MCH RBC QN AUTO: 28.9 PG (ref 26.1–32.9)
MCHC RBC AUTO-ENTMCNC: 31.2 G/DL (ref 31.4–35)
MCV RBC AUTO: 92.6 FL (ref 79.6–97.8)
MM INDURATION POC: 0 MM (ref 0–5)
MONOCYTES # BLD: 0.7 K/UL (ref 0.1–1.3)
MONOCYTES NFR BLD: 10 % (ref 4–12)
NEUTS SEG # BLD: 3.2 K/UL (ref 1.7–8.2)
NEUTS SEG NFR BLD: 49 % (ref 43–78)
NRBC # BLD: 0 K/UL (ref 0–0.2)
PLATELET # BLD AUTO: 198 K/UL (ref 150–450)
PMV BLD AUTO: 9.7 FL (ref 9.4–12.3)
POTASSIUM SERPL-SCNC: 4.1 MMOL/L (ref 3.5–5.1)
PPD POC: NEGATIVE NEGATIVE
RBC # BLD AUTO: 3.5 M/UL (ref 4.05–5.2)
SODIUM SERPL-SCNC: 143 MMOL/L (ref 136–145)
WBC # BLD AUTO: 6.6 K/UL (ref 4.3–11.1)

## 2021-08-20 PROCEDURE — 74011250636 HC RX REV CODE- 250/636: Performed by: FAMILY MEDICINE

## 2021-08-20 PROCEDURE — 74011250637 HC RX REV CODE- 250/637: Performed by: FAMILY MEDICINE

## 2021-08-20 PROCEDURE — 36415 COLL VENOUS BLD VENIPUNCTURE: CPT

## 2021-08-20 PROCEDURE — 97530 THERAPEUTIC ACTIVITIES: CPT

## 2021-08-20 PROCEDURE — 80048 BASIC METABOLIC PNL TOTAL CA: CPT

## 2021-08-20 PROCEDURE — 85025 COMPLETE CBC W/AUTO DIFF WBC: CPT

## 2021-08-20 PROCEDURE — 99218 HC RM OBSERVATION: CPT

## 2021-08-20 RX ADMIN — RISPERIDONE 1 MG: 0.5 TABLET ORAL at 09:02

## 2021-08-20 RX ADMIN — SERTRALINE HYDROCHLORIDE 50 MG: 50 TABLET ORAL at 09:03

## 2021-08-20 RX ADMIN — ALLOPURINOL 100 MG: 100 TABLET ORAL at 21:42

## 2021-08-20 RX ADMIN — MEMANTINE 5 MG: 5 TABLET ORAL at 09:03

## 2021-08-20 RX ADMIN — BUMETANIDE 1 MG: 1 TABLET ORAL at 18:08

## 2021-08-20 RX ADMIN — MONTELUKAST 10 MG: 10 TABLET, FILM COATED ORAL at 09:02

## 2021-08-20 RX ADMIN — HYDROCHLOROTHIAZIDE 12.5 MG: 12.5 CAPSULE ORAL at 09:02

## 2021-08-20 RX ADMIN — Medication 81 MG: at 09:02

## 2021-08-20 RX ADMIN — GABAPENTIN 400 MG: 400 CAPSULE ORAL at 09:03

## 2021-08-20 RX ADMIN — APIXABAN 2.5 MG: 2.5 TABLET, FILM COATED ORAL at 21:42

## 2021-08-20 RX ADMIN — APIXABAN 2.5 MG: 2.5 TABLET, FILM COATED ORAL at 09:02

## 2021-08-20 RX ADMIN — FLECAINIDE ACETATE 50 MG: 100 TABLET ORAL at 21:41

## 2021-08-20 RX ADMIN — PANTOPRAZOLE SODIUM 40 MG: 40 TABLET, DELAYED RELEASE ORAL at 21:42

## 2021-08-20 RX ADMIN — FAMOTIDINE 40 MG: 20 TABLET ORAL at 18:08

## 2021-08-20 RX ADMIN — Medication 10 ML: at 22:07

## 2021-08-20 RX ADMIN — FLECAINIDE ACETATE 50 MG: 100 TABLET ORAL at 09:03

## 2021-08-20 RX ADMIN — MEMANTINE 5 MG: 5 TABLET ORAL at 21:42

## 2021-08-20 RX ADMIN — TAMOXIFEN CITRATE 20 MG: 10 TABLET, FILM COATED ORAL at 09:03

## 2021-08-20 RX ADMIN — GABAPENTIN 400 MG: 400 CAPSULE ORAL at 18:08

## 2021-08-20 RX ADMIN — METOPROLOL TARTRATE 25 MG: 25 TABLET, FILM COATED ORAL at 21:41

## 2021-08-20 RX ADMIN — AMLODIPINE BESYLATE 5 MG: 5 TABLET ORAL at 09:02

## 2021-08-20 RX ADMIN — GABAPENTIN 400 MG: 400 CAPSULE ORAL at 22:07

## 2021-08-20 RX ADMIN — METOPROLOL TARTRATE 25 MG: 25 TABLET, FILM COATED ORAL at 09:02

## 2021-08-20 RX ADMIN — ALLOPURINOL 100 MG: 100 TABLET ORAL at 09:02

## 2021-08-20 RX ADMIN — LOSARTAN POTASSIUM 50 MG: 50 TABLET, FILM COATED ORAL at 09:03

## 2021-08-20 RX ADMIN — Medication 10 ML: at 05:14

## 2021-08-20 RX ADMIN — CLORAZEPATE DIPOTASSIUM 3.75 MG: 7.5 TABLET ORAL at 22:06

## 2021-08-20 RX ADMIN — RISPERIDONE 1 MG: 0.5 TABLET ORAL at 21:42

## 2021-08-20 NOTE — PROGRESS NOTES
[facility]ist Note     Admit Date:  2021  4:58 PM   Name:  Harpal Figueroa   Age:  80 y.o.  :  1940   MRN:  [id number]   PCP:  Aashish Marshall MD  Treatment Team: Attending Provider: Serge Adler MD; Utilization Review: Natalia London RN; : Charlene Figueroa; Physical Therapist: Sharon Snyder PT; Staff Nurse: Cliff Mathews RN; Primary Nurse: Lindsay Acosta RN    HPI/Subjective:   Patient is a 72-year-old female with no significant past medical history presented to ED after fall. Denies loss of consciousness. In the ED all relevant imaging studies performed and no significant injuries discovered. Labs noted for KATEY. Patient admitted for observation. : Patient seen at the bedside. Reports she is feeling better. She wants to go home. Patient daughter at bedside and reports patient is following neurology and patient has dementia. Daughter reports patient found wandering on the streets. Also forgetting things. She lives alone and is not able to take care of herself at home. Patient has frequent falls. Daughter concerned about safe discharge for patient. Assessment and Plan:     KATEY:  Creatinine 1.29, improving  Continue maintenance IV fluid    Frequent falls:  PT/OT  fall precautions    Current use of long-term anticoagulation:  On Eliquis and baby aspirin  Discussed risk of bleeding due to patient having multiple falls within the last 30 days. Advised daughter to discuss anticoagulation with the cardiologist due to these frequent falls    A.  Fib:  Rate controlled  Continue flecainide and metoprolol    Hypertension:  Continue Norvasc, Bumex, hydrochlorothiazide, Cozaar and metoprolol    Status cardiac pacemaker:  Noted    Cognitive disorder:  Most likely Alzheimer's versus Lewy body dementia, outpatient notes indicate that she has been worsening with some hallucination and behavioral disorders  She has been placed on medications for this with some success. She is at high risk of worsening while in the hospital.     Discharge planning: PT/OT consulted, may need placement. Case management consulted. PPD ordered. DVT ppx ordered  Code status:  Full  Estimated LOS:  Greater than 2 midnights  Risk:  high    Hospital Problems as of 8/20/2021 Date Reviewed: 7/22/2021        Codes Class Noted - Resolved POA    * (Principal) KATEY (acute kidney injury) ([facility] [address] ICD-10-CM: N17.9  ICD-9-CM: 584.9  8/18/2021 - Present Yes        Current use of long term anticoagulation (Chronic) ICD-10-CM: Z79.01  ICD-9-CM: V58.61  8/18/2021 - Present Yes    Overview Signed 8/18/2021 10:04 PM by MD Alie Hewitt             Frequent falls (Chronic) ICD-10-CM: R29.6  ICD-9-CM: V15.88  8/18/2021 - Present Yes        Cognitive disorder (Chronic) ICD-10-CM: F09  ICD-9-CM: 294.9  4/20/2021 - Present Yes        Status cardiac pacemaker (Chronic) ICD-10-CM: Z95.0  ICD-9-CM: V45.01  8/30/2017 - Present Yes        Atrial fibrillation (HCC) (Chronic) ICD-10-CM: I48.91  ICD-9-CM: 427.31  1/22/2014 - Present Yes        HTN (hypertension) (Chronic) ICD-10-CM: I10  ICD-9-CM: 401.9  9/29/2013 - Present Yes        Primary pulmonary hypertension (HCC) (Chronic) ICD-10-CM: I27.0  ICD-9-CM: 416.0  10/30/2000 - Present Yes                10 systems reviewed and negative except as noted in HPI.   Past Medical History:   Diagnosis Date    Abdominal pain 9/29/2013    COPD (chronic obstructive pulmonary disease) (HCC)     DJD (degenerative joint disease) 9/29/2013    Esophageal reflux 9/29/2013    HTN (hypertension) 9/29/2013    Hypercholesterolemia     Hypertension     Other and unspecified hyperlipidemia 9/29/2013    Pyelonephritis 9/29/2013    Sensory neuropathy 12/23/2020    UTI (lower urinary tract infection) 9/29/2013      Past Surgical History:   Procedure Laterality Date    HX HYSTERECTOMY      HX PACEMAKER      GA BREAST SURGERY PROCEDURE UNLISTED  01/07/2019    right breast cancer-lumpectomy    VT CARDIAC SURG PROCEDURE UNLIST      pacemaker    VT CHEST SURGERY PROCEDURE UNLISTED      VT LAP,CHOLECYSTECTOMY        Allergies   Allergen Reactions    Advair Diskus [Fluticasone Propion-Salmeterol] Other (comments)     shakes    Aspirin Nausea Only     Pt can take aspirin 81mg Pt c/o upset stomach with higher dose    Codeine Nausea Only    Lipitor [Atorvastatin] Other (comments)     Leg weakness      Social History     Tobacco Use    Smoking status: Former Smoker     Packs/day: 1.00     Years: 22.00     Pack years: 22.00     Types: Cigarettes     Quit date: 1989     Years since quittin.6    Smokeless tobacco: Never Used   Substance Use Topics    Alcohol use: No      Family History   Problem Relation Age of Onset    Heart Disease Mother     Hypertension Mother     Heart Disease Father     Hypertension Father       Family history reviewed and noncontributory. Immunization History   Administered Date(s) Administered    COVID-19, PFIZER, MRNA, LNP-S, PF, 30MCG/0.3ML DOSE 2021, 2021    Influenza High Dose Vaccine PF 10/09/2017, 10/19/2018    Influenza Vaccine 10/01/2012    Influenza Vaccine (Quad) PF (>6 Mo Flulaval, Fluarix, and >3 Yrs Afluria, Fluzone [id number]) 2016    Influenza Vaccine (Tri) Adjuvanted (>65 Yrs FLUAD TRI [id number]) 10/28/2019    Influenza, Quadrivalent, Adjuvanted (>65 Yrs FLUAD QUAD [id number]) 09/10/2020    Pneumococcal Conjugate (PCV-13) 2018    Pneumococcal Vaccine (Unspecified Type) 2002, 10/01/2007, 2016    TB Skin Test (PPD) Intradermal 2021    Tdap 2017    Zoster Vaccine, Live 2016     PTA Medications:  Prior to Admission Medications   Prescriptions Last Dose Informant Patient Reported? Taking?    DISABLED PLACARD (DISABLED PLACARD) DMV   No No   Sig: As directed   Oxygen   Yes No   Si liters at night only   albuterol (VENTOLIN HFA) 90 mcg/actuation inhaler   No No Sig: Take 2 Puffs by inhalation every four (4) hours as needed for Shortness of Breath. allopurinoL (ZYLOPRIM) 100 mg tablet   No No   Sig: TAKE 1 TABLET BY MOUTH EVERY DAY   amLODIPine (NORVASC) 5 mg tablet 8/18/2021 at Unknown time  No Yes   Sig: Take 1 Tab by mouth daily. apixaban (Eliquis) 5 mg tablet 8/18/2021 at Unknown time  No Yes   Sig: Take 1 Tab by mouth two (2) times a day. aspirin delayed-release 81 mg tablet 8/18/2021 at Unknown time  No Yes   Sig: Take 1 Tab by mouth daily. bumetanide (BUMEX) 1 mg tablet 8/18/2021 at Unknown time  No Yes   Sig: Take 1 Tab by mouth five (5) days a week. clorazepate (TRANXENE) 7.5 mg tablet   No No   Sig: TAKE 1/2 TABLET BY MOUTH NIGHTLY   diclofenac (VOLTAREN) 1 % gel   No No   Sig: Apply  to affected area four (4) times daily. Patient taking differently: Apply 2 g to affected area four (4) times daily. Applies to BUEs   dicyclomine (BENTYL) 10 mg capsule   No No   Sig: TAKE 1 CAPSULE BY MOUTH 3 TIMES A DAY   famotidine (PEPCID) 40 mg tablet 8/17/2021 at Unknown time  No Yes   Sig: TAKE 1 TABLET BY MOUTH EVERY DAY IN THE EVENING   flecainide (TAMBOCOR) 50 mg tablet 8/18/2021 at Unknown time  No Yes   Sig: Take 1 Tab by mouth two (2) times a day.   gabapentin (NEURONTIN) 400 mg capsule 8/18/2021 at Unknown time  No Yes   Sig: Take 1 Cap by mouth three (3) times daily. lansoprazole (PREVACID) 30 mg capsule 8/18/2021 at Unknown time  No Yes   Sig: Take 1 Cap by mouth two (2) times a day. losartan-hydroCHLOROthiazide (HYZAAR) 50-12.5 mg per tablet 8/18/2021 at Unknown time  No Yes   Sig: Take 1 Tab by mouth daily. Patient taking differently: Take 1 Tab by mouth daily. Take 1/2 tab for 6 days (starting 12/4) then 1 tab every day   memantine (NAMENDA) 5 mg tablet 8/18/2021 at Unknown time  No Yes   Sig: Take 1 Tablet by mouth two (2) times a day.    metoprolol tartrate (LOPRESSOR) 25 mg tablet   No No   Sig: TAKE 1 TABLET BY MOUTH TWICE A DAY   montelukast (SINGULAIR) 10 mg tablet 8/18/2021 at Unknown time  No Yes   Sig: Take 1 Tablet by mouth daily. nitrofurantoin (Macrodantin) 100 mg capsule Not Taking at Unknown time  Yes No   Sig: Take  by mouth nightly. Patient not taking: Reported on 8/18/2021   risperiDONE (RisperDAL) 1 mg tablet   No No   Sig: Take 1 Tablet by mouth two (2) times a day. risperiDONE (RisperDAL) 1 mg tablet   Yes No   Sig: Take  by mouth two (2) times a day. Patient not taking: Reported on 7/22/2021   sertraline (ZOLOFT) 50 mg tablet 8/18/2021 at Unknown time  No Yes   Sig: TAKE 1 TABLET BY MOUTH EVERY DAY   tamoxifen (NOLVADEX) 20 mg tablet 8/18/2021 at Unknown time  Yes Yes   Sig: TAKE 1 TABLET BY MOUTH EVERY DAY      Facility-Administered Medications: None       Objective:     Patient Vitals for the past 24 hrs:   Temp Pulse Resp BP SpO2   08/20/21 1119 97.7 °F (36.5 °C) 72 18 133/80 92 %   08/20/21 0742 98.3 °F (36.8 °C) 72 18 (!) 168/79 91 %   08/20/21 0318 98.1 °F (36.7 °C) 70 18 121/60 93 %   08/19/21 2300 98.3 °F (36.8 °C) 69 18 110/69 92 %   08/19/21 1821 98.2 °F (36.8 °C) 69 16 135/66 93 %     Oxygen Therapy  O2 Sat (%): 92 % (08/20/21 1119)  Pulse via Oximetry: 69 beats per minute (08/18/21 2200)  O2 Device: None (Room air) (08/20/21 0318)    Estimated body mass index is 32.92 kg/m² as calculated from the following:    Height as of this encounter: 5' 2\" (1.575 m). Weight as of this encounter: 81.6 kg (180 lb). No intake or output data in the 24 hours ending 08/20/21 1843    *Note that automatically entered I/Os may not be accurate; dependent on patient compliance with collection and accurate  by assistants. Physical Exam:  General:    Alert. Eyes:   Normal sclerae. Extraocular movements intact. HENT:  Normocephalic, atraumatic. Moist mucous membranes, bruise on the lower jaw  CV:   RRR. No m/r/g. Lungs:  CTAB. No wheezing, rhonchi, or rales. Abdomen: Soft, nontender, nondistended. Extremities: Warm and dry. No cyanosis or edema. Neurologic: CN II-XII grossly intact. Sensation intact. Skin:     No rashes or jaundice. Normal coloration  Psych:  Normal mood and affect. I reviewed the labs, imaging, EKGs, telemetry, and other studies done this admission. Data Reviewed:   Recent Results (from the past 24 hour(s))   METABOLIC PANEL, BASIC    Collection Time: 08/20/21  4:23 AM   Result Value Ref Range    Sodium 143 136 - 145 mmol/L    Potassium 4.1 3.5 - 5.1 mmol/L    Chloride 108 (H) 98 - 107 mmol/L    CO2 32 21 - 32 mmol/L    Anion gap 3 (L) 7 - 16 mmol/L    Glucose 93 65 - 100 mg/dL    BUN 17 8 - 23 MG/DL    Creatinine 1.29 (H) 0.6 - 1.0 MG/DL    GFR est AA 51 (L) >60 ml/min/1.73m2    GFR est non-AA 42 (L) >60 ml/min/1.73m2    Calcium 9.4 8.3 - 10.4 MG/DL   CBC WITH AUTOMATED DIFF    Collection Time: 08/20/21  4:23 AM   Result Value Ref Range    WBC 6.6 4.3 - 11.1 K/uL    RBC 3.50 (L) 4.05 - 5.2 M/uL    HGB 10.1 (L) 11.7 - 15.4 g/dL    HCT 32.4 (L) 35.8 - 46.3 %    MCV 92.6 79.6 - 97.8 FL    MCH 28.9 26.1 - 32.9 PG    MCHC 31.2 (L) 31.4 - 35.0 g/dL    RDW 13.7 11.9 - 14.6 %    PLATELET 111 734 - 340 K/uL    MPV 9.7 9.4 - 12.3 FL    ABSOLUTE NRBC 0.00 0.0 - 0.2 K/uL    DF AUTOMATED      NEUTROPHILS 49 43 - 78 %    LYMPHOCYTES 28 13 - 44 %    MONOCYTES 10 4.0 - 12.0 %    EOSINOPHILS 12 (H) 0.5 - 7.8 %    BASOPHILS 1 0.0 - 2.0 %    IMMATURE GRANULOCYTES 1 0.0 - 5.0 %    ABS. NEUTROPHILS 3.2 1.7 - 8.2 K/UL    ABS. LYMPHOCYTES 1.9 0.5 - 4.6 K/UL    ABS. MONOCYTES 0.7 0.1 - 1.3 K/UL    ABS. EOSINOPHILS 0.8 0.0 - 0.8 K/UL    ABS. BASOPHILS 0.0 0.0 - 0.2 K/UL    ABS. IMM.  GRANS. 0.0 0.0 - 0.5 K/UL   PLEASE READ & DOCUMENT PPD TEST IN 24 HRS    Collection Time: 08/20/21  8:51 AM   Result Value Ref Range    PPD Negative Negative    mm Induration 0 0 - 5 mm       All Micro Results     Procedure Component Value Units Date/Time    CULTURE, BLOOD [[id number]] Collected: 08/18/21 2104    Order Status: Completed Specimen: Blood Updated: 08/20/21 0747     Special Requests: --        LEFT  HAND       Culture result: NO GROWTH 2 DAYS       CULTURE, BLOOD [[id number]] Collected: 08/18/21 2107    Order Status: Completed Specimen: Blood Updated: 08/20/21 0747     Special Requests: --        RIGHT  Antecubital       Culture result: NO GROWTH 2 DAYS             Current Facility-Administered Medications   Medication Dose Route Frequency    albuterol (PROVENTIL VENTOLIN) nebulizer solution 2.5 mg  2.5 mg Nebulization Q6H PRN    amLODIPine (NORVASC) tablet 5 mg  5 mg Oral DAILY    aspirin delayed-release tablet 81 mg  81 mg Oral DAILY    bumetanide (BUMEX) tablet 1 mg  1 mg Oral Once per day on Mon Tue Wed Thu Fri    clorazepate (TRANXENE) tablet 3.75 mg  3.75 mg Oral QHS    famotidine (PEPCID) tablet 40 mg  40 mg Oral QPM    losartan (COZAAR) tablet 50 mg  50 mg Oral DAILY    metoprolol tartrate (LOPRESSOR) tablet 25 mg  25 mg Oral Q12H    montelukast (SINGULAIR) tablet 10 mg  10 mg Oral DAILY    sertraline (ZOLOFT) tablet 50 mg  50 mg Oral DAILY    tamoxifen (NOLVADEX) tablet 20 mg  20 mg Oral DAILY    sodium chloride (NS) flush 5-40 mL  5-40 mL IntraVENous Q8H    sodium chloride (NS) flush 5-40 mL  5-40 mL IntraVENous PRN    acetaminophen (TYLENOL) tablet 650 mg  650 mg Oral Q6H PRN    Or    acetaminophen (TYLENOL) suppository 650 mg  650 mg Rectal Q6H PRN    polyethylene glycol (MIRALAX) packet 17 g  17 g Oral DAILY    hydroCHLOROthiazide (MICROZIDE) capsule 12.5 mg  12.5 mg Oral DAILY    gabapentin (NEURONTIN) capsule 400 mg  400 mg Oral TID    risperiDONE (RisperDAL) tablet 1 mg  1 mg Oral Q12H    apixaban (ELIQUIS) tablet 2.5 mg  2.5 mg Oral Q12H    allopurinoL (ZYLOPRIM) tablet 100 mg  100 mg Oral Q12H    flecainide (TAMBOCOR) tablet 50 mg  50 mg Oral Q12H    memantine (NAMENDA) tablet 5 mg  5 mg Oral Q12H    pantoprazole (PROTONIX) tablet 40 mg  40 mg Oral QHS       Other Studies:  No results found for this visit on 08/18/21. No results found. [unfilled]       Part of this note was written by using a voice dictation software and the note has been proof read but may still contain some grammatical/other typographical errors.     Signed:  Christianne Dodd MD

## 2021-08-20 NOTE — PROGRESS NOTES
Care Management Interventions  PCP Verified by CM: Yes  Last Visit to PCP: 06/17/21  Discharge Location  Discharge Placement: Unable to determine at this time    SW spoke w/ pt and pt's daughter today. Pt's daughter had questions Re: [facility] Benefits and Medicaid. SW referred to supervisor and explained to pt and pt's daughter the differences and options that pt may have if approved by both services. SW spoke w/ pt and pt's daughter about the pt's choice list and made them aware that [facility] would be able to assist pt with short-term and long-term placement along w/ pt's medicaid pending. Pt and pt's daughter agreed to trying [facility]. SANGEETA sent referral out to [facility] and will follow-up w/ pt and pt's daughter. 3:15pm- There is a bed offer for pt and pt has been approved to go to [facility] on Monday if d/c. Pt's daughter agreed to bed offer.  SANGEETA will follow-up w/ pt    TRE Sosa

## 2021-08-20 NOTE — PROGRESS NOTES
Problem: Mobility Impaired (Adult and Pediatric)  Goal: *Acute Goals and Plan of Care (Insert Text)  Outcome: Progressing Towards Goal  Note:   LTG:  (1.)Ms. Aileen Jensen will move from supine to sit and sit to supine  in bed with STAND BY ASSIST within 5 treatment day(s). (2.)Ms. Aileen Jensen will transfer from bed to chair and chair to bed with STAND BY ASSIST using the least restrictive device within 5 treatment day(s). (3.)Ms. Aileen Jensen will ambulate with STAND BY ASSIST for 100 feet with the least restrictive device within 5 treatment day(s). ________________________________________________________________________________________________      PHYSICAL THERAPY: Daily Note and AM 8/20/2021  OBSERVATION: PT Visit Days : 2  Payor: Wanda Nash / Plan: [id number] PPO/PFFS / Product Type: [employer] Care Medicare /       NAME/AGE/GENDER: Isabel Blancas is a 80 y.o. female   PRIMARY DIAGNOSIS: KATEY (acute kidney injury) ([facility] [address] [N17.9] KATEY (acute kidney injury) ([facility] [address] KATEY (acute kidney injury) ([facility] [address]       ICD-10: Treatment Diagnosis:    · Generalized Muscle Weakness (M62.81)  · Difficulty in walking, Not elsewhere classified (R26.2)  · History of falling (Z91.81)   Precaution/Allergies:  Advair diskus [fluticasone propion-salmeterol], Aspirin, Codeine, and Lipitor [atorvastatin]      ASSESSMENT:     Ms. Aileen Jensen presents with above diagnosis. Patient with a recent fall outside. She does have a history of memory issues but does live alone. Patient would benefit from therapy to address strength, balance, and mobility. Would likely benefit from group home/Memory Care as she doesn't appear to safe to live alone. May benefit from STR to maximize strength, balance, and mobility. Patient up in chair on contact, son present. Pt. Reports feeling much better. She ambulated in the steele with RW CGA. She did some exercises listed below working on strength and balance.   She needs UE assistance for higher level balance. Left in chair with alarm on and son present. This section established at most recent assessment   PROBLEM LIST (Impairments causing functional limitations):  1. Decreased Strength  2. Decreased ADL/Functional Activities  3. Decreased Transfer Abilities  4. Decreased Ambulation Ability/Technique  5. Decreased Balance  6. Decreased Activity Tolerance  7. Decreased Cognition   INTERVENTIONS PLANNED: (Benefits and precautions of physical therapy have been discussed with the patient.)  1. Balance Exercise  2. Bed Mobility  3. Family Education  4. Gait Training  5. Home Exercise Program (HEP)  6. Therapeutic Activites  7. Therapeutic Exercise/Strengthening  8. Transfer Training     TREATMENT PLAN: Frequency/Duration: daily for duration of hospital stay  Rehabilitation Potential For Stated Goals: Good     REHAB RECOMMENDATIONS (at time of discharge pending progress):    Placement: It is my opinion, based on this patient's performance to date, that Ms. Meghna Santamaria may benefit from participating in 1-2 additional therapy sessions in order to continue to assess for rehab potential and then make recommendation for disposition at discharge. Equipment:    None at this time              HISTORY:   History of Present Injury/Illness (Reason for Referral):  Patient is a 80 y.o. female with who presented to the ED with multiple sites of pain after a fall. She had an unwitnessed fall outside of her home. She states she fell forward and believes she hit her head on the concrete surface. She does not think she lost consciousness. Family reports that she has been able to bear weight since the fall, but is not taking any steps. She is on anticoagulation with Eliquis. ER is performed all relevant imaging studies and no significant injuries discovered. Patient denies any symptoms prior to the fall, she has been in her usual state of health. Denies fever/chills, NVD, abdominal pain, chest pain, shortness of breath.   Past Medical History/Comorbidities:   Ms. Piter Myers  has a past medical history of Abdominal pain (9/29/2013), COPD (chronic obstructive pulmonary disease) ([facility] [address] DJD (degenerative joint disease) (9/29/2013), Esophageal reflux (9/29/2013), HTN (hypertension) (9/29/2013), Hypercholesterolemia, Hypertension, Other and unspecified hyperlipidemia (9/29/2013), Pyelonephritis (9/29/2013), Sensory neuropathy (12/23/2020), and UTI (lower urinary tract infection) (9/29/2013). Ms. Piter Myers  has a past surgical history that includes hx pacemaker; pr chest surgery procedure unlisted; pr lap,cholecystectomy; hx hysterectomy; pr cardiac surg procedure unlist (2/05 8/2015); and pr breast surgery procedure unlisted (01/07/2019). Social History/Living Environment:   Home Environment: Private residence  One/Two Story Residence: One story  Living Alone: Yes  Support Systems: Child(viktor)  Patient Expects to be Discharged to[de-identified] Unknown  Current DME Used/Available at Home: Walker, rollator  Prior Level of Function/Work/Activity:  Ambulatory but unsure of consistent use of AD. Number of Personal Factors/Comorbidities that affect the Plan of Care: 1-2: MODERATE COMPLEXITY   EXAMINATION:   Most Recent Physical Functioning:   Gross Assessment:                  Posture:     Balance:  Sitting: Intact  Standing: With support Bed Mobility:  Supine to Sit:  (in chair)  Wheelchair Mobility:     Transfers:  Sit to Stand: Contact guard assistance  Stand to Sit: Contact guard assistance  Duration: 25 Minutes  Gait:     Speed/Cheryl: Delayed  Step Length: Left shortened;Right shortened  Gait Abnormalities: Decreased step clearance  Distance (ft): 80 Feet (ft)  Assistive Device: Walker, rolling  Ambulation - Level of Assistance: Contact guard assistance  Interventions: Safety awareness training;Verbal cues      Body Structures Involved:  1. Muscles Body Functions Affected:  1. Movement Related Activities and Participation Affected:  1.  Mobility   Number of elements that affect the Plan of Care: 3: MODERATE COMPLEXITY   CLINICAL PRESENTATION:   Presentation: Stable and uncomplicated: LOW COMPLEXITY   CLINICAL DECISION MAKIN [facility] [address] [id number] AM-PAC 6 Clicks   Basic Mobility Inpatient Short Form  How much difficulty does the patient currently have. .. Unable A Lot A Little None   1. Turning over in bed (including adjusting bedclothes, sheets and blankets)? [] 1   [] 2   [x] 3   [] 4   2. Sitting down on and standing up from a chair with arms ( e.g., wheelchair, bedside commode, etc.)   [] 1   [] 2   [x] 3   [] 4   3. Moving from lying on back to sitting on the side of the bed? [] 1   [] 2   [x] 3   [] 4   How much help from another person does the patient currently need. .. Total A Lot A Little None   4. Moving to and from a bed to a chair (including a wheelchair)? [] 1   [] 2   [x] 3   [] 4   5. Need to walk in hospital room? [] 1   [] 2   [x] 3   [] 4   6. Climbing 3-5 steps with a railing? [] 1   [] 2   [x] 3   [] 4   © , Trustees of [address] [facility] [address] [address], under license to [employer]. All rights reserved      Score:  Initial: 18 Most Recent: X (Date: -- )    Interpretation of Tool:  Represents activities that are increasingly more difficult (i.e. Bed mobility, Transfers, Gait). Medical Necessity:     · Patient is expected to demonstrate progress in   · strength, balance, and coordination  ·  to   · increase independence with mobility. · .  Reason for Services/Other Comments:  · Patient continues to require skilled intervention due to   · Decreased strength, balance, and mobility. · .   Use of outcome tool(s) and clinical judgement create a POC that gives a: Clear prediction of patient's progress: LOW COMPLEXITY            TREATMENT:   (In addition to Assessment/Re-Assessment sessions the following treatments were rendered)   Pre-treatment Symptoms/Complaints:  Patient agreeable. Needing to toilet.   Pain: Initial:      Post Session:  0 Therapeutic Activity: (  25 Minutes ):  Therapeutic activities including Chair transfers and Ambulation on level ground and exercises listed below to improve mobility, strength, balance, and coordination. Required minimal Safety awareness training;Verbal cues to promote static and dynamic balance in standing. Date:  8/20 Date:   Date:     Activity/Exercise Parameters Parameters Parameters   Ankle pumps 10     Long arc quads 10     Seated marching 10     Standing hip abduction with UE support 10     Heel raises 10     Static standing with feet apart/together 10 sec     Single leg stance with UE support 10 sec             Braces/Orthotics/Lines/Etc:   · O2 Device: None (Room air)  Treatment/Session Assessment:    · Response to Treatment:  Patient making progress. · Interdisciplinary Collaboration:   o Registered Nurse  · After treatment position/precautions:   o Up in chair  o Bed alarm/tab alert on  o Bed/Chair-wheels locked  o Bed in low position  o Caregiver at bedside  o Call light within reach  o Family at bedside   · Compliance with Program/Exercises: Will assess as treatment progresses  · Recommendations/Intent for next treatment session: \"Next visit will focus on advancements to more challenging activities and reduction in assistance provided\".   Total Treatment Duration:  PT Patient Time In/Time Out  Time In: 1125  Time Out: CARLOS Melissa

## 2021-08-20 NOTE — PROGRESS NOTES
Problem: Falls - Risk of  Goal: *Absence of Falls  Description: Document Stacy Franks Fall Risk and appropriate interventions in the flowsheet.   Outcome: Progressing Towards Goal  Note: Fall Risk Interventions:  Mobility Interventions: Bed/chair exit alarm, Patient to call before getting OOB, PT Consult for mobility concerns, Utilize walker, cane, or other assistive device, OT consult for ADLs    Mentation Interventions: Adequate sleep, hydration, pain control, Bed/chair exit alarm, Evaluate medications/consider consulting pharmacy, Toileting rounds, More frequent rounding, Increase mobility, Room close to nurse's station, Eyeglasses and hearing aids    Medication Interventions: Evaluate medications/consider consulting pharmacy, Patient to call before getting OOB, Teach patient to arise slowly, Bed/chair exit alarm    Elimination Interventions: Call light in reach, Bed/chair exit alarm, Stay With Me (per policy), Patient to call for help with toileting needs, Toilet paper/wipes in reach, Toileting schedule/hourly rounds    History of Falls Interventions: Evaluate medications/consider consulting pharmacy, Door open when patient unattended, Consult care management for discharge planning, Investigate reason for fall, Room close to nurse's station, Bed/chair exit alarm

## 2021-08-21 LAB
ANION GAP SERPL CALC-SCNC: 4 MMOL/L (ref 7–16)
BASOPHILS # BLD: 0 K/UL (ref 0–0.2)
BASOPHILS NFR BLD: 1 % (ref 0–2)
BUN SERPL-MCNC: 16 MG/DL (ref 8–23)
CALCIUM SERPL-MCNC: 9.4 MG/DL (ref 8.3–10.4)
CHLORIDE SERPL-SCNC: 104 MMOL/L (ref 98–107)
CO2 SERPL-SCNC: 32 MMOL/L (ref 21–32)
CREAT SERPL-MCNC: 1.32 MG/DL (ref 0.6–1)
DIFFERENTIAL METHOD BLD: ABNORMAL
EOSINOPHIL # BLD: 0.7 K/UL (ref 0–0.8)
EOSINOPHIL NFR BLD: 11 % (ref 0.5–7.8)
ERYTHROCYTE [DISTWIDTH] IN BLOOD BY AUTOMATED COUNT: 13.4 % (ref 11.9–14.6)
GLUCOSE SERPL-MCNC: 87 MG/DL (ref 65–100)
HCT VFR BLD AUTO: 32.6 % (ref 35.8–46.3)
HGB BLD-MCNC: 10.4 G/DL (ref 11.7–15.4)
IMM GRANULOCYTES # BLD AUTO: 0 K/UL (ref 0–0.5)
IMM GRANULOCYTES NFR BLD AUTO: 1 % (ref 0–5)
LYMPHOCYTES # BLD: 2.2 K/UL (ref 0.5–4.6)
LYMPHOCYTES NFR BLD: 33 % (ref 13–44)
MCH RBC QN AUTO: 29 PG (ref 26.1–32.9)
MCHC RBC AUTO-ENTMCNC: 31.9 G/DL (ref 31.4–35)
MCV RBC AUTO: 90.8 FL (ref 79.6–97.8)
MM INDURATION POC: 0 MM (ref 0–5)
MONOCYTES # BLD: 0.6 K/UL (ref 0.1–1.3)
MONOCYTES NFR BLD: 10 % (ref 4–12)
NEUTS SEG # BLD: 2.9 K/UL (ref 1.7–8.2)
NEUTS SEG NFR BLD: 45 % (ref 43–78)
NRBC # BLD: 0 K/UL (ref 0–0.2)
PLATELET # BLD AUTO: 203 K/UL (ref 150–450)
PMV BLD AUTO: 9.8 FL (ref 9.4–12.3)
POTASSIUM SERPL-SCNC: 3.6 MMOL/L (ref 3.5–5.1)
PPD POC: NEGATIVE NEGATIVE
RBC # BLD AUTO: 3.59 M/UL (ref 4.05–5.2)
SODIUM SERPL-SCNC: 140 MMOL/L (ref 136–145)
WBC # BLD AUTO: 6.5 K/UL (ref 4.3–11.1)

## 2021-08-21 PROCEDURE — 85025 COMPLETE CBC W/AUTO DIFF WBC: CPT

## 2021-08-21 PROCEDURE — 2709999900 HC NON-CHARGEABLE SUPPLY

## 2021-08-21 PROCEDURE — 36415 COLL VENOUS BLD VENIPUNCTURE: CPT

## 2021-08-21 PROCEDURE — 74011250637 HC RX REV CODE- 250/637: Performed by: FAMILY MEDICINE

## 2021-08-21 PROCEDURE — 99218 HC RM OBSERVATION: CPT

## 2021-08-21 PROCEDURE — 80048 BASIC METABOLIC PNL TOTAL CA: CPT

## 2021-08-21 PROCEDURE — 97530 THERAPEUTIC ACTIVITIES: CPT

## 2021-08-21 PROCEDURE — 74011250636 HC RX REV CODE- 250/636: Performed by: FAMILY MEDICINE

## 2021-08-21 RX ORDER — SODIUM CHLORIDE 9 MG/ML
75 INJECTION, SOLUTION INTRAVENOUS CONTINUOUS
Status: DISCONTINUED | OUTPATIENT
Start: 2021-08-21 | End: 2021-08-22

## 2021-08-21 RX ADMIN — MONTELUKAST 10 MG: 10 TABLET, FILM COATED ORAL at 10:45

## 2021-08-21 RX ADMIN — RISPERIDONE 1 MG: 0.5 TABLET ORAL at 20:36

## 2021-08-21 RX ADMIN — APIXABAN 2.5 MG: 2.5 TABLET, FILM COATED ORAL at 10:45

## 2021-08-21 RX ADMIN — Medication 81 MG: at 10:45

## 2021-08-21 RX ADMIN — SODIUM CHLORIDE 75 ML/HR: 900 INJECTION, SOLUTION INTRAVENOUS at 20:39

## 2021-08-21 RX ADMIN — AMLODIPINE BESYLATE 5 MG: 5 TABLET ORAL at 10:44

## 2021-08-21 RX ADMIN — APIXABAN 2.5 MG: 2.5 TABLET, FILM COATED ORAL at 20:35

## 2021-08-21 RX ADMIN — METOPROLOL TARTRATE 25 MG: 25 TABLET, FILM COATED ORAL at 20:36

## 2021-08-21 RX ADMIN — FAMOTIDINE 40 MG: 20 TABLET ORAL at 17:45

## 2021-08-21 RX ADMIN — FLECAINIDE ACETATE 50 MG: 100 TABLET ORAL at 10:46

## 2021-08-21 RX ADMIN — CLORAZEPATE DIPOTASSIUM 3.75 MG: 7.5 TABLET ORAL at 20:35

## 2021-08-21 RX ADMIN — FLECAINIDE ACETATE 50 MG: 100 TABLET ORAL at 20:37

## 2021-08-21 RX ADMIN — GABAPENTIN 400 MG: 400 CAPSULE ORAL at 21:07

## 2021-08-21 RX ADMIN — MEMANTINE 5 MG: 5 TABLET ORAL at 10:45

## 2021-08-21 RX ADMIN — PANTOPRAZOLE SODIUM 40 MG: 40 TABLET, DELAYED RELEASE ORAL at 20:37

## 2021-08-21 RX ADMIN — SERTRALINE HYDROCHLORIDE 50 MG: 50 TABLET ORAL at 10:45

## 2021-08-21 RX ADMIN — HYDROCHLOROTHIAZIDE 12.5 MG: 12.5 CAPSULE ORAL at 10:45

## 2021-08-21 RX ADMIN — ALLOPURINOL 100 MG: 100 TABLET ORAL at 20:38

## 2021-08-21 RX ADMIN — PANTOPRAZOLE SODIUM 40 MG: 40 TABLET, DELAYED RELEASE ORAL at 20:38

## 2021-08-21 RX ADMIN — SODIUM CHLORIDE 75 ML/HR: 900 INJECTION, SOLUTION INTRAVENOUS at 11:42

## 2021-08-21 RX ADMIN — TAMOXIFEN CITRATE 20 MG: 10 TABLET, FILM COATED ORAL at 10:45

## 2021-08-21 RX ADMIN — ALLOPURINOL 100 MG: 100 TABLET ORAL at 10:45

## 2021-08-21 RX ADMIN — GABAPENTIN 400 MG: 400 CAPSULE ORAL at 16:43

## 2021-08-21 RX ADMIN — Medication 10 ML: at 05:31

## 2021-08-21 RX ADMIN — LOSARTAN POTASSIUM 50 MG: 50 TABLET, FILM COATED ORAL at 10:45

## 2021-08-21 RX ADMIN — GABAPENTIN 400 MG: 400 CAPSULE ORAL at 10:45

## 2021-08-21 RX ADMIN — RISPERIDONE 1 MG: 0.5 TABLET ORAL at 10:45

## 2021-08-21 RX ADMIN — METOPROLOL TARTRATE 25 MG: 25 TABLET, FILM COATED ORAL at 10:45

## 2021-08-21 RX ADMIN — MEMANTINE 5 MG: 5 TABLET ORAL at 20:35

## 2021-08-21 NOTE — PROGRESS NOTES
SANGEETA spoke with patient's daughter Ruma Niño, [phone]). Tabby wanted to confirm patient's discharge plan of going to [facility] on Monday. Per [place] Heir, she will be available at 3pm to provide transportation or patient will require ambulance to SNF.     BRIANA Meyer  [facility]   [phone]

## 2021-08-21 NOTE — PROGRESS NOTES
[facility]ist Note     Admit Date:  2021  4:58 PM   Name:  Matthew Salgado   Age:  80 y.o.  :  1940   MRN:  [id number]   PCP:  Willie Figueroa MD  Treatment Team: Attending Provider: Nimesh Bello MD; Utilization Review: Lexx Mithcell RN; : Elder Landers; Physical Therapist: Bairon Dumont PT; Charge Nurse: Kathy Romo RN    HPI/Subjective:   Patient is a 51-year-old female with no significant past medical history presented to ED after fall. Denies loss of consciousness. In the ED all relevant imaging studies performed and no significant injuries discovered. Labs noted for KATEY. Patient admitted for observation. : Patient is seen at the bedside. No active complaint. Reports she is feeling better. Pending rehab placement. Assessment and Plan:     KATEY:  Creatinine 1.32  Continue maintenance IV fluid  Avoid nephrotoxic agent    Frequent falls:  PT/OT  fall precautions    Current use of long-term anticoagulation:  On Eliquis and baby aspirin  Discussed risk of bleeding due to patient having multiple falls within the last 30 days. Advised daughter to discuss anticoagulation with the cardiologist due to these frequent falls    A. Fib:  Rate controlled  Continue flecainide and metoprolol    Hypertension:  Continue Norvasc, Bumex, hydrochlorothiazide, Cozaar and metoprolol    Status cardiac pacemaker:  Noted    Cognitive disorder:  Most likely Alzheimer's versus Lewy body dementia, outpatient notes indicate that she has been worsening with some hallucination and behavioral disorders  She has been placed on medications for this with some success. She is at high risk of worsening while in the hospital.     Discharge planning: Pending rehab placement, anticipate discharge on Monday.     DVT ppx ordered  Code status:  Full  Estimated LOS:  Greater than 2 midnights  Risk:  high    Hospital Problems as of 2021 Date Reviewed: 2021        Codes Class Noted - Resolved POA    * (Principal) KATEY (acute kidney injury) ([facility] [address] ICD-10-CM: N17.9  ICD-9-CM: 584.9  8/18/2021 - Present Yes        Current use of long term anticoagulation (Chronic) ICD-10-CM: Z79.01  ICD-9-CM: V58.61  8/18/2021 - Present Yes    Overview Signed 8/18/2021 10:04 PM by MD Alie Wei             Frequent falls (Chronic) ICD-10-CM: R29.6  ICD-9-CM: V15.88  8/18/2021 - Present Yes        Cognitive disorder (Chronic) ICD-10-CM: F09  ICD-9-CM: 294.9  4/20/2021 - Present Yes        Status cardiac pacemaker (Chronic) ICD-10-CM: Z95.0  ICD-9-CM: V45.01  8/30/2017 - Present Yes        Atrial fibrillation (HCC) (Chronic) ICD-10-CM: I48.91  ICD-9-CM: 427.31  1/22/2014 - Present Yes        HTN (hypertension) (Chronic) ICD-10-CM: I10  ICD-9-CM: 401.9  9/29/2013 - Present Yes        Primary pulmonary hypertension (HCC) (Chronic) ICD-10-CM: I27.0  ICD-9-CM: 416.0  10/30/2000 - Present Yes                10 systems reviewed and negative except as noted in HPI.   Past Medical History:   Diagnosis Date    Abdominal pain 9/29/2013    COPD (chronic obstructive pulmonary disease) (HCC)     DJD (degenerative joint disease) 9/29/2013    Esophageal reflux 9/29/2013    HTN (hypertension) 9/29/2013    Hypercholesterolemia     Hypertension     Other and unspecified hyperlipidemia 9/29/2013    Pyelonephritis 9/29/2013    Sensory neuropathy 12/23/2020    UTI (lower urinary tract infection) 9/29/2013      Past Surgical History:   Procedure Laterality Date    HX HYSTERECTOMY      HX PACEMAKER      [facility] BREAST SURGERY PROCEDURE UNLISTED  01/07/2019    right breast cancer-lumpectomy    [place] CARDIAC SURG PROCEDURE UNLIST  2/05 8/2015    pacemaker    [facility] CHEST SURGERY PROCEDURE UNLISTED      [place] LAP,CHOLECYSTECTOMY        Allergies   Allergen Reactions    Advair Diskus [Fluticasone Propion-Salmeterol] Other (comments)     shakes    Aspirin Nausea Only     Pt can take aspirin 81mg Pt c/o upset stomach with higher dose    Codeine Nausea Only    Lipitor [Atorvastatin] Other (comments)     Leg weakness      Social History     Tobacco Use    Smoking status: Former Smoker     Packs/day: 1.00     Years: 22.00     Pack years: 22.00     Types: Cigarettes     Quit date: 1989     Years since quittin.6    Smokeless tobacco: Never Used   Substance Use Topics    Alcohol use: No      Family History   Problem Relation Age of Onset    Heart Disease Mother     Hypertension Mother     Heart Disease Father     Hypertension Father       Family history reviewed and noncontributory. Immunization History   Administered Date(s) Administered    COVID-19, PFIZER, MRNA, LNP-S, PF, 30MCG/0.3ML DOSE 2021, 2021    Influenza High Dose Vaccine PF 10/09/2017, 10/19/2018    Influenza Vaccine 10/01/2012    Influenza Vaccine (Quad) PF (>6 Mo Flulaval, Fluarix, and >3 Yrs Afluria, Fluzone [id number]) 2016    Influenza Vaccine (Tri) Adjuvanted (>65 Yrs FLUAD TRI [id number]) 10/28/2019    Influenza, Quadrivalent, Adjuvanted (>65 Yrs FLUAD QUAD [id number]) 09/10/2020    Pneumococcal Conjugate (PCV-13) 2018    Pneumococcal Vaccine (Unspecified Type) 2002, 10/01/2007, 2016    TB Skin Test (PPD) Intradermal 2021    Tdap 2017    Zoster Vaccine, Live 2016     PTA Medications:  Prior to Admission Medications   Prescriptions Last Dose Informant Patient Reported? Taking? DISABLED PLACARD (DISABLED PLACARD) DMV   No No   Sig: As directed   Oxygen   Yes No   Si liters at night only   albuterol (VENTOLIN HFA) 90 mcg/actuation inhaler   No No   Sig: Take 2 Puffs by inhalation every four (4) hours as needed for Shortness of Breath. allopurinoL (ZYLOPRIM) 100 mg tablet   No No   Sig: TAKE 1 TABLET BY MOUTH EVERY DAY   amLODIPine (NORVASC) 5 mg tablet 2021 at Unknown time  No Yes   Sig: Take 1 Tab by mouth daily.    apixaban (Eliquis) 5 mg tablet 2021 at Unknown time  No Yes Sig: Take 1 Tab by mouth two (2) times a day. aspirin delayed-release 81 mg tablet 8/18/2021 at Unknown time  No Yes   Sig: Take 1 Tab by mouth daily. bumetanide (BUMEX) 1 mg tablet 8/18/2021 at Unknown time  No Yes   Sig: Take 1 Tab by mouth five (5) days a week. clorazepate (TRANXENE) 7.5 mg tablet   No No   Sig: TAKE 1/2 TABLET BY MOUTH NIGHTLY   diclofenac (VOLTAREN) 1 % gel   No No   Sig: Apply  to affected area four (4) times daily. Patient taking differently: Apply 2 g to affected area four (4) times daily. Applies to BUEs   dicyclomine (BENTYL) 10 mg capsule   No No   Sig: TAKE 1 CAPSULE BY MOUTH 3 TIMES A DAY   famotidine (PEPCID) 40 mg tablet 8/17/2021 at Unknown time  No Yes   Sig: TAKE 1 TABLET BY MOUTH EVERY DAY IN THE EVENING   flecainide (TAMBOCOR) 50 mg tablet 8/18/2021 at Unknown time  No Yes   Sig: Take 1 Tab by mouth two (2) times a day.   gabapentin (NEURONTIN) 400 mg capsule 8/18/2021 at Unknown time  No Yes   Sig: Take 1 Cap by mouth three (3) times daily. lansoprazole (PREVACID) 30 mg capsule 8/18/2021 at Unknown time  No Yes   Sig: Take 1 Cap by mouth two (2) times a day. losartan-hydroCHLOROthiazide (HYZAAR) 50-12.5 mg per tablet 8/18/2021 at Unknown time  No Yes   Sig: Take 1 Tab by mouth daily. Patient taking differently: Take 1 Tab by mouth daily. Take 1/2 tab for 6 days (starting 12/4) then 1 tab every day   memantine (NAMENDA) 5 mg tablet 8/18/2021 at Unknown time  No Yes   Sig: Take 1 Tablet by mouth two (2) times a day. metoprolol tartrate (LOPRESSOR) 25 mg tablet   No No   Sig: TAKE 1 TABLET BY MOUTH TWICE A DAY   montelukast (SINGULAIR) 10 mg tablet 8/18/2021 at Unknown time  No Yes   Sig: Take 1 Tablet by mouth daily. nitrofurantoin (Macrodantin) 100 mg capsule Not Taking at Unknown time  Yes No   Sig: Take  by mouth nightly.    Patient not taking: Reported on 8/18/2021   risperiDONE (RisperDAL) 1 mg tablet   No No   Sig: Take 1 Tablet by mouth two (2) times a day.   risperiDONE (RisperDAL) 1 mg tablet   Yes No   Sig: Take  by mouth two (2) times a day. Patient not taking: Reported on 7/22/2021   sertraline (ZOLOFT) 50 mg tablet 8/18/2021 at Unknown time  No Yes   Sig: TAKE 1 TABLET BY MOUTH EVERY DAY   tamoxifen (NOLVADEX) 20 mg tablet 8/18/2021 at Unknown time  Yes Yes   Sig: TAKE 1 TABLET BY MOUTH EVERY DAY      Facility-Administered Medications: None       Objective:     Patient Vitals for the past 24 hrs:   Temp Pulse Resp BP SpO2   08/21/21 1449 98.1 °F (36.7 °C) 70 18 106/69 90 %   08/21/21 1143 98.1 °F (36.7 °C) 70 18 (!) 145/79 92 %   08/21/21 0800 97.6 °F (36.4 °C) 71 18 132/81 93 %   08/21/21 0311 98.2 °F (36.8 °C) 67 18 108/61 93 %   08/20/21 2310 98.1 °F (36.7 °C) 73 18 109/70 91 %   08/20/21 2248 98.2 °F (36.8 °C) 72 22 (!) 161/81 91 %     Oxygen Therapy  O2 Sat (%): 90 % (08/21/21 1449)  Pulse via Oximetry: 69 beats per minute (08/18/21 2200)  O2 Device: None (Room air) (08/21/21 0749)    Estimated body mass index is 32.92 kg/m² as calculated from the following:    Height as of this encounter: 5' 2\" (1.575 m). Weight as of this encounter: 81.6 kg (180 lb). No intake or output data in the 24 hours ending 08/21/21 1769    *Note that automatically entered I/Os may not be accurate; dependent on patient compliance with collection and accurate  by assistants. Physical Exam:  General:    Alert. Eyes:   Normal sclerae. Extraocular movements intact. HENT:  Normocephalic, atraumatic. Moist mucous membranes, bruise on the lower jaw  CV:   RRR. No m/r/g. Lungs:  CTAB. No wheezing, rhonchi, or rales. Abdomen: Soft, nontender, nondistended. Extremities: Warm and dry. No cyanosis or edema. Neurologic: CN II-XII grossly intact. Sensation intact. Skin:     No rashes or jaundice. Normal coloration  Psych:  Normal mood and affect. I reviewed the labs, imaging, EKGs, telemetry, and other studies done this admission.   Data Reviewed: Recent Results (from the past 24 hour(s))   CBC WITH AUTOMATED DIFF    Collection Time: 08/21/21  3:38 AM   Result Value Ref Range    WBC 6.5 4.3 - 11.1 K/uL    RBC 3.59 (L) 4.05 - 5.2 M/uL    HGB 10.4 (L) 11.7 - 15.4 g/dL    HCT 32.6 (L) 35.8 - 46.3 %    MCV 90.8 79.6 - 97.8 FL    MCH 29.0 26.1 - 32.9 PG    MCHC 31.9 31.4 - 35.0 g/dL    RDW 13.4 11.9 - 14.6 %    PLATELET 477 046 - 375 K/uL    MPV 9.8 9.4 - 12.3 FL    ABSOLUTE NRBC 0.00 0.0 - 0.2 K/uL    DF AUTOMATED      NEUTROPHILS 45 43 - 78 %    LYMPHOCYTES 33 13 - 44 %    MONOCYTES 10 4.0 - 12.0 %    EOSINOPHILS 11 (H) 0.5 - 7.8 %    BASOPHILS 1 0.0 - 2.0 %    IMMATURE GRANULOCYTES 1 0.0 - 5.0 %    ABS. NEUTROPHILS 2.9 1.7 - 8.2 K/UL    ABS. LYMPHOCYTES 2.2 0.5 - 4.6 K/UL    ABS. MONOCYTES 0.6 0.1 - 1.3 K/UL    ABS. EOSINOPHILS 0.7 0.0 - 0.8 K/UL    ABS. BASOPHILS 0.0 0.0 - 0.2 K/UL    ABS. IMM.  GRANS. 0.0 0.0 - 0.5 K/UL   METABOLIC PANEL, BASIC    Collection Time: 08/21/21  3:38 AM   Result Value Ref Range    Sodium 140 136 - 145 mmol/L    Potassium 3.6 3.5 - 5.1 mmol/L    Chloride 104 98 - 107 mmol/L    CO2 32 21 - 32 mmol/L    Anion gap 4 (L) 7 - 16 mmol/L    Glucose 87 65 - 100 mg/dL    BUN 16 8 - 23 MG/DL    Creatinine 1.32 (H) 0.6 - 1.0 MG/DL    GFR est AA 50 (L) >60 ml/min/1.73m2    GFR est non-AA 41 (L) >60 ml/min/1.73m2    Calcium 9.4 8.3 - 10.4 MG/DL       All Micro Results     Procedure Component Value Units Date/Time    CULTURE, BLOOD [[id number]] Collected: 08/18/21 2104    Order Status: Completed Specimen: Blood Updated: 08/21/21 0826     Special Requests: --        LEFT  HAND       Culture result: NO GROWTH 3 DAYS       CULTURE, BLOOD [[id number]] Collected: 08/18/21 2107    Order Status: Completed Specimen: Blood Updated: 08/21/21 0826     Special Requests: --        RIGHT  Antecubital       Culture result: NO GROWTH 3 DAYS             Current Facility-Administered Medications   Medication Dose Route Frequency    0.9% sodium chloride infusion 75 mL/hr IntraVENous CONTINUOUS    albuterol (PROVENTIL VENTOLIN) nebulizer solution 2.5 mg  2.5 mg Nebulization Q6H PRN    amLODIPine (NORVASC) tablet 5 mg  5 mg Oral DAILY    aspirin delayed-release tablet 81 mg  81 mg Oral DAILY    bumetanide (BUMEX) tablet 1 mg  1 mg Oral Once per day on Mon Tue Wed Thu Fri    clorazepate (TRANXENE) tablet 3.75 mg  3.75 mg Oral QHS    famotidine (PEPCID) tablet 40 mg  40 mg Oral QPM    losartan (COZAAR) tablet 50 mg  50 mg Oral DAILY    metoprolol tartrate (LOPRESSOR) tablet 25 mg  25 mg Oral Q12H    montelukast (SINGULAIR) tablet 10 mg  10 mg Oral DAILY    sertraline (ZOLOFT) tablet 50 mg  50 mg Oral DAILY    tamoxifen (NOLVADEX) tablet 20 mg  20 mg Oral DAILY    sodium chloride (NS) flush 5-40 mL  5-40 mL IntraVENous Q8H    sodium chloride (NS) flush 5-40 mL  5-40 mL IntraVENous PRN    acetaminophen (TYLENOL) tablet 650 mg  650 mg Oral Q6H PRN    Or    acetaminophen (TYLENOL) suppository 650 mg  650 mg Rectal Q6H PRN    polyethylene glycol (MIRALAX) packet 17 g  17 g Oral DAILY    hydroCHLOROthiazide (MICROZIDE) capsule 12.5 mg  12.5 mg Oral DAILY    gabapentin (NEURONTIN) capsule 400 mg  400 mg Oral TID    risperiDONE (RisperDAL) tablet 1 mg  1 mg Oral Q12H    apixaban (ELIQUIS) tablet 2.5 mg  2.5 mg Oral Q12H    allopurinoL (ZYLOPRIM) tablet 100 mg  100 mg Oral Q12H    flecainide (TAMBOCOR) tablet 50 mg  50 mg Oral Q12H    memantine (NAMENDA) tablet 5 mg  5 mg Oral Q12H    pantoprazole (PROTONIX) tablet 40 mg  40 mg Oral QHS       Other Studies:  No results found for this visit on 08/18/21. No results found. [unfilled]       Part of this note was written by using a voice dictation software and the note has been proof read but may still contain some grammatical/other typographical errors.     Signed:  Carla Taylor MD

## 2021-08-21 NOTE — PROGRESS NOTES
Problem: Falls - Risk of  Goal: *Absence of Falls  Description: Document [address] Fall Risk and appropriate interventions in the flowsheet.   Outcome: Progressing Towards Goal  Note: Fall Risk Interventions:  Mobility Interventions: Bed/chair exit alarm, OT consult for ADLs, Patient to call before getting OOB, PT Consult for mobility concerns, PT Consult for assist device competence, Strengthening exercises (ROM-active/passive), Utilize walker, cane, or other assistive device    Mentation Interventions: Bed/chair exit alarm, Door open when patient unattended    Medication Interventions: Bed/chair exit alarm, Evaluate medications/consider consulting pharmacy, Patient to call before getting OOB, Teach patient to arise slowly    Elimination Interventions: Bed/chair exit alarm, Call light in reach, Elevated toilet seat, Patient to call for help with toileting needs, Toileting schedule/hourly rounds    History of Falls Interventions: Bed/chair exit alarm, Consult care management for discharge planning, Door open when patient unattended, Evaluate medications/consider consulting pharmacy, Investigate reason for fall, Room close to nurse's station         Problem: Patient Education: Go to Patient Education Activity  Goal: Patient/Family Education  Outcome: Progressing Towards Goal     Problem: Urinary Tract Infection - Adult  Goal: *Absence of infection signs and symptoms  Outcome: Progressing Towards Goal     Problem: Patient Education: Go to Patient Education Activity  Goal: Patient/Family Education  Outcome: Progressing Towards Goal     Problem: Pain  Goal: *Control of Pain  Outcome: Progressing Towards Goal  Goal: *PALLIATIVE CARE:  Alleviation of Pain  Outcome: Progressing Towards Goal     Problem: Patient Education: Go to Patient Education Activity  Goal: Patient/Family Education  Outcome: Progressing Towards Goal     Problem: [address] (Adult/Pediatric)  Goal: *STG: Participates in treatment plan  Outcome: Progressing Towards Goal  Goal: *STG: Seeks staff when feelings of anxiety and fear arise  Outcome: Progressing Towards Goal  Goal: *STG: Attends activities and groups  Outcome: Progressing Towards Goal  Goal: *STG: Absence of lethality  Outcome: Progressing Towards Goal  Goal: *STG: Demonstrates ability to understand and use improved judgment in daily activities and relationships  Outcome: Progressing Towards Goal  Goal: *STG: Remains safe in hospital  Outcome: Progressing Towards Goal  Goal: *LTG: Obtains optimum level of functioning  Outcome: Progressing Towards Goal  Goal: *STG/LTG: Maintain structure for daily activities  Outcome: Progressing Towards Goal  Goal: *STG/LTG: Complies with medication therapy  Outcome: Progressing Towards Goal  Goal: Interventions  Outcome: Progressing Towards Goal     Problem: Patient Education: Go to Patient Education Activity  Goal: Patient/Family Education  Outcome: Progressing Towards Goal     Problem: Patient Education: Go to Patient Education Activity  Goal: Patient/Family Education  Outcome: Progressing Towards Goal     Problem: Patient Education: Go to Patient Education Activity  Goal: Patient/Family Education  Outcome: Progressing Towards Goal

## 2021-08-21 NOTE — PROGRESS NOTES
Problem: Mobility Impaired (Adult and Pediatric)  Goal: *Acute Goals and Plan of Care (Insert Text)  Outcome: Progressing Towards Goal  Note:   LTG:  (1.)Ms. Timo Valdez will move from supine to sit and sit to supine  in bed with STAND BY ASSIST within 5 treatment day(s). (2.)Ms. Timo Valdez will transfer from bed to chair and chair to bed with STAND BY ASSIST using the least restrictive device within 5 treatment day(s). (3.)Ms. Timo Valdez will ambulate with STAND BY ASSIST for 100 feet with the least restrictive device within 5 treatment day(s). Met 8/21/21  ________________________________________________________________________________________________      PHYSICAL THERAPY: Daily Note and AM 8/21/2021  OBSERVATION: PT Visit Days : 3  Payor: Amilcar Alberts / Plan: [id number] PPO/PFFS / Product Type: [employer] Medicare /       NAME/AGE/GENDER: Anastasiia Sharma is a 80 y.o. female   PRIMARY DIAGNOSIS: KATEY (acute kidney injury) ([facility] [address] [N17.9] KATEY (acute kidney injury) ([facility] [address] KATEY (acute kidney injury) ([facility] [address]       ICD-10: Treatment Diagnosis:    · Generalized Muscle Weakness (M62.81)  · Difficulty in walking, Not elsewhere classified (R26.2)  · History of falling (Z91.81)   Precaution/Allergies:  Advair diskus [fluticasone propion-salmeterol], Aspirin, Codeine, and Lipitor [atorvastatin]      ASSESSMENT:     Ms. Timo Valdez presents with above diagnosis. Patient with a recent fall outside. She does have a history of memory issues but does live alone. Patient would benefit from therapy to address strength, balance, and mobility. Would likely benefit from [facility]/Memory Care as she doesn't appear to safe to live alone. May benefit from STR to maximize strength, balance, and mobility. Patient participated well this am.  Needing to use restroom. Able to manage clothing without assist.  SBA for mobility for increased distance with occasional cues to stand closer to the walker.   Able to transition between sitting and standing from recliner without assist from UEs. Did well with standing LE exercises at walker. Patient wanting to go home but appears plan is for STR-LTC. This section established at most recent assessment   PROBLEM LIST (Impairments causing functional limitations):  1. Decreased Strength  2. Decreased ADL/Functional Activities  3. Decreased Transfer Abilities  4. Decreased Ambulation Ability/Technique  5. Decreased Balance  6. Decreased Activity Tolerance  7. Decreased Cognition   INTERVENTIONS PLANNED: (Benefits and precautions of physical therapy have been discussed with the patient.)  1. Balance Exercise  2. Bed Mobility  3. Family Education  4. Gait Training  5. Home Exercise Program (HEP)  6. Therapeutic Activites  7. Therapeutic Exercise/Strengthening  8. Transfer Training     TREATMENT PLAN: Frequency/Duration: daily for duration of hospital stay  Rehabilitation Potential For Stated Goals: Good     REHAB RECOMMENDATIONS (at time of discharge pending progress):    Placement: It is my opinion, based on this patient's performance to date, that Ms. Giovana Villasenor may benefit from participating in 1-2 additional therapy sessions in order to continue to assess for rehab potential and then make recommendation for disposition at discharge. Equipment:    None at this time              HISTORY:   History of Present Injury/Illness (Reason for Referral):  Patient is a 80 y.o. female with who presented to the ED with multiple sites of pain after a fall. She had an unwitnessed fall outside of her home. She states she fell forward and believes she hit her head on the concrete surface. She does not think she lost consciousness. Family reports that she has been able to bear weight since the fall, but is not taking any steps. She is on anticoagulation with Eliquis. ER is performed all relevant imaging studies and no significant injuries discovered.   Patient denies any symptoms prior to the fall, she has been in her usual state of health. Denies fever/chills, NVD, abdominal pain, chest pain, shortness of breath. Past Medical History/Comorbidities:   Ms. Devaughn Ta  has a past medical history of Abdominal pain (9/29/2013), COPD (chronic obstructive pulmonary disease) ([facility] [address] DJD (degenerative joint disease) (9/29/2013), Esophageal reflux (9/29/2013), HTN (hypertension) (9/29/2013), Hypercholesterolemia, Hypertension, Other and unspecified hyperlipidemia (9/29/2013), Pyelonephritis (9/29/2013), Sensory neuropathy (12/23/2020), and UTI (lower urinary tract infection) (9/29/2013). Ms. Devaughn Ta  has a past surgical history that includes hx pacemaker; pr chest surgery procedure unlisted; pr lap,cholecystectomy; hx hysterectomy; pr cardiac surg procedure unlist (2/05 8/2015); and pr breast surgery procedure unlisted (01/07/2019). Social History/Living Environment:   Home Environment: Private residence  One/Two Story Residence: One story  Living Alone: Yes  Support Systems: Child(viktor)  Patient Expects to be Discharged to[de-identified] Unknown  Current DME Used/Available at Home: Walker, rollator  Prior Level of Function/Work/Activity:  Ambulatory but unsure of consistent use of AD.      Number of Personal Factors/Comorbidities that affect the Plan of Care: 1-2: MODERATE COMPLEXITY   EXAMINATION:   Most Recent Physical Functioning:   Gross Assessment:  AROM: Generally decreased, functional  Strength: Generally decreased, functional  Coordination: Generally decreased, functional               Posture:     Balance:  Sitting: Intact  Standing: With support Bed Mobility:     Wheelchair Mobility:     Transfers:  Sit to Stand: Supervision  Stand to Sit: Supervision  Bed to Chair: Stand-by assistance  Gait:     Speed/Cheryl: Pace decreased (<100 feet/min)  Step Length: Left shortened;Right shortened  Gait Abnormalities: Decreased step clearance  Distance (ft): 150 Feet (ft)  Assistive Device: Walker, rolling  Ambulation - Level of Assistance: Stand-by assistance  Interventions: Safety awareness training;Verbal cues      Body Structures Involved:  1. Muscles Body Functions Affected:  1. Movement Related Activities and Participation Affected:  1. Mobility   Number of elements that affect the Plan of Care: 3: MODERATE COMPLEXITY   CLINICAL PRESENTATION:   Presentation: Stable and uncomplicated: LOW COMPLEXITY   CLINICAL DECISION MAKIN[address] [place] [address] AM-PAC 6 Clicks   Basic Mobility Inpatient Short Form  How much difficulty does the patient currently have. .. Unable A Lot A Little None   1. Turning over in bed (including adjusting bedclothes, sheets and blankets)? [] 1   [] 2   [x] 3   [] 4   2. Sitting down on and standing up from a chair with arms ( e.g., wheelchair, bedside commode, etc.)   [] 1   [] 2   [x] 3   [] 4   3. Moving from lying on back to sitting on the side of the bed? [] 1   [] 2   [x] 3   [] 4   How much help from another person does the patient currently need. .. Total A Lot A Little None   4. Moving to and from a bed to a chair (including a wheelchair)? [] 1   [] 2   [x] 3   [] 4   5. Need to walk in hospital room? [] 1   [] 2   [x] 3   [] 4   6. Climbing 3-5 steps with a railing? [] 1   [] 2   [x] 3   [] 4   © , Trustees of [address] [place] [address], under license to [employer]. All rights reserved      Score:  Initial: 18 Most Recent: X (Date: -- )    Interpretation of Tool:  Represents activities that are increasingly more difficult (i.e. Bed mobility, Transfers, Gait). Medical Necessity:     · Patient is expected to demonstrate progress in   · strength, balance, and coordination  ·  to   · increase independence with mobility. · .  Reason for Services/Other Comments:  · Patient continues to require skilled intervention due to   · Decreased strength, balance, and mobility.   · .   Use of outcome tool(s) and clinical judgement create a POC that gives a: Clear prediction of patient's progress: LOW COMPLEXITY TREATMENT:   (In addition to Assessment/Re-Assessment sessions the following treatments were rendered)   Pre-treatment Symptoms/Complaints:  Patient agreeable. Pain: Initial:   Pain Intensity 1: 0  Post Session:  0     Therapeutic Activity: (   25 minutes ):  Therapeutic activities including Chair transfers, Toilet transfers, Ambulation on level ground and exercises listed below to improve mobility, strength, balance, and coordination. Required minimal Safety awareness training;Verbal cues to promote static and dynamic balance in standing. Date:  8/20 Date:  8/21/21 Date:     Activity/Exercise Parameters Parameters Parameters   Ankle pumps 10     Long arc quads 10 15 B    Seated marching 10     Standing hip abduction with UE support 10 15 B    Heel raises 10 15 B-standing    Static standing with feet apart/together 10 sec     Single leg stance with UE support 10 sec     Toe raises  15 B-standing    Standing marching  15 B    Sit <> stand  10 without UE assist from recliner        Braces/Orthotics/Lines/Etc:   · O2 Device: None (Room air)  Treatment/Session Assessment:    · Response to Treatment:  Patient progressing well and moving well. Good participation. Likely better at [address] [place] [address] than [facility]. · Interdisciplinary Collaboration:   o Physical Therapist  o Registered Nurse  · After treatment position/precautions:   o Up in chair  o Bed alarm/tab alert on  o Bed/Chair-wheels locked  o Bed in low position  o Call light within reach   · Compliance with Program/Exercises: Will assess as treatment progresses  · Recommendations/Intent for next treatment session: \"Next visit will focus on advancements to more challenging activities and reduction in assistance provided\".   Total Treatment Duration:  PT Patient Time In/Time Out  Time In: 4113  Time Out: [facility] Reyna Marr PT

## 2021-08-22 LAB
ANION GAP SERPL CALC-SCNC: 3 MMOL/L (ref 7–16)
BASOPHILS # BLD: 0 K/UL (ref 0–0.2)
BASOPHILS NFR BLD: 1 % (ref 0–2)
BUN SERPL-MCNC: 15 MG/DL (ref 8–23)
CALCIUM SERPL-MCNC: 9.2 MG/DL (ref 8.3–10.4)
CHLORIDE SERPL-SCNC: 107 MMOL/L (ref 98–107)
CO2 SERPL-SCNC: 31 MMOL/L (ref 21–32)
CREAT SERPL-MCNC: 1.1 MG/DL (ref 0.6–1)
DIFFERENTIAL METHOD BLD: ABNORMAL
EOSINOPHIL # BLD: 0.8 K/UL (ref 0–0.8)
EOSINOPHIL NFR BLD: 13 % (ref 0.5–7.8)
ERYTHROCYTE [DISTWIDTH] IN BLOOD BY AUTOMATED COUNT: 13.7 % (ref 11.9–14.6)
GLUCOSE SERPL-MCNC: 96 MG/DL (ref 65–100)
HCT VFR BLD AUTO: 32.2 % (ref 35.8–46.3)
HGB BLD-MCNC: 10.2 G/DL (ref 11.7–15.4)
IMM GRANULOCYTES # BLD AUTO: 0 K/UL (ref 0–0.5)
IMM GRANULOCYTES NFR BLD AUTO: 1 % (ref 0–5)
LYMPHOCYTES # BLD: 2.2 K/UL (ref 0.5–4.6)
LYMPHOCYTES NFR BLD: 35 % (ref 13–44)
MCH RBC QN AUTO: 29.1 PG (ref 26.1–32.9)
MCHC RBC AUTO-ENTMCNC: 31.7 G/DL (ref 31.4–35)
MCV RBC AUTO: 91.7 FL (ref 79.6–97.8)
MONOCYTES # BLD: 0.6 K/UL (ref 0.1–1.3)
MONOCYTES NFR BLD: 9 % (ref 4–12)
NEUTS SEG # BLD: 2.6 K/UL (ref 1.7–8.2)
NEUTS SEG NFR BLD: 42 % (ref 43–78)
NRBC # BLD: 0 K/UL (ref 0–0.2)
PLATELET # BLD AUTO: 208 K/UL (ref 150–450)
PMV BLD AUTO: 9.6 FL (ref 9.4–12.3)
POTASSIUM SERPL-SCNC: 3.7 MMOL/L (ref 3.5–5.1)
RBC # BLD AUTO: 3.51 M/UL (ref 4.05–5.2)
SODIUM SERPL-SCNC: 141 MMOL/L (ref 136–145)
WBC # BLD AUTO: 6.2 K/UL (ref 4.3–11.1)

## 2021-08-22 PROCEDURE — 74011250636 HC RX REV CODE- 250/636: Performed by: FAMILY MEDICINE

## 2021-08-22 PROCEDURE — 2709999900 HC NON-CHARGEABLE SUPPLY

## 2021-08-22 PROCEDURE — 85025 COMPLETE CBC W/AUTO DIFF WBC: CPT

## 2021-08-22 PROCEDURE — 97112 NEUROMUSCULAR REEDUCATION: CPT

## 2021-08-22 PROCEDURE — 80048 BASIC METABOLIC PNL TOTAL CA: CPT

## 2021-08-22 PROCEDURE — 74011250637 HC RX REV CODE- 250/637: Performed by: FAMILY MEDICINE

## 2021-08-22 PROCEDURE — 97530 THERAPEUTIC ACTIVITIES: CPT

## 2021-08-22 PROCEDURE — 36415 COLL VENOUS BLD VENIPUNCTURE: CPT

## 2021-08-22 PROCEDURE — 99218 HC RM OBSERVATION: CPT

## 2021-08-22 RX ADMIN — RISPERIDONE 1 MG: 0.5 TABLET ORAL at 21:35

## 2021-08-22 RX ADMIN — AMLODIPINE BESYLATE 5 MG: 5 TABLET ORAL at 08:38

## 2021-08-22 RX ADMIN — GABAPENTIN 400 MG: 400 CAPSULE ORAL at 21:35

## 2021-08-22 RX ADMIN — MEMANTINE 5 MG: 5 TABLET ORAL at 21:35

## 2021-08-22 RX ADMIN — POLYETHYLENE GLYCOL 3350 17 G: 17 POWDER, FOR SOLUTION ORAL at 08:39

## 2021-08-22 RX ADMIN — SERTRALINE HYDROCHLORIDE 50 MG: 50 TABLET ORAL at 08:38

## 2021-08-22 RX ADMIN — APIXABAN 2.5 MG: 2.5 TABLET, FILM COATED ORAL at 21:36

## 2021-08-22 RX ADMIN — MONTELUKAST 10 MG: 10 TABLET, FILM COATED ORAL at 08:38

## 2021-08-22 RX ADMIN — APIXABAN 2.5 MG: 2.5 TABLET, FILM COATED ORAL at 08:38

## 2021-08-22 RX ADMIN — GABAPENTIN 400 MG: 400 CAPSULE ORAL at 17:14

## 2021-08-22 RX ADMIN — CLORAZEPATE DIPOTASSIUM 3.75 MG: 7.5 TABLET ORAL at 21:36

## 2021-08-22 RX ADMIN — MEMANTINE 5 MG: 5 TABLET ORAL at 08:39

## 2021-08-22 RX ADMIN — Medication 10 ML: at 21:39

## 2021-08-22 RX ADMIN — RISPERIDONE 1 MG: 0.5 TABLET ORAL at 08:38

## 2021-08-22 RX ADMIN — METOPROLOL TARTRATE 25 MG: 25 TABLET, FILM COATED ORAL at 08:38

## 2021-08-22 RX ADMIN — Medication 81 MG: at 08:39

## 2021-08-22 RX ADMIN — HYDROCHLOROTHIAZIDE 12.5 MG: 12.5 CAPSULE ORAL at 08:38

## 2021-08-22 RX ADMIN — FAMOTIDINE 40 MG: 20 TABLET ORAL at 17:14

## 2021-08-22 RX ADMIN — PANTOPRAZOLE SODIUM 40 MG: 40 TABLET, DELAYED RELEASE ORAL at 21:35

## 2021-08-22 RX ADMIN — ALLOPURINOL 100 MG: 100 TABLET ORAL at 21:36

## 2021-08-22 RX ADMIN — FLECAINIDE ACETATE 50 MG: 100 TABLET ORAL at 08:39

## 2021-08-22 RX ADMIN — SODIUM CHLORIDE 75 ML/HR: 900 INJECTION, SOLUTION INTRAVENOUS at 15:16

## 2021-08-22 RX ADMIN — FLECAINIDE ACETATE 50 MG: 100 TABLET ORAL at 21:36

## 2021-08-22 RX ADMIN — LOSARTAN POTASSIUM 50 MG: 50 TABLET, FILM COATED ORAL at 08:39

## 2021-08-22 RX ADMIN — METOPROLOL TARTRATE 25 MG: 25 TABLET, FILM COATED ORAL at 21:35

## 2021-08-22 RX ADMIN — TAMOXIFEN CITRATE 20 MG: 10 TABLET, FILM COATED ORAL at 08:38

## 2021-08-22 RX ADMIN — ALLOPURINOL 100 MG: 100 TABLET ORAL at 08:39

## 2021-08-22 RX ADMIN — GABAPENTIN 400 MG: 400 CAPSULE ORAL at 08:38

## 2021-08-22 NOTE — PROGRESS NOTES
Problem: Falls - Risk of  Goal: *Absence of Falls  Description: Document Francisca Locket Fall Risk and appropriate interventions in the flowsheet.   Outcome: Progressing Towards Goal  Note: Fall Risk Interventions:  Mobility Interventions: Bed/chair exit alarm    Mentation Interventions: Bed/chair exit alarm    Medication Interventions: Bed/chair exit alarm    Elimination Interventions: Call light in reach    History of Falls Interventions: Bed/chair exit alarm         Problem: Urinary Tract Infection - Adult  Goal: *Absence of infection signs and symptoms  Outcome: Progressing Towards Goal     Problem: Pain  Goal: *Control of Pain  Outcome: Progressing Towards Goal     Problem: Dementia-BEHAVIORAL HEALTH (Adult/Pediatric)  Goal: *STG: Participates in treatment plan  Outcome: Progressing Towards Goal  Goal: *STG: Seeks staff when feelings of anxiety and fear arise  Outcome: Progressing Towards Goal  Goal: *STG: Attends activities and groups  Outcome: Progressing Towards Goal  Goal: *STG: Absence of lethality  Outcome: Progressing Towards Goal  Goal: *STG: Demonstrates ability to understand and use improved judgment in daily activities and relationships  Outcome: Progressing Towards Goal  Goal: *STG: Remains safe in hospital  Outcome: Progressing Towards Goal  Goal: *LTG: Obtains optimum level of functioning  Outcome: Progressing Towards Goal  Goal: *STG/LTG: Maintain structure for daily activities  Outcome: Progressing Towards Goal  Goal: *STG/LTG: Complies with medication therapy  Outcome: Progressing Towards Goal  Goal: Interventions  Outcome: Progressing Towards Goal

## 2021-08-22 NOTE — PROGRESS NOTES
Problem: Self Care Deficits Care Plan (Adult)  Goal: *Acute Goals and Plan of Care (Insert Text)  Outcome: Progressing Towards Goal  Note: 1. Patient will perform grooming with supervision. 2. Patient will perform Upper body dressing with supervision  3. Patient will perform lower body dressing with min assist  4. Patient will perform upper and lower body bathing with contact guard assist.  5. Patient will perform toilet transfers with CGA. 6. Patient will perform shower transfer with CGA. 7. Patient will participate in 30 + minutes of ADL/ therapeutic exercise/therapeutic activity with min rest breaks to increase activity tolerance for self care. 8. Patient will perform ADL functional mobility in room with CGA. Goals to be achieved in 7 days. OCCUPATIONAL THERAPY: Daily Note and AM 8/22/2021  OBSERVATION: OT Visit Days: 2  Payor: Cleola Rubinstein / Plan: [id number] PPO/PFFS / Product Type: [employer] Medicare /      NAME/AGE/GENDER: Mitesh Holbrook is a 80 y.o. female   PRIMARY DIAGNOSIS:  KATEY (acute kidney injury) ([facility] [address] [N17.9] KATEY (acute kidney injury) ([facility] [address] KATEY (acute kidney injury) ([facility] [address]       ICD-10: Treatment Diagnosis:    · Generalized Muscle Weakness (M62.81)  · Other lack of cordination (R27.8)   Precautions/Allergies:     Advair diskus [fluticasone propion-salmeterol], Aspirin, Codeine, and Lipitor [atorvastatin]      ASSESSMENT:     Ms. Kelly Vázquez presents with above diagnosis. Patient with a recent fall outside. She does have a history of memory issues but does live alone. Patient would benefit from therapy to address strength, self care and mobility. Would likely benefit [facility] as she doesn't appear to safe to live alone. May benefit from STR to maximize strength, balance, and mobility. Patient participated well with assessment. Pt setup for toileting and hygiene.   Pt completed functional transfers with CGA.     8/22/21: Pt presents sitting in recliner attempting to place lunch order. Pt had just gotten to the shower and completed all morning ADLs with minimal assistance from CNA. Pt eager to get up and go for walk in hallway and around room. Pt did so with SBA and RW. Pt demonstrated dynamic and static standing balance for about 15 minutes while standing at window sill and performing various tasks. Pt returned to recliner with all needs met and in reach. Pt plans for rehab on Monday 8/23. This section established at most recent assessment   PROBLEM LIST (Impairments causing functional limitations):  1. Decreased Strength  2. Decreased ADL/Functional Activities  3. Decreased Transfer Abilities  4. Decreased Ambulation Ability/Technique  5. Decreased Balance  6. Decreased Activity Tolerance   INTERVENTIONS PLANNED: (Benefits and precautions of occupational therapy have been discussed with the patient.)  1. Activities of daily living training  2. Adaptive equipment training  3. Balance training  4. Clothing management  5. Therapeutic activity  6. Therapeutic exercise     TREATMENT PLAN: Frequency/Duration: Follow patient 3x/week to address above goals. Rehabilitation Potential For Stated Goals: Good     REHAB RECOMMENDATIONS (at time of discharge pending progress):    Placement: It is my opinion, based on this patient's performance to date, that Ms. Milagro Tijerina may benefit from participating in 1-2 additional therapy sessions in order to continue to assess for rehab potential and then make recommendation for disposition at discharge. Equipment:    None at this time              OCCUPATIONAL PROFILE AND HISTORY:   History of Present Injury/Illness (Reason for Referral):   KATEY  Past Medical History/Comorbidities:   Ms. Milagro Tijerina  has a past medical history of Abdominal pain (9/29/2013), COPD (chronic obstructive pulmonary disease) ([facility] [address] DJD (degenerative joint disease) (9/29/2013), Esophageal reflux (9/29/2013), HTN (hypertension) (9/29/2013), Hypercholesterolemia, Hypertension, Other and unspecified hyperlipidemia (9/29/2013), Pyelonephritis (9/29/2013), Sensory neuropathy (12/23/2020), and UTI (lower urinary tract infection) (9/29/2013). Ms. Sofia Lino  has a past surgical history that includes hx pacemaker; pr chest surgery procedure unlisted; pr lap,cholecystectomy; hx hysterectomy; pr cardiac surg procedure unlist (2/05 8/2015); and pr breast surgery procedure unlisted (01/07/2019). Social History/Living Environment:   Home Environment: Private residence  One/Two Story Residence: One story  Living Alone: Yes  Support Systems: Child(viktor)  Patient Expects to be Discharged to[de-identified] [address] DME Used/Available at Home: Walker, rollator  Prior Level of Function/Work/Activity:  Ambulated with rollator; assist with ADLs   Number of Personal Factors/Comorbidities that affect the Plan of Care: Brief history (0):  LOW COMPLEXITY   ASSESSMENT OF OCCUPATIONAL PERFORMANCE[de-identified]   Activities of Daily Living:   Basic ADLs (From Assessment) Complex ADLs (From Assessment)   Feeding: Setup  Oral Facial Hygiene/Grooming: Setup  Bathing: Minimum assistance, Moderate assistance  Upper Body Dressing: Setup  Lower Body Dressing: Minimum assistance, Moderate assistance  Toileting: Setup     Grooming/Bathing/Dressing Activities of Daily Living     Cognitive Retraining  Safety/Judgement: Awareness of environment                 Functional Transfers  Bathroom Mobility: Stand-by assistance     Bed/Mat Mobility  Sit to Stand: Supervision  Stand to Sit: Supervision     Most Recent Physical Functioning:   Gross Assessment:                  Posture:     Balance:  Sitting: Intact  Standing: Pull to stand; With support Bed Mobility:     Wheelchair Mobility:     Transfers:  Sit to Stand: Supervision  Stand to Sit: Supervision            Patient Vitals for the past 6 hrs:   BP SpO2 Pulse   08/22/21 0745 (!) 175/88 94 % 70       Mental Status  Neurologic State: Alert  Orientation Level: Oriented X4  Cognition: Appropriate decision making  Perception: Appears intact  Perseveration: No perseveration noted  Safety/Judgement: Awareness of environment                          Physical Skills Involved:  1. Balance  2. Strength  3. Activity Tolerance Cognitive Skills Affected (resulting in the inability to perform in a timely and safe manner):  1. Perception  2. Executive Function  3. Long Term Memory Psychosocial Skills Affected:  1. Habits/Routines   Number of elements that affect the Plan of Care: 5+:  HIGH COMPLEXITY   CLINICAL DECISION MAKING:   [facility] AM-PAC 6 Clicks   Daily Activity Inpatient Short Form  How much help from another person does the patient currently need. .. Total A Lot A Little None   1. Putting on and taking off regular lower body clothing? [] 1   [x] 2   [] 3   [] 4   2. Bathing (including washing, rinsing, drying)? [] 1   [x] 2   [] 3   [] 4   3. Toileting, which includes using toilet, bedpan or urinal?   [] 1   [] 2   [x] 3   [] 4   4. Putting on and taking off regular upper body clothing? [] 1   [] 2   [] 3   [x] 4   5. Taking care of personal grooming such as brushing teeth? [] 1   [] 2   [] 3   [x] 4   6. Eating meals? [] 1   [] 2   [] 3   [x] 4   © 2007, Trustees of [facility] [employer], under license to [email or web address]. All rights reserved      Score:  Initial: 20 Most Recent: X (Date: -- )    Interpretation of Tool:  Represents activities that are increasingly more difficult (i.e. Bed mobility, Transfers, Gait). Medical Necessity:     · Patient is expected to demonstrate progress in   · strength, balance, coordination, and functional technique  ·  to   · increase independence with self care and functional mobility   · . Reason for Services/Other Comments:  · Patient continues to require skilled intervention due to   · Decrease self care and mobility   · .    Use of outcome tool(s) and clinical judgement create a POC that gives a: LOW COMPLEXITY         TREATMENT:   (In addition to Assessment/Re-Assessment sessions the following treatments were rendered)     Pre-treatment Symptoms/Complaints:  tolerated sitting up in recliner  Pain: Initial:   Pain Intensity 1: 0  Post Session:  0     Therapeutic Activity: (    15): Therapeutic activities including Chair transfers and Ambulation on level ground to improve mobility, strength and balance. Required minimal   to promote static and dynamic balance in standing. Neuromuscular Re-education: ( 15):  Exercise/activities per grid below to improve balance, coordination and posture. Required minimal visual and verbal cues to promote static and dynamic balance in standing. Braces/Orthotics/Lines/Etc:   · IV  · O2 Device: None (Room air)  Treatment/Session Assessment:    · Response to Treatment:  tolerated well  · Interdisciplinary Collaboration:   o Physical Therapist  o Occupational Therapist  o Registered Nurse  · After treatment position/precautions:   o Up in chair  o Bed alarm/tab alert on  o Bed/Chair-wheels locked  o Call light within reach  o RN notified  o LEs reclined, food tray setup    · Compliance with Program/Exercises: Compliant all of the time. · Recommendations/Intent for next treatment session: \"Next visit will focus on advancements to more challenging activities and reduction in assistance provided\".   Total Treatment Duration:  OT Patient Time In/Time Out  Time In: 1030  Time Out: [address], [place]

## 2021-08-22 NOTE — PROGRESS NOTES
Problem: Mobility Impaired (Adult and Pediatric)  Goal: *Acute Goals and Plan of Care (Insert Text)  Outcome: Progressing Towards Goal  Note:   LTG:  (1.)Ms. Orman Lanes will move from supine to sit and sit to supine  in bed with STAND BY ASSIST within 5 treatment day(s). (2.)Ms. Orman Lanes will transfer from bed to chair and chair to bed with STAND BY ASSIST using the least restrictive device within 5 treatment day(s). (3.)Ms. Orman Lanes will ambulate with STAND BY ASSIST for 100 feet with the least restrictive device within 5 treatment day(s). Met 8/21/21  ________________________________________________________________________________________________      PHYSICAL THERAPY: Daily Note and PM 8/22/2021  OBSERVATION: PT Visit Days : 4  Payor: Karma Partida / Plan: [id number] PPO/PFFS / Product Type: [employer] Medicare /       NAME/AGE/GENDER: Madisyn Marquez is a 80 y.o. female   PRIMARY DIAGNOSIS: KATEY (acute kidney injury) ([facility] [address] [N17.9] KATEY (acute kidney injury) ([facility] [address] KATEY (acute kidney injury) ([facility] [address]       ICD-10: Treatment Diagnosis:    · Generalized Muscle Weakness (M62.81)  · Difficulty in walking, Not elsewhere classified (R26.2)  · History of falling (Z91.81)   Precaution/Allergies:  Advair diskus [fluticasone propion-salmeterol], Aspirin, Codeine, and Lipitor [atorvastatin]      ASSESSMENT:     Ms. Orman Lanes presents with above diagnosis. Patient with a recent fall outside. She does have a history of memory issues but does live alone. Patient would benefit from therapy to address strength, balance, and mobility. Would likely benefit from [facility]/Memory Care as she doesn't appear to safe to live alone. May benefit from STR to maximize strength, balance, and mobility. Patient participated well this afternoon. Not happy about going to STR. Supervision for transfers and gait with cues to increase upright posture and avoid shuffling gait. Patient moving well and progressing well. This section established at most recent assessment   PROBLEM LIST (Impairments causing functional limitations):  1. Decreased Strength  2. Decreased ADL/Functional Activities  3. Decreased Transfer Abilities  4. Decreased Ambulation Ability/Technique  5. Decreased Balance  6. Decreased Activity Tolerance  7. Decreased Cognition   INTERVENTIONS PLANNED: (Benefits and precautions of physical therapy have been discussed with the patient.)  1. Balance Exercise  2. Bed Mobility  3. Family Education  4. Gait Training  5. Home Exercise Program (HEP)  6. Therapeutic Activites  7. Therapeutic Exercise/Strengthening  8. Transfer Training     TREATMENT PLAN: Frequency/Duration: daily for duration of hospital stay  Rehabilitation Potential For Stated Goals: Good     REHAB RECOMMENDATIONS (at time of discharge pending progress):    Placement: It is my opinion, based on this patient's performance to date, that Ms. Abebe Mason may benefit from participating in 1-2 additional therapy sessions in order to continue to assess for rehab potential and then make recommendation for disposition at discharge. Equipment:    None at this time              HISTORY:   History of Present Injury/Illness (Reason for Referral):  Patient is a 80 y.o. female with who presented to the ED with multiple sites of pain after a fall. She had an unwitnessed fall outside of her home. She states she fell forward and believes she hit her head on the concrete surface. She does not think she lost consciousness. Family reports that she has been able to bear weight since the fall, but is not taking any steps. She is on anticoagulation with Eliquis. ER is performed all relevant imaging studies and no significant injuries discovered. Patient denies any symptoms prior to the fall, she has been in her usual state of health. Denies fever/chills, NVD, abdominal pain, chest pain, shortness of breath.   Past Medical History/Comorbidities:   Ms. Abebe Mason  has a past medical history of Abdominal pain (9/29/2013), COPD (chronic obstructive pulmonary disease) ([facility] [address] DJD (degenerative joint disease) (9/29/2013), Esophageal reflux (9/29/2013), HTN (hypertension) (9/29/2013), Hypercholesterolemia, Hypertension, Other and unspecified hyperlipidemia (9/29/2013), Pyelonephritis (9/29/2013), Sensory neuropathy (12/23/2020), and UTI (lower urinary tract infection) (9/29/2013). Ms. Timo Valdez  has a past surgical history that includes hx pacemaker; pr chest surgery procedure unlisted; pr lap,cholecystectomy; hx hysterectomy; pr cardiac surg procedure unlist (2/05 8/2015); and pr breast surgery procedure unlisted (01/07/2019). Social History/Living Environment:   Home Environment: Private residence  One/Two Story Residence: One story  Living Alone: Yes  Support Systems: Child(viktor)  Patient Expects to be Discharged to[de-identified] Skilled nursing facility  Current DME Used/Available at Home: jose luis Raygoza  Prior Level of Function/Work/Activity:  Ambulatory but unsure of consistent use of AD. Number of Personal Factors/Comorbidities that affect the Plan of Care: 1-2: MODERATE COMPLEXITY   EXAMINATION:   Most Recent Physical Functioning:   Gross Assessment:  AROM: Generally decreased, functional  Strength: Generally decreased, functional  Coordination: Generally decreased, functional               Posture:  Posture (WDL): Exceptions to WDL  Posture Assessment:  Forward head, Rounded shoulders  Balance:  Sitting: Intact  Standing: With support Bed Mobility:     Wheelchair Mobility:     Transfers:  Sit to Stand: Supervision  Stand to Sit: Supervision  Gait:     Base of Support: Center of gravity altered  Speed/Cheryl: Pace decreased (<100 feet/min)  Step Length: Left shortened;Right shortened  Gait Abnormalities: Shuffling gait  Distance (ft): 220 Feet (ft)  Assistive Device: Walker, rolling  Ambulation - Level of Assistance: Supervision  Interventions: Safety awareness training;Verbal cues      Body Structures Involved:  1. Muscles Body Functions Affected:  1. Movement Related Activities and Participation Affected:  1. Mobility   Number of elements that affect the Plan of Care: 3: MODERATE COMPLEXITY   CLINICAL PRESENTATION:   Presentation: Stable and uncomplicated: LOW COMPLEXITY   CLINICAL DECISION MAKIN [facility] [address] [id number] AM-PAC 6 Clicks   Basic Mobility Inpatient Short Form  How much difficulty does the patient currently have. .. Unable A Lot A Little None   1. Turning over in bed (including adjusting bedclothes, sheets and blankets)? [] 1   [] 2   [x] 3   [] 4   2. Sitting down on and standing up from a chair with arms ( e.g., wheelchair, bedside commode, etc.)   [] 1   [] 2   [x] 3   [] 4   3. Moving from lying on back to sitting on the side of the bed? [] 1   [] 2   [x] 3   [] 4   How much help from another person does the patient currently need. .. Total A Lot A Little None   4. Moving to and from a bed to a chair (including a wheelchair)? [] 1   [] 2   [x] 3   [] 4   5. Need to walk in hospital room? [] 1   [] 2   [x] 3   [] 4   6. Climbing 3-5 steps with a railing? [] 1   [] 2   [x] 3   [] 4   © , Trustees of [address] [place] [address] [address] [id number], under license to [employer]). All rights reserved      Score:  Initial: 18 Most Recent: X (Date: -- )    Interpretation of Tool:  Represents activities that are increasingly more difficult (i.e. Bed mobility, Transfers, Gait). Medical Necessity:     · Patient is expected to demonstrate progress in   · strength, balance, and coordination  ·  to   · increase independence with mobility. · .  Reason for Services/Other Comments:  · Patient continues to require skilled intervention due to   · Decreased strength, balance, and mobility.   · .   Use of outcome tool(s) and clinical judgement create a POC that gives a: Clear prediction of patient's progress: LOW COMPLEXITY            TREATMENT:   (In addition to Assessment/Re-Assessment sessions the following treatments were rendered)   Pre-treatment Symptoms/Complaints:  Patient agreeable. Pain: Initial:   Pain Intensity 1: 0  Post Session:  0     Therapeutic Activity: (   15 minutes ):  Therapeutic activities including Chair transfers and Ambulation on level ground to improve mobility, strength, balance, and coordination. Required minimal Safety awareness training;Verbal cues to promote static and dynamic balance in standing. Date:  8/20 Date:  8/21/21 Date:     Activity/Exercise Parameters Parameters Parameters   Ankle pumps 10     Long arc quads 10 15 B    Seated marching 10     Standing hip abduction with UE support 10 15 B    Heel raises 10 15 B-standing    Static standing with feet apart/together 10 sec     Single leg stance with UE support 10 sec     Toe raises  15 B-standing    Standing marching  15 B    Sit <> stand  10 without UE assist from recliner        Braces/Orthotics/Lines/Etc:   · O2 Device: None (Room air)  Treatment/Session Assessment:    · Response to Treatment:  Patient progressing well and moving well. · Interdisciplinary Collaboration:   o Physical Therapist  o Registered Nurse  · After treatment position/precautions:   o Up in chair  o Bed alarm/tab alert on  o Bed/Chair-wheels locked  o Bed in low position  o Call light within reach   · Compliance with Program/Exercises: Will assess as treatment progresses  · Recommendations/Intent for next treatment session: \"Next visit will focus on advancements to more challenging activities and reduction in assistance provided\".   Total Treatment Duration:  PT Patient Time In/Time Out  Time In: 1500  Time Out: [address], PT

## 2021-08-22 NOTE — PROGRESS NOTES
Received patient awake sitting in recliner with IV fluids infusing as ordered. Assisted patient to bathroom and back to bed. Nursing assessment completed. Bed in low and locked position. Call bell is within patient's reach.

## 2021-08-23 VITALS
BODY MASS INDEX: 33.13 KG/M2 | DIASTOLIC BLOOD PRESSURE: 79 MMHG | SYSTOLIC BLOOD PRESSURE: 157 MMHG | HEART RATE: 71 BPM | HEIGHT: 62 IN | RESPIRATION RATE: 16 BRPM | TEMPERATURE: 98.1 F | WEIGHT: 180 LBS | OXYGEN SATURATION: 93 %

## 2021-08-23 LAB
ANION GAP SERPL CALC-SCNC: 2 MMOL/L (ref 7–16)
BACTERIA SPEC CULT: NORMAL
BACTERIA SPEC CULT: NORMAL
BASOPHILS # BLD: 0 K/UL (ref 0–0.2)
BASOPHILS NFR BLD: 1 % (ref 0–2)
BUN SERPL-MCNC: 13 MG/DL (ref 8–23)
CALCIUM SERPL-MCNC: 9.9 MG/DL (ref 8.3–10.4)
CHLORIDE SERPL-SCNC: 108 MMOL/L (ref 98–107)
CO2 SERPL-SCNC: 31 MMOL/L (ref 21–32)
COVID-19 RAPID TEST, COVR: NOT DETECTED
CREAT SERPL-MCNC: 1.05 MG/DL (ref 0.6–1)
DIFFERENTIAL METHOD BLD: ABNORMAL
EOSINOPHIL # BLD: 0.8 K/UL (ref 0–0.8)
EOSINOPHIL NFR BLD: 12 % (ref 0.5–7.8)
ERYTHROCYTE [DISTWIDTH] IN BLOOD BY AUTOMATED COUNT: 13.5 % (ref 11.9–14.6)
GLUCOSE SERPL-MCNC: 95 MG/DL (ref 65–100)
HCT VFR BLD AUTO: 35.9 % (ref 35.8–46.3)
HGB BLD-MCNC: 11.5 G/DL (ref 11.7–15.4)
IMM GRANULOCYTES # BLD AUTO: 0 K/UL (ref 0–0.5)
IMM GRANULOCYTES NFR BLD AUTO: 1 % (ref 0–5)
LYMPHOCYTES # BLD: 2.6 K/UL (ref 0.5–4.6)
LYMPHOCYTES NFR BLD: 37 % (ref 13–44)
MCH RBC QN AUTO: 29.2 PG (ref 26.1–32.9)
MCHC RBC AUTO-ENTMCNC: 32 G/DL (ref 31.4–35)
MCV RBC AUTO: 91.1 FL (ref 79.6–97.8)
MONOCYTES # BLD: 0.6 K/UL (ref 0.1–1.3)
MONOCYTES NFR BLD: 8 % (ref 4–12)
NEUTS SEG # BLD: 3 K/UL (ref 1.7–8.2)
NEUTS SEG NFR BLD: 42 % (ref 43–78)
NRBC # BLD: 0 K/UL (ref 0–0.2)
PLATELET # BLD AUTO: 252 K/UL (ref 150–450)
PMV BLD AUTO: 9.7 FL (ref 9.4–12.3)
POTASSIUM SERPL-SCNC: 3.8 MMOL/L (ref 3.5–5.1)
RBC # BLD AUTO: 3.94 M/UL (ref 4.05–5.2)
SARS-COV-2, COV2: NORMAL
SARS-COV-2, COV2: NOT DETECTED
SERVICE CMNT-IMP: NORMAL
SERVICE CMNT-IMP: NORMAL
SODIUM SERPL-SCNC: 141 MMOL/L (ref 136–145)
SOURCE, COVRS: NORMAL
SPECIMEN SOURCE, FCOV2M: NORMAL
WBC # BLD AUTO: 7.1 K/UL (ref 4.3–11.1)

## 2021-08-23 PROCEDURE — U0005 INFEC AGEN DETEC AMPLI PROBE: HCPCS

## 2021-08-23 PROCEDURE — 74011250636 HC RX REV CODE- 250/636: Performed by: FAMILY MEDICINE

## 2021-08-23 PROCEDURE — 80048 BASIC METABOLIC PNL TOTAL CA: CPT

## 2021-08-23 PROCEDURE — 36415 COLL VENOUS BLD VENIPUNCTURE: CPT

## 2021-08-23 PROCEDURE — 85025 COMPLETE CBC W/AUTO DIFF WBC: CPT

## 2021-08-23 PROCEDURE — 99218 HC RM OBSERVATION: CPT

## 2021-08-23 PROCEDURE — 74011250637 HC RX REV CODE- 250/637: Performed by: FAMILY MEDICINE

## 2021-08-23 PROCEDURE — 87635 SARS-COV-2 COVID-19 AMP PRB: CPT

## 2021-08-23 RX ADMIN — METOPROLOL TARTRATE 25 MG: 25 TABLET, FILM COATED ORAL at 08:03

## 2021-08-23 RX ADMIN — AMLODIPINE BESYLATE 5 MG: 5 TABLET ORAL at 08:03

## 2021-08-23 RX ADMIN — MEMANTINE 5 MG: 5 TABLET ORAL at 08:03

## 2021-08-23 RX ADMIN — Medication 10 ML: at 05:19

## 2021-08-23 RX ADMIN — RISPERIDONE 1 MG: 0.5 TABLET ORAL at 08:03

## 2021-08-23 RX ADMIN — Medication 81 MG: at 08:04

## 2021-08-23 RX ADMIN — TAMOXIFEN CITRATE 20 MG: 10 TABLET, FILM COATED ORAL at 08:03

## 2021-08-23 RX ADMIN — ALLOPURINOL 100 MG: 100 TABLET ORAL at 08:03

## 2021-08-23 RX ADMIN — SERTRALINE HYDROCHLORIDE 50 MG: 50 TABLET ORAL at 08:04

## 2021-08-23 RX ADMIN — GABAPENTIN 400 MG: 400 CAPSULE ORAL at 08:03

## 2021-08-23 RX ADMIN — MONTELUKAST 10 MG: 10 TABLET, FILM COATED ORAL at 08:04

## 2021-08-23 RX ADMIN — FLECAINIDE ACETATE 50 MG: 100 TABLET ORAL at 08:03

## 2021-08-23 RX ADMIN — LOSARTAN POTASSIUM 50 MG: 50 TABLET, FILM COATED ORAL at 08:03

## 2021-08-23 RX ADMIN — HYDROCHLOROTHIAZIDE 12.5 MG: 12.5 CAPSULE ORAL at 08:03

## 2021-08-23 RX ADMIN — APIXABAN 2.5 MG: 2.5 TABLET, FILM COATED ORAL at 08:04

## 2021-08-23 RX ADMIN — POLYETHYLENE GLYCOL 3350 17 G: 17 POWDER, FOR SOLUTION ORAL at 08:04

## 2021-08-23 NOTE — PROGRESS NOTES
Problem: Falls - Risk of  Goal: *Absence of Falls  Description: Document Gwsuni Wallace Fall Risk and appropriate interventions in the flowsheet.   Outcome: Progressing Towards Goal  Note: Fall Risk Interventions:  Mobility Interventions: Bed/chair exit alarm    Mentation Interventions: Bed/chair exit alarm    Medication Interventions: Bed/chair exit alarm    Elimination Interventions: Call light in reach    History of Falls Interventions: Bed/chair exit alarm         Problem: Urinary Tract Infection - Adult  Goal: *Absence of infection signs and symptoms  Outcome: Progressing Towards Goal     Problem: Pain  Goal: *Control of Pain  Outcome: Progressing Towards Goal     Problem: Dementia-BEHAVIORAL HEALTH (Adult/Pediatric)  Goal: *STG: Participates in treatment plan  Outcome: Progressing Towards Goal  Goal: *STG: Seeks staff when feelings of anxiety and fear arise  Outcome: Progressing Towards Goal  Goal: *STG: Attends activities and groups  Outcome: Progressing Towards Goal  Goal: *STG: Absence of lethality  Outcome: Progressing Towards Goal  Goal: *STG: Demonstrates ability to understand and use improved judgment in daily activities and relationships  Outcome: Progressing Towards Goal  Goal: *STG: Remains safe in hospital  Outcome: Progressing Towards Goal  Goal: *LTG: Obtains optimum level of functioning  Outcome: Progressing Towards Goal  Goal: *STG/LTG: Maintain structure for daily activities  Outcome: Progressing Towards Goal  Goal: *STG/LTG: Complies with medication therapy  Outcome: Progressing Towards Goal  Goal: Interventions  Outcome: Progressing Towards Goal

## 2021-08-23 NOTE — DISCHARGE SUMMARY
Hospitalist Discharge Summary     Admit Date:  2021  4:58 PM   DC note date: 2021  Name:  Dustin Duron   Age:  80 y.o.  :  1940   MRN:  [id number]   PCP:  Rajesh Waters MD  Treatment Team: Utilization Review: Alec Esparza RN; : Chalo Brannon; Physical Therapist: Keturah Garnett PT; Occupational Therapist: Kobe De Leon OT    Problem List for this Hospitalization:  Hospital Problems as of 2021 Date Reviewed: 2021        Codes Class Noted - Resolved POA    * (Principal) KATEY (acute kidney injury) ([facility] [address] ICD-10-CM: N17.9  ICD-9-CM: 584.9  2021 - Present Yes        Current use of long term anticoagulation (Chronic) ICD-10-CM: Z79.01  ICD-9-CM: V58.61  2021 - Present Yes    Overview Signed 2021 10:04 PM by Melvin Allan MD     Eliquis             Frequent falls (Chronic) ICD-10-CM: R29.6  ICD-9-CM: V15.88  2021 - Present Yes        Cognitive disorder (Chronic) ICD-10-CM: F09  ICD-9-CM: 294.9  2021 - Present Yes        Status cardiac pacemaker (Chronic) ICD-10-CM: Z95.0  ICD-9-CM: V45.01  2017 - Present Yes        Atrial fibrillation ([facility] [address] (Chronic) ICD-10-CM: I48.91  ICD-9-CM: 427.31  2014 - Present Yes        HTN (hypertension) (Chronic) ICD-10-CM: I10  ICD-9-CM: 401.9  2013 - Present Yes        Primary pulmonary hypertension ([facility] [address] (Chronic) ICD-10-CM: I27.0  ICD-9-CM: 416.0  10/30/2000 - Present Yes            Admission HPI from 2021:    \" Patient is a 80 y.o. female with who presented to the ED with multiple sites of pain after a fall. She had an unwitnessed fall outside of her home. She states she fell forward and believes she hit her head on the concrete surface. She does not think she lost consciousness. Family reports that she has been able to bear weight since the fall, but is not taking any steps. She is on anticoagulation with Eliquis.   ER is performed all relevant imaging studies and no significant injuries discovered. Patient denies any symptoms prior to the fall, she has been in her usual state of health. Denies fever/chills, NVD, abdominal pain, chest pain, shortness of breath. \"    Hospital Course:  Patient is a 59-year-old female with past medical history of dementia presented to ED after a fall. In the ED all relevant imaging studies performed and no significant injuries discovered. Labs noted for KATEY. Patient started on fluid resuscitation with resolution of KATEY. Patient is on Eliquis and baby aspirin. Discussed risk of bleeding due to patient having multiple falls within the last 30 days.  Advised daughter to discuss anticoagulation with the cardiologist due to these frequent falls. All other chronic comorbidities stable and we continued essential home meds. PT/OT consulted and recommend short-term rehab. Case management consulted and rehab arranged. Patient is medically stable to discharge to rehab today. Disposition: Rehab Facility  Activity: Activity as tolerated  Diet: ADULT DIET Regular  Code Status: Full Code    Follow Up Orders: Follow-up Appointments   Procedures    FOLLOW UP VISIT Appointment in: 3 - 5 Days PCP 2-3 WEEKS WITH NEUROLOGY     PCP  2-3 WEEKS WITH NEUROLOGY     Standing Status:   Standing     Number of Occurrences:   1     Order Specific Question:   Appointment in     Answer:   3 - 5 Days       Follow-up Information     Follow up With Specialties Details Why Contact [address] [address]  [phone]    Miguelina King MD Internal Medicine   [address]  [address] [place] [address]            Discharge meds at bottom of this note. Plan was discussed with patient. All questions answered. Patient was stable at time of discharge. Given instructions to call a physician or return if any concerns.   Discharge summary and encounter summary was sent to PCP electronically via \"Comm Mgt\" link in [facility], if possible. Diagnostic Imaging/Tests:   XR CHEST PA LAT    Result Date: 8/18/2021  EXAM: XR CHEST PA LAT INDICATION: Fall, weakness COMPARISON: None FINDINGS: The cardiomediastinal silhouette is enlarged. Dual-chamber pacemaker leads are intact. Trace bilateral pleural effusions. No pneumothorax. Pulmonary venous congestion with suggestion of interstitial pulmonary edema. Subtle linear lucency through the right lateral second rib may represent a nondisplaced fracture. 1. Possible nondisplaced right lateral second rib fracture. No pneumothorax. 2. Cardiomegaly with trace bilateral pleural effusions and mild interstitial pulmonary edema. XR SHOULDER LT AP/LAT MIN 2 V    Result Date: 8/18/2021  Examination: XR SHOULDER LT AP/LAT MIN 2 V, XR HIP LT W OR WO PELV 2-3 VWS INDICATION: Left shoulder and pelvic pain after fall COMPARISON: None FINDINGS: Left shoulder: No fracture or malalignment. Mild degenerative changes of the glenohumeral and AC joints. Bone mineralization is decreased. See separate chest radiograph report for details involving the lungs. Pelvis and left hip: Portions of the sacrum are obscured by overlying bowel gas and stool. No evidence of fracture or malalignment. Mild bilateral hip and SI joint degenerative changes. Advanced lower lumbar spondylosis. Decreased bone mineralization. No focal soft tissue abnormality. Left shoulder: 1. No acute osseous abnormality. Pelvis and left hip: 1. No acute osseous abnormality, however, osteopenia limits evaluation for subtle nondisplaced fractures. If there is persistent concern for pelvic fracture, consider MRI.      XR HIP LT W OR WO PELV 2-3 VWS    Result Date: 8/18/2021  Examination: XR SHOULDER LT AP/LAT MIN 2 V, XR HIP LT W OR WO PELV 2-3 VWS INDICATION: Left shoulder and pelvic pain after fall COMPARISON: None FINDINGS: Left shoulder: No fracture or malalignment. Mild degenerative changes of the glenohumeral and AC joints. Bone mineralization is decreased. See separate chest radiograph report for details involving the lungs. Pelvis and left hip: Portions of the sacrum are obscured by overlying bowel gas and stool. No evidence of fracture or malalignment. Mild bilateral hip and SI joint degenerative changes. Advanced lower lumbar spondylosis. Decreased bone mineralization. No focal soft tissue abnormality. Left shoulder: 1. No acute osseous abnormality. Pelvis and left hip: 1. No acute osseous abnormality, however, osteopenia limits evaluation for subtle nondisplaced fractures. If there is persistent concern for pelvic fracture, consider MRI. MRI BRAIN WO CONT    Result Date: 8/9/2021  Exam: MRI BRAIN WO CONT on 8/9/2021 3:09 PM Clinical History: The Female patient is 80years old presenting for AMS with worsening memory, confusion, tremor; abnormal gait with multiple falls x 3 years; hx breast CA 2015; prior MRI on pacs; [employer] pacemaker. Comparison:  Head CT 5/23/2020, brain MRI 8/26/2020 Technique:  Axial T2, axial FLAIR, axial diffusion-weighted,  sagittal T1 and coronal gradient-echo scans were performed. Findings: Chronic age-related senescent changes are again seen with confluent periventricular white matter disease and lacunae throughout the corona radiata as well as centrum semiovale. . There is no evidence of restricted diffusion to suggest acute ischemia. There are no abnormal extra-axial fluid collections. No evidence of mass or mass effect is seen. There is no diffusion signal abnormality. Expected flow voids are maintained in the major intracranial vessels. Involutional changes. Visualized brainstem structures are unremarkable. There is no evidence of Chiari malformation. The ventricular system and CSF containing spaces are unremarkable in appearance. Visualized extracranial soft tissues are unremarkable.  The paranasal sinuses are well pneumatized and aerated. Minimal fluid signal throughout the dependent mastoid air cells bilaterally     1. Age-related senescent changes and chronic microvascular disease without acute intracranial abnormality. CPT code(s) [id number]     CT HEAD WO CONT    Result Date: 8/18/2021  EXAMINATION: HEAD CT WITHOUT CONTRAST 8/18/2021 5:49 PM ACCESSION NUMBER: [id number] INDICATION: Fall, hit head, on eliquis COMPARISON: CT head, 11/23/2020 TECHNIQUE: Multiple-row detector helical CT examination of the head without intravenous contrast. Radiation dose reduction techniques were used for this study:  Our CT scanners use one or all of the following: Automated exposure control, adjustment of the mA and/or kVp according to patient's size, iterative reconstruction. FINDINGS: No evidence of intracranial mass, hemorrhage, or large territorial infarct. Generalized parenchymal volume loss with commensurate enlargement of the ventricles and sulci. The ventricles remain midline and symmetric. Basal cisterns are patent. There is scattered deep and periventricular white matter lucency which is nonspecific but unchanged and most compatible with chronic microvascular ischemic changes. No extra-axial fluid collection or mass effect. The orbital contents are within normal limits. The paranasal sinuses are clear. The mastoid air cells and middle ears are clear. No significant osseous or extracranial soft tissue lesions. Stable chronic changes without acute intracranial abnormality. CT MAXILLOFACIAL WO CONT    Result Date: 8/18/2021  CT facial bones without contrast HISTORY: ES ER, fall, hit head on Eloquis, CT HEAD/C-SPINE/FACIAL Technique: Helically acquired images were obtained through the facial bones reconstructed at 2.5 mm thickness. Reformatted images were submitted.  Radiation dose reduction techniques were used for this study:  Our CT scanners use one or all of the following: Automated exposure control, adjustment of the mA and/or kVp according to patient's size, iterative reconstruction. COMPARISON: None. FINDINGS: The mastoid air cells and paranasal sinuses are well pneumatized and aerated. There is no evidence of acute facial bone fracture. The zygomatic arches and pterygoid plates are intact. The globes are intact. There is no significant soft tissue swelling. There are degenerative changes at the temporomandibular joints. There is partial imaged cervical spondylosis. No evidence of acute facial bone fracture. CT SPINE CERV WO CONT    Result Date: 8/18/2021  CT cervical spine without contrast: HISTORY: ES ER, fall, hit head on Eloquis, CT HEAD/C-SPINE/FACIAL Helically acquired images were obtained reconstructed at 2.5 mm thickness. Sagittal and coronal reformatted images were submitted. All CT scans at this facility are performed using dose optimization technique as appropriate to a performed exam, to include on automated exposure control, adjustment of the mA and/or KV according to patient's size (including appropriate matching for site-specific examinations), or use of iterative reconstruction technique. COMPARISON: CT myelogram 11/19/2008 FINDINGS: The vertebral body height and alignment are well maintained. There is moderate disc space narrowing at C5-6 and C6-7. Multilevel endplate spurring is present. There is moderate facet arthropathy on the left at C3 4-5. There is no evidence of acute fracture or subluxation. The prevertebral soft tissues are unremarkable. Carotid atherosclerotic changes are present. 1. No evidence of acute cervical spine fracture. 2. Progression of cervical spondylosis. CT HIP LT WO CONT    Result Date: 8/18/2021  CT left hip without IV contrast INDICATION: Left hip pain after fall COMPARISON: Radiographs performed earlier today TECHNIQUE: Axial CT images were obtained through the left hip without IV contrast. Coronal and sagittal reformats were provided.  Radiation dose reduction techniques were used for this study. Our CT scanners use one or all of the following: Automated exposure control, adjustment of the mA and/or kV according to patient size, iterative reconstruction. FINDINGS: The bones are diffusely demineralized. Within this limitation, no acute fracture is discerned. Mild-moderate osteoarthritic changes of the left hip characterized by joint space narrowing and marginal osteophyte development. No effusion is present. The soft tissues about the left hip are within normal limits. Colonic diverticulosis. Visualized structures within the imaged abdomen and pelvis otherwise appear normal. The uterus is surgically absent. No acute abnormality within the incidentally imaged right hip. No acute osteoarticular process of the pelvis or left hip. Echocardiogram results:  No results found for this visit on 08/18/21. Procedures done this admission:  * No surgery found *    All Micro Results     Procedure Component Value Units Date/Time    SARS-COV-2, PCR [[id number]] Collected: 08/23/21 0715    Order Status: Completed Updated: 08/23/21 0756    COVID-19 RAPID TEST [[id number]] Collected: 08/23/21 0715    Order Status: Completed Specimen: Nasopharyngeal Updated: 08/23/21 0756     Specimen source Nasopharyngeal        COVID-19 rapid test Not detected        Comment:      The specimen is NEGATIVE for SARS-CoV-2, the novel coronavirus associated with COVID-19. A negative result does not rule out COVID-19. This test has been authorized by the FDA under an Emergency Use Authorization (EUA) for use by authorized laboratories.         Fact sheet for Healthcare Providers: ConventionUpdate.co.nz  Fact sheet for Patients: ConventionUpdate.co.nz       Methodology: Isothermal Nucleic Acid Amplification         CULTURE, BLOOD [[id number]] Collected: 08/18/21 2104    Order Status: Completed Specimen: Blood Updated: 08/22/21 0909     Special Requests: -- LEFT  HAND       Culture result: NO GROWTH 4 DAYS       CULTURE, BLOOD [[id number]] Collected: 08/18/21 2107    Order Status: Completed Specimen: Blood Updated: 08/22/21 0909     Special Requests: --        RIGHT  Antecubital       Culture result: NO GROWTH 4 DAYS             [unfilled]    Labs: Results:       BMP, Mg, Phos Recent Labs     08/23/21 0434 08/22/21 0427 08/21/21  0338    141 140   K 3.8 3.7 3.6   * 107 104   CO2 31 31 32   AGAP 2* 3* 4*   BUN 13 15 16   CREA 1.05* 1.10* 1.32*   CA 9.9 9.2 9.4   GLU 95 96 87      CBC Recent Labs     08/23/21 0434 08/22/21 0427 08/21/21 0338   WBC 7.1 6.2 6.5   RBC 3.94* 3.51* 3.59*   HGB 11.5* 10.2* 10.4*   HCT 35.9 32.2* 32.6*    208 203   GRANS 42* 42* 45   LYMPH 37 35 33   EOS 12* 13* 11*   MONOS 8 9 10   BASOS 1 1 1   IG 1 1 1   ANEU 3.0 2.6 2.9   ABL 2.6 2.2 2.2   MARIANN 0.8 0.8 0.7   ABM 0.6 0.6 0.6   ABB 0.0 0.0 0.0   AIG 0.0 0.0 0.0      LFT No results for input(s): ALT, TBIL, AP, TP, ALB, GLOB, AGRAT in the last 72 hours.     No lab exists for component: SGOT, GPT   Cardiac Testing No results found for: BNPP, BNP, CPK, RCK1, RCK2, RCK3, RCK4, CKMB, CKNDX, CKND1, TROPT, TROIQ   Coagulation Tests No results found for: PTP, INR, APTT, INREXT   A1c Lab Results   Component Value Date/Time    Hemoglobin A1c 5.7 (H) 08/17/2020 10:14 AM      Lipid Panel Lab Results   Component Value Date/Time    Cholesterol, total 143 06/18/2019 08:34 AM    Cholesterol (POC) 178 05/13/2014 04:00 PM    HDL Cholesterol 46 06/18/2019 08:34 AM    LDL, calculated 61 06/18/2019 08:34 AM    VLDL, calculated 36 06/18/2019 08:34 AM    Triglyceride 181 (H) 06/18/2019 08:34 AM    Triglycerides (POC) 257 05/13/2014 04:00 PM      Thyroid Panel Lab Results   Component Value Date/Time    TSH 3.230 08/17/2020 10:14 AM    TSH 0.691 09/19/2016 10:11 AM    T4, Total 9.6 03/27/2015 09:11 AM    T4, Total 10.5 12/17/2014 08:41 AM        Most Recent UA Lab Results   Component Value Date/Time    WBC 10-20 08/18/2021 07:49 PM    RBC 0-3 08/18/2021 07:49 PM    Epithelial cells 5-10 08/18/2021 07:49 PM    Bacteria 4+ (H) 08/18/2021 07:49 PM    Casts 20-50 08/18/2021 07:49 PM        Allergies   Allergen Reactions    Advair Diskus [Fluticasone Propion-Salmeterol] Other (comments)     shakes    Aspirin Nausea Only     Pt can take aspirin 81mg Pt c/o upset stomach with higher dose    Codeine Nausea Only    Lipitor [Atorvastatin] Other (comments)     Leg weakness     Immunization History   Administered Date(s) Administered    COVID-19, PFIZER, MRNA, LNP-S, PF, 30MCG/0.3ML DOSE 02/09/2021, 03/06/2021    Influenza High Dose Vaccine PF 10/09/2017, 10/19/2018    Influenza Vaccine 10/01/2012    Influenza Vaccine (Quad) PF (>6 Mo Flulaval, Fluarix, and >3 Yrs Afluria, Fluzone 79518) 09/29/2016    Influenza Vaccine (Tri) Adjuvanted (>65 Yrs FLUAD TRI 69150) 10/28/2019    Influenza, Quadrivalent, Adjuvanted (>65 Yrs FLUAD QUAD 23313) 09/10/2020    Pneumococcal Conjugate (PCV-13) 01/01/2018    Pneumococcal Vaccine (Unspecified Type) 01/01/2002, 10/01/2007, 09/01/2016    TB Skin Test (PPD) Intradermal 08/19/2021    Tdap 05/17/2017    Zoster Vaccine, Live 01/14/2016       All Labs from Last 24 Hrs:  Recent Results (from the past 24 hour(s))   CBC WITH AUTOMATED DIFF    Collection Time: 08/23/21  4:34 AM   Result Value Ref Range    WBC 7.1 4.3 - 11.1 K/uL    RBC 3.94 (L) 4.05 - 5.2 M/uL    HGB 11.5 (L) 11.7 - 15.4 g/dL    HCT 35.9 35.8 - 46.3 %    MCV 91.1 79.6 - 97.8 FL    MCH 29.2 26.1 - 32.9 PG    MCHC 32.0 31.4 - 35.0 g/dL    RDW 13.5 11.9 - 14.6 %    PLATELET 295 309 - 647 K/uL    MPV 9.7 9.4 - 12.3 FL    ABSOLUTE NRBC 0.00 0.0 - 0.2 K/uL    DF AUTOMATED      NEUTROPHILS 42 (L) 43 - 78 %    LYMPHOCYTES 37 13 - 44 %    MONOCYTES 8 4.0 - 12.0 %    EOSINOPHILS 12 (H) 0.5 - 7.8 %    BASOPHILS 1 0.0 - 2.0 %    IMMATURE GRANULOCYTES 1 0.0 - 5.0 %    ABS. NEUTROPHILS 3.0 1.7 - 8.2 K/UL    ABS. LYMPHOCYTES 2.6 0.5 - 4.6 K/UL    ABS. MONOCYTES 0.6 0.1 - 1.3 K/UL    ABS. EOSINOPHILS 0.8 0.0 - 0.8 K/UL    ABS. BASOPHILS 0.0 0.0 - 0.2 K/UL    ABS. IMM. GRANS. 0.0 0.0 - 0.5 K/UL   METABOLIC PANEL, BASIC    Collection Time: 08/23/21  4:34 AM   Result Value Ref Range    Sodium 141 136 - 145 mmol/L    Potassium 3.8 3.5 - 5.1 mmol/L    Chloride 108 (H) 98 - 107 mmol/L    CO2 31 21 - 32 mmol/L    Anion gap 2 (L) 7 - 16 mmol/L    Glucose 95 65 - 100 mg/dL    BUN 13 8 - 23 MG/DL    Creatinine 1.05 (H) 0.6 - 1.0 MG/DL    GFR est AA >60 >60 ml/min/1.73m2    GFR est non-AA 53 (L) >60 ml/min/1.73m2    Calcium 9.9 8.3 - 10.4 MG/DL   SARS-COV-2    Collection Time: 08/23/21  7:15 AM   Result Value Ref Range    SARS-CoV-2 Please find results under separate order     COVID-19 RAPID TEST    Collection Time: 08/23/21  7:15 AM   Result Value Ref Range    Specimen source Nasopharyngeal      COVID-19 rapid test Not detected NOTD         Discharge Exam:  Patient Vitals for the past 24 hrs:   Temp Pulse Resp BP SpO2   08/23/21 0744 98.1 °F (36.7 °C) 71 16 (!) 157/79 93 %   08/23/21 0347 98.2 °F (36.8 °C) 74 18 132/78 93 %   08/22/21 2341 98 °F (36.7 °C) 63 18 (!) 144/83 90 %   08/22/21 1915 98.5 °F (36.9 °C) 68 18 (!) 156/74 93 %   08/22/21 1520 97.7 °F (36.5 °C) 70 20 131/63 91 %   08/22/21 1210 98.1 °F (36.7 °C) 70 18 121/63 95 %     Oxygen Therapy  O2 Sat (%): 93 % (08/23/21 0744)  Pulse via Oximetry: 69 beats per minute (08/18/21 2200)  O2 Device: None (Room air) (08/23/21 0810)    Estimated body mass index is 32.92 kg/m² as calculated from the following:    Height as of this encounter: 5' 2\" (1.575 m). Weight as of this encounter: 81.6 kg (180 lb). No intake or output data in the 24 hours ending 08/23/21 1047    *Note that automatically entered I/Os may not be accurate; dependent on patient compliance with collection and accurate  by assistants. General:    Well nourished. Alert. Eyes:   Normal sclerae. Extraocular movements intact. ENT:  Normocephalic, atraumatic. Moist mucous membranes, bruise over the lower jaw and neck  CV:   Regular rate and rhythm. No murmur, rub, or gallop. Lungs:  Clear to auscultation bilaterally. No wheezing, rhonchi, or rales. Abdomen: Soft, nontender, nondistended. Extremities: Warm and dry. No cyanosis or edema. Neurologic: CN II-XII grossly intact. No gross focal deficits   Skin:     No rashes or jaundice. Psych:  Normal mood and affect.     Current Med List in Hospital:   Current Facility-Administered Medications   Medication Dose Route Frequency    albuterol (PROVENTIL VENTOLIN) nebulizer solution 2.5 mg  2.5 mg Nebulization Q6H PRN    amLODIPine (NORVASC) tablet 5 mg  5 mg Oral DAILY    aspirin delayed-release tablet 81 mg  81 mg Oral DAILY    bumetanide (BUMEX) tablet 1 mg  1 mg Oral Once per day on Mon Tue Wed Thu Fri    clorazepate (TRANXENE) tablet 3.75 mg  3.75 mg Oral QHS    famotidine (PEPCID) tablet 40 mg  40 mg Oral QPM    losartan (COZAAR) tablet 50 mg  50 mg Oral DAILY    metoprolol tartrate (LOPRESSOR) tablet 25 mg  25 mg Oral Q12H    montelukast (SINGULAIR) tablet 10 mg  10 mg Oral DAILY    sertraline (ZOLOFT) tablet 50 mg  50 mg Oral DAILY    tamoxifen (NOLVADEX) tablet 20 mg  20 mg Oral DAILY    sodium chloride (NS) flush 5-40 mL  5-40 mL IntraVENous Q8H    sodium chloride (NS) flush 5-40 mL  5-40 mL IntraVENous PRN    acetaminophen (TYLENOL) tablet 650 mg  650 mg Oral Q6H PRN    Or    acetaminophen (TYLENOL) suppository 650 mg  650 mg Rectal Q6H PRN    polyethylene glycol (MIRALAX) packet 17 g  17 g Oral DAILY    hydroCHLOROthiazide (MICROZIDE) capsule 12.5 mg  12.5 mg Oral DAILY    gabapentin (NEURONTIN) capsule 400 mg  400 mg Oral TID    risperiDONE (RisperDAL) tablet 1 mg  1 mg Oral Q12H    apixaban (ELIQUIS) tablet 2.5 mg  2.5 mg Oral Q12H    allopurinoL (ZYLOPRIM) tablet 100 mg  100 mg Oral Q12H    flecainide (TAMBOCOR) tablet 50 mg  50 mg Oral Q12H    memantine (NAMENDA) tablet 5 mg  5 mg Oral Q12H    pantoprazole (PROTONIX) tablet 40 mg  40 mg Oral QHS     Current Outpatient Medications   Medication Sig    memantine (NAMENDA) 5 mg tablet Take 1 Tablet by mouth two (2) times a day.  montelukast (SINGULAIR) 10 mg tablet Take 1 Tablet by mouth daily.  gabapentin (NEURONTIN) 400 mg capsule Take 1 Cap by mouth three (3) times daily.  amLODIPine (NORVASC) 5 mg tablet Take 1 Tab by mouth daily.  lansoprazole (PREVACID) 30 mg capsule Take 1 Cap by mouth two (2) times a day.  sertraline (ZOLOFT) 50 mg tablet TAKE 1 TABLET BY MOUTH EVERY DAY    losartan-hydroCHLOROthiazide (HYZAAR) 50-12.5 mg per tablet Take 1 Tab by mouth daily. (Patient taking differently: Take 1 Tab by mouth daily. Take 1/2 tab for 6 days (starting 12/4) then 1 tab every day)    apixaban (Eliquis) 5 mg tablet Take 1 Tab by mouth two (2) times a day.  flecainide (TAMBOCOR) 50 mg tablet Take 1 Tab by mouth two (2) times a day.  aspirin delayed-release 81 mg tablet Take 1 Tab by mouth daily.  famotidine (PEPCID) 40 mg tablet TAKE 1 TABLET BY MOUTH EVERY DAY IN THE EVENING    bumetanide (BUMEX) 1 mg tablet Take 1 Tab by mouth five (5) days a week.  tamoxifen (NOLVADEX) 20 mg tablet TAKE 1 TABLET BY MOUTH EVERY DAY    DISABLED PLACARD (DISABLED PLACARD) DMV As directed    clorazepate (TRANXENE) 7.5 mg tablet TAKE 1/2 TABLET BY MOUTH NIGHTLY    nitrofurantoin (Macrodantin) 100 mg capsule Take  by mouth nightly. (Patient not taking: Reported on 8/18/2021)    risperiDONE (RisperDAL) 1 mg tablet Take 1 Tablet by mouth two (2) times a day.     dicyclomine (BENTYL) 10 mg capsule TAKE 1 CAPSULE BY MOUTH 3 TIMES A DAY    allopurinoL (ZYLOPRIM) 100 mg tablet TAKE 1 TABLET BY MOUTH EVERY DAY    metoprolol tartrate (LOPRESSOR) 25 mg tablet TAKE 1 TABLET BY MOUTH TWICE A DAY    diclofenac (VOLTAREN) 1 % gel Apply  to affected area four (4) times daily. (Patient taking differently: Apply 2 g to affected area four (4) times daily. Applies to BUEs)    albuterol (VENTOLIN HFA) 90 mcg/actuation inhaler Take 2 Puffs by inhalation every four (4) hours as needed for Shortness of Breath.  Oxygen 2 liters at night only       Discharge Info:   Discharge Medication List as of 8/18/2021  8:12 PM      START taking these medications    Details   oxyCODONE IR (Roxicodone) 5 mg immediate release tablet Take 1 Tablet by mouth every four (4) hours as needed for Pain for up to 3 days. Max Daily Amount: 30 mg., Print, Disp-10 Tablet, R-0         CONTINUE these medications which have NOT CHANGED    Details   memantine (NAMENDA) 5 mg tablet Take 1 Tablet by mouth two (2) times a day., Normal, Disp-60 Tablet, R-5      montelukast (SINGULAIR) 10 mg tablet Take 1 Tablet by mouth daily. , Normal, Disp-90 Tablet, R-4      gabapentin (NEURONTIN) 400 mg capsule Take 1 Cap by mouth three (3) times daily. , Normal, Disp-90 Cap, R-5      amLODIPine (NORVASC) 5 mg tablet Take 1 Tab by mouth daily. , Normal, Disp-90 Tab, R-4      lansoprazole (PREVACID) 30 mg capsule Take 1 Cap by mouth two (2) times a day., Normal, Disp-60 Cap, R-12      sertraline (ZOLOFT) 50 mg tablet TAKE 1 TABLET BY MOUTH EVERY DAY, Normal, Disp-90 Tab, R-3      losartan-hydroCHLOROthiazide (HYZAAR) 50-12.5 mg per tablet Take 1 Tab by mouth daily. , Normal, Disp-90 Tab,R-3      apixaban (Eliquis) 5 mg tablet Take 1 Tab by mouth two (2) times a day., Normal, Disp-180 Tab,R-3      flecainide (TAMBOCOR) 50 mg tablet Take 1 Tab by mouth two (2) times a day., Normal, Disp-180 Tab,R-4      aspirin delayed-release 81 mg tablet Take 1 Tab by mouth daily. , No Print, Disp-90 Tab,R-3      famotidine (PEPCID) 40 mg tablet TAKE 1 TABLET BY MOUTH EVERY DAY IN THE EVENING, Normal, Disp-90 Tab,R-3      bumetanide (BUMEX) 1 mg tablet Take 1 Tab by mouth five (5) days a week., Normal, Disp-90 Tab, R-4      tamoxifen (NOLVADEX) 20 mg tablet TAKE 1 TABLET BY MOUTH EVERY DAY, Historical Med, R-3      DISABLED PLACARD (DISABLED PLACARD) DMV As directed, Print, Disp-1 Each, R-0      !! risperiDONE (RisperDAL) 1 mg tablet Take  by mouth two (2) times a day., Historical Med      clorazepate (TRANXENE) 7.5 mg tablet TAKE 1/2 TABLET BY MOUTH NIGHTLY, Normal, Disp-15 Tablet, R-5This request is for a new prescription for a controlled substance as required by Federal/State law. nitrofurantoin (Macrodantin) 100 mg capsule Take  by mouth nightly., Historical Med      !! risperiDONE (RisperDAL) 1 mg tablet Take 1 Tablet by mouth two (2) times a day., Normal, Disp-60 Tablet, R-5      dicyclomine (BENTYL) 10 mg capsule TAKE 1 CAPSULE BY MOUTH 3 TIMES A DAY, Normal, Disp-270 Cap, R-3      allopurinoL (ZYLOPRIM) 100 mg tablet TAKE 1 TABLET BY MOUTH EVERY DAY, Normal, Disp-90 Tab, R-4      metoprolol tartrate (LOPRESSOR) 25 mg tablet TAKE 1 TABLET BY MOUTH TWICE A DAY, Normal, Disp-180 Tab,R-4      diclofenac (VOLTAREN) 1 % gel Apply  to affected area four (4) times daily. , Normal, Disp-5 Each, R-5      albuterol (VENTOLIN HFA) 90 mcg/actuation inhaler Take 2 Puffs by inhalation every four (4) hours as needed for Shortness of Breath., Normal, Disp-1 Inhaler, R-11      Oxygen 2 liters at night only, Historical Med       !! - Potential duplicate medications found. Please discuss with provider. Time spent in patient discharge planning and coordination 35 minutes.     Signed:  Jayy Wise MD

## 2021-08-23 NOTE — PROGRESS NOTES
Care Management Interventions  PCP Verified by CM: Yes  Last Visit to PCP: 06/17/21  Discharge Location  Discharge Placement: Skilled nursing facility ([address] [place] [address] [id number])    SW spoke w/ pt prior to d/c. Per Dr. Karina Cyr is medically stable for d/c today. Pt is going to [facility] [address], picked up by Valentina Reed at 10:30 am. SW contacted pt's daughter, gave nurse report and unit number. Pt stated that she did not have any questions or concerns.      Sonia Wilson MSW

## 2021-08-27 ENCOUNTER — HOSPITAL ENCOUNTER (OUTPATIENT)
Dept: LAB | Age: 81
Discharge: HOME | End: 2021-08-27

## 2021-08-27 LAB
ANION GAP SERPL CALC-SCNC: 6 MMOL/L (ref 7–16)
BASOPHILS # BLD: 0 K/UL (ref 0–0.2)
BASOPHILS NFR BLD: 0 % (ref 0–2)
BUN SERPL-MCNC: 19 MG/DL (ref 8–23)
CALCIUM SERPL-MCNC: 9.4 MG/DL (ref 8.3–10.4)
CHLORIDE SERPL-SCNC: 103 MMOL/L (ref 98–107)
CO2 SERPL-SCNC: 30 MMOL/L (ref 21–32)
CREAT SERPL-MCNC: 1.23 MG/DL (ref 0.6–1)
DIFFERENTIAL METHOD BLD: ABNORMAL
EOSINOPHIL # BLD: 0.1 K/UL (ref 0–0.8)
EOSINOPHIL NFR BLD: 2 % (ref 0.5–7.8)
ERYTHROCYTE [DISTWIDTH] IN BLOOD BY AUTOMATED COUNT: 14 % (ref 11.9–14.6)
GLUCOSE SERPL-MCNC: 99 MG/DL (ref 65–100)
HCT VFR BLD AUTO: 32.1 % (ref 35.8–46.3)
HGB BLD-MCNC: 10.1 G/DL (ref 11.7–15.4)
IMM GRANULOCYTES # BLD AUTO: 0 K/UL (ref 0–0.5)
IMM GRANULOCYTES NFR BLD AUTO: 0 % (ref 0–5)
LYMPHOCYTES # BLD: 1.2 K/UL (ref 0.5–4.6)
LYMPHOCYTES NFR BLD: 19 % (ref 13–44)
MCH RBC QN AUTO: 28.9 PG (ref 26.1–32.9)
MCHC RBC AUTO-ENTMCNC: 31.5 G/DL (ref 31.4–35)
MCV RBC AUTO: 92 FL (ref 79.6–97.8)
MONOCYTES # BLD: 0.9 K/UL (ref 0.1–1.3)
MONOCYTES NFR BLD: 16 % (ref 4–12)
NEUTS SEG # BLD: 3.8 K/UL (ref 1.7–8.2)
NEUTS SEG NFR BLD: 63 % (ref 43–78)
NRBC # BLD: 0 K/UL (ref 0–0.2)
PLATELET # BLD AUTO: 165 K/UL (ref 150–450)
PMV BLD AUTO: 10.1 FL (ref 9.4–12.3)
POTASSIUM SERPL-SCNC: 3.5 MMOL/L (ref 3.5–5.1)
RBC # BLD AUTO: 3.49 M/UL (ref 4.05–5.2)
SODIUM SERPL-SCNC: 139 MMOL/L (ref 136–145)
WBC # BLD AUTO: 6 K/UL (ref 4.3–11.1)

## 2021-08-27 PROCEDURE — 85025 COMPLETE CBC W/AUTO DIFF WBC: CPT

## 2021-08-27 PROCEDURE — 80048 BASIC METABOLIC PNL TOTAL CA: CPT

## 2021-09-20 PROBLEM — I50.32 CHRONIC HEART FAILURE WITH PRESERVED EJECTION FRACTION (HFPEF) (HCC): Status: ACTIVE | Noted: 2021-09-20

## 2021-09-27 NOTE — PROGRESS NOTES
[facility]  Date: 09/27/21  Time: 4:52 PM    ___________________    Patient: Gilbert Bradshaw  Coverage Information:     Pay[place]: [place]     Plan: [facility] MEDICARE PART A AND B     Subscriber ID: [id number]     Phone Number:   MRN: [id number]    Current Benefit Period: Benefit Period 1  Start Date: 9/20/2021  End Date: 12/18/2021      Hospice Attending Provider: Matilda Hernandez MD  [address] [place] [address]  [address]  Phone: [phone]  Fax: [phone]    Level of Care: Routine Home Care      ___________________    Diagnoses:  Diagnoses of Metabolic encephalopathy, Acute midline low back pain without sciatica, COVID-19 long hauler, Pressure injury of sacral region, stage 2 (HCC), Oropharyngeal dysphagia, Pneumonia of both lungs due to infectious organism, unspecified part of lung, Failure to thrive in adult, COVID-19 virus infection, Cervical stenosis of spinal canal, Hyponatremia, Anemia, unspecified type, Hypomagnesemia, and Type 2 diabetes mellitus with hyperglycemia, without long-term current use of insulin ([facility] were pertinent to this visit.     Current Medications:    Current Facility-Administered Medications:     fentaNYL (DURAGESIC) 25 mcg/hr patch 1 Patch, 1 Patch, TransDERmal, Q72H, Wolfgang Harada, NP, 1 Patch at 09/26/21 2043    haloperidol lactate (HALDOL) injection 1 mg, 1 mg, SubCUTAneous, Q6H, Leana Dodge NP, 1 mg at 09/27/21 1305    morphine injection 2 mg, 2 mg, SubCUTAneous, Q20MIN PRN, 2 mg at 09/26/21 1727 **OR** [DISCONTINUED] morphine injection 2 mg, 2 mg, IntraVENous, Q20MIN PRN, Matilda Hernandez MD, 2 mg at 09/22/21 1303    haloperidol lactate (HALDOL) injection 2 mg, 2 mg, SubCUTAneous, Q1H PRN **OR** [DISCONTINUED] haloperidol lactate (HALDOL) injection 2 mg, 2 mg, IntraVENous, Q1H PRN, Matilda Hernandez MD    acetaminophen (TYLENOL) suppository 650 mg, 650 mg, Rectal, Q3H PRN, Matilda Hernandez MD    LORazepam (ATIVAN) tablet 1 mg, 1 [facility]ist Note     Admit Date:  2021  4:58 PM   Name:  Dia Zamorano   Age:  80 y.o.  :  1940   MRN:  [id number]   PCP:  Yash Davies MD  Treatment Team: Attending Provider: Chris Beard MD; Utilization Review: Skylar Stewart RN; : Laya Martínez    HPI/Subjective:   Patient is a 55-year-old female with no significant past medical history presented to ED after fall. Denies loss of consciousness. In the ED all relevant imaging studies performed and no significant injuries discovered. Labs noted for KATEY. Patient admitted for observation. : Patient is seen at the bedside. No active complaint. Patient is okay to go to rehab temporarily but does not want to stay there for a long time. Patient states she is going to talk to her daughter about this. Assessment and Plan:     KATEY:  Creatinine 1.10  Improving  Discontinue IV fluids  Avoid nephrotoxic agent    Frequent falls:  PT/OT  fall precautions    Current use of long-term anticoagulation:  On Eliquis and baby aspirin  Discussed risk of bleeding due to patient having multiple falls within the last 30 days. Advised daughter to discuss anticoagulation with the cardiologist due to these frequent falls    A. Fib:  Rate controlled  Continue flecainide and metoprolol    Hypertension:  Continue Norvasc, Bumex, hydrochlorothiazide, Cozaar and metoprolol    Status cardiac pacemaker:  Noted    Cognitive disorder:  Most likely Alzheimer's versus Lewy body dementia, outpatient notes indicate that she has been worsening with some hallucination and behavioral disorders  She has been placed on medications for this with some success. She is at high risk of worsening while in the hospital.     Discharge planning: Pending rehab placement, anticipate discharge on Monday.     DVT ppx ordered  Code status:  Full  Estimated LOS:  Greater than 2 midnights  Risk:  high    Hospital Problems as of 2021 Date Reviewed: 2021 Codes Class Noted - Resolved POA    * (Principal) KATEY (acute kidney injury) ([facility] ICD-10-CM: N17.9  ICD-9-CM: 584.9  8/18/2021 - Present Yes        Current use of long term anticoagulation (Chronic) ICD-10-CM: Z79.01  ICD-9-CM: V58.61  8/18/2021 - Present Yes    Overview Signed 8/18/2021 10:04 PM by MD Alie Beltrán             Frequent falls (Chronic) ICD-10-CM: R29.6  ICD-9-CM: V15.88  8/18/2021 - Present Yes        Cognitive disorder (Chronic) ICD-10-CM: F09  ICD-9-CM: 294.9  4/20/2021 - Present Yes        Status cardiac pacemaker (Chronic) ICD-10-CM: Z95.0  ICD-9-CM: V45.01  8/30/2017 - Present Yes        Atrial fibrillation (HCC) (Chronic) ICD-10-CM: I48.91  ICD-9-CM: 427.31  1/22/2014 - Present Yes        HTN (hypertension) (Chronic) ICD-10-CM: I10  ICD-9-CM: 401.9  9/29/2013 - Present Yes        Primary pulmonary hypertension ([facility] (Chronic) ICD-10-CM: I27.0  ICD-9-CM: 416.0  10/30/2000 - Present Yes                10 systems reviewed and negative except as noted in HPI.   Past Medical History:   Diagnosis Date    Abdominal pain 9/29/2013    COPD (chronic obstructive pulmonary disease) (HCC)     DJD (degenerative joint disease) 9/29/2013    Esophageal reflux 9/29/2013    HTN (hypertension) 9/29/2013    Hypercholesterolemia     Hypertension     Other and unspecified hyperlipidemia 9/29/2013    Pyelonephritis 9/29/2013    Sensory neuropathy 12/23/2020    UTI (lower urinary tract infection) 9/29/2013      Past Surgical History:   Procedure Laterality Date    HX HYSTERECTOMY      HX PACEMAKER      [place] BREAST SURGERY PROCEDURE UNLISTED  01/07/2019    right breast cancer-lumpectomy    [place] CARDIAC SURG PROCEDURE UNLIST  2/05 8/2015    pacemaker    [place] CHEST SURGERY PROCEDURE UNLISTED      [place] LAP,CHOLECYSTECTOMY        Allergies   Allergen Reactions    Advair Diskus [Fluticasone Propion-Salmeterol] Other (comments)     shakes    Aspirin Nausea Only     Pt can take aspirin mg, Oral, Q4H PRN, Noe Figueroa MD, 1 mg at 09/26/21 0932    bisacodyL (DULCOLAX) suppository 10 mg, 10 mg, Rectal, PRN, Noe Figueroa MD    haloperidol lactate (HALDOL) injection 2 mg, 2 mg, SubCUTAneous, Q1H PRN, 2 mg at 09/25/21 0551 **OR** [DISCONTINUED] haloperidol lactate (HALDOL) injection 2 mg, 2 mg, IntraVENous, Q1H PRN, Noe Figueroa MD, 2 mg at 09/22/21 1303    glycopyrrolate (ROBINUL) injection 0.2 mg, 0.2 mg, SubCUTAneous, Q4H PRN, Noe Figueroa MD    Orders:  Orders Placed This Encounter    IP CONSULT TO SPIRITUAL CARE     Standing Status:   Standing     Number of Occurrences:   1     Order Specific Question:   Reason for Consult: Answer: Once on week one, then PRN. For [facility] Patients Only. For contracted patients, their primary hospice will continue to manage spiritual care needs.  DIET PLEASURE     Standing Status:   Standing     Number of Occurrences:   1    VITAL SIGNS     Standing Status:   Standing     Number of Occurrences:   1    NURSING-MISCELLANEOUS: Comfort Care Measures CONTINUOUS     Standing Status:   Standing     Number of Occurrences:   1     Order Specific Question:   Description of Order:     Answer:   Comfort Care Measures    DENNISON CATHETER, CARE     1. Dennison Catheter care every shift and PRN  2. Notify Physician of Dennison Catheter leakage, occlusion, gross adherent sediment or accidental removal  3. Change Dennison 30 days after insertion. Standing Status:   Standing     Number of Occurrences:   02070    BLADDER CHECKS     May scan bladder PRN for urinary retention and or patient discomfort     Standing Status:   Standing     Number of Occurrences:   13536    NURSING ASSESSMENT:  SPECIFY Assess for GIP, routine, or respite level of care. Q SHIFT Routine     Standing Status:   Standing     Number of Occurrences:   1     Order Specific Question:   Please describe the test or procedure you would like to order.      Answer:   Assess for GIP, 81mg Pt c/o upset stomach with higher dose    Codeine Nausea Only    Lipitor [Atorvastatin] Other (comments)     Leg weakness      Social History     Tobacco Use    Smoking status: Former Smoker     Packs/day: 1.00     Years: 22.00     Pack years: 22.00     Types: Cigarettes     Quit date: 1989     Years since quittin.6    Smokeless tobacco: Never Used   Substance Use Topics    Alcohol use: No      Family History   Problem Relation Age of Onset    Heart Disease Mother     Hypertension Mother     Heart Disease Father     Hypertension Father       Family history reviewed and noncontributory. Immunization History   Administered Date(s) Administered    COVID-19, PFIZER, MRNA, LNP-S, PF, 30MCG/0.3ML DOSE 2021, 2021    Influenza High Dose Vaccine PF 10/09/2017, 10/19/2018    Influenza Vaccine 10/01/2012    Influenza Vaccine (Quad) PF (>6 Mo Flulaval, Fluarix, and >3 Yrs Afluria, Fluzone 70217) 2016    Influenza Vaccine (Tri) Adjuvanted (>65 Yrs FLUAD TRI 42410) 10/28/2019    Influenza, Quadrivalent, Adjuvanted (>65 Yrs FLUAD QUAD [id number]) 09/10/2020    Pneumococcal Conjugate (PCV-13) 2018    Pneumococcal Vaccine (Unspecified Type) 2002, 10/01/2007, 2016    TB Skin Test (PPD) Intradermal 2021    Tdap 2017    Zoster Vaccine, Live 2016     PTA Medications:  Prior to Admission Medications   Prescriptions Last Dose Informant Patient Reported? Taking? DISABLED PLACARD (DISABLED PLACARD) DMV   No No   Sig: As directed   Oxygen   Yes No   Si liters at night only   albuterol (VENTOLIN HFA) 90 mcg/actuation inhaler   No No   Sig: Take 2 Puffs by inhalation every four (4) hours as needed for Shortness of Breath. allopurinoL (ZYLOPRIM) 100 mg tablet   No No   Sig: TAKE 1 TABLET BY MOUTH EVERY DAY   amLODIPine (NORVASC) 5 mg tablet 2021 at Unknown time  No Yes   Sig: Take 1 Tab by mouth daily.    apixaban (Eliquis) 5 mg tablet 2021 at routine, or respite level of care.  PAIN ASSESSMENT Pain and Symptoms: Assess ever 4 hours and PRN, for GIP level of care. PRN Routine     Standing Status:   Standing     Number of Occurrences:   87505     Order Specific Question:   Please describe the test or procedure you would like to order. Answer:   Pain and Symptoms: Assess ever 4 hours and PRN, for GIP level of care.  BEDREST, COMPLETE     Standing Status:   Standing     Number of Occurrences:   1    NURSING ASSESSMENT:  SPECIFY provide patient with available PRN morphine and ativan doses prior to wound care and full bath CONTINUOUS Routine     Standing Status:   Standing     Number of Occurrences:   1     Order Specific Question:   Please describe the test or procedure you would like to order. Answer:   provide patient with available PRN morphine and ativan doses prior to wound care and full bath    NURSING-MISCELLANEOUS: PPE required for any aerosolizing procedure (ie, intubation, bronchoscopy). Surgical mask on patient for any transport outside room. Patient should be single room, closed door with notification of droplet plus isolation. CON. .. Standing Status:   Standing     Number of Occurrences:   1     Order Specific Question:   Description of Order:     Answer:   PPE required for any aerosolizing procedure (ie, intubation, bronchoscopy). Surgical mask on patient for any transport outside room. Patient should be single room, closed door with notification of droplet plus isolation.     DRESSING CHANGE - PICC, MLC, SUBCUTANEOUS AND ACCESSED PORT DRESSING CHANGE     PICC, MLC, SUBCUTANEOUS AND ACCESSED PORT DRESSING CHANGE:  Change catheter site dressing every 7 days and prn if site dressing becomes damp loosened or visibly soiled     Standing Status:   Standing     Number of Occurrences:   15287    WOUND CARE, DRESSING CHANGE     Wound Care:  Location:sacrum  Decubitus Wounds Stage III - Cleanse wound location with wound cleanser, pat Unknown time  No Yes   Sig: Take 1 Tab by mouth two (2) times a day. aspirin delayed-release 81 mg tablet 8/18/2021 at Unknown time  No Yes   Sig: Take 1 Tab by mouth daily. bumetanide (BUMEX) 1 mg tablet 8/18/2021 at Unknown time  No Yes   Sig: Take 1 Tab by mouth five (5) days a week. clorazepate (TRANXENE) 7.5 mg tablet   No No   Sig: TAKE 1/2 TABLET BY MOUTH NIGHTLY   diclofenac (VOLTAREN) 1 % gel   No No   Sig: Apply  to affected area four (4) times daily. Patient taking differently: Apply 2 g to affected area four (4) times daily. Applies to BUEs   dicyclomine (BENTYL) 10 mg capsule   No No   Sig: TAKE 1 CAPSULE BY MOUTH 3 TIMES A DAY   famotidine (PEPCID) 40 mg tablet 8/17/2021 at Unknown time  No Yes   Sig: TAKE 1 TABLET BY MOUTH EVERY DAY IN THE EVENING   flecainide (TAMBOCOR) 50 mg tablet 8/18/2021 at Unknown time  No Yes   Sig: Take 1 Tab by mouth two (2) times a day.   gabapentin (NEURONTIN) 400 mg capsule 8/18/2021 at Unknown time  No Yes   Sig: Take 1 Cap by mouth three (3) times daily. lansoprazole (PREVACID) 30 mg capsule 8/18/2021 at Unknown time  No Yes   Sig: Take 1 Cap by mouth two (2) times a day. losartan-hydroCHLOROthiazide (HYZAAR) 50-12.5 mg per tablet 8/18/2021 at Unknown time  No Yes   Sig: Take 1 Tab by mouth daily. Patient taking differently: Take 1 Tab by mouth daily. Take 1/2 tab for 6 days (starting 12/4) then 1 tab every day   memantine (NAMENDA) 5 mg tablet 8/18/2021 at Unknown time  No Yes   Sig: Take 1 Tablet by mouth two (2) times a day. metoprolol tartrate (LOPRESSOR) 25 mg tablet   No No   Sig: TAKE 1 TABLET BY MOUTH TWICE A DAY   montelukast (SINGULAIR) 10 mg tablet 8/18/2021 at Unknown time  No Yes   Sig: Take 1 Tablet by mouth daily. nitrofurantoin (Macrodantin) 100 mg capsule Not Taking at Unknown time  Yes No   Sig: Take  by mouth nightly.    Patient not taking: Reported on 8/18/2021   risperiDONE (RisperDAL) 1 mg tablet   No No   Sig: Take 1 Tablet by dry. Apply metronidazole 500 mg crushed daily mixed with 30 mL anti-fungal cream applied to wound bed. Apply gauze, abd pad and secure with kerlix. Place chux pad under wound. Change every 3 days and PRN if soiled or not secure. Turn every 2 hours. Change dressing/ packing every 72 hrs and as needed for soak through or heavy soiling     Standing Status:   Standing     Number of Occurrences:   5    NURSING-MISCELLANEOUS: level of care change: 9/27: Change to routine. CONTINUOUS     9/27: Change to routine. Standing Status:   Standing     Number of Occurrences:   1     Order Specific Question:   Description of Order:     Answer:   level of care change:    DO NOT RESUSCITATE     Standing Status:   Standing     Number of Occurrences:   1     Order Specific Question:   Comfort Measures Only? Answer: Yes    DROPLET PLUS     Droplet Plus Isolation consists of: Droplet plus Contact plus Eye Protection (face shield/goggles)     Standing Status:   Standing     Number of Occurrences:   1    OXYGEN CANNULA Liters per minute: 2; Indications for O2 therapy: RESPIRATORY DISTRESS PRN Routine     Standing Status:   Standing     Number of Occurrences:   88083     Order Specific Question:   Liters per minute: Answer:   2     Order Specific Question:   Indications for O2 therapy     Answer:   RESPIRATORY DISTRESS    benzonatate (TESSALON) 100 mg capsule     Sig: Take 100 mg by mouth three (3) times daily as needed.  amLODIPine (NORVASC) 10 mg tablet     Sig: Take 10 mg by mouth daily.  acetaminophen (TYLENOL) 500 mg tablet     Sig: Take 1-2 Tablets by mouth.  bisacodyL (DULCOLAX) 10 mg supp     Sig: Insert 10 mg into rectum daily as needed.  carvediloL (COREG) 25 mg tablet     Sig: Take 25 mg by mouth two (2) times a day.  dexAMETHasone (DECADRON) 6 mg tablet     Sig: Take 6 mg by mouth daily.  famotidine (PEPCID) 20 mg tablet     Sig: Take 1 Tablet by mouth daily.     gabapentin (NEURONTIN) 300 mg capsule     Sig: Take 300 mg by mouth three (3) times daily as needed.  hydrALAZINE (APRESOLINE) 50 mg tablet     Sig: Take 50 mg by mouth three (3) times daily.  Purelax 17 gram packet     Sig: Take 1 Packet by mouth daily.  traMADoL (ULTRAM) 50 mg tablet     Sig: TAKE 1 TABLET AS NEEDED ORAL TWICE A DAY 30 DAYS    DISCONTD: sodium chloride (NS) flush 3 mL    DISCONTD: sodium chloride (NS) flush 3 mL    morphine injection 2 mg    DISCONTD: morphine injection 2 mg    haloperidol lactate (HALDOL) injection 2 mg    DISCONTD: haloperidol lactate (HALDOL) injection 2 mg    acetaminophen (TYLENOL) suppository 650 mg    LORazepam (ATIVAN) tablet 1 mg    bisacodyL (DULCOLAX) suppository 10 mg    haloperidol lactate (HALDOL) injection 2 mg    DISCONTD: haloperidol lactate (HALDOL) injection 2 mg    glycopyrrolate (ROBINUL) injection 0.2 mg    DISCONTD: fentaNYL (DURAGESIC) 12 mcg/hr patch 1 Patch    DISCONTD: haloperidol lactate (HALDOL) injection 1 mg    haloperidol lactate (HALDOL) injection 1 mg    fentaNYL (DURAGESIC) 25 mcg/hr patch 1 Patch    INITIAL PHYSICIAN ORDER: HOSPICE Level Of Care: General Inpatient; Reason for Admission: complex symptom management     Standing Status:   Standing     Number of Occurrences:   1     Order Specific Question:   Status     Answer:   Hospice     Order Specific Question:   Level Of Care     Answer:   General Inpatient     Order Specific Question:   Reason for Admission     Answer:   complex symptom management     Order Specific Question:   Inpatient Hospitalization Certified Necessary for the Following Reasons     Answer:   3. Patient receiving treatment that can only be provided in an inpatient setting (further clarification in H&P documentation)     Order Specific Question:   Admitting Diagnosis     Answer:   Metabolic encephalopathy [864.61. ICD-9-CM]     Order Specific Question:   Terminal Prognosis Diagnosis(es)     Answer:   COVID-19 long hauler mouth two (2) times a day. risperiDONE (RisperDAL) 1 mg tablet   Yes No   Sig: Take  by mouth two (2) times a day. Patient not taking: Reported on 7/22/2021   sertraline (ZOLOFT) 50 mg tablet 8/18/2021 at Unknown time  No Yes   Sig: TAKE 1 TABLET BY MOUTH EVERY DAY   tamoxifen (NOLVADEX) 20 mg tablet 8/18/2021 at Unknown time  Yes Yes   Sig: TAKE 1 TABLET BY MOUTH EVERY DAY      Facility-Administered Medications: None       Objective:     Patient Vitals for the past 24 hrs:   Temp Pulse Resp BP SpO2   08/22/21 1210 98.1 °F (36.7 °C) 70 18 121/63    08/22/21 0745 97.9 °F (36.6 °C) 70 18 (!) 175/88 94 %   08/22/21 0307 98.1 °F (36.7 °C) 70 16 (!) 152/77 (!) 88 %   08/21/21 2300 97.9 °F (36.6 °C) 70 18 (!) 104/59 92 %   08/21/21 1908 98 °F (36.7 °C) 70 18 125/72 91 %     Oxygen Therapy  O2 Sat (%): 94 % (08/22/21 0745)  Pulse via Oximetry: 69 beats per minute (08/18/21 2200)  O2 Device: None (Room air) (08/22/21 0847)    Estimated body mass index is 32.92 kg/m² as calculated from the following:    Height as of this encounter: 5' 2\" (1.575 m). Weight as of this encounter: 81.6 kg (180 lb). Intake/Output Summary (Last 24 hours) at 8/22/2021 1539  Last data filed at 8/21/2021 1654  Gross per 24 hour   Intake    Output 275 ml   Net -275 ml       *Note that automatically entered I/Os may not be accurate; dependent on patient compliance with collection and accurate  by assistants. Physical Exam:  General:    Alert. Eyes:   Normal sclerae. Extraocular movements intact. HENT:  Normocephalic, atraumatic. Moist mucous membranes, bruise on the lower jaw  CV:   RRR. No m/r/g. Lungs:  CTAB. No wheezing, rhonchi, or rales. Abdomen: Soft, nontender, nondistended. Extremities: Warm and dry. No cyanosis or edema. Neurologic: CN II-XII grossly intact. Sensation intact. Skin:     No rashes or jaundice. Normal coloration  Psych:  Normal mood and affect.     I reviewed the labs, imaging, EKGs, [[id number]]     Order Specific Question:   Terminal Prognosis Diagnosis(es)     Answer:   Hypoalbuminemia due to protein-calorie malnutrition [facility]) [[id number]]     Order Specific Question:   Terminal Prognosis Diagnosis(es)     Answer:   Debilitated patient [[id number]]     Order Specific Question:   Terminal Prognosis Diagnosis(es)     Answer:   Dysphagia [[id number]]     Order Specific Question:   Terminal Prognosis Diagnosis(es)     Answer:   Hyponatremia with decreased serum osmolality [[id number]]     Order Specific Question:   Terminal Prognosis Diagnosis(es)     Answer:   Hypomagnesemia [[id number]]     Order Specific Question:   Terminal Prognosis Diagnosis(es)     Answer:   Lobar pneumonia, unspecified organism [facility]) [[id number]]     Order Specific Question:   Terminal Prognosis Diagnosis(es)     Answer:   Decubitus ulcer of sacral region, stage 3 [facility]) [[id number]]     Order Specific Question:   Terminal Prognosis Diagnosis(es)     Answer:   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 [facility]) [[id number]]     Order Specific Question:   Terminal Prognosis Diagnosis(es)     Answer:   Neuropathic pain [[id number]]     Order Specific Question:   Terminal Prognosis Diagnosis(es)     Answer: Anemia of chronic disease [980249]     Order Specific Question:   Terminal Prognosis Diagnosis(es)     Answer:   Hypokalemia due to inadequate potassium intake [[id number]]     Order Specific Question:   Terminal Prognosis Diagnosis(es)     Answer:   Hypertensive cardiopathy [[id number]]     Order Specific Question:   Terminal Prognosis Diagnosis(es)     Answer:   Chronic congestive heart failure with left ventricular diastolic dysfunction [facility]) [[id number]]     Order Specific Question:   Admitting Physician     Answer:   Francisco Schmidt     Order Specific Question:   Attending Physician     Answer:   Francisco Schmidt     Order Specific Question:   Discharge Plan:     Answer:    Other (Specify)     Comments:   end of life telemetry, and other studies done this admission. Data Reviewed:   Recent Results (from the past 24 hour(s))   CBC WITH AUTOMATED DIFF    Collection Time: 08/22/21  4:27 AM   Result Value Ref Range    WBC 6.2 4.3 - 11.1 K/uL    RBC 3.51 (L) 4.05 - 5.2 M/uL    HGB 10.2 (L) 11.7 - 15.4 g/dL    HCT 32.2 (L) 35.8 - 46.3 %    MCV 91.7 79.6 - 97.8 FL    MCH 29.1 26.1 - 32.9 PG    MCHC 31.7 31.4 - 35.0 g/dL    RDW 13.7 11.9 - 14.6 %    PLATELET 900 899 - 669 K/uL    MPV 9.6 9.4 - 12.3 FL    ABSOLUTE NRBC 0.00 0.0 - 0.2 K/uL    DF AUTOMATED      NEUTROPHILS 42 (L) 43 - 78 %    LYMPHOCYTES 35 13 - 44 %    MONOCYTES 9 4.0 - 12.0 %    EOSINOPHILS 13 (H) 0.5 - 7.8 %    BASOPHILS 1 0.0 - 2.0 %    IMMATURE GRANULOCYTES 1 0.0 - 5.0 %    ABS. NEUTROPHILS 2.6 1.7 - 8.2 K/UL    ABS. LYMPHOCYTES 2.2 0.5 - 4.6 K/UL    ABS. MONOCYTES 0.6 0.1 - 1.3 K/UL    ABS. EOSINOPHILS 0.8 0.0 - 0.8 K/UL    ABS. BASOPHILS 0.0 0.0 - 0.2 K/UL    ABS. IMM.  GRANS. 0.0 0.0 - 0.5 K/UL   METABOLIC PANEL, BASIC    Collection Time: 08/22/21  4:27 AM   Result Value Ref Range    Sodium 141 136 - 145 mmol/L    Potassium 3.7 3.5 - 5.1 mmol/L    Chloride 107 98 - 107 mmol/L    CO2 31 21 - 32 mmol/L    Anion gap 3 (L) 7 - 16 mmol/L    Glucose 96 65 - 100 mg/dL    BUN 15 8 - 23 MG/DL    Creatinine 1.10 (H) 0.6 - 1.0 MG/DL    GFR est AA >60 >60 ml/min/1.73m2    GFR est non-AA 51 (L) >60 ml/min/1.73m2    Calcium 9.2 8.3 - 10.4 MG/DL       All Micro Results     Procedure Component Value Units Date/Time    CULTURE, BLOOD [[id number]] Collected: 08/18/21 2104    Order Status: Completed Specimen: Blood Updated: 08/22/21 0909     Special Requests: --        LEFT  HAND       Culture result: NO GROWTH 4 DAYS       CULTURE, BLOOD [[id number]] Collected: 08/18/21 2107    Order Status: Completed Specimen: Blood Updated: 08/22/21 0909     Special Requests: --        RIGHT  Antecubital       Culture result: NO GROWTH 4 DAYS             Current Facility-Administered Medications Medication Dose Route Frequency    0.9% sodium chloride infusion  75 mL/hr IntraVENous CONTINUOUS    albuterol (PROVENTIL VENTOLIN) nebulizer solution 2.5 mg  2.5 mg Nebulization Q6H PRN    amLODIPine (NORVASC) tablet 5 mg  5 mg Oral DAILY    aspirin delayed-release tablet 81 mg  81 mg Oral DAILY    bumetanide (BUMEX) tablet 1 mg  1 mg Oral Once per day on Mon Tue Wed Thu Fri    clorazepate (TRANXENE) tablet 3.75 mg  3.75 mg Oral QHS    famotidine (PEPCID) tablet 40 mg  40 mg Oral QPM    losartan (COZAAR) tablet 50 mg  50 mg Oral DAILY    metoprolol tartrate (LOPRESSOR) tablet 25 mg  25 mg Oral Q12H    montelukast (SINGULAIR) tablet 10 mg  10 mg Oral DAILY    sertraline (ZOLOFT) tablet 50 mg  50 mg Oral DAILY    tamoxifen (NOLVADEX) tablet 20 mg  20 mg Oral DAILY    sodium chloride (NS) flush 5-40 mL  5-40 mL IntraVENous Q8H    sodium chloride (NS) flush 5-40 mL  5-40 mL IntraVENous PRN    acetaminophen (TYLENOL) tablet 650 mg  650 mg Oral Q6H PRN    Or    acetaminophen (TYLENOL) suppository 650 mg  650 mg Rectal Q6H PRN    polyethylene glycol (MIRALAX) packet 17 g  17 g Oral DAILY    hydroCHLOROthiazide (MICROZIDE) capsule 12.5 mg  12.5 mg Oral DAILY    gabapentin (NEURONTIN) capsule 400 mg  400 mg Oral TID    risperiDONE (RisperDAL) tablet 1 mg  1 mg Oral Q12H    apixaban (ELIQUIS) tablet 2.5 mg  2.5 mg Oral Q12H    allopurinoL (ZYLOPRIM) tablet 100 mg  100 mg Oral Q12H    flecainide (TAMBOCOR) tablet 50 mg  50 mg Oral Q12H    memantine (NAMENDA) tablet 5 mg  5 mg Oral Q12H    pantoprazole (PROTONIX) tablet 40 mg  40 mg Oral QHS       Other Studies:  No results found for this visit on 08/18/21. No results found. [unfilled]       Part of this note was written by using a voice dictation software and the note has been proof read but may still contain some grammatical/other typographical errors.     Signed:  Joe Martin MD  IP CONSULT TO SOCIAL WORK     Standing Status:   Standing     Number of Occurrences:   1     Order Specific Question:   Reason for Consult: Answer: For [facility] Patients Only. For contracted patients, their primary hospice will continue to manage social work needs. Allergies: Allergies   Allergen Reactions    Penicillins Rash     Tolerated Ceftriaxone 8/5/21 and cefdinir 3/28/19       Care Plan:  Encounter Problems (Active)     Problem: Anticipatory Grief     Dates: Start: 09/23/21       Disciplines: Interdisciplinary    Goal: Grief heard and acknowledged, anxiety reduced, patient coping identified, patient/family expressed gratitude     Dates: Start: 09/23/21   Expected End: 10/01/21       Disciplines: Interdisciplinary    Intervention: Assess grief responses     Dates: Start: 09/23/21       Description: Assess for feelings of grief       Intervention: Support grieving process     Dates: Start: 09/23/21       Description: Address feelings of loss, anticipatory grief, expression of concern. Problem: Anxiety/Agitation     Dates: Start: 09/23/21       Disciplines: Interdisciplinary    Goal: Verbalize or staff assess the ability to manage anxiety     Dates: Start: 09/23/21   Expected End: 09/29/21       Description: The patient/family/caregiver will verbalize and demonstrate ability to manage the patient's anxiety throughout hospice care. Disciplines: Interdisciplinary    Intervention: Assess for anxiety/agitation     Dates: Start: 09/23/21       Description: Assess for signs and symptoms of anxiety and agitation. Intervention: Instruct/Implement strategies to reduce anxiety/agitation     Dates: Start: 09/23/21       Description: Instruct patient/caregiver on strategies to reduce anxiety/agitation.              Problem: Communication Deficit     Dates: Start: 09/20/21       Disciplines: Interdisciplinary    Goal: Effectively communicate symptoms, needs, and concerns Dates: Start: 09/20/21   Expected End: 09/26/21       Disciplines: Interdisciplinary    Intervention: Assess spiritual needs     Dates: Start: 09/20/21          Intervention: Expression of emotions     Dates: Start: 09/20/21          Intervention: Instruct end of life support     Dates: Start: 09/20/21       Description: Assess mental status and identify loss of short term memory, confusion, impaired cognitive ability, alertness, and orientation. Problem: Dyspnea Due to End of Life     Dates: Start: 09/20/21       Disciplines: Interdisciplinary    Goal: Demonstrate understanding of and ability to manage respiratory symptoms at end of life     Dates: Start: 09/20/21   Expected End: 09/26/21       Disciplines: Interdisciplinary    Intervention: Assess for signs and symptoms of dyspnea     Dates: Start: 09/20/21          Intervention: Aaliyah Perez on causes/symptoms of dyspnea     Dates: Start: 09/20/21          Intervention: Aaliyah Perez patient/caregiver/family on strategies to effectively manage dyspnea     Dates: Start: 09/20/21                Problem: Emotional Support Needs     Dates: Start: 09/22/21       Description: Pt, pt's daughter Tk Emerson, and pt's family will have their emotional support needs met weekly by SW. Disciplines: Interdisciplinary    Goal: Patient/family is receiving emotional support     Dates: Start: 09/22/21   Expected End: 10/01/21       Description: Pt, Tk Emerson, and family will receive emotional support weekly from SW through weekly check-ins, education on the hospice philosophy, rapport building, active listening, and validation of feelings throughout pt's hospice journey. Disciplines: Interdisciplinary    Intervention: Assess for emotional distress     Dates: Start: 09/22/21       Description: SW will assess for emotional distress in pt, Saba VANN, and family through the use of open ended questions and motivational interviewing scaling questions.         Intervention: Provide emotional support of the family's cultural expressions of grief and loss     Dates: Start: 09/22/21       Description: SW will provide emotional support of the family's cultural expression of grief, loss, and response to illness. Problem: Falls - Risk of     Dates: Start: 09/20/21       Disciplines: Interdisciplinary    Goal: *Absence of Falls     Dates: Start: 09/20/21   Expected End: 09/29/21       Description: Document Saba High Fall Risk and appropriate interventions in the flowsheet. Disciplines: Interdisciplinary          Problem: General Wound Care     Dates: Start: 09/20/21       Disciplines: Interdisciplinary    Goal: *Infected Wound: Prevention of further infection and promotion of healing     Dates: Start: 09/20/21   Expected End: 09/29/21       Description: Infection control procedures (eg: clean dressings, clean gloves, hand washing, precautions to isolate wound from contamination, sterile instruments used for wound debridement) should be implemented.     Disciplines: Interdisciplinary       Goal: Interventions     Dates: Start: 09/20/21   Expected End: 09/26/21       Disciplines: Interdisciplinary    Intervention: Pain management     Dates: Start: 09/20/21          Intervention: Wound assessment, including wound bed description and dimensions     Dates: Start: 09/20/21          Intervention: Wound dressing change, sterile or clean     Dates: Start: 09/20/21          Intervention: Wound care (eg: Remove necrotic tissue; infection control/protection; absorb exudate; fill dead space; maintain moisture; maintain constant temperature of wound)     Dates: Start: 09/20/21          Intervention: Skin assessment     Dates: Start: 09/20/21          Intervention: Skin monitoring and care (e.g. skin cleansing; keep dry, moisturize, utilization of  lotion and/or skin barrier cream)     Dates: Start: 09/20/21                Problem: Imminent Death     Dates: Start: 09/20/21       Disciplines: Interdisciplinary    Goal: Collaborate with patient/family/caregiver/interdisciplinary team to minimize and manage end of life symptoms     Dates: Start: 21   Expected End: 21       Disciplines: Interdisciplinary    Intervention: Analia Hoffman on end of life issues     Dates: Start: 21       Description: Instruct patient/caregiver on end of life issues. Intervention: Instruct on impending death     Dates: Start: 21       Description: Instruct on impending death, need for  arrangements, physical care, patient wishes, hospice role, and signs/symptoms of approaching death. Intervention: Instruct on what to do at death     Dates: Start: 21       Description: Instruct caregiver on what to do at time of death. Intervention: Plan for death     Dates: Start: 21       Description: Assess patient/caregiver emotional readiness and plan for death and assist as needed. Problem: Infection - Risk of, Urinary Catheter-Associated Urinary Tract Infection     Dates: Start: 21       Disciplines: Interdisciplinary    Goal: *Absence of infection signs and symptoms     Dates: Start: 21   Expected End: 21       Disciplines: Interdisciplinary    Intervention: Urinary catheter needs assessment     Dates: Start: 21       Description: Medically/surgically unstable; Chemically paralyzed; Obstruction/retention; Strict I/Os;  Surgical procedure; Comfort Care; Multiple Stage 3 or 4 pressure ulcers, chest to knees       Intervention: Urine assessment (eg: Clarity; color; odor; volume)     Dates: Start: 21          Intervention: Infection signs and symptoms monitoring - urinary tract (eg: Flank or suprapubic pain; burning; fever; dysuria; frequency; urgency; cloudy/bloody/foul odor urine; confusion/agitation; incontinence)     Dates: Start: 21          Intervention: Lab monitoring (eg: Urinalysis; culture and sensitivity; complete blood count with differential)     Dates: Start: 09/23/21          Intervention: Urinary catheter management (eg: Closed urinary catheter system maintenance; urinary catheter patency with free downhill flow; perineal care; monitor intake and output)     Dates: Start: 09/23/21          Intervention: Urinary catheter discontinuation     Dates: Start: 09/23/21                Problem: Pain     Dates: Start: 09/20/21       Disciplines: Nurse, Interdisciplinary, RT    Goal: *Control of Pain     Dates: Start: 09/20/21   Expected End: 09/29/21       Disciplines: Nurse, Interdisciplinary, RT    Intervention: Assess pain characteristics (eg: Intensity scale; onset; location; quality; severity; duration; frequency; radiation)     Dates: Start: 09/20/21          Intervention: Assess pain management - barriers (eg: Past pain experiences)     Dates: Start: 09/20/21          Intervention: Identify pain expectations (eg: Patient's pain goal; somatic experiences; behavioral changes; affect)     Dates: Start: 09/20/21          Intervention: Identify pain medication concerns (eg: Cultural considerations; addiction concerns)     Dates: Start: 09/20/21          Intervention: Support system identification (eg: Caregiver; community resource; family; friends; Jain; support group)     Dates: Start: 09/20/21          Intervention: Monitor for change in patient condition (eg:  Vital signs changes; changes in level of consciousness; nausea; behavioral changes)     Dates: Start: 09/20/21          Intervention: Medication side-effect assessment     Dates: Start: 09/20/21          Intervention: Pain-relief response reassessment (eg: Frequency based on route of administration; effectiveness)     Dates: Start: 09/20/21             Goal: *PALLIATIVE CARE:  Alleviation of Pain     Dates: Start: 09/20/21   Expected End: 09/26/21       Disciplines: Nurse, Interdisciplinary, RT    Intervention: Refer patient for holistic services per facility     Dates: Start: 09/20/21                Problem: Patient Education:  Go to Education Activity     Dates: Start: 09/20/21       Disciplines: Interdisciplinary    Goal: Patient/Family Education     Dates: Start: 09/20/21   Expected End: 09/29/21       Disciplines: Interdisciplinary          Problem: Patient Education: Go to Patient Education Activity     Dates: Start: 09/20/21       Disciplines: Interdisciplinary    Goal: Patient/Family Education     Dates: Start: 09/20/21   Expected End: 09/26/21       Disciplines: Interdisciplinary          Problem: Patient Education: Go to Patient Education Activity     Dates: Start: 09/20/21       Disciplines: Interdisciplinary    Goal: Patient/Family Education     Dates: Start: 09/20/21       Disciplines: Interdisciplinary          Problem: Patient Education: Go to Patient Education Activity     Dates: Start: 09/20/21       Disciplines: Interdisciplinary    Goal: Patient/Family Education     Dates: Start: 09/20/21   Expected End: 09/26/21       Disciplines: Interdisciplinary          Problem: Patient Education: Go to Patient Education Activity     Dates: Start: 09/20/21       Disciplines: Nurse, Interdisciplinary, RT    Goal: Patient/Family Education     Dates: Start: 09/20/21       Disciplines: Nurse, Interdisciplinary, RT          Problem: Pressure Injury - Risk of     Dates: Start: 09/20/21       Disciplines: Interdisciplinary    Goal: *Prevention of pressure injury     Dates: Start: 09/20/21   Expected End: 09/29/21       Description: Document Moustapha Scale and appropriate interventions in the flowsheet. Disciplines: Interdisciplinary          Problem: Pressure Injury - Risk of     Dates: Start: 09/20/21       Disciplines: Interdisciplinary    Goal: *Prevention of pressure injury     Dates: Start: 09/20/21       Description: Document Moustapha Scale and appropriate interventions in the flowsheet.     Disciplines: Interdisciplinary          Problem: Risk for Spread of Infection     Dates: Start: 09/20/21       Disciplines: Interdisciplinary    Goal: Prevent transmission of infectious organism to others     Dates: Start: 09/20/21   Expected End: 09/29/21       Description: Prevent the transmission of infectious organisms to other patients, staff members, and visitors. Disciplines: Interdisciplinary    Intervention: Verify correct isolation order has been placed for confirmed or suspected infection     Dates: Start: 09/20/21          Intervention: Proper isolation precautions placed on patient door     Dates: Start: 09/20/21          Intervention: Proper PPE utilized by all who enter patient's room     Dates: Start: 09/20/21          Intervention: Proper hand hygiene     Dates: Start: 09/20/21       Description: Use alcohol based hand  or soap and water.        Intervention: Proper decontamination of surfaces and shared equipment/devices     Dates: Start: 09/20/21               Care Plan Problems/Goals      Progressing Towards Goal (20)      Prevent transmission of infectious organism to others (Risk for Spread of Infection)    Disciplines:  Interdisciplinary Expected end:  09/29/21        Outcome: Progressing Towards Goal By Logan Spencer RN on 09/27/21 8299            Patient/Family Education (Patient Education:  Go to Education Activity)    Disciplines:  Interdisciplinary Expected end:  09/29/21        Outcome: Progressing Towards Goal By Jania Avalos RN on 09/26/21 2018            *Absence of Falls (Falls - Risk of)    Disciplines:  Interdisciplinary Expected end:  09/29/21        Outcome: Progressing Towards Goal By Logan Spencer RN on 09/27/21 1464            Patient/Family Education (Patient Education: Go to Patient Education Activity)    Disciplines:  Interdisciplinary Expected end:  09/26/21        Outcome: Progressing Towards Goal By Jania Avalos RN on 09/23/21 1953            *Prevention of pressure injury (Pressure Injury - Risk of) Disciplines:  Interdisciplinary Expected end:  09/29/21        Outcome: Progressing Towards Goal By Sarai Reyes RN on 09/27/21 9748            Patient/Family Education (Patient Education: Go to Patient Education Activity)    Disciplines:  Interdisciplinary Expected end:  -        Outcome: Progressing Towards Goal By Dara Granados on 09/25/21 1028            Demonstrate understanding of and ability to manage respiratory symptoms at end of life (Dyspnea Due to End of Life)    Disciplines:  Interdisciplinary Expected end:  09/26/21        Outcome: Progressing Towards Goal By Sarai Reyes RN on 09/27/21 4256            Effectively communicate symptoms, needs, and concerns (Communication Deficit)    Disciplines:  Interdisciplinary Expected end:  09/26/21        Outcome: Progressing Towards Goal By Sarai Reyes RN on 09/27/21 1828            Collaborate with patient/family/caregiver/interdisciplinary team to minimize and manage end of life symptoms (Imminent Death)    Disciplines:  Interdisciplinary Expected end:  09/26/21        Outcome: Progressing Towards Goal By Sarai Reyes RN on 09/26/21 2251            *Prevention of pressure injury (Pressure Injury - Risk of)    Disciplines:  Interdisciplinary Expected end:  -        Outcome: Progressing Towards Goal By Sarai Reyes RN on 09/26/21 8180            Patient/Family Education (Patient Education: Go to Patient Education Activity)    Disciplines:  Interdisciplinary Expected end:  09/26/21        Outcome: Progressing Towards Goal By Dara Granados on 09/20/21 1744            *Control of Pain (Pain)    Disciplines:  Nurse, Interdisciplinary, RT Expected end:  09/29/21        Outcome: Progressing Towards Goal By Sarai Reyes RN on 09/27/21 0747            *PALLIATIVE CARE:  Alleviation of Pain (Pain)    Disciplines:  Nurse, Interdisciplinary, RT Expected end:  09/26/21        Outcome: Progressing Towards Goal By Ney Grimes RN on 09/27/21 3201            Patient/Family Education (Patient Education: Go to Patient Education Activity)    Disciplines:  Nurse, Interdisciplinary, RT Expected end:  -        Outcome: Progressing Towards Goal By Jagdeep Le on 09/25/21 1028            *Infected Wound: Prevention of further infection and promotion of healing (General Wound Care)    Disciplines:  Interdisciplinary Expected end:  09/29/21        Outcome: Progressing Towards Goal By Ney Grimes RN on 09/27/21 0747            Interventions (General Wound Care)    Disciplines:  Interdisciplinary Expected end:  09/26/21        Outcome: Progressing Towards Goal By Ney Grimes RN on 09/27/21 6870            Patient/family is receiving emotional support (Emotional Support Needs)    Disciplines:  Interdisciplinary Expected end:  10/01/21        Outcome: Progressing Towards Goal By Jagdeep Le on 09/25/21 1028            Grief heard and acknowledged, anxiety reduced, patient coping identified, patient/family expressed gratitude (Anticipatory Grief)    Disciplines:  Interdisciplinary Expected end:  10/01/21        Outcome: Progressing Towards Goal By Jagdeep Le on 09/25/21 1028            Verbalize or staff assess the ability to manage anxiety (Anxiety/Agitation)    Disciplines:  Interdisciplinary Expected end:  09/29/21        Outcome: Progressing Towards Goal By Ney Grimes RN on 09/27/21 7594            *Absence of infection signs and symptoms (Infection - Risk of, Urinary Catheter-Associated Urinary Tract Infection)    Disciplines:  Interdisciplinary Expected end:  09/29/21        Outcome: Progressing Towards Goal By Ney Grimes RN on 09/27/21 0747                         Resolved/Met (1)      Identify beliefs/practices that support hospice experience (Spiritual Evaluation)    Disciplines:  Interdisciplinary Expected end:  -        Outcome: Resolved/Met By Judith Moore on 09/23/21 1714                              ___________________    Care Team Notes          POC/IDG Notes      [facility] IDG Nurse Notes by Tye Medina RN at 09/27/21 1651  Version 1 of 1    Author: Tye Medina RN Service: NURSING Author Type: Registered Nurse    Filed: 09/27/21 1652 Date of Service: 09/27/21 1651 Status: Signed    : Tye Medina RN (Registered Nurse)       Patient: Selvin Rolon    Date: 09/27/21  Time: 4:51 PM    [facility] Nurse Notes  Follow Up IDG: Pt is a 80-year-old Female with metabolic encephalopathy who is here GIP level of care for management of pain and wound care. Linn with UOP of 200ml   IV access: None   PO intake: NPO  Oxygen: Room Air  Wounds: Sacral Stage III  PRN medications: Morphine 2mg x 2 doses for pain/ Ativan 1mg x 1 dose for agitation   Scheduled meds:  Fentanyl 25mcg Q 72 hours / Haldol 1mg Q 6 hours. Plan: Change to routine LOC 9.27.2021. Add flagyl and anitfungal cream to wound. D/C alginate dressing. Comprehensive plan of care reviewed. IDG and pt./family in agreement with plan of care. The IDG identifies through on-going assessment when a change is needed to the POC; the pt/family will receive care and services necessitated by changes in POC. Medications reviewed by the pharmacist and Medical Director. Signed by: Lisandra Da Silva RN       [address] [place] [address] IDG  Notes by Alexsander Osuna LMSW at 09/27/21 1142  Version 1 of 1    Author: Alexsander Osuna LMSW Service: -- Author Type: Licensed Masters in Social Work    Filed: 09/27/21 1146 Date of Service: 09/27/21 1142 Status: Signed    : Alexsander Osuna [address] [place] [address] Ave (Licensed Masters in Social Work)       Patient: Selvin Rolon    Date: 09/27/21  Time: 11:42 AM    [facility]  Notes  Pt has changed to routine LOC. SW will notify family and discuss next steps. No changes in the plan of care.  SW will continue to provide ongoing emotional support and assessment of psychosocial and bereavement concerns, as well as needs until discharge. Signed by: Ivy Redmond LMSW       [facility] IDG  Notes by Josse Jackson at 09/27/21 1132  Version 1 of 1    Author: Josse Jackson Service: Spiritual Care Author Type: Pastoral Care    Filed: 09/27/21 1135 Date of Service: 09/27/21 1132 Status: Signed    : Josse Jackson (Pastoral Care)       Patient: Eddie Valles    Date: 09/27/21  Time: 11:32 AM    [facility]  Notes    Intervention: Spiritual Assessment completed. Attempts made to  to communicate with family. Outcome: Patient resting peacefully. Plan: Continue to make an attempt to connect with family. Signed by: Elena Stauffer       [facility] IDG Nurse Notes by Jaime Llanos RN at 09/24/21 1458  Version 1 of 1    Author: Jaime Llanos RN Service: NURSING Author Type: Registered Nurse    Filed: 09/24/21 1458 Date of Service: 09/24/21 1458 Status: Signed    : Jaime Llanos RN (Registered Nurse)       Patient: Eddie Valles    Date: 09/24/21  Time: 2:58 PM    [facility] Nurse Notes  1st IDG: Pt is a 42-year-old Female with metabolic encephalopathy who is here GIP level of care for management of pain and wound care. Linn with UOP of 150ml   IV access: None   PO intake: NPO  Oxygen: Room Air  Wounds: Sacral Stage III  PRN medications: Haldol 2mg x 4 doses for agitation / Morphine 2mg x 6 doses for pain / Ativan 1mg x 1 dose   Scheduled meds:  Fentanyl 12mcg patch Q 72 hours  Plan:  Comprehensive plan of care reviewed. IDG and pt./family in agreement with plan of care. The IDG identifies through on-going assessment when a change is needed to the POC; the pt/family will receive care and services necessitated by changes in POC. Medications reviewed by the pharmacist and Medical Director.         Signed by: Andriy Linn RN       [facility] IDG  Notes by Ivana Marcial at 09/24/21 7256  Version 1 of 1    Author: Ivana Marcial Service: Hospice and Palliative Care Author Type:     Filed: 09/24/21 1247 Date of Service: 09/24/21 1246 Status: Signed    : Lauren Beltran ()       Pt remains under GIP level of care. No changes in the plan of care. SW will continue to provide ongoing emotional support and assessment of psychosocial and bereavement concerns as well as needs until discharge. [address] ID  Notes by Álvaro Chacon at 09/24/21 1232  Version 1 of 1    Author: Álvaro Chacon Service: Hospice and Palliative Care Author Type: Pastoral Care    Filed: 09/24/21 1234 Date of Service: 09/24/21 1232 Status: Signed    : Álvaro Chacon (Pastoral Care)       Patient: Margarita Ruth    Date: 09/24/21  Time: 12:32 PM    [facility]  Notes    Intervention:  Patient admitted on 9/20/21. Initial spiritual and bereavement assessments completed, and appropriate support has been provided by spiritual care staff. Outcome:   Patient/family receptive to spiritual care. Plan:   Continue to provide spiritual/emotional support as needed, requested, or referred. Signed by: Ashleigh [address] [facility]  Notes by Anabella Peter at 09/22/21 2148  Version 1 of 1    Author: Anabella Peter Service: Spiritual Care Author Type: Pastoral Care    Filed: 09/22/21 2148 Date of Service: 09/22/21 2148 Status: Signed    : Anabella Peter (Pastoral Care)       Patient: Margarita Ruth    Date: 09/22/21  Time: 9:48 PM    [facility]  Notes    / Grief Counselor has reviewed  Initial Comprehensive Assessment and plan of care. Bereavement and Spiritual Care Assessments to be completed and plan of care put in place to meet the needs, requests and referrals.         Signed by: Fran Lugo       [address] ID  Notes by Arthur Leija LMSW at 09/21/21 0993  Version 1 of 1    Author: Arthur Leija LMSW Service: -- Author Type: Licensed Masters in Social Work    Filed: 09/21/21 0946 Date of Service: 09/21/21 0945 Status: Signed    : Randa Valdivia LMSW (Licensed Masters in Social Work)       Patient: Jaren Marr    Date: 09/21/21  Time: 9:45 AM    [facility]  Notes  SW has read the initial comprehensive assessment and plan of care. No immediate needs noted. Initial SW assessment visit will be completed within 5 days of admission. Signed by: Cameron Pierce LMSW       [facility] Nurse Notes by Terrance Fitzgerald at 09/20/21 1839  Version 1 of 1    Author: Terrance Fitzgerald Service: NURSING Author Type: Registered Nurse    Filed: 09/20/21 1840 Date of Service: 09/20/21 1839 Status: Signed    : Terrance Fitzgerald (Registered Nurse)       Patient: Jaren Marr    Date: 09/20/21  Time: 6:39 PM    [facility] Nurse Notes  57-year-old female arrived to [facility] via [address] with Dx; Metabolic Encephalopathy. Pt is COVID (+)  from test done 8/4 and 9/19. PPE and Droplet Precautions in place. Pt placed on Air Mattress due to immobility and sacral wound. VSS; Temp 98.6   Patient's LOC is GIP, with Symptom management for pain. Pt is nonverbal, blind in Left eye and has glaucoma in her right eye. Pt unable to move on her own and is Total Care. Pt has stage 3 sacral wound, foul smelling with brownish - green drainage and unattached edges with visible bone, measuring 7cm x5cm x 3 cm. Linn placed shortly after arrival, thick yellow urine returned with sediment, pus and strong foul odor . Pt has  #20 peripheral functioning IV in right hand. Medical History includes, Cervical Spine Stenosis, HTN, CHF, Neuropathic Pain, Dm and Glaucoma. Granddaughter Cassie Hart" is at the bedside, stating \" was up and about, sharp mentally before she got Covid in August then went downhill\". Granddaughter also stating pt lives with her daughter , who is her primary care giver.   She didn't have that sore (stage 3 ) until she went into the hospital in august\"    Admission complete and initial [place] care plans initiated in addition to symptom-management and LDA. IDG team made aware of plan of care and immediate needs. No active signs or symptoms of pain, shortness of breath , anxiety , seizures or nausea/vomiting upon arrival and presently. Comfort and safety measures in place. Signed by: Dafne Roberts Team Present:   Team Members Present: Physician, Nurse, , , Bereavement, Other (Comment)  Physician Team Member: Dr. Jenna Travis  Nurse Team Member: Suad Palomo  Social Work Team Member: Frankey Pates   Team Member: Cynthia Hernandez  Other Discipline Present (Name):  Daryn Thrasher NP

## 2021-10-24 ENCOUNTER — HOSPITAL ENCOUNTER (INPATIENT)
Age: 81
LOS: 3 days | Discharge: NURSING, REHAB OR LONG-TERM CARE | DRG: 682 | End: 2021-10-27
Attending: EMERGENCY MEDICINE | Admitting: FAMILY MEDICINE
Payer: MEDICARE

## 2021-10-24 ENCOUNTER — APPOINTMENT (OUTPATIENT)
Dept: GENERAL RADIOLOGY | Age: 81
DRG: 682 | End: 2021-10-24
Attending: EMERGENCY MEDICINE
Payer: MEDICARE

## 2021-10-24 DIAGNOSIS — I10 HYPERTENSION, UNSPECIFIED TYPE: Primary | ICD-10-CM

## 2021-10-24 DIAGNOSIS — N17.9 ACUTE RENAL FAILURE, UNSPECIFIED ACUTE RENAL FAILURE TYPE (HCC): ICD-10-CM

## 2021-10-24 PROBLEM — R53.81 DEBILITY: Status: ACTIVE | Noted: 2021-08-24

## 2021-10-24 PROBLEM — D64.9 NORMOCYTIC ANEMIA: Status: ACTIVE | Noted: 2021-10-24

## 2021-10-24 PROBLEM — N18.32 ACUTE RENAL FAILURE SUPERIMPOSED ON STAGE 3B CHRONIC KIDNEY DISEASE (HCC): Status: ACTIVE | Noted: 2021-10-24

## 2021-10-24 PROBLEM — Z87.440 HISTORY OF RECURRENT UTIS: Status: ACTIVE | Noted: 2021-08-24

## 2021-10-24 PROBLEM — A41.9 SEVERE SEPSIS (HCC): Status: ACTIVE | Noted: 2021-10-24

## 2021-10-24 PROBLEM — E87.20 LACTIC ACIDOSIS: Status: ACTIVE | Noted: 2021-10-24

## 2021-10-24 PROBLEM — R65.20 SEVERE SEPSIS (HCC): Status: RESOLVED | Noted: 2021-10-24 | Resolved: 2021-10-24

## 2021-10-24 PROBLEM — A41.9 SEVERE SEPSIS (HCC): Status: RESOLVED | Noted: 2021-10-24 | Resolved: 2021-10-24

## 2021-10-24 PROBLEM — R65.20 SEVERE SEPSIS (HCC): Status: ACTIVE | Noted: 2021-10-24

## 2021-10-24 PROBLEM — I95.9 HYPOTENSION: Status: ACTIVE | Noted: 2021-10-24

## 2021-10-24 PROBLEM — Z87.39 HISTORY OF GOUT: Status: ACTIVE | Noted: 2021-08-24

## 2021-10-24 LAB
ALBUMIN SERPL-MCNC: 2.9 G/DL (ref 3.2–4.6)
ALBUMIN/GLOB SERPL: 0.8 {RATIO} (ref 1.2–3.5)
ALP SERPL-CCNC: 55 U/L (ref 50–136)
ALT SERPL-CCNC: 19 U/L (ref 12–65)
ANION GAP SERPL CALC-SCNC: 10 MMOL/L (ref 7–16)
APPEARANCE UR: ABNORMAL
AST SERPL-CCNC: 25 U/L (ref 15–37)
BASOPHILS # BLD: 0.1 K/UL (ref 0–0.2)
BASOPHILS NFR BLD: 1 % (ref 0–2)
BILIRUB SERPL-MCNC: 0.2 MG/DL (ref 0.2–1.1)
BILIRUB UR QL: ABNORMAL
BNP SERPL-MCNC: 636 PG/ML
BUN SERPL-MCNC: 41 MG/DL (ref 8–23)
CALCIUM SERPL-MCNC: 9.6 MG/DL (ref 8.3–10.4)
CHLORIDE SERPL-SCNC: 103 MMOL/L (ref 98–107)
CO2 SERPL-SCNC: 24 MMOL/L (ref 21–32)
COLOR UR: ABNORMAL
CREAT SERPL-MCNC: 4.16 MG/DL (ref 0.6–1)
DIFFERENTIAL METHOD BLD: ABNORMAL
EOSINOPHIL # BLD: 0.5 K/UL (ref 0–0.8)
EOSINOPHIL NFR BLD: 5 % (ref 0.5–7.8)
ERYTHROCYTE [DISTWIDTH] IN BLOOD BY AUTOMATED COUNT: 13.3 % (ref 11.9–14.6)
GLOBULIN SER CALC-MCNC: 3.5 G/DL (ref 2.3–3.5)
GLUCOSE SERPL-MCNC: 179 MG/DL (ref 65–100)
GLUCOSE UR STRIP.AUTO-MCNC: NEGATIVE MG/DL
HCT VFR BLD AUTO: 30.9 % (ref 35.8–46.3)
HGB BLD-MCNC: 9.9 G/DL (ref 11.7–15.4)
HGB UR QL STRIP: NEGATIVE
IMM GRANULOCYTES # BLD AUTO: 0.1 K/UL (ref 0–0.5)
IMM GRANULOCYTES NFR BLD AUTO: 1 % (ref 0–5)
KETONES UR QL STRIP.AUTO: ABNORMAL MG/DL
LACTATE SERPL-SCNC: 0.7 MMOL/L (ref 0.4–2)
LACTATE SERPL-SCNC: 2.7 MMOL/L (ref 0.4–2)
LEUKOCYTE ESTERASE UR QL STRIP.AUTO: NEGATIVE
LIPASE SERPL-CCNC: 85 U/L (ref 73–393)
LYMPHOCYTES # BLD: 1.8 K/UL (ref 0.5–4.6)
LYMPHOCYTES NFR BLD: 16 % (ref 13–44)
MCH RBC QN AUTO: 28 PG (ref 26.1–32.9)
MCHC RBC AUTO-ENTMCNC: 32 G/DL (ref 31.4–35)
MCV RBC AUTO: 87.3 FL (ref 79.6–97.8)
MONOCYTES # BLD: 0.8 K/UL (ref 0.1–1.3)
MONOCYTES NFR BLD: 7 % (ref 4–12)
NEUTS SEG # BLD: 7.6 K/UL (ref 1.7–8.2)
NEUTS SEG NFR BLD: 71 % (ref 43–78)
NITRITE UR QL STRIP.AUTO: NEGATIVE
NRBC # BLD: 0 K/UL (ref 0–0.2)
PH UR STRIP: 5 [PH] (ref 5–9)
PLATELET # BLD AUTO: 274 K/UL (ref 150–450)
PMV BLD AUTO: 10.1 FL (ref 9.4–12.3)
POTASSIUM SERPL-SCNC: 3.8 MMOL/L (ref 3.5–5.1)
PROT SERPL-MCNC: 6.4 G/DL (ref 6.3–8.2)
PROT UR STRIP-MCNC: NEGATIVE MG/DL
RBC # BLD AUTO: 3.54 M/UL (ref 4.05–5.2)
SODIUM SERPL-SCNC: 137 MMOL/L (ref 136–145)
SP GR UR REFRACTOMETRY: 1.02 (ref 1–1.02)
TROPONIN-HIGH SENSITIVITY: 13.5 PG/ML (ref 0–14)
TROPONIN-HIGH SENSITIVITY: 14 PG/ML (ref 0–14)
UROBILINOGEN UR QL STRIP.AUTO: 0.2 EU/DL (ref 0.2–1)
WBC # BLD AUTO: 10.7 K/UL (ref 4.3–11.1)

## 2021-10-24 PROCEDURE — 71045 X-RAY EXAM CHEST 1 VIEW: CPT

## 2021-10-24 PROCEDURE — 83880 ASSAY OF NATRIURETIC PEPTIDE: CPT

## 2021-10-24 PROCEDURE — 74011250636 HC RX REV CODE- 250/636: Performed by: FAMILY MEDICINE

## 2021-10-24 PROCEDURE — 93005 ELECTROCARDIOGRAM TRACING: CPT | Performed by: PHYSICIAN ASSISTANT

## 2021-10-24 PROCEDURE — 84484 ASSAY OF TROPONIN QUANT: CPT

## 2021-10-24 PROCEDURE — 65610000001 HC ROOM ICU GENERAL

## 2021-10-24 PROCEDURE — 74011250636 HC RX REV CODE- 250/636: Performed by: PHYSICIAN ASSISTANT

## 2021-10-24 PROCEDURE — 96361 HYDRATE IV INFUSION ADD-ON: CPT

## 2021-10-24 PROCEDURE — 83605 ASSAY OF LACTIC ACID: CPT

## 2021-10-24 PROCEDURE — 96365 THER/PROPH/DIAG IV INF INIT: CPT

## 2021-10-24 PROCEDURE — 74011000250 HC RX REV CODE- 250: Performed by: FAMILY MEDICINE

## 2021-10-24 PROCEDURE — 74011000258 HC RX REV CODE- 258: Performed by: EMERGENCY MEDICINE

## 2021-10-24 PROCEDURE — 83690 ASSAY OF LIPASE: CPT

## 2021-10-24 PROCEDURE — 74011250636 HC RX REV CODE- 250/636: Performed by: EMERGENCY MEDICINE

## 2021-10-24 PROCEDURE — 87086 URINE CULTURE/COLONY COUNT: CPT

## 2021-10-24 PROCEDURE — 77010033678 HC OXYGEN DAILY

## 2021-10-24 PROCEDURE — 74011000250 HC RX REV CODE- 250

## 2021-10-24 PROCEDURE — 99284 EMERGENCY DEPT VISIT MOD MDM: CPT

## 2021-10-24 PROCEDURE — 80053 COMPREHEN METABOLIC PANEL: CPT

## 2021-10-24 PROCEDURE — 87040 BLOOD CULTURE FOR BACTERIA: CPT

## 2021-10-24 PROCEDURE — 85025 COMPLETE CBC W/AUTO DIFF WBC: CPT

## 2021-10-24 PROCEDURE — 74011250637 HC RX REV CODE- 250/637: Performed by: FAMILY MEDICINE

## 2021-10-24 PROCEDURE — 94760 N-INVAS EAR/PLS OXIMETRY 1: CPT

## 2021-10-24 PROCEDURE — 81003 URINALYSIS AUTO W/O SCOPE: CPT

## 2021-10-24 RX ORDER — SERTRALINE HYDROCHLORIDE 50 MG/1
50 TABLET, FILM COATED ORAL DAILY
Status: DISCONTINUED | OUTPATIENT
Start: 2021-10-25 | End: 2021-10-27 | Stop reason: HOSPADM

## 2021-10-24 RX ORDER — MIDODRINE HYDROCHLORIDE 5 MG/1
5 TABLET ORAL
Status: DISCONTINUED | OUTPATIENT
Start: 2021-10-25 | End: 2021-10-25

## 2021-10-24 RX ORDER — ACETAMINOPHEN 325 MG/1
650 TABLET ORAL
Status: DISCONTINUED | OUTPATIENT
Start: 2021-10-24 | End: 2021-10-27 | Stop reason: HOSPADM

## 2021-10-24 RX ORDER — SODIUM CHLORIDE 0.9 % (FLUSH) 0.9 %
5-40 SYRINGE (ML) INJECTION EVERY 8 HOURS
Status: DISCONTINUED | OUTPATIENT
Start: 2021-10-24 | End: 2021-10-27 | Stop reason: HOSPADM

## 2021-10-24 RX ORDER — MEMANTINE HYDROCHLORIDE 5 MG/1
5 TABLET ORAL EVERY 12 HOURS
Status: DISCONTINUED | OUTPATIENT
Start: 2021-10-24 | End: 2021-10-27 | Stop reason: HOSPADM

## 2021-10-24 RX ORDER — DICYCLOMINE HYDROCHLORIDE 10 MG/1
10 CAPSULE ORAL 3 TIMES DAILY
Status: DISCONTINUED | OUTPATIENT
Start: 2021-10-24 | End: 2021-10-27 | Stop reason: HOSPADM

## 2021-10-24 RX ORDER — ASPIRIN 81 MG/1
81 TABLET ORAL DAILY
Status: DISCONTINUED | OUTPATIENT
Start: 2021-10-25 | End: 2021-10-27 | Stop reason: HOSPADM

## 2021-10-24 RX ORDER — NOREPINEPHRINE BIT/0.9 % NACL 4MG/250ML
PLASTIC BAG, INJECTION (ML) INTRAVENOUS
Status: COMPLETED
Start: 2021-10-24 | End: 2021-10-24

## 2021-10-24 RX ORDER — ONDANSETRON 2 MG/ML
4 INJECTION INTRAMUSCULAR; INTRAVENOUS
Status: DISCONTINUED | OUTPATIENT
Start: 2021-10-24 | End: 2021-10-27 | Stop reason: HOSPADM

## 2021-10-24 RX ORDER — POLYETHYLENE GLYCOL 3350 17 G/17G
17 POWDER, FOR SOLUTION ORAL DAILY PRN
Status: DISCONTINUED | OUTPATIENT
Start: 2021-10-24 | End: 2021-10-27 | Stop reason: HOSPADM

## 2021-10-24 RX ORDER — NOREPINEPHRINE BIT/0.9 % NACL 4MG/250ML
4 PLASTIC BAG, INJECTION (ML) INTRAVENOUS
Status: DISCONTINUED | OUTPATIENT
Start: 2021-10-24 | End: 2021-10-24

## 2021-10-24 RX ORDER — MIDODRINE HYDROCHLORIDE 5 MG/1
5 TABLET ORAL ONCE
Status: COMPLETED | OUTPATIENT
Start: 2021-10-24 | End: 2021-10-24

## 2021-10-24 RX ORDER — TAMOXIFEN CITRATE 10 MG/1
20 TABLET, FILM COATED ORAL DAILY
Status: DISCONTINUED | OUTPATIENT
Start: 2021-10-25 | End: 2021-10-27 | Stop reason: HOSPADM

## 2021-10-24 RX ORDER — MONTELUKAST SODIUM 10 MG/1
10 TABLET ORAL DAILY
Status: DISCONTINUED | OUTPATIENT
Start: 2021-10-25 | End: 2021-10-27 | Stop reason: HOSPADM

## 2021-10-24 RX ORDER — SODIUM CHLORIDE 0.9 % (FLUSH) 0.9 %
5-40 SYRINGE (ML) INJECTION AS NEEDED
Status: DISCONTINUED | OUTPATIENT
Start: 2021-10-24 | End: 2021-10-27 | Stop reason: HOSPADM

## 2021-10-24 RX ORDER — ACETAMINOPHEN 650 MG/1
650 SUPPOSITORY RECTAL
Status: DISCONTINUED | OUTPATIENT
Start: 2021-10-24 | End: 2021-10-27 | Stop reason: HOSPADM

## 2021-10-24 RX ORDER — RISPERIDONE 1 MG/1
1 TABLET, FILM COATED ORAL EVERY 12 HOURS
Status: DISCONTINUED | OUTPATIENT
Start: 2021-10-24 | End: 2021-10-27 | Stop reason: HOSPADM

## 2021-10-24 RX ORDER — NOREPINEPHRINE BIT/0.9 % NACL 4MG/250ML
2 PLASTIC BAG, INJECTION (ML) INTRAVENOUS
Status: DISCONTINUED | OUTPATIENT
Start: 2021-10-24 | End: 2021-10-24

## 2021-10-24 RX ORDER — ONDANSETRON 4 MG/1
4 TABLET, ORALLY DISINTEGRATING ORAL
Status: DISCONTINUED | OUTPATIENT
Start: 2021-10-24 | End: 2021-10-27 | Stop reason: HOSPADM

## 2021-10-24 RX ORDER — PANTOPRAZOLE SODIUM 40 MG/1
40 TABLET, DELAYED RELEASE ORAL
Status: DISCONTINUED | OUTPATIENT
Start: 2021-10-25 | End: 2021-10-27 | Stop reason: HOSPADM

## 2021-10-24 RX ORDER — SODIUM CHLORIDE, SODIUM LACTATE, POTASSIUM CHLORIDE, CALCIUM CHLORIDE 600; 310; 30; 20 MG/100ML; MG/100ML; MG/100ML; MG/100ML
75 INJECTION, SOLUTION INTRAVENOUS CONTINUOUS
Status: DISPENSED | OUTPATIENT
Start: 2021-10-24 | End: 2021-10-27

## 2021-10-24 RX ORDER — NOREPINEPHRINE BIT/0.9 % NACL 4MG/250ML
.5-3 PLASTIC BAG, INJECTION (ML) INTRAVENOUS
Status: DISCONTINUED | OUTPATIENT
Start: 2021-10-24 | End: 2021-10-26

## 2021-10-24 RX ORDER — ALBUTEROL SULFATE 0.83 MG/ML
2.5 SOLUTION RESPIRATORY (INHALATION)
Status: DISCONTINUED | OUTPATIENT
Start: 2021-10-24 | End: 2021-10-27 | Stop reason: HOSPADM

## 2021-10-24 RX ADMIN — Medication 2 MCG/MIN: at 21:13

## 2021-10-24 RX ADMIN — APIXABAN 2.5 MG: 2.5 TABLET, FILM COATED ORAL at 20:38

## 2021-10-24 RX ADMIN — SODIUM CHLORIDE 500 ML: 900 INJECTION, SOLUTION INTRAVENOUS at 18:44

## 2021-10-24 RX ADMIN — MEMANTINE 5 MG: 5 TABLET ORAL at 20:39

## 2021-10-24 RX ADMIN — DICYCLOMINE HYDROCHLORIDE 10 MG: 10 CAPSULE ORAL at 20:39

## 2021-10-24 RX ADMIN — SODIUM CHLORIDE 1000 ML: 900 INJECTION, SOLUTION INTRAVENOUS at 16:26

## 2021-10-24 RX ADMIN — Medication 10 ML: at 20:41

## 2021-10-24 RX ADMIN — SODIUM CHLORIDE 500 ML: 900 INJECTION, SOLUTION INTRAVENOUS at 20:04

## 2021-10-24 RX ADMIN — CEFTRIAXONE 1 G: 1 INJECTION, POWDER, FOR SOLUTION INTRAMUSCULAR; INTRAVENOUS at 18:10

## 2021-10-24 RX ADMIN — SODIUM CHLORIDE, SODIUM LACTATE, POTASSIUM CHLORIDE, AND CALCIUM CHLORIDE 75 ML/HR: 600; 310; 30; 20 INJECTION, SOLUTION INTRAVENOUS at 19:39

## 2021-10-24 RX ADMIN — MIDODRINE HYDROCHLORIDE 5 MG: 5 TABLET ORAL at 18:44

## 2021-10-24 RX ADMIN — RISPERIDONE 1 MG: 1 TABLET ORAL at 20:39

## 2021-10-24 NOTE — ED NOTES
Pt is an 79 yo F HTN, recurrent UTIs who presents with daughter for the c/o hypotension. She is currently being treated for a UTI, daughter noticed that her urine had foul smell to it. She had been on abx since 10/19/21, bactrim day 5/7 days. Today pt appears generally weak and daughter checked her BP at home and was found to be 68/39 mmHg and was advised to bring her to the hospital.     Pt afebrile, BP 84/50 mmHg, otherwise vss. No abd pain on palpation. Patient evaluated initially in triage. Rapid Medical Evaluation was conducted and necessary orders have been placed. I have performed a medical screening exam.  Care will now be transferred to the attending physician in the emergency department.   Dwayne Daniels 4:23 PM

## 2021-10-24 NOTE — H&P
Hospitalist History and Physical   Admit Date:  10/24/2021  4:23 PM   Name:  Daimen Leyva   Age:  80 y.o. Sex:  female  :  1940   MRN:  [id number]   Room:  ER08/    Presenting Complaint: Hypotension    Reason(s) for Admission: [facility] [N17.9]     History of Present Illness:   Damien Leyva is a 80 y.o. female with medical history of breast cancer, recurrent UTI's, PAF on Eliquis, HFpEF, mild dementia who presented with decrease urine output and hypotension of 1 day duration. Pt took her Am meds and her daughter checks her BP and noted it was low. She notified home health RN who instructed for her to come to the ED. Pt has a history of recurrent UTI's and has been taking Macrobid for it. She went to see her PCP on 10/19 for confusion and urinary odor. She was started on Bactrim and home Losartan/Hctz was held. Of note, she was also admitted in August for KATEY and UTI, went to rehab until mid-September. Pt denies SOB, chest pain, abdominal pain, N/V, diarrhea, constipation. In ED, SBP 80-90's, DBP 40's. She was given 1.5L bolus in ED without much improvement. LA 2.7. WBC wnl. CMP with Cr 4.16 (baseline 1.1-1.2). UA negative for UTI but was treated with Bactrim for 5 days previously. Pt was given Rocephin. BCx and UCx obtained. Review of Systems:  10 systems reviewed and negative except as noted in HPI. Assessment & Plan:     KATEY on CKD stage 3  Cr on admission was 4.16 (Cr 1.1-1.2 at baseline). Was recently taken off of home Losartan. Most likely d/t recent UTI and hypotension/hypovolemia contributing  Avoid nephrotoxic meds  Accurate I&O's  Holding home diuretics/ACE-I/ARB  MIVF  Urine studies and CTAP w/o contrast to r/o obstruction    History of recurrent UTI's  Holding home Bactrim and Macrobid in acute setting  Abx:Rocephin (10/24-. ..)empirically for now since hypotensive pending UCx  BCX: pending  Ucx pending    Hypotension, persistent  Most likely d/t hypovolemia and recent UTI and KATEY per above.  Received 1.5L in ED  Will give another 500mL bolus and midodrine in ED  Holding home antihypertensives  Initiate midodrine TID  If does not improve, will initiate Levophed    Lactic acidosis, resolved  Most likely d/t hypotension and KATEY per above    PAF  Cont home Eliquis  Holding home metoprolol d/t hypotension    Nocturnal hypoxemia  Cont 2LO2NC at baseline    HFpEF, chronic  Primary pulm HTN  TTE in 2020 with EF 50-55%  Holding home Bumex  I&O's, daily weights    Cognitive disorder  Dementia/autditory hallucination  Holding home gabapentin d/t KATEY  Cont ome Namenda and Risperidone    Depression  Cont home Sertraline    Hx of HTN  Holding home antihypertensives (norvasc, metoprolol,     GERD  Cont home PPI    Gout  Holding home allopurinol d/t KATEY    Normocytic anemia  Chronic per daughter  Hgb baseline 9-10    SSS  Pacemaker in place  Noted    Hx of breast cancer  Cont home Tamoxifen    Frequent falls  Debility  Limit sedating meds  Fall precautions  PT/OT        Dispo/Discharge Planning:     >2 days    Diet: Easy to chew  VTE ppx: Eliquis  Code status: DNR    Hospital Problems as of 10/24/2021 Date Reviewed: 8/29/2021        Codes Class Noted - Resolved POA    [facility]) [facility] ICD-10-CM: N17.9  ICD-9-CM: 584.9  10/24/2021 - Present Unknown              Past Medical History:   Diagnosis Date    Abdominal pain 9/29/2013    COPD (chronic obstructive pulmonary disease) (HCC)     DJD (degenerative joint disease) 9/29/2013    Esophageal reflux 9/29/2013    HTN (hypertension) 9/29/2013    Hypercholesterolemia     Hypertension     Other and unspecified hyperlipidemia 9/29/2013    Pyelonephritis 9/29/2013    Sensory neuropathy 12/23/2020    UTI (lower urinary tract infection) 9/29/2013     Past Surgical History:   Procedure Laterality Date    HX HYSTERECTOMY      HX PACEMAKER      [place] BREAST SURGERY PROCEDURE UNLISTED  01/07/2019    right breast cancer-lumpectomy    [place] CARDIAC SURG PROCEDURE [place]LIST      pacemaker    [place] CHEST SURGERY PROCEDURE [place]LISTED      [place] LAP,CHOLECYSTECTOMY        Allergies   Allergen Reactions    Advair Diskus [Fluticasone Propion-Salmeterol] Other (comments)     shakes    Aspirin Nausea Only     Pt can take aspirin 81mg Pt c/o upset stomach with higher dose    Codeine Nausea Only    Lipitor [Atorvastatin] Other (comments)     Leg weakness      Social History     Tobacco Use    Smoking status: Former Smoker     Packs/day: 1.00     Years: 22.00     Pack years: 22.00     Types: Cigarettes     Quit date: 1989     Years since quittin.8    Smokeless tobacco: Never Used   Substance Use Topics    Alcohol use: No      Family History   Problem Relation Age of Onset    Heart Disease Mother     Hypertension Mother     Heart Disease Father     Hypertension Father       Family history reviewed and negative except as noted above.     Immunization History   Administered Date(s) Administered    COVID-19, PFIZER, MRNA, LNP-S, PF, 30MCG/0.3ML DOSE 2021, 2021    Influenza High Dose Vaccine PF 10/09/2017, 10/19/2018    Influenza Vaccine 10/01/2012    Influenza Vaccine (Quad) PF (>6 Mo Flulaval, Fluarix, and >3 Yrs Afluria, Fluzone 05166) 2016    Influenza Vaccine (Tri) Adjuvanted (>65 Yrs FLUAD TRI [id number]) 10/28/2019    Influenza, Quadrivalent, Adjuvanted (>65 Yrs FLUAD QUAD [id number]) 09/10/2020, 10/12/2021    Pneumococcal Conjugate (PCV-13) 2018    Pneumococcal Vaccine (Unspecified Type) 2002, 10/01/2007, 2016    TB Skin Test (PPD) Intradermal 2021    Tdap 2017    Zoster Vaccine, Live 2016     PTA Medications:  Current Outpatient Medications   Medication Instructions    albuterol (VENTOLIN HFA) 90 mcg/actuation inhaler 2 Puffs, Inhalation, EVERY 4 HOURS AS NEEDED    allopurinoL (ZYLOPRIM) 100 mg tablet TAKE 1 TABLET BY MOUTH EVERY DAY    amLODIPine (NORVASC) 5 mg, Oral, DAILY    apixaban (ELIQUIS) 5 mg, Oral, 2 TIMES DAILY    aspirin delayed-release 81 mg, Oral, DAILY    bumetanide (BUMEX) 1 mg, Oral, 5 X WEEK    clorazepate (TRANXENE) 7.5 mg tablet TAKE 1/2 TABLET BY MOUTH NIGHTLY    diclofenac (VOLTAREN) 1 % gel Topical, 4 TIMES DAILY    dicyclomine (BENTYL) 10 mg capsule TAKE 1 CAPSULE BY MOUTH 3 TIMES A DAY    DISABLED PLACARD (DISABLED PLACARD) DMV As directed    famotidine (PEPCID) 40 mg tablet TAKE 1 TABLET BY MOUTH EVERY DAY IN THE EVENING    gabapentin (NEURONTIN) 400 mg, Oral, 3 TIMES DAILY    hydrOXYzine HCL (ATARAX) 25 mg tablet TAKE 1 TO 2 TABLETS BY MOUTH 4 TIMES A DAY AS NEEDED FOR ITCHING    lansoprazole (PREVACID) 30 mg, Oral, 2 TIMES DAILY    memantine (NAMENDA) 5 mg, Oral, 2 TIMES DAILY    metoprolol tartrate (LOPRESSOR) 25 mg tablet TAKE 1 TABLET BY MOUTH TWICE A DAY    montelukast (SINGULAIR) 10 mg, Oral, DAILY    nitrofurantoin, macrocrystal-monohydrate, (MACROBID) 100 mg capsule 100 mg, Oral, 2 TIMES DAILY    Oxygen 2 liters at night only     risperiDONE (RISPERDAL) 1 mg, Oral, 2 TIMES DAILY    sertraline (ZOLOFT) 50 mg tablet TAKE 1 TABLET BY MOUTH EVERY DAY    tamoxifen (NOLVADEX) 20 mg tablet TAKE 1 TABLET BY MOUTH EVERY DAY    trimethoprim-sulfamethoxazole (BACTRIM DS, SEPTRA DS) 160-800 mg per tablet 1 Tablet, Oral, 2 TIMES DAILY       Objective:     Patient Vitals for the past 24 hrs:   Temp Pulse Resp BP SpO2   10/24/21 1617 98.5 °F (36.9 °C) 70 18 (!) 84/50 96 %     Oxygen Therapy  O2 Sat (%): 96 % (10/24/21 1617)  O2 Device: None (Room air) (10/24/21 1700)    Estimated body mass index is 29.26 kg/m² as calculated from the following:    Height as of this encounter: 5' 2\" (1.575 m). Weight as of this encounter: 72.6 kg (160 lb).     Intake/Output Summary (Last 24 hours) at 10/24/2021 1834  Last data filed at 10/24/2021 1756  Gross per 24 hour   Intake 1000 ml   Output    Net 1000 ml         Physical Exam:    Blood pressure (!) 84/50, pulse 70, temperature 98.5 °F (36.9 °C), resp. rate 18, height 5' 2\" (1.575 m), weight 72.6 kg (160 lb), SpO2 96 %. General:    No overt distress. Ill appearing. Alert and oriented x3, follows commands  Head:  Normocephalic, atraumatic  Eyes:  Sclerae appear normal.  Pupils equally round. ENT:  Nares appear normal, no drainage. Moist oral mucosa  Neck:  No restricted ROM. Trachea midline   CV:   RRR. No m/r/g. No jugular venous distension. Lungs:   CTAB. No wheezing, rhonchi, or rales. Respirations even, unlabored  Abdomen: Bowel sounds present. Soft, nontender, nondistended. Extremities: No cyanosis or clubbing. No edema  Skin:     No rashes and normal coloration. Warm and dry. Neuro:  CN II-XII grossly intact. Sensation intact. A&Ox3  Psych:  Normal mood and affect. I have reviewed ordered lab tests and independently visualized imaging below:    Last 24hr Labs:  Recent Results (from the past 24 hour(s))   CBC WITH AUTOMATED DIFF    Collection Time: 10/24/21  4:42 PM   Result Value Ref Range    WBC 10.7 4.3 - 11.1 K/uL    RBC 3.54 (L) 4.05 - 5.2 M/uL    HGB 9.9 (L) 11.7 - 15.4 g/dL    HCT 30.9 (L) 35.8 - 46.3 %    MCV 87.3 79.6 - 97.8 FL    MCH 28.0 26.1 - 32.9 PG    MCHC 32.0 31.4 - 35.0 g/dL    RDW 13.3 11.9 - 14.6 %    PLATELET 572 236 - 412 K/uL    MPV 10.1 9.4 - 12.3 FL    ABSOLUTE NRBC 0.00 0.0 - 0.2 K/uL    DF AUTOMATED      NEUTROPHILS 71 43 - 78 %    LYMPHOCYTES 16 13 - 44 %    MONOCYTES 7 4.0 - 12.0 %    EOSINOPHILS 5 0.5 - 7.8 %    BASOPHILS 1 0.0 - 2.0 %    IMMATURE GRANULOCYTES 1 0.0 - 5.0 %    ABS. NEUTROPHILS 7.6 1.7 - 8.2 K/UL    ABS. LYMPHOCYTES 1.8 0.5 - 4.6 K/UL    ABS. MONOCYTES 0.8 0.1 - 1.3 K/UL    ABS. EOSINOPHILS 0.5 0.0 - 0.8 K/UL    ABS. BASOPHILS 0.1 0.0 - 0.2 K/UL    ABS. IMM.  GRANS. 0.1 0.0 - 0.5 K/UL   METABOLIC PANEL, COMPREHENSIVE    Collection Time: 10/24/21  4:42 PM   Result Value Ref Range    Sodium 137 136 - 145 mmol/L Potassium 3.8 3.5 - 5.1 mmol/L    Chloride 103 98 - 107 mmol/L    CO2 24 21 - 32 mmol/L    Anion gap 10 7 - 16 mmol/L    Glucose 179 (H) 65 - 100 mg/dL    BUN 41 (H) 8 - 23 MG/DL    Creatinine 4.16 (H) 0.6 - 1.0 MG/DL    GFR est AA 13 (L) >60 ml/min/1.73m2    GFR est non-AA 11 (L) >60 ml/min/1.73m2    Calcium 9.6 8.3 - 10.4 MG/DL    Bilirubin, total 0.2 0.2 - 1.1 MG/DL    ALT (SGPT) 19 12 - 65 U/L    AST (SGOT) 25 15 - 37 U/L    Alk.  phosphatase 55 50 - 136 U/L    Protein, total 6.4 6.3 - 8.2 g/dL    Albumin 2.9 (L) 3.2 - 4.6 g/dL    Globulin 3.5 2.3 - 3.5 g/dL    A-G Ratio 0.8 (L) 1.2 - 3.5     LACTIC ACID    Collection Time: 10/24/21  4:42 PM   Result Value Ref Range    Lactic acid 2.7 (H) 0.4 - 2.0 MMOL/L   LIPASE    Collection Time: 10/24/21  4:42 PM   Result Value Ref Range    Lipase 85 73 - 393 U/L   NT-PRO BNP    Collection Time: 10/24/21  4:42 PM   Result Value Ref Range    NT pro- (H) <450 PG/ML   TROPONIN-HIGH SENSITIVITY    Collection Time: 10/24/21  4:42 PM   Result Value Ref Range    Troponin-High Sensitivity 14.0 0 - 14 pg/mL   URINALYSIS W/ RFLX MICROSCOPIC    Collection Time: 10/24/21  6:02 PM   Result Value Ref Range    Color CHANEL      Appearance CLOUDY      Specific gravity 1.023 1.001 - 1.023      pH (UA) 5.0 5.0 - 9.0      Protein Negative NEG mg/dL    Glucose Negative mg/dL    Ketone TRACE (A) NEG mg/dL    Bilirubin MODERATE (A) NEG      Blood Negative NEG      Urobilinogen 0.2 0.2 - 1.0 EU/dL    Nitrites Negative NEG      Leukocyte Esterase Negative NEG         All Micro Results     Procedure Component Value Units Date/Time    CULTURE, URINE [[id number]] Collected: 10/24/21 0366    Order Status: Completed Specimen: Clean catch Updated: 10/24/21 1811    CULTURE, BLOOD [[id number]] Collected: 10/24/21 1642    Order Status: Completed Specimen: Blood Updated: 10/24/21 1733    CULTURE, BLOOD [[id number]] Collected: 10/24/21 1642    Order Status: Completed Specimen: Blood Updated: 10/24/21 1733          Other Studies:  XR CHEST PORT    Result Date: 10/24/2021  CHEST X-RAY, single portable view  10/24/2021 History: Hypotension. Technique: Single frontal view of the chest. Comparison: Chest x-ray 8/18/2021 Findings: A grossly stable left-sided intracardiac device is seen. The heart is moderately enlarged although stable. No pneumothorax is seen. No evolving consolidation, or significant pleural effusion is seen. 1.  Grossly stable cardiomegaly without evolving acute changes suggested by plain film. This report was made using voice transcription. Despite my best efforts to avoid any, transcription errors may persist. If there is any question about the accuracy of the report or need for clarification, then please call [phone], or text me through [employer] for clarification or correction. Medications Administered     cefTRIAXone (ROCEPHIN) 1 g in 0.9% sodium chloride (MBP/ADV) 50 mL MBP     Admin Date  10/24/2021 Action  New Bag Dose  1 g Rate  100 mL/hr Route  IntraVENous Administered By  Sina Ferrer RN          sodium chloride 0.9 % bolus infusion 1,000 mL     Admin Date  10/24/2021 Action  New Bag Dose  1,000 mL Route  IntraVENous Administered By  Sina Ferrer RN                  Signed: Jess Fernandez DO    Part of this note may have been written by using a voice dictation software. The note has been proof read but may still contain some grammatical/other typographical errors.

## 2021-10-24 NOTE — ACP (ADVANCE CARE PLANNING)
[facility]  At the heart of better care     Advance Care Planning   Admit Date:  10/24/2021  4:23 PM   Name:  Miranda Rubin   Age:  80 y.o. Sex:  female  :  1940   MRN:  [id number]   Room:  [address] [place] [address] Rd is able to make her own decisions: Yes    POA/surrogate decision maker if patient is altered:  Daughter Mina Stinson        Patient / surrogate decision-maker directed:  Code Status: DO NOT RESUSCITATE, DO NOT INTUBATE -okay for other aggressive medical and surgical intervention. Time spent: 18 minutes in direct discussion (face to face and/or over phone). Signed:   Kimberly Boucher DO

## 2021-10-24 NOTE — PROGRESS NOTES
TRANSFER - IN REPORT:    Verbal report received from AILEEN Nichols(name) on Livia  being received from ED(unit) for routine progression of care      Report consisted of patients Situation, Background, Assessment and   Recommendations(SBAR). Information from the following report(s) was reviewed with the receiving nurse. Opportunity for questions and clarification was provided. Assessment completed upon patients arrival to unit and care assumed.

## 2021-10-24 NOTE — ED PROVIDER NOTES
Patient with history of hypertension. Has a pacemaker in place. No stents in her heart. Has chronic UTIs currently on antibiotic for UTI. Blood pressure was noted to be low at the PCPs office on Tuesday. Also had a UTI at that time. Stopped her lisinopril and started antibiotics. Patient's blood pressure has still been low with systolic in the 19S and 11B at home today prior to arrival.  Patient is fatigued and weak. The history is provided by the patient. No  was used. Hypotension   This is a new problem. The current episode started more than 2 days ago. The problem has been gradually worsening. Associated symptoms include weakness. Pertinent negatives include no confusion, no unresponsiveness and no numbness. Mental status baseline is normal.  Risk factors include a recent infection. Her past medical history is significant for hypertension.         Past Medical History:   Diagnosis Date    Abdominal pain 9/29/2013    COPD (chronic obstructive pulmonary disease) (HCC)     DJD (degenerative joint disease) 9/29/2013    Esophageal reflux 9/29/2013    HTN (hypertension) 9/29/2013    Hypercholesterolemia     Hypertension     Other and unspecified hyperlipidemia 9/29/2013    Pyelonephritis 9/29/2013    Sensory neuropathy 12/23/2020    UTI (lower urinary tract infection) 9/29/2013       Past Surgical History:   Procedure Laterality Date    HX HYSTERECTOMY      HX PACEMAKER      TX BREAST SURGERY PROCEDURE UNLISTED  01/07/2019    right breast cancer-lumpectomy    TX CARDIAC SURG PROCEDURE UNLIST  2/05 8/2015    pacemaker    TX CHEST SURGERY PROCEDURE UNLISTED      TX LAP,CHOLECYSTECTOMY           Family History:   Problem Relation Age of Onset    Heart Disease Mother     Hypertension Mother     Heart Disease Father     Hypertension Father        Social History     Socioeconomic History    Marital status:      Spouse name: Not on file    Number of children: Not on file    Years of education: Not on file    Highest education level: Not on file   Occupational History    Not on file   Tobacco Use    Smoking status: Former Smoker     Packs/day: 1.00     Years: 22.00     Pack years: 22.00     Types: Cigarettes     Quit date: 1989     Years since quittin.8    Smokeless tobacco: Never Used   Substance and Sexual Activity    Alcohol use: No    Drug use: No    Sexual activity: Never   Other Topics Concern    Not on file   Social History Narrative    Not on file     Social Determinants of Health     Financial Resource Strain:     Difficulty of Paying Living Expenses:    Food Insecurity:     Worried About Running Out of Food in the Last Year:     [address] in the Last Year:    Transportation Needs:     Lack of Transportation (Medical):  Lack of Transportation (Non-Medical):    Physical Activity:     Days of Exercise per Week:     Minutes of Exercise per Session:    Stress:     Feeling of Stress :    Social Connections:     Frequency of Communication with Friends and Family:     Frequency of Social Gatherings with Friends and Family:     Attends [employer] Services:     Active Member of Clubs or Organizations:     Attends Club or Organization Meetings:     Marital Status:    Intimate Partner Violence:     Fear of Current or Ex-Partner:     Emotionally Abused:     Physically Abused:     Sexually Abused: ALLERGIES: Advair diskus [fluticasone propion-salmeterol], Aspirin, Codeine, and Lipitor [atorvastatin]    Review of Systems   Constitutional: Positive for fatigue. Negative for chills and fever. HENT: Negative for rhinorrhea and sore throat. Eyes: Negative for pain and redness. Respiratory: Negative for chest tightness, shortness of breath and wheezing. Cardiovascular: Positive for leg swelling. Negative for chest pain. Gastrointestinal: Negative for abdominal pain, diarrhea, nausea and vomiting.    Genitourinary: Negative for dysuria and hematuria. Musculoskeletal: Negative for back pain, gait problem, neck pain and neck stiffness. Skin: Negative for color change and rash. Neurological: Positive for weakness. Negative for numbness and headaches. Psychiatric/Behavioral: Negative for confusion. Vitals:    10/24/21 1617   BP: (!) 84/50   Pulse: 70   Resp: 18   Temp: 98.5 °F (36.9 °C)   SpO2: 96%   Weight: 72.6 kg (160 lb)   Height: 5' 2\" (1.575 m)            Physical Exam  Constitutional:       Appearance: Normal appearance. She is well-developed. HENT:      Head: Normocephalic and atraumatic. Cardiovascular:      Rate and Rhythm: Normal rate and regular rhythm. Pulmonary:      Effort: Pulmonary effort is normal. No respiratory distress. Breath sounds: Normal breath sounds. No wheezing. Abdominal:      General: Bowel sounds are normal.      Palpations: Abdomen is soft. Tenderness: There is no abdominal tenderness. Musculoskeletal:         General: Normal range of motion. Cervical back: Normal range of motion and neck supple. Right lower leg: Edema present. Left lower leg: Edema present. Skin:     General: Skin is warm and dry. Neurological:      General: No focal deficit present. Mental Status: She is alert and oriented to person, place, and time. MDM  Number of Diagnoses or Management Options  Diagnosis management comments: Patient with hypertension and acute renal failure creatinine 4. Currently being treated for UTI with outpatient antibiotics. Will admit for fluids and antibiotics and further monitoring and treatment. Amount and/or Complexity of Data Reviewed  Clinical lab tests: ordered and reviewed  Tests in the radiology section of CPT®: ordered and reviewed  Tests in the medicine section of CPT®: ordered and reviewed    Patient Progress  Patient progress: stable         Procedures          EKG: nonspecific ST and T waves changes.  Pace rhythm rate 70.           XR CHEST PORT (Final result)  Result time 10/24/21 17:24:09  Final result by Jackie Coleman MD (10/24/21 17:24:09)                Impression:    1.  Grossly stable cardiomegaly without evolving acute changes suggested by   plain film. This report was made using voice transcription. Despite my best efforts to avoid   any, transcription errors may persist. If there is any question about the   accuracy of the report or need for clarification, then please call [phone], or text me through [employer] for clarification or correction. Narrative:    CHEST X-RAY, single portable view  10/24/2021     History: Hypotension. Technique: Single frontal view of the chest.     Comparison: Chest x-ray 8/18/2021     Findings:   A grossly stable left-sided intracardiac device is seen. The heart is moderately   enlarged although stable. No pneumothorax is seen. No evolving consolidation, or   significant pleural effusion is seen.                   Results Include:    Recent Results (from the past 24 hour(s))   CBC WITH AUTOMATED DIFF    Collection Time: 10/24/21  4:42 PM   Result Value Ref Range    WBC 10.7 4.3 - 11.1 K/uL    RBC 3.54 (L) 4.05 - 5.2 M/uL    HGB 9.9 (L) 11.7 - 15.4 g/dL    HCT 30.9 (L) 35.8 - 46.3 %    MCV 87.3 79.6 - 97.8 FL    MCH 28.0 26.1 - 32.9 PG    MCHC 32.0 31.4 - 35.0 g/dL    RDW 13.3 11.9 - 14.6 %    PLATELET 987 186 - 695 K/uL    MPV 10.1 9.4 - 12.3 FL    ABSOLUTE NRBC 0.00 0.0 - 0.2 K/uL    DF AUTOMATED      NEUTROPHILS 71 43 - 78 %    LYMPHOCYTES 16 13 - 44 %    MONOCYTES 7 4.0 - 12.0 %    EOSINOPHILS 5 0.5 - 7.8 %    BASOPHILS 1 0.0 - 2.0 %    IMMATURE GRANULOCYTES 1 0.0 - 5.0 %    ABS. NEUTROPHILS 7.6 1.7 - 8.2 K/UL    ABS. LYMPHOCYTES 1.8 0.5 - 4.6 K/UL    ABS. MONOCYTES 0.8 0.1 - 1.3 K/UL    ABS. EOSINOPHILS 0.5 0.0 - 0.8 K/UL    ABS. BASOPHILS 0.1 0.0 - 0.2 K/UL    ABS. IMM.  GRANS. 0.1 0.0 - 0.5 K/UL   METABOLIC PANEL, COMPREHENSIVE    Collection Time: 10/24/21  4:42 PM   Result Value Ref Range    Sodium 137 136 - 145 mmol/L    Potassium 3.8 3.5 - 5.1 mmol/L    Chloride 103 98 - 107 mmol/L    CO2 24 21 - 32 mmol/L    Anion gap 10 7 - 16 mmol/L    Glucose 179 (H) 65 - 100 mg/dL    BUN 41 (H) 8 - 23 MG/DL    Creatinine 4.16 (H) 0.6 - 1.0 MG/DL    GFR est AA 13 (L) >60 ml/min/1.73m2    GFR est non-AA 11 (L) >60 ml/min/1.73m2    Calcium 9.6 8.3 - 10.4 MG/DL    Bilirubin, total 0.2 0.2 - 1.1 MG/DL    ALT (SGPT) 19 12 - 65 U/L    AST (SGOT) 25 15 - 37 U/L    Alk.  phosphatase 55 50 - 136 U/L    Protein, total 6.4 6.3 - 8.2 g/dL    Albumin 2.9 (L) 3.2 - 4.6 g/dL    Globulin 3.5 2.3 - 3.5 g/dL    A-G Ratio 0.8 (L) 1.2 - 3.5     LACTIC ACID    Collection Time: 10/24/21  4:42 PM   Result Value Ref Range    Lactic acid 2.7 (H) 0.4 - 2.0 MMOL/L   LIPASE    Collection Time: 10/24/21  4:42 PM   Result Value Ref Range    Lipase 85 73 - 393 U/L   NT-PRO BNP    Collection Time: 10/24/21  4:42 PM   Result Value Ref Range    NT pro- (H) <450 PG/ML   TROPONIN-HIGH SENSITIVITY    Collection Time: 10/24/21  4:42 PM   Result Value Ref Range    Troponin-High Sensitivity 14.0 0 - 14 pg/mL

## 2021-10-25 ENCOUNTER — APPOINTMENT (OUTPATIENT)
Dept: CT IMAGING | Age: 81
DRG: 682 | End: 2021-10-25
Attending: FAMILY MEDICINE
Payer: MEDICARE

## 2021-10-25 LAB
ANION GAP SERPL CALC-SCNC: 7 MMOL/L (ref 7–16)
ATRIAL RATE: 70 BPM
BUN SERPL-MCNC: 34 MG/DL (ref 8–23)
CALCIUM SERPL-MCNC: 8.7 MG/DL (ref 8.3–10.4)
CALCULATED R AXIS, ECG10: 5 DEGREES
CALCULATED T AXIS, ECG11: 20 DEGREES
CHLORIDE SERPL-SCNC: 110 MMOL/L (ref 98–107)
CO2 SERPL-SCNC: 24 MMOL/L (ref 21–32)
CREAT SERPL-MCNC: 2.97 MG/DL (ref 0.6–1)
DIAGNOSIS, 93000: NORMAL
ERYTHROCYTE [DISTWIDTH] IN BLOOD BY AUTOMATED COUNT: 13.2 % (ref 11.9–14.6)
GLUCOSE SERPL-MCNC: 96 MG/DL (ref 65–100)
HCT VFR BLD AUTO: 29.3 % (ref 35.8–46.3)
HGB BLD-MCNC: 9.3 G/DL (ref 11.7–15.4)
MCH RBC QN AUTO: 27.5 PG (ref 26.1–32.9)
MCHC RBC AUTO-ENTMCNC: 31.7 G/DL (ref 31.4–35)
MCV RBC AUTO: 86.7 FL (ref 79.6–97.8)
NRBC # BLD: 0 K/UL (ref 0–0.2)
P-R INTERVAL, ECG05: 248 MS
PLATELET # BLD AUTO: 239 K/UL (ref 150–450)
PMV BLD AUTO: 9.5 FL (ref 9.4–12.3)
POTASSIUM SERPL-SCNC: 3.6 MMOL/L (ref 3.5–5.1)
Q-T INTERVAL, ECG07: 454 MS
QRS DURATION, ECG06: 92 MS
QTC CALCULATION (BEZET), ECG08: 490 MS
RBC # BLD AUTO: 3.38 M/UL (ref 4.05–5.2)
SODIUM SERPL-SCNC: 141 MMOL/L (ref 136–145)
VENTRICULAR RATE, ECG03: 70 BPM
WBC # BLD AUTO: 7.1 K/UL (ref 4.3–11.1)

## 2021-10-25 PROCEDURE — 74011000258 HC RX REV CODE- 258: Performed by: FAMILY MEDICINE

## 2021-10-25 PROCEDURE — 80048 BASIC METABOLIC PNL TOTAL CA: CPT

## 2021-10-25 PROCEDURE — 74011250636 HC RX REV CODE- 250/636: Performed by: FAMILY MEDICINE

## 2021-10-25 PROCEDURE — 74176 CT ABD & PELVIS W/O CONTRAST: CPT

## 2021-10-25 PROCEDURE — 97530 THERAPEUTIC ACTIVITIES: CPT

## 2021-10-25 PROCEDURE — 97535 SELF CARE MNGMENT TRAINING: CPT

## 2021-10-25 PROCEDURE — 36415 COLL VENOUS BLD VENIPUNCTURE: CPT

## 2021-10-25 PROCEDURE — 97162 PT EVAL MOD COMPLEX 30 MIN: CPT

## 2021-10-25 PROCEDURE — 85027 COMPLETE CBC AUTOMATED: CPT

## 2021-10-25 PROCEDURE — 65610000001 HC ROOM ICU GENERAL

## 2021-10-25 PROCEDURE — 74011250637 HC RX REV CODE- 250/637: Performed by: FAMILY MEDICINE

## 2021-10-25 PROCEDURE — 97166 OT EVAL MOD COMPLEX 45 MIN: CPT

## 2021-10-25 RX ORDER — MIDODRINE HYDROCHLORIDE 5 MG/1
10 TABLET ORAL
Status: DISCONTINUED | OUTPATIENT
Start: 2021-10-25 | End: 2021-10-26

## 2021-10-25 RX ADMIN — SERTRALINE 50 MG: 50 TABLET, FILM COATED ORAL at 09:21

## 2021-10-25 RX ADMIN — DICYCLOMINE HYDROCHLORIDE 10 MG: 10 CAPSULE ORAL at 21:18

## 2021-10-25 RX ADMIN — APIXABAN 2.5 MG: 2.5 TABLET, FILM COATED ORAL at 09:21

## 2021-10-25 RX ADMIN — RISPERIDONE 1 MG: 1 TABLET ORAL at 09:21

## 2021-10-25 RX ADMIN — DICYCLOMINE HYDROCHLORIDE 10 MG: 10 CAPSULE ORAL at 09:21

## 2021-10-25 RX ADMIN — Medication 10 ML: at 21:20

## 2021-10-25 RX ADMIN — RISPERIDONE 1 MG: 1 TABLET ORAL at 21:18

## 2021-10-25 RX ADMIN — ASPIRIN 81 MG: 81 TABLET, COATED ORAL at 09:21

## 2021-10-25 RX ADMIN — CEFTRIAXONE 1 G: 1 INJECTION, POWDER, FOR SOLUTION INTRAMUSCULAR; INTRAVENOUS at 16:01

## 2021-10-25 RX ADMIN — PANTOPRAZOLE SODIUM 40 MG: 40 TABLET, DELAYED RELEASE ORAL at 07:50

## 2021-10-25 RX ADMIN — TAMOXIFEN CITRATE 20 MG: 10 TABLET, FILM COATED ORAL at 09:21

## 2021-10-25 RX ADMIN — Medication 10 ML: at 05:11

## 2021-10-25 RX ADMIN — MIDODRINE HYDROCHLORIDE 10 MG: 5 TABLET ORAL at 16:00

## 2021-10-25 RX ADMIN — APIXABAN 2.5 MG: 2.5 TABLET, FILM COATED ORAL at 21:18

## 2021-10-25 RX ADMIN — MEMANTINE 5 MG: 5 TABLET ORAL at 09:21

## 2021-10-25 RX ADMIN — MEMANTINE 5 MG: 5 TABLET ORAL at 21:18

## 2021-10-25 RX ADMIN — MONTELUKAST 10 MG: 10 TABLET, FILM COATED ORAL at 09:21

## 2021-10-25 RX ADMIN — DICYCLOMINE HYDROCHLORIDE 10 MG: 10 CAPSULE ORAL at 16:00

## 2021-10-25 RX ADMIN — Medication 10 ML: at 14:00

## 2021-10-25 RX ADMIN — SODIUM CHLORIDE, SODIUM LACTATE, POTASSIUM CHLORIDE, AND CALCIUM CHLORIDE 75 ML/HR: 600; 310; 30; 20 INJECTION, SOLUTION INTRAVENOUS at 09:25

## 2021-10-25 RX ADMIN — MIDODRINE HYDROCHLORIDE 10 MG: 5 TABLET ORAL at 07:50

## 2021-10-25 NOTE — PROGRESS NOTES
Patient BP 80's/50's with MAP below 65. Contacted Dr. Carmen Mckeon who ordered NOREPINephrine (LEVOPHED) 4 mg/250 mL (16 mcg/mL) in NS infusion. Infusion started @ 2 mcg/min. Will continue to monitor and adjust as needed.

## 2021-10-25 NOTE — PROGRESS NOTES
Problem: Self Care Deficits Care Plan (Adult)  Goal: *Acute Goals and Plan of Care (Insert Text)  Outcome: Progressing Towards Goal  Note:   Patient will complete toileting with contact guard assist to increase self care independence. Patient will complete bathing with contact guard assist to increase self care independence. Patient will tolerate 40 minutes of OT treatment with self incorporated rest breaks to increase activity tolerance to enhance participation in hobbies. Patient will complete all functional transfers with contact guard assist using adaptive equipment as needed. Patient will complete UE exercises with stand by assist to increase overall activity tolerance and strength. Timeframe: 7 visits        OCCUPATIONAL THERAPY: Initial Assessment and Daily Note 10/25/2021  INPATIENT: OT Visit Days: 1  Payor: [employer]  / Plan: [place] [employer] MEDICARE CHOICE PPO/PFFS / Product Type: Managed Care Medicare /      NAME/AGE/GENDER: Landry Koyanagi is a 80 y.o. female   PRIMARY DIAGNOSIS:  Acute renal failure (ARF) ([facility] [address] [N17.9] Acute renal failure superimposed on stage 3b chronic kidney disease ([facility] [address] Acute renal failure superimposed on stage 3b chronic kidney disease ([facility] [address]        ICD-10: Treatment Diagnosis:    Generalized Muscle Weakness (M62.81)  Other lack of cordination (R27.8)  Difficulty in walking, Not elsewhere classified (R26.2)   Precautions/Allergies:     Advair diskus [fluticasone propion-salmeterol], Aspirin, Codeine, and Lipitor [atorvastatin]      ASSESSMENT:     Ms. Nava Wilson presents with dx above. She was here 8/19 - 8/24/2021 and went to a [facility] after for multiple weeks per patient. Questionable historian. Lives alone, has supportive dtr. Patient is too confused this date to be living alone, unsure how far from baseline her cognition is.  She does have deficits in self cares and transfers which would make 24/7 supervision appropriate at d/c.   ST vs LT care at [facility], memory care, or 24/7 supervision. Initiate OT in hospital.     This section established at most recent assessment   PROBLEM LIST (Impairments causing functional limitations):  Decreased Strength  Decreased ADL/Functional Activities  Decreased Transfer Abilities   INTERVENTIONS PLANNED: (Benefits and precautions of occupational therapy have been discussed with the patient.)  Activities of daily living training  Neuromuscular re-eduation  Therapeutic activity  Therapeutic exercise     TREATMENT PLAN: Frequency/Duration: Follow patient 3x a week to address above goals. Rehabilitation Potential For Stated Goals: Good     REHAB RECOMMENDATIONS (at time of discharge pending progress):    Placement: It is my opinion, based on this patient's performance to date, that Ms. Marv Carlson may benefit from participating in 1-2 additional therapy sessions in order to continue to assess for rehab potential and then make recommendation for disposition at discharge. Equipment:   None at this time              [facility]:   History of Present Injury/Illness (Reason for Referral):  See H&P  Past Medical History/Comorbidities:   Ms. Marv Carlson  has a past medical history of Abdominal pain (9/29/2013), COPD (chronic obstructive pulmonary disease) ([facility] [address] DJD (degenerative joint disease) (9/29/2013), Esophageal reflux (9/29/2013), HTN (hypertension) (9/29/2013), Hypercholesterolemia, Hypertension, Other and unspecified hyperlipidemia (9/29/2013), Pyelonephritis (9/29/2013), Sensory neuropathy (12/23/2020), and UTI (lower urinary tract infection) (9/29/2013). Ms. Marv Carlson  has a past surgical history that includes hx pacemaker; pr chest surgery procedure unlisted; pr lap,cholecystectomy; hx hysterectomy; pr cardiac surg procedure unlist (2/05 8/2015); and pr breast surgery procedure unlisted (01/07/2019).   Social History/Living Environment:   Home Environment: Apartment  # Steps to Enter: 1  One/Two Story Residence: One story  Living Alone: Yes  Support Systems: Child(viktor)  Patient Expects to be Discharged to[de-identified] Apartment  Current DME Used/Available at Home: jose luis Diaz  Prior Level of Function/Work/Activity:  Lives alone. Recent SNF. Mostly independent ADL's prior but has some base dementia. Number of Personal Factors/Comorbidities that affect the Plan of Care: Expanded review of therapy/medical records (1-2):  MODERATE COMPLEXITY   ASSESSMENT OF OCCUPATIONAL PERFORMANCE[de-identified]   Activities of Daily Living:   Basic ADLs (From Assessment) Complex ADLs (From Assessment)   Feeding: Setup  Oral Facial Hygiene/Grooming: Stand-by assistance  Bathing: Minimum assistance  Upper Body Dressing: Minimum assistance  Lower Body Dressing: Maximum assistance  Toileting: Total assistance     Grooming/Bathing/Dressing Activities of Daily Living                       Functional Transfers  Toilet Transfer : Moderate assistance  Shower Transfer: Moderate assistance     Bed/Mat Mobility  Rolling: Moderate assistance  Supine to Sit: Moderate assistance  Sit to Stand: Contact guard assistance;Assist x2  Stand to Sit: Contact guard assistance;Assist x2  Bed to Chair: Moderate assistance  Scooting: Maximum assistance     Most Recent Physical Functioning:   Gross Assessment:                  Posture:     Balance:  Sitting: Impaired (posterior and R sided lean)  Sitting - Static: Fair (occasional)  Standing: With support;Pull to stand Bed Mobility:  Rolling:  Moderate assistance  Supine to Sit: Moderate assistance  Scooting: Maximum assistance  Wheelchair Mobility:     Transfers:  Sit to Stand: Contact guard assistance;Assist x2  Stand to Sit: Contact guard assistance;Assist x2  Bed to Chair: Moderate assistance            Patient Vitals for the past 6 hrs:   BP BP Patient Position SpO2 O2 Flow Rate (L/min) Pulse   10/25/21 0545 (!) 102/47 -- 98 % -- 67   10/25/21 0600 (!) 105/46 -- 98 % -- 69   10/25/21 0615 (!) 110/50 -- 97 % -- 69   10/25/21 0630 (!) 113/58 -- 98 % -- 69   10/25/21 0645 (!) 124/59 -- 98 % -- 69   10/25/21 0700 (!) 140/87 -- 97 % -- 69   10/25/21 0719 (!) 140/55 Lying; At rest 98 % 2 l/min 69   10/25/21 0730 (!) 127/54 -- 96 % -- 69   10/25/21 0745 130/66 -- 96 % -- 73   10/25/21 0750 130/66 -- -- -- 69   10/25/21 0800 (!) 119/51 -- 96 % -- 69   10/25/21 0815 (!) 110/52 -- 96 % -- 69   10/25/21 0830 (!) 98/49 -- 97 % -- 69   10/25/21 0845 (!) 99/52 -- 98 % -- 70   10/25/21 0915 112/62 -- (!) 88 % -- 74   10/25/21 0930 133/64 -- 95 % -- 92   10/25/21 0945 (!) 142/60 -- 96 % -- 94   10/25/21 1000 108/71 -- 96 % -- 92   10/25/21 1015 101/78 -- 94 % -- 88       Mental Status  Neurologic State: Alert  Orientation Level: Oriented X4  Cognition: Appropriate decision making  Perception: Appears intact                          Physical Skills Involved:  Range of Motion  Balance  Strength  Activity Tolerance Cognitive Skills Affected (resulting in the inability to perform in a timely and safe manner): Short Term Recall  Sustained Attention Psychosocial Skills Affected:  Habits/Routines  Environmental Adaptation  Self-Awareness   Number of elements that affect the Plan of Care: 3-5:  MODERATE COMPLEXITY   CLINICAL DECISION MAKIN [facility] [address] [id number] AM-PAC 6 Clicks   Daily Activity Inpatient Short Form  How much help from another person does the patient currently need. .. Total A Lot A Little None   1. Putting on and taking off regular lower body clothing? [] 1   [x] 2   [] 3   [] 4   2. Bathing (including washing, rinsing, drying)? [] 1   [x] 2   [] 3   [] 4   3. Toileting, which includes using toilet, bedpan or urinal?   [x] 1   [] 2   [] 3   [] 4   4. Putting on and taking off regular upper body clothing? [] 1   [] 2   [x] 3   [] 4   5. Taking care of personal grooming such as brushing teeth? [] 1   [] 2   [x] 3   [] 4   6. Eating meals? [] 1   [] 2   [] 3   [x] 4   © , Trustees of [address] [place] [address] [address] [address], under license to [employer]. All rights reserved      Score:  Initial: 15 Most Recent: X (Date: -- )    Interpretation of Tool:  Represents activities that are increasingly more difficult (i.e. Bed mobility, Transfers, Gait). Medical Necessity:     Skilled intervention continues to be required due to deficits listed above. Reason for Services/Other Comments:  Patient continues to require skilled intervention due to   Dx above  . Use of outcome tool(s) and clinical judgement create a POC that gives a: MODERATE COMPLEXITY         TREATMENT:   (In addition to Assessment/Re-Assessment sessions the following treatments were rendered)     Pre-treatment Symptoms/Complaints:    Pain: Initial:   Pain Intensity 1: 0  Post Session:  0     Self Care: (15): Procedure(s) (per grid) utilized to improve and/or restore self-care/home management as related to dressing, grooming, and transfers . Required minimal verbal and tactile cueing to facilitate activities of daily living skills. Initial evaluation 10 minutes. Braces/Orthotics/Lines/Etc:   O2 Device: Nasal cannula  Treatment/Session Assessment:    Response to Treatment:  Fair, sitting in recliner with feet up. Interdisciplinary Collaboration:   Physical Therapist  Occupational Therapist  Registered Nurse  After treatment position/precautions:   Up in chair  Call light within reach  RN notified   Compliance with Program/Exercises: Compliant all of the time, Will assess as treatment progresses. Recommendations/Intent for next treatment session: \"Next visit will focus on advancements to more challenging activities and reduction in assistance provided\".   Total Treatment Duration:  OT Patient Time In/Time Out  Time In: 0845  Time Out: 2908 [address] [place] [address],

## 2021-10-25 NOTE — PROGRESS NOTES
Problem: Mobility Impaired (Adult and Pediatric)  Goal: *Acute Goals and Plan of Care (Insert Text)  Description: STG:  (1.)Ms. Marie Silva will move from supine to sit and sit to supine , scoot up and down, and roll side to side with CONTACT GUARD ASSIST within 3 treatment day(s). (2.)Ms. Marie Silva will transfer from bed to chair and chair to bed with MINIMAL ASSIST using the least restrictive device within 3 treatment day(s). (3.)Ms. Marie Silva will ambulate with MINIMAL ASSIST for 20 feet with the least restrictive device within 3 treatment day(s). LTG:  (1.)Ms. Marie Silva will move from supine to sit and sit to supine , scoot up and down, and roll side to side in bed with MODIFIED INDEPENDENCE within 7 treatment day(s). (2.)Ms. Marie Sivla will transfer from bed to chair and chair to bed with SUPERVISION using the least restrictive device within 7 treatment day(s). (3.)Ms. Marie Silva will ambulate with SUPERVISION for a minimum of 50 feet with the least restrictive device within 7 treatment day(s).   ________________________________________________________________________________________________      Outcome: Progressing Towards Goal       PHYSICAL THERAPY: Initial Assessment and Daily Note 10/25/2021  INPATIENT: PT Visit Days : 1  Payor: Joey Turner / Plan: [id number] PPO/PFFS / Product Type: Managed Care Medicare /       NAME/AGE/GENDER: Swapnil Brady is a 80 y.o. female   PRIMARY DIAGNOSIS: Acute renal failure (ARF) ([address] [N17.9] Acute renal failure superimposed on stage 3b chronic kidney disease ([address] Acute renal failure superimposed on stage 3b chronic kidney disease ([address]        ICD-10: Treatment Diagnosis:    Generalized Muscle Weakness (M62.81)  Other lack of cordination (R27.8)  Difficulty in walking, Not elsewhere classified (R26.2)  Repeated Falls (R29.6)  History of falling (Z91.81)   Precaution/Allergies:  Advair diskus [fluticasone propion-salmeterol], Aspirin, Codeine, and Lipitor [atorvastatin]      ASSESSMENT:     Ms. Heidi Mireles presents with acute renal failure and AMS. She was recently DC from [address] [place] [address] and has been home for about a month. Pt reports living alone at baseline but presents with some confusion and is a poor historian. She was able to perform bed mobility with mod A and VC for safety. Tactile cueing needed intermittently with ambulation for safety and increased stability. She presents with overall decreased stability and safety with mobility at this time and would benefit from continued skilled PT. This section established at most recent assessment   PROBLEM LIST (Impairments causing functional limitations):  Decreased Strength  Decreased ADL/Functional Activities  Decreased Transfer Abilities  Decreased Ambulation Ability/Technique  Decreased Balance  Decreased Activity Tolerance  Decreased Cognition   INTERVENTIONS PLANNED: (Benefits and precautions of physical therapy have been discussed with the patient.)  Balance Exercise  Bed Mobility  Gait Training  Home Exercise Program (HEP)  Neuromuscular Re-education/Strengthening  Range of Motion (ROM)  Therapeutic Activites  Therapeutic Exercise/Strengthening  Transfer Training     TREATMENT PLAN: Frequency/Duration: daily for duration of hospital stay  Rehabilitation Potential For Stated Goals: [facility] (at time of discharge pending progress):    Placement: It is my opinion, based on this patient's performance to date, that Ms. Heidi Mireles may benefit from intensive therapy at a [address] [place] [address] after discharge due to the functional deficits listed above that are likely to improve with skilled rehabilitation and concerns that he/she may be unsafe to be unsupervised at home due to high fall risk and decreased stability and safety with mobility .   Equipment:   None at this time              HISTORY:   History of Present Injury/Illness (Reason for Referral):  Víctor eRece is a 80 y.o. female with medical history of breast cancer, recurrent UTI's, PAF on Eliquis, HFpEF, mild dementia who presented with decrease urine output and hypotension of 1 day duration. Pt took her Am meds and her daughter checks her BP and noted it was low. She notified home health RN who instructed for her to come to the ED. Pt has a history of recurrent UTI's and has been taking Macrobid for it. She went to see her PCP on 10/19 for confusion and urinary odor. She was started on Bactrim and home Losartan/Hctz was held. Of note, she was also admitted in August for KATEY and UTI, went to rehab until mid-September. Pt denies SOB, chest pain, abdominal pain, N/V, diarrhea, constipation. In ED, SBP 80-90's, DBP 40's. She was given 1.5L bolus in ED without much improvement. LA 2.7. WBC wnl. CMP with Cr 4.16 (baseline 1.1-1.2). UA negative for UTI but was treated with Bactrim for 5 days previously. Pt was given Rocephin. BCx and UCx obtained. Past Medical History/Comorbidities:   Ms. Ally Brock  has a past medical history of Abdominal pain (9/29/2013), COPD (chronic obstructive pulmonary disease) ([facility] DJD (degenerative joint disease) (9/29/2013), Esophageal reflux (9/29/2013), HTN (hypertension) (9/29/2013), Hypercholesterolemia, Hypertension, Other and unspecified hyperlipidemia (9/29/2013), Pyelonephritis (9/29/2013), Sensory neuropathy (12/23/2020), and UTI (lower urinary tract infection) (9/29/2013). Ms. Ally Brock  has a past surgical history that includes hx pacemaker; pr chest surgery procedure unlisted; pr lap,cholecystectomy; hx hysterectomy; pr cardiac surg procedure unlist (2/05 8/2015); and pr breast surgery procedure unlisted (01/07/2019).   Social History/Living Environment:   Home Environment: Apartment  # Steps to Enter: 1  One/Two Story Residence: One story  Living Alone: Yes  Support Systems: Child(viktor)  Patient Expects to be Discharged to[de-identified] Apartment  Current DME Used/Available at Home: [employer]  Prior Level of Function/Work/Activity:  Pt is a poor historian but reports living alone and needing assistance with bathing. Confusion and dementia at baseline with history of UTIs. Number of Personal Factors/Comorbidities that affect the Plan of Care: 3+: HIGH COMPLEXITY   EXAMINATION:   Most Recent Physical Functioning:   Gross Assessment:   Generalized muscle weakness               Posture:  Rounded shoulders and slight forward flexion in stand     Balance:  Sitting: Impaired (posterior and R sided lean)  Sitting - Static: Fair (occasional)  Standing: With support;Pull to stand Bed Mobility:  Rolling: Moderate assistance  Supine to Sit: Moderate assistance  Wheelchair Mobility:     Transfers:  Sit to Stand: Contact guard assistance;Assist x2  Stand to Sit: Contact guard assistance;Assist x2  Bed to Chair: Moderate assistance  Gait:     Speed/Cheryl: Slow;Shuffled  Step Length: Right shortened  Distance (ft): 10 Feet (ft)  Assistive Device: Walker, rolling  Ambulation - Level of Assistance: Moderate assistance      Body Structures Involved:  Joints  Muscles  Brain-cognition Body Functions Affected:  Mental  Neuromusculoskeletal  Movement Related Activities and Participation Affected: Mobility   Number of elements that affect the Plan of Care: 4+: HIGH COMPLEXITY   CLINICAL PRESENTATION:   Presentation: Evolving clinical presentation with changing clinical characteristics: MODERATE COMPLEXITY   CLINICAL DECISION MAKIN [facility] Mobility Inpatient Short Form  How much difficulty does the patient currently have. .. Unable A Lot A Little None   1. Turning over in bed (including adjusting bedclothes, sheets and blankets)? [] 1   [] 2   [x] 3   [] 4   2. Sitting down on and standing up from a chair with arms ( e.g., wheelchair, bedside commode, etc.)   [] 1   [] 2   [x] 3   [] 4   3. Moving from lying on back to sitting on the side of the bed?    [] 1   [x] 2   [] 3   [] 4   How much help from another person does the patient currently need. .. Total A Lot A Little None   4. Moving to and from a bed to a chair (including a wheelchair)? [] 1   [x] 2   [] 3   [] 4   5. Need to walk in hospital room? [] 1   [x] 2   [] 3   [] 4   6. Climbing 3-5 steps with a railing? [x] 1   [] 2   [] 3   [] 4   © 2007, Trustees of [address] [place] [address] [address] [address], under license to [employer]. All rights reserved      Score:  Initial: 13 Most Recent: X (Date: -- )    Interpretation of Tool:  Represents activities that are increasingly more difficult (i.e. Bed mobility, Transfers, Gait). Medical Necessity:     Skilled intervention continues to be required due to decreased overall functional mobility and strength. Reason for Services/Other Comments:  Patient continues to require skilled intervention due to   Functioning below her baseline with generalized muscle weakness and overall mobility  . Use of outcome tool(s) and clinical judgement create a POC that gives a: Questionable prediction of patient's progress: MODERATE COMPLEXITY            TREATMENT:   (In addition to Assessment/Re-Assessment sessions the following treatments were rendered)   Pre-treatment Symptoms/Complaints:  Fatigue, increased confusion  Pain: Initial:   Pain Intensity 1: 0  Post Session:  0     Today's treatment session addressed Decreased Strength, Decreased Transfer Abilities, and Decreased Ambulation Ability/Technique to progress towards achieving her goal(s). During this session, Occupational Therapy addressed Activity tolerance to progress towards their discipline specific goal(s). Co-treatment was necessary to improve patient's cognitive participation, ability to follow higher level commands, and ability to return to normal functional activity. Therapeutic Activity: (    10): Therapeutic activities including Bed transfers and Ambulation on level ground to improve mobility, strength, balance, and coordination.   Required moderate verbal and tactile cues  to promote coordination of bilateral, lower extremity(s). Braces/Orthotics/Lines/Etc:   IV  mendes catheter  O2 Device: Nasal cannula  Treatment/Session Assessment:    Response to Treatment:  Pt tolerated treatment session well with need for increased assistance for all mobility. Interdisciplinary Collaboration:   Physical Therapist  Occupational Therapist  Registered Nurse  After treatment position/precautions:   Up in chair  Bed/Chair-wheels locked  Call light within reach   Compliance with Program/Exercises: Will assess as treatment progresses  Recommendations/Intent for next treatment session: \"Next visit will focus on advancements to more challenging activities and reduction in assistance provided\".   Total Treatment Duration:  PT Patient Time In/Time Out  Time In: 6372  Time Out: 3[facility],

## 2021-10-25 NOTE — PROGRESS NOTES
Physician Progress Note      Becca Hamm  CSN #:                  [id number]  :                       1940  ADMIT DATE:       10/24/2021 4:23 PM  DISCH DATE:  RESPONDING  PROVIDER #:        PREETHI SALGADO DO          QUERY TEXT:    Pt admitted with hypotension, decreased urine output. . Pt with recent UTI, noted to have KATEY, hypovelmia. If possible, please document in the progress notes and discharge summary if you are evaluating and/or treating any of the following: The medical record reflects the following:  Risk Factors: PAF, Chr Diastolic CHF, age 81 y/o  Clinical Indicators: 10/24- B/P 84/50, 85/38, 77/43 10/24- LA 2.7, BUN 41, Creat 4.16;H&P- Holding home antihypertensives,Initiate midodrine TID- If does not improve, will initiate Levophed  Treatment: IV N/S bolus, IV N/S infusion, Levophed IV, Midodrine, Hold Bumex, Supplemental Oxygen, Daily Wgt, Accurate Intake and Output  Options provided:  -- Hypovolemic Shock  -- Hypovolemia without Shock  -- Hypotension without Shock  -- Other - I will add my own diagnosis  -- Disagree - Not applicable / Not valid  -- Disagree - Clinically unable to determine / Unknown  -- Refer to Clinical Documentation Reviewer    PROVIDER RESPONSE TEXT:    This patient has Hypovolemic Shock.     Query created by: rosalee pepper on 10/25/2021 11:19 AM      Electronically signed by:  Jerod Christie DO 10/25/2021 11:42 AM

## 2021-10-25 NOTE — PROGRESS NOTES
Care Management Interventions  PCP Verified by CM: Yes (Scherry Galeazzi, MD)  Mode of Transport at Discharge: BLS  Transition of Care Consult (CM Consult): Discharge Planning  Support Systems: Child(viktor) (Mao #[phone])  Confirm Follow Up Transport: Family  The Patient and/or Patient Representative was Provided with a Choice of Provider and Agrees with the Discharge Plan?: Yes  Freedom of Choice List was Provided with Basic Dialogue that Supports the Patient's Individualized Plan of Care/Goals, Treatment Preferences and Shares the Quality Data Associated with the Providers?: Yes  Discharge Location  Discharge Placement: [address] with pt to establish prior to admission baseline and discuss their anticipated goals at time of d/c. Pt came in for low BP, brought by miracle/Tabby. Pt currently being Tx for a UTI, she was admitted back in Aug for same, then d/c to [address] [place] [address] where she stayed until Sept. Pt states that she lives at home, alone, inde with cane in her 1-story home. Pt also has a walker, WC and shower chair if needed. MiracleJenniferTabby lives close and is very supportive, pt last visit with PCP was last Tuesday (10/19) and her Rx are filled at [facility]. Pt goal is to return home upon d/c, she is open to the option of rehab and if that is the plan she would prefer to go back to [place]. I have left a choice list at bedside and she will look at later. I called pt's miracle/Tabby and she is on her way in. CM to follow.

## 2021-10-25 NOTE — PROGRESS NOTES
Hospitalist Progress Note   Admit Date:  10/24/2021  4:23 PM   Name:  Landry Koyanagi   Age:  80 y.o. Sex:  female  :  1940   MRN:  [id number]   Room:  372/01    Presenting Complaint: Hypotension    Reason(s) for Admission: Acute renal failure (ARF) [facility]) [N17.9]     Hospital Course & Interval History:   80 y.o. female with medical history of breast cancer, recurrent UTI's, PAF on Eliquis, HFpEF, mild dementia who presented with decrease urine output and hypotension of 1 day duration. Pt took her Am meds and her daughter checks her BP and noted it was low. She notified home health RN who instructed for her to come to the ED. Pt has a history of recurrent UTI's and has been taking Macrobid for it. She went to see her PCP on 10/19 for confusion and urinary odor. She was started on Bactrim and home Losartan/Hctz was held. Of note, she was also admitted in August for KATEY and UTI, went to rehab until mid-September. Pt denies SOB, chest pain, abdominal pain, N/V, diarrhea, constipation. In ED, SBP 80-90's, DBP 40's. She was given 1.5L bolus in ED without much improvement. LA 2.7. WBC wnl. CMP with Cr 4.16 (baseline 1.1-1.2). UA negative for UTI but was treated with Bactrim for 5 days previously. Pt was given Rocephin. BCx and UCx obtained. Subjective (10/25/21):    Pt alert and oriented x3. Stated she is doing fine. Denies any needs. Kidney function improving. BP improving. Remains on Levophed. Denies SOB, chest pain, abdominal pain, N/V. Assessment & Plan:     KATEY on CKD stage 3, improving  Cr on admission was 4.16 (Cr 1.1-1.2 at baseline). Was recently taken off of home Losartan.  Most likely d/t recent UTI and hypotension/hypovolemia contributing  CTAP w/o contrast unremarkable  Avoid nephrotoxic meds  Accurate I&O's  Holding home diuretics/ACE-I/ARB  MIVF  Urine studies pending  Cr improving     History of recurrent UTI's  Holding home Bactrim and Macrobid in acute setting  Abx:Rocephin (10/24-. ..)empirically for now since hypotensive pending UCx  BCX: pending  Ucx pending     Hypotension, persistent  Most likely d/t hypovolemia and recent UTI and KATEY per above. S/p 2L bolus.   Holding home antihypertensives  Cont Levophed gtt, wean down as tolerated  Increase midodrine to 10mg TID to hopefully wean her off of Levophed     Lactic acidosis, resolved  Most likely d/t hypotension and KATEY per above     PAF  Cont home Eliquis  Holding home metoprolol d/t hypotension     Nocturnal hypoxemia  Cont 2LO2NC at baseline     HFpEF, chronic  Primary pulm HTN  TTE in  with EF 50-55%  Holding home Bumex  I&O's, daily weights     Cognitive disorder  Dementia/autditory hallucination  Holding home gabapentin d/t KATEY  Cont ome Namenda and Risperidone     Depression  Cont home Sertraline     Hx of HTN  Holding home antihypertensives (norvasc, metoprolol,      GERD  Cont home PPI     Gout  Holding home allopurinol d/t KATEY     Normocytic anemia  Chronic per daughter  Hgb baseline 9-10     SSS  Pacemaker in place  Noted     Hx of breast cancer  Cont home Tamoxifen     Frequent falls  Debility  Limit sedating meds  Fall precautions  PT/OT      Dispo/Discharge Plannin-2 days pending PT/OT and improvement in BP and KATEY    Diet:  ADULT DIET Regular  DVT PPx: Eliquis  Code status: DNR    Hospital Problems as of 10/25/2021 Date Reviewed: 2021        Codes Class Noted - Resolved POA    * (Principal) Acute renal failure superimposed on stage 3b chronic kidney disease ([facility] [address] ICD-10-CM: N17.9, N18.32  ICD-9-CM: 584.9, 585.3  10/24/2021 - Present         Hypotension ICD-10-CM: I95.9  ICD-9-CM: 458.9  10/24/2021 - Present Unknown        Normocytic anemia ICD-10-CM: D64.9  ICD-9-CM: 285.9  10/24/2021 - Present Unknown        Lactic acidosis ICD-10-CM: E87.2  ICD-9-CM: 276.2  10/24/2021 - Present Unknown        Chronic heart failure with preserved ejection fraction (HFpEF) ([facility]) ICD-10-CM: I50.32  ICD-9-CM: 428.9 9/20/2021 - Present Yes        History of gout ICD-10-CM: Z87.39  ICD-9-CM: V12.29  8/24/2021 - Present Yes    Overview Signed 10/24/2021  7:23 PM by Rogerio Fierro DO     Last Assessment & Plan:   Formatting of this note might be different from the original.  Continue Allopurinol 100 mg daily             History of recurrent UTIs ICD-10-CM: Z87.440  ICD-9-CM: V13.02  8/24/2021 - Present Yes    Overview Signed 10/24/2021  7:23 PM by Rogerio Fierro DO     Last Assessment & Plan:   Formatting of this note might be different from the original.  Patient is on Macrodantin daily per PCP             Debility ICD-10-CM: R53.81  ICD-9-CM: 799.3  8/24/2021 - Present Yes    Overview Signed 10/24/2021  7:23 PM by Rogerio Fierro DO     Last Assessment & Plan:   Formatting of this note might be different from the original.  Due to hx falls and recent hospitalization. Making progress with therapy  Recent COVID + - but thankfully no/min sx  9/13  - pt being discharged today to her Senior living apt. Will continue close f/u with Dr. Amna Prater for medication management for delusions/hallucinations etc (none observed in SNF). [facility] I advised lauren to look into [facility] for near future needs.              Current use of long term anticoagulation (Chronic) ICD-10-CM: Z79.01  ICD-9-CM: V58.61  8/18/2021 - Present Yes    Overview Signed 8/18/2021 10:04 PM by MD Alie James             Frequent falls (Chronic) ICD-10-CM: R29.6  ICD-9-CM: V15.88  8/18/2021 - Present Yes        Cognitive disorder (Chronic) ICD-10-CM: F09  ICD-9-CM: 294.9  4/20/2021 - Present Yes        Mild episode of recurrent major depressive disorder ([facility] [address] ICD-10-CM: F33.0  ICD-9-CM: 296.31  1/24/2019 - Present Yes        Status cardiac pacemaker (Chronic) ICD-10-CM: Z95.0  ICD-9-CM: V45.01  8/30/2017 - Present Yes        Sick sinus syndrome ([facility] [address] ICD-10-CM: I49.5  ICD-9-CM: 427.81  8/30/2017 - Present Yes        Diastolic dysfunction ICD-10-CM: I51.89  ICD-9-CM: 429.9  7/14/2017 - Present Yes        Hyperlipidemia ICD-10-CM: E78.5  ICD-9-CM: 272.4  7/28/2015 - Present Yes        Atrial fibrillation (HCC) (Chronic) ICD-10-CM: I48.91  ICD-9-CM: 427.31  1/22/2014 - Present Yes        Hypoxemia ICD-10-CM: R09.02  ICD-9-CM: 799.02  1/22/2014 - Present Yes        Hypertension ICD-10-CM: I10  ICD-9-CM: 401.9  9/29/2013 - Present Yes        Primary pulmonary hypertension ([address] (Chronic) ICD-10-CM: I27.0  ICD-9-CM: 416.0  10/30/2000 - Present Yes              Objective:     Patient Vitals for the past 24 hrs:   Temp Pulse Resp BP SpO2   10/25/21 0719 98 °F (36.7 °C) 69 21 (!) 140/55 98 %   10/25/21 0630  69 26 (!) 113/58 98 %   10/25/21 0615  69 12 (!) 110/50 97 %   10/25/21 0600  69 10 (!) 105/46 98 %   10/25/21 0545  67 9 (!) 102/47 98 %   10/25/21 0530  68 9 (!) 105/52 97 %   10/25/21 0515  69 9 (!) 103/53 97 %   10/25/21 0500  69 8 (!) 96/43 98 %   10/25/21 0445  69 9 (!) 99/40 98 %   10/25/21 0430 97.9 °F (36.6 °C) 71 18 (!) 121/51 97 %   10/25/21 0415  69 11 (!) 100/43 96 %   10/25/21 0400  69 12 (!) 107/49 98 %   10/25/21 0345  69 12 (!) 116/59 98 %   10/25/21 0330  69 23 (!) 116/55 (!) 78 %   10/25/21 0315  69 20 117/60 97 %   10/25/21 0300  69 21 (!) 126/59 98 %   10/25/21 0245  69 13 (!) 114/47 97 %   10/25/21 0230  69 23 (!) 119/55 92 %   10/25/21 0215  70 19 116/64 92 %   10/25/21 0200  73 (!) 32 (!) 114/53 98 %   10/25/21 0130  69 18 (!) 120/57 98 %   10/25/21 0115  70 9 116/62 98 %   10/25/21 0112  72 22 (!) 112/53 95 %   10/25/21 0047  69 24 (!) 114/46 95 %   10/25/21 0032  69 12 (!) 107/58 98 %   10/25/21 0017  69 12 (!) 109/58 97 %   10/25/21 0002 97.8 °F (36.6 °C) 69 17 (!) 110/51 98 %   10/24/21 2347  69 20 (!) 112/57 98 %   10/24/21 2332  69 13 (!) 106/46 98 %   10/24/21 2317  69 18 (!) 110/58 98 %   10/24/21 2302  69 22 (!) 113/52 97 %   10/24/21 2247  69 18 (!) 114/52 98 %   10/24/21 2232  69 16 (!) 107/54 98 %   10/24/21 2216  69 27 (!) 99/45 94 %   10/24/21 2201  69 17 (!) 94/45 97 %   10/24/21 2146  66 17 (!) 104/52 98 %   10/24/21 2131  69 13 (!) 88/50 96 %   10/24/21 2126     96 %   10/24/21 2116  69 16 (!) 86/50 (!) 86 %   10/24/21 2111  70 19 (!) 91/41 95 %   10/24/21 2106  69 16 (!) 82/48 97 %   10/24/21 2101  69 11 (!) 77/43 (!) 72 %   10/24/21 2046  69 14 (!) 85/43 96 %   10/24/21 2031  69 21 (!) 98/48 97 %   10/24/21 2016  69 20 (!) 94/37 96 %   10/24/21 2009  69 16 (!) 83/45 97 %   10/24/21 2001  69 25  (!) 74 %   10/24/21 1957 97.7 °F (36.5 °C) 73 18 (!) 83/40 98 %   10/24/21 1946  67 23 (!) 84/41 97 %   10/24/21 1942  67 21 (!) 94/46 95 %   10/24/21 1939  69 20 (!) 86/42 96 %   10/24/21 1934  69 23 (!) 83/47 92 %   10/24/21 1931  69 30 (!) 85/38 96 %   10/24/21 1927  69 19 (!) 91/45 96 %   10/24/21 1920  70 16 (!) 97/43 (!) 79 %   10/24/21 1918  71 30  90 %   10/24/21 1912    (!) 96/43    10/24/21 1901    (!) 91/43    10/24/21 1856  69 19  96 %   10/24/21 1808  69 22     10/24/21 1806    (!) 87/43 91 %   10/24/21 1617 98.5 °F (36.9 °C) 70 18 (!) 84/50 96 %     Oxygen Therapy  O2 Sat (%): 98 % (10/25/21 0719)  Pulse via Oximetry: 69 beats per minute (10/25/21 0630)  O2 Device: Nasal cannula (10/25/21 0719)  Skin Assessment: Clean, dry, & intact (10/25/21 0430)  Skin Protection for O2 Device: N/A (10/25/21 0430)  O2 Flow Rate (L/min): 2 l/min (10/25/21 0719)    Estimated body mass index is 29.26 kg/m² as calculated from the following:    Height as of this encounter: 5' 2\" (1.575 m). Weight as of this encounter: 72.6 kg (160 lb). Intake/Output Summary (Last 24 hours) at 10/25/2021 0733  Last data filed at 10/25/2021 0511  Gross per 24 hour   Intake 2865.47 ml   Output 350 ml   Net 2515.47 ml         Physical Exam:     Blood pressure (!) 140/55, pulse 69, temperature 98 °F (36.7 °C), resp.  rate 21, height 5' 2\" (1.575 m), weight 72.6 kg (160 lb), SpO2 98 %, not currently breastfeeding. General:    Well nourished. No overt distress  Head:  Normocephalic, atraumatic  Eyes:  Sclerae appear normal.  Pupils equally round. ENT:  Nares appear normal, no drainage. Moist oral mucosa  Neck:  No restricted ROM. Trachea midline   CV:   Irregular rhythm, regular rate. No m/r/g. No jugular venous distension. Lungs:   CTAB. No wheezing, rhonchi, or rales. Respirations even, unlabored  Abdomen: Bowel sounds present. Soft, nontender, nondistended. Extremities: No cyanosis or clubbing. No edema  Skin:     No rashes and normal coloration. Warm and dry. Neuro:  CN II-XII grossly intact. Sensation intact. A&Ox3  Psych:  Normal mood and affect. I have reviewed ordered lab tests and independently visualized imaging below:    Recent Labs:  Recent Results (from the past 48 hour(s))   CBC WITH AUTOMATED DIFF    Collection Time: 10/24/21  4:42 PM   Result Value Ref Range    WBC 10.7 4.3 - 11.1 K/uL    RBC 3.54 (L) 4.05 - 5.2 M/uL    HGB 9.9 (L) 11.7 - 15.4 g/dL    HCT 30.9 (L) 35.8 - 46.3 %    MCV 87.3 79.6 - 97.8 FL    MCH 28.0 26.1 - 32.9 PG    MCHC 32.0 31.4 - 35.0 g/dL    RDW 13.3 11.9 - 14.6 %    PLATELET 022 343 - 627 K/uL    MPV 10.1 9.4 - 12.3 FL    ABSOLUTE NRBC 0.00 0.0 - 0.2 K/uL    DF AUTOMATED      NEUTROPHILS 71 43 - 78 %    LYMPHOCYTES 16 13 - 44 %    MONOCYTES 7 4.0 - 12.0 %    EOSINOPHILS 5 0.5 - 7.8 %    BASOPHILS 1 0.0 - 2.0 %    IMMATURE GRANULOCYTES 1 0.0 - 5.0 %    ABS. NEUTROPHILS 7.6 1.7 - 8.2 K/UL    ABS. LYMPHOCYTES 1.8 0.5 - 4.6 K/UL    ABS. MONOCYTES 0.8 0.1 - 1.3 K/UL    ABS. EOSINOPHILS 0.5 0.0 - 0.8 K/UL    ABS. BASOPHILS 0.1 0.0 - 0.2 K/UL    ABS. IMM.  GRANS. 0.1 0.0 - 0.5 K/UL   METABOLIC PANEL, COMPREHENSIVE    Collection Time: 10/24/21  4:42 PM   Result Value Ref Range    Sodium 137 136 - 145 mmol/L    Potassium 3.8 3.5 - 5.1 mmol/L    Chloride 103 98 - 107 mmol/L    CO2 24 21 - 32 mmol/L    Anion gap 10 7 - 16 mmol/L Glucose 179 (H) 65 - 100 mg/dL    BUN 41 (H) 8 - 23 MG/DL    Creatinine 4.16 (H) 0.6 - 1.0 MG/DL    GFR est AA 13 (L) >60 ml/min/1.73m2    GFR est non-AA 11 (L) >60 ml/min/1.73m2    Calcium 9.6 8.3 - 10.4 MG/DL    Bilirubin, total 0.2 0.2 - 1.1 MG/DL    ALT (SGPT) 19 12 - 65 U/L    AST (SGOT) 25 15 - 37 U/L    Alk.  phosphatase 55 50 - 136 U/L    Protein, total 6.4 6.3 - 8.2 g/dL    Albumin 2.9 (L) 3.2 - 4.6 g/dL    Globulin 3.5 2.3 - 3.5 g/dL    A-G Ratio 0.8 (L) 1.2 - 3.5     LACTIC ACID    Collection Time: 10/24/21  4:42 PM   Result Value Ref Range    Lactic acid 2.7 (H) 0.4 - 2.0 MMOL/L   LIPASE    Collection Time: 10/24/21  4:42 PM   Result Value Ref Range    Lipase 85 73 - 393 U/L   NT-PRO BNP    Collection Time: 10/24/21  4:42 PM   Result Value Ref Range    NT pro- (H) <450 PG/ML   TROPONIN-HIGH SENSITIVITY    Collection Time: 10/24/21  4:42 PM   Result Value Ref Range    Troponin-High Sensitivity 14.0 0 - 14 pg/mL   CULTURE, BLOOD    Collection Time: 10/24/21  4:42 PM    Specimen: Blood   Result Value Ref Range    Special Requests: NO SPECIAL REQUESTS  LEFT FOREARM        Culture result: NO GROWTH AFTER 13 HOURS     CULTURE, BLOOD    Collection Time: 10/24/21  4:42 PM    Specimen: Blood   Result Value Ref Range    Special Requests: NO SPECIAL REQUESTS  LEFT UPPER ARM        Culture result: NO GROWTH AFTER 13 HOURS     EKG, 12 LEAD, INITIAL    Collection Time: 10/24/21  4:58 PM   Result Value Ref Range    Ventricular Rate 70 BPM    Atrial Rate 70 BPM    P-R Interval 248 ms    QRS Duration 92 ms    Q-T Interval 454 ms    QTC Calculation (Bezet) 490 ms    Calculated R Axis 5 degrees    Calculated T Axis 20 degrees    Diagnosis       Atrial-paced rhythm  Low voltage QRS  Nonspecific T wave abnormality  Prolonged QT  Abnormal ECG  When compared with ECG of 24-OCT-2021 04:16,  No significant change was found    Confirmed by Hannah Patrick [id number]) on 10/25/2021 6:55:03 AM     URINALYSIS W/ RFLX MICROSCOPIC Collection Time: 10/24/21  6:02 PM   Result Value Ref Range    Color CHANEL      Appearance CLOUDY      Specific gravity 1.023 1.001 - 1.023      pH (UA) 5.0 5.0 - 9.0      Protein Negative NEG mg/dL    Glucose Negative mg/dL    Ketone TRACE (A) NEG mg/dL    Bilirubin MODERATE (A) NEG      Blood Negative NEG      Urobilinogen 0.2 0.2 - 1.0 EU/dL    Nitrites Negative NEG      Leukocyte Esterase Negative NEG     CULTURE, URINE    Collection Time: 10/24/21  6:03 PM    Specimen: Cath Urine   Result Value Ref Range    Special Requests: NO SPECIAL REQUESTS      Culture result:        NO GROWTH AFTER SHORT PERIOD OF INCUBATION. FURTHER RESULTS TO FOLLOW AFTER OVERNIGHT INCUBATION.    TROPONIN-HIGH SENSITIVITY    Collection Time: 10/24/21  6:40 PM   Result Value Ref Range    Troponin-High Sensitivity 13.5 0 - 14 pg/mL   LACTIC ACID    Collection Time: 10/24/21  6:40 PM   Result Value Ref Range    Lactic acid 0.7 0.4 - 2.0 MMOL/L   METABOLIC PANEL, BASIC    Collection Time: 10/25/21  3:00 AM   Result Value Ref Range    Sodium 141 136 - 145 mmol/L    Potassium 3.6 3.5 - 5.1 mmol/L    Chloride 110 (H) 98 - 107 mmol/L    CO2 24 21 - 32 mmol/L    Anion gap 7 7 - 16 mmol/L    Glucose 96 65 - 100 mg/dL    BUN 34 (H) 8 - 23 MG/DL    Creatinine 2.97 (H) 0.6 - 1.0 MG/DL    GFR est AA 19 (L) >60 ml/min/1.73m2    GFR est non-AA 16 (L) >60 ml/min/1.73m2    Calcium 8.7 8.3 - 10.4 MG/DL   CBC W/O DIFF    Collection Time: 10/25/21  3:00 AM   Result Value Ref Range    WBC 7.1 4.3 - 11.1 K/uL    RBC 3.38 (L) 4.05 - 5.2 M/uL    HGB 9.3 (L) 11.7 - 15.4 g/dL    HCT 29.3 (L) 35.8 - 46.3 %    MCV 86.7 79.6 - 97.8 FL    MCH 27.5 26.1 - 32.9 PG    MCHC 31.7 31.4 - 35.0 g/dL    RDW 13.2 11.9 - 14.6 %    PLATELET 498 742 - 199 K/uL    MPV 9.5 9.4 - 12.3 FL    ABSOLUTE NRBC 0.00 0.0 - 0.2 K/uL       All Micro Results     Procedure Component Value Units Date/Time    CULTURE, BLOOD [[id number]] Collected: 10/24/21 1642    Order Status: Completed Specimen: Blood Updated: 10/25/21 0714     Special Requests: --        NO SPECIAL REQUESTS  LEFT UPPER ARM       Culture result: NO GROWTH AFTER 13 HOURS       CULTURE, BLOOD [[id number]] Collected: 10/24/21 1642    Order Status: Completed Specimen: Blood Updated: 10/25/21 0714     Special Requests: --        NO SPECIAL REQUESTS  LEFT FOREARM       Culture result: NO GROWTH AFTER 13 HOURS       CULTURE, URINE [[id number]] Collected: 10/24/21 1803    Order Status: Completed Specimen: Cath Urine Updated: 10/25/21 0620     Special Requests: NO SPECIAL REQUESTS        Culture result:       NO GROWTH AFTER SHORT PERIOD OF INCUBATION. FURTHER RESULTS TO FOLLOW AFTER OVERNIGHT INCUBATION. Other Studies:  XR CHEST PORT    Result Date: 10/24/2021  CHEST X-RAY, single portable view  10/24/2021 History: Hypotension. Technique: Single frontal view of the chest. Comparison: Chest x-ray 8/18/2021 Findings: A grossly stable left-sided intracardiac device is seen. The heart is moderately enlarged although stable. No pneumothorax is seen. No evolving consolidation, or significant pleural effusion is seen. 1.  Grossly stable cardiomegaly without evolving acute changes suggested by plain film. This report was made using voice transcription. Despite my best efforts to avoid any, transcription errors may persist. If there is any question about the accuracy of the report or need for clarification, then please call [phone], or text me through [email or web address] for clarification or correction. CT ABD/PEL FOR RENAL STONE    Result Date: 10/25/2021  Clinical history: Acute kidney injury with UTI. TECHNIQUE: Axial and coronal CT of the abdomen/pelvis without IV contrast. CT dose reduction was achieved through use of a standardized protocol tailored for this examination and automatic exposure control for dose modulation. FINDINGS: Absence of IV contrast limits sensitivity. There is no renal calculus or hydronephrosis. Gallbladder is surgically absent. The visualized liver and the spleen are normal in contour. Pancreas is atrophic. Unenhanced adrenal glands are unremarkable. Abdominal aorta is normal in course and caliber with prominent atherosclerotic calcifications. No evidence of lymphadenopathy. Stomach is normal in contour. Small bowel loops are normal in caliber. No small bowel obstruction. Pelvic findings: Urinary bladder is decompressed by Pino catheter. The colon is normal in course and caliber. No evidence of appendicitis. There is no free air or free fluid. There are 6 lumbar-type vertebral bodies. There is mild compression deformity of T12.     1. No renal calculus or hydronephrosis. Urinary bladder decompressed by Pino catheter. 2. Negative for colitis, diverticulitis, appendicitis or bowel obstruction. 3. Mild compression deformity of T12, age indeterminate. This was not definitely seen on the April 2021 x-ray.       Current Meds:  Current Facility-Administered Medications   Medication Dose Route Frequency    albuterol (PROVENTIL VENTOLIN) nebulizer solution 2.5 mg  2.5 mg Nebulization Q6H PRN    apixaban (ELIQUIS) tablet 2.5 mg  2.5 mg Oral Q12H    aspirin delayed-release tablet 81 mg  81 mg Oral DAILY    dicyclomine (BENTYL) capsule 10 mg  10 mg Oral TID    pantoprazole (PROTONIX) tablet 40 mg  40 mg Oral ACB    memantine (NAMENDA) tablet 5 mg  5 mg Oral Q12H    montelukast (SINGULAIR) tablet 10 mg  10 mg Oral DAILY    risperiDONE (RisperDAL) tablet 1 mg  1 mg Oral Q12H    sertraline (ZOLOFT) tablet 50 mg  50 mg Oral DAILY    tamoxifen (NOLVADEX) tablet 20 mg  20 mg Oral DAILY    sodium chloride (NS) flush 5-40 mL  5-40 mL IntraVENous Q8H    sodium chloride (NS) flush 5-40 mL  5-40 mL IntraVENous PRN    acetaminophen (TYLENOL) tablet 650 mg  650 mg Oral Q6H PRN    Or    acetaminophen (TYLENOL) suppository 650 mg  650 mg Rectal Q6H PRN    polyethylene glycol (MIRALAX) packet 17 g  17 g Oral DAILY PRN  ondansetron (ZOFRAN ODT) tablet 4 mg  4 mg Oral Q8H PRN    Or    ondansetron (ZOFRAN) injection 4 mg  4 mg IntraVENous Q6H PRN    lactated Ringers infusion  75 mL/hr IntraVENous CONTINUOUS    cefTRIAXone (ROCEPHIN) 1 g in 0.9% sodium chloride (MBP/ADV) 50 mL MBP  1 g IntraVENous Q24H    midodrine (PROAMATINE) tablet 5 mg  5 mg Oral TID WITH MEALS    NOREPINephrine (LEVOPHED) 4 mg/250 mL (16 mcg/mL) in NS infusion  0.5-30 mcg/min IntraVENous TITRATE       Signed: Nikolai Osman DO    Part of this note may have been written by using a voice dictation software. The note has been proof read but may still contain some grammatical/other typographical errors.

## 2021-10-25 NOTE — PROGRESS NOTES
Problem: Pressure Injury - Risk of  Goal: *Prevention of pressure injury  Description: Document Fransico Scale and appropriate interventions in the flowsheet. Outcome: Progressing Towards Goal  Note: Pressure Injury Interventions:       Moisture Interventions: Absorbent underpads, Apply protective barrier, creams and emollients, Assess need for specialty bed, Check for incontinence Q2 hours and as needed, Maintain skin hydration (lotion/cream), Offer toileting Q_hr    Activity Interventions: Assess need for specialty bed, Increase time out of bed, Pressure redistribution bed/mattress(bed type)    Mobility Interventions: Assess need for specialty bed, Float heels, HOB 30 degrees or less, Pressure redistribution bed/mattress (bed type), Turn and reposition approx. every two hours(pillow and wedges), PT/OT evaluation    Nutrition Interventions: Document food/fluid/supplement intake, Discuss nutritional consult with provider, Offer support with meals,snacks and hydration    Friction and Shear Interventions: Apply protective barrier, creams and emollients, Feet elevated on foot rest, Foam dressings/transparent film/skin sealants, HOB 30 degrees or less, Minimize layers                Problem: Patient Education: Go to Patient Education Activity  Goal: Patient/Family Education  Outcome: Progressing Towards Goal     Problem: Falls - Risk of  Goal: *Absence of Falls  Description: Document Luz Elena Fall Risk and appropriate interventions in the flowsheet.   Outcome: Progressing Towards Goal  Note: Fall Risk Interventions:  Mobility Interventions: Bed/chair exit alarm, Communicate number of staff needed for ambulation/transfer, OT consult for ADLs, Patient to call before getting OOB, PT Consult for mobility concerns, PT Consult for assist device competence, Strengthening exercises (ROM-active/passive), Utilize walker, cane, or other assistive device         Medication Interventions: Assess postural VS orthostatic hypotension, Bed/chair exit alarm, Evaluate medications/consider consulting pharmacy, Patient to call before getting OOB, Teach patient to arise slowly    Elimination Interventions: Bed/chair exit alarm, Call light in reach, Patient to call for help with toileting needs, Stay With Me (per policy), Toilet paper/wipes in reach, Toileting schedule/hourly rounds    History of Falls Interventions: Bed/chair exit alarm, Consult care management for discharge planning, Door open when patient unattended, Evaluate medications/consider consulting pharmacy, Room close to nurse's station, Vital signs minimum Q4HRs X 24 hrs (comment for end date)         Problem: Patient Education: Go to Patient Education Activity  Goal: Patient/Family Education  Outcome: Progressing Towards Goal

## 2021-10-25 NOTE — PROGRESS NOTES
Spiritual Care Visit, initial visit. Visited with patient at bedside. Prayed for patient's healing and health. Visit by Samantha Hudson, Staff .  José Luis., Gwendolyn.B., B.A.

## 2021-10-26 LAB
ANION GAP SERPL CALC-SCNC: 5 MMOL/L (ref 7–16)
BACTERIA SPEC CULT: NORMAL
BUN SERPL-MCNC: 22 MG/DL (ref 8–23)
CALCIUM SERPL-MCNC: 9.5 MG/DL (ref 8.3–10.4)
CHLORIDE SERPL-SCNC: 111 MMOL/L (ref 98–107)
CO2 SERPL-SCNC: 25 MMOL/L (ref 21–32)
COVID-19 RAPID TEST, COVR: NOT DETECTED
CREAT SERPL-MCNC: 1.73 MG/DL (ref 0.6–1)
ERYTHROCYTE [DISTWIDTH] IN BLOOD BY AUTOMATED COUNT: 13.3 % (ref 11.9–14.6)
GLUCOSE SERPL-MCNC: 89 MG/DL (ref 65–100)
HCT VFR BLD AUTO: 29.8 % (ref 35.8–46.3)
HGB BLD-MCNC: 9.5 G/DL (ref 11.7–15.4)
MCH RBC QN AUTO: 27.8 PG (ref 26.1–32.9)
MCHC RBC AUTO-ENTMCNC: 31.9 G/DL (ref 31.4–35)
MCV RBC AUTO: 87.1 FL (ref 79.6–97.8)
NRBC # BLD: 0 K/UL (ref 0–0.2)
PLATELET # BLD AUTO: 241 K/UL (ref 150–450)
PMV BLD AUTO: 9.2 FL (ref 9.4–12.3)
POTASSIUM SERPL-SCNC: 4.1 MMOL/L (ref 3.5–5.1)
RBC # BLD AUTO: 3.42 M/UL (ref 4.05–5.2)
SARS-COV-2, COV2: NORMAL
SERVICE CMNT-IMP: NORMAL
SODIUM SERPL-SCNC: 141 MMOL/L (ref 136–145)
SOURCE, COVRS: NORMAL
WBC # BLD AUTO: 6.5 K/UL (ref 4.3–11.1)

## 2021-10-26 PROCEDURE — 74011250637 HC RX REV CODE- 250/637: Performed by: FAMILY MEDICINE

## 2021-10-26 PROCEDURE — 80048 BASIC METABOLIC PNL TOTAL CA: CPT

## 2021-10-26 PROCEDURE — 65610000001 HC ROOM ICU GENERAL

## 2021-10-26 PROCEDURE — 36415 COLL VENOUS BLD VENIPUNCTURE: CPT

## 2021-10-26 PROCEDURE — 87635 SARS-COV-2 COVID-19 AMP PRB: CPT

## 2021-10-26 PROCEDURE — 85027 COMPLETE CBC AUTOMATED: CPT

## 2021-10-26 PROCEDURE — 74011000258 HC RX REV CODE- 258: Performed by: FAMILY MEDICINE

## 2021-10-26 PROCEDURE — U0005 INFEC AGEN DETEC AMPLI PROBE: HCPCS

## 2021-10-26 PROCEDURE — 97530 THERAPEUTIC ACTIVITIES: CPT

## 2021-10-26 PROCEDURE — 74011250636 HC RX REV CODE- 250/636: Performed by: FAMILY MEDICINE

## 2021-10-26 PROCEDURE — 97110 THERAPEUTIC EXERCISES: CPT

## 2021-10-26 PROCEDURE — 2709999900 HC NON-CHARGEABLE SUPPLY

## 2021-10-26 RX ORDER — MIDODRINE HYDROCHLORIDE 5 MG/1
5 TABLET ORAL
Status: DISCONTINUED | OUTPATIENT
Start: 2021-10-26 | End: 2021-10-26

## 2021-10-26 RX ORDER — METOPROLOL TARTRATE 25 MG/1
25 TABLET, FILM COATED ORAL 2 TIMES DAILY
Status: DISCONTINUED | OUTPATIENT
Start: 2021-10-26 | End: 2021-10-27 | Stop reason: HOSPADM

## 2021-10-26 RX ORDER — MIDODRINE HYDROCHLORIDE 5 MG/1
2.5 TABLET ORAL
Status: DISCONTINUED | OUTPATIENT
Start: 2021-10-26 | End: 2021-10-26

## 2021-10-26 RX ADMIN — SODIUM CHLORIDE, SODIUM LACTATE, POTASSIUM CHLORIDE, AND CALCIUM CHLORIDE 75 ML/HR: 600; 310; 30; 20 INJECTION, SOLUTION INTRAVENOUS at 13:49

## 2021-10-26 RX ADMIN — METOPROLOL TARTRATE 25 MG: 25 TABLET, FILM COATED ORAL at 08:28

## 2021-10-26 RX ADMIN — SODIUM CHLORIDE, SODIUM LACTATE, POTASSIUM CHLORIDE, AND CALCIUM CHLORIDE 75 ML/HR: 600; 310; 30; 20 INJECTION, SOLUTION INTRAVENOUS at 00:42

## 2021-10-26 RX ADMIN — CEFTRIAXONE 1 G: 1 INJECTION, POWDER, FOR SOLUTION INTRAMUSCULAR; INTRAVENOUS at 16:23

## 2021-10-26 RX ADMIN — MIDODRINE HYDROCHLORIDE 2.5 MG: 5 TABLET ORAL at 08:28

## 2021-10-26 RX ADMIN — ASPIRIN 81 MG: 81 TABLET, COATED ORAL at 08:28

## 2021-10-26 RX ADMIN — POLYETHYLENE GLYCOL 3350 17 G: 17 POWDER, FOR SOLUTION ORAL at 16:28

## 2021-10-26 RX ADMIN — MONTELUKAST 10 MG: 10 TABLET, FILM COATED ORAL at 08:28

## 2021-10-26 RX ADMIN — Medication 10 ML: at 05:54

## 2021-10-26 RX ADMIN — RISPERIDONE 1 MG: 1 TABLET ORAL at 08:27

## 2021-10-26 RX ADMIN — TAMOXIFEN CITRATE 20 MG: 10 TABLET, FILM COATED ORAL at 10:19

## 2021-10-26 RX ADMIN — DICYCLOMINE HYDROCHLORIDE 10 MG: 10 CAPSULE ORAL at 08:27

## 2021-10-26 RX ADMIN — RISPERIDONE 1 MG: 1 TABLET ORAL at 20:53

## 2021-10-26 RX ADMIN — DICYCLOMINE HYDROCHLORIDE 10 MG: 10 CAPSULE ORAL at 16:23

## 2021-10-26 RX ADMIN — MEMANTINE 5 MG: 5 TABLET ORAL at 20:53

## 2021-10-26 RX ADMIN — SERTRALINE 50 MG: 50 TABLET, FILM COATED ORAL at 08:28

## 2021-10-26 RX ADMIN — Medication 10 ML: at 20:53

## 2021-10-26 RX ADMIN — ACETAMINOPHEN 650 MG: 325 TABLET, FILM COATED ORAL at 06:37

## 2021-10-26 RX ADMIN — MEMANTINE 5 MG: 5 TABLET ORAL at 08:27

## 2021-10-26 RX ADMIN — PANTOPRAZOLE SODIUM 40 MG: 40 TABLET, DELAYED RELEASE ORAL at 06:37

## 2021-10-26 RX ADMIN — METOPROLOL TARTRATE 25 MG: 25 TABLET, FILM COATED ORAL at 20:53

## 2021-10-26 RX ADMIN — APIXABAN 2.5 MG: 2.5 TABLET, FILM COATED ORAL at 20:52

## 2021-10-26 RX ADMIN — APIXABAN 2.5 MG: 2.5 TABLET, FILM COATED ORAL at 08:28

## 2021-10-26 RX ADMIN — DICYCLOMINE HYDROCHLORIDE 10 MG: 10 CAPSULE ORAL at 20:53

## 2021-10-26 NOTE — PROGRESS NOTES
Problem: Mobility Impaired (Adult and Pediatric)  Goal: *Acute Goals and Plan of Care (Insert Text)  Description: STG:  (1.)Ms. Rajat Thomas will move from supine to sit and sit to supine , scoot up and down, and roll side to side with CONTACT GUARD ASSIST within 3 treatment day(s). (2.)Ms. Rajat Thomas will transfer from bed to chair and chair to bed with MINIMAL ASSIST using the least restrictive device within 3 treatment day(s). (3.)Ms. Rajat Thomas will ambulate with MINIMAL ASSIST for 20 feet with the least restrictive device within 3 treatment day(s). LTG:  (1.)Ms. Rajat Thomas will move from supine to sit and sit to supine , scoot up and down, and roll side to side in bed with MODIFIED INDEPENDENCE within 7 treatment day(s). (2.)Ms. Rajat Tohmas will transfer from bed to chair and chair to bed with SUPERVISION using the least restrictive device within 7 treatment day(s). (3.)Ms. Rajat Thomas will ambulate with SUPERVISION for a minimum of 50 feet with the least restrictive device within 7 treatment day(s).   ________________________________________________________________________________________________      Outcome: Progressing Towards Goal       PHYSICAL THERAPY: Daily Note and AM 10/26/2021  INPATIENT: PT Visit Days : 2  Payor: Dionne So / Plan: [id number] PPO/PFFS / Product Type: Easiest Credit Card To Get Approved For Care Medicare /       NAME/AGE/GENDER: Arabella Wood is a 80 y.o. female   PRIMARY DIAGNOSIS: Acute renal failure (ARF) ([facility] [address] [N17.9] Acute renal failure superimposed on stage 3b chronic kidney disease ([facility] [address] Acute renal failure superimposed on stage 3b chronic kidney disease ([address]       ICD-10: Treatment Diagnosis:    · Generalized Muscle Weakness (M62.81)  · Other lack of cordination (R27.8)  · Difficulty in walking, Not elsewhere classified (R26.2)  · Repeated Falls (R29.6)  · History of falling (Z91.81)   Precaution/Allergies:  Advair diskus [fluticasone propion-salmeterol], Aspirin, Codeine, and Lipitor [atorvastatin]      ASSESSMENT:     Ms. Kimi Steve presents with acute renal failure and AMS. She was recently DC from [address] [place] [address] and has been home for about a month. Pt reports living alone at baseline but presents with some confusion and is a poor historian. She was able to perform bed mobility with mod A and VC for safety. Tactile cueing needed intermittently with ambulation for safety and increased stability. She presents with overall decreased stability and safety with mobility at this time and would benefit from continued skilled PT.   10/26- pt supine on contact, remains in ICU. She is agreeable to work with therapy and get out of bed. We worked on bed mobility, supine to sit and scooting. Pt with poor sitting balance in static and dynamic. Worked on sit to stand, some weight shifting in standing to help her get her balance and then took a few steps to the chair. Pt very weak and needed mod assist to get to chair, do not think she could have gone any further due to her weakness and decreased balance. In chair she was able to scoot better than in the bed and she scooted to the back of the chair. Worked on some leg exercises with pt with verbal cues. She stayed up in chair with needs in reach and RN notified. Per CM note they are looking at [place] [facility] for continued therapy. This section established at most recent assessment   PROBLEM LIST (Impairments causing functional limitations):  1. Decreased Strength  2. Decreased ADL/Functional Activities  3. Decreased Transfer Abilities  4. Decreased Ambulation Ability/Technique  5. Decreased Balance  6. Decreased Activity Tolerance  7. Decreased Cognition   INTERVENTIONS PLANNED: (Benefits and precautions of physical therapy have been discussed with the patient.)  1. Balance Exercise  2. Bed Mobility  3. Gait Training  4. Home Exercise Program (HEP)  5. Neuromuscular Re-education/Strengthening  6. Range of Motion (ROM)  7. Therapeutic Activites  8.  Therapeutic Exercise/Strengthening  9. Transfer Training     TREATMENT PLAN: Frequency/Duration: daily for duration of hospital stay  Rehabilitation Potential For Stated Goals: [facility] (at time of discharge pending progress):    Placement: It is my opinion, based on this patient's performance to date, that Ms. Deni Christianson may benefit from intensive therapy at a [address] [place] [address] after discharge due to the functional deficits listed above that are likely to improve with skilled rehabilitation and concerns that he/she may be unsafe to be unsupervised at home due to high fall risk and decreased stability and safety with mobility . Equipment:    None at this time              HISTORY:   History of Present Injury/Illness (Reason for Referral):  Go Way is a 80 y.o. female with medical history of breast cancer, recurrent UTI's, PAF on Eliquis, HFpEF, mild dementia who presented with decrease urine output and hypotension of 1 day duration. Pt took her Am meds and her daughter checks her BP and noted it was low. She notified home health RN who instructed for her to come to the ED. Pt has a history of recurrent UTI's and has been taking Macrobid for it. She went to see her PCP on 10/19 for confusion and urinary odor. She was started on Bactrim and home Losartan/Hctz was held. Of note, she was also admitted in August for KATEY and UTI, went to rehab until mid-September. Pt denies SOB, chest pain, abdominal pain, N/V, diarrhea, constipation. In ED, SBP 80-90's, DBP 40's. She was given 1.5L bolus in ED without much improvement. LA 2.7. WBC wnl. CMP with Cr 4.16 (baseline 1.1-1.2). UA negative for UTI but was treated with Bactrim for 5 days previously. Pt was given Rocephin. BCx and UCx obtained.    Past Medical History/Comorbidities:   Ms. Deni Christianson  has a past medical history of Abdominal pain (9/29/2013), COPD (chronic obstructive pulmonary disease) ([facility] [address] DJD (degenerative joint disease) (9/29/2013), Esophageal reflux (9/29/2013), HTN (hypertension) (9/29/2013), Hypercholesterolemia, Hypertension, Other and unspecified hyperlipidemia (9/29/2013), Pyelonephritis (9/29/2013), Sensory neuropathy (12/23/2020), and UTI (lower urinary tract infection) (9/29/2013). Ms. Laila Soas  has a past surgical history that includes hx pacemaker; pr chest surgery procedure unlisted; pr lap,cholecystectomy; hx hysterectomy; pr cardiac surg procedure unlist (2/05 8/2015); and pr breast surgery procedure unlisted (01/07/2019). Social History/Living Environment:   Home Environment: Apartment  # Steps to Enter: 1  One/Two Story Residence: One story  Living Alone: Yes  Support Systems: Child(viktor)  Patient Expects to be Discharged to[de-identified] Apartment  Current DME Used/Available at Home: Walker, rollator  Prior Level of Function/Work/Activity:  Pt is a poor historian but reports living alone and needing assistance with bathing. Confusion and dementia at baseline with history of UTIs. Number of Personal Factors/Comorbidities that affect the Plan of Care: 3+: HIGH COMPLEXITY   EXAMINATION:   Most Recent Physical Functioning:   Gross Assessment:   Generalized muscle weakness               Posture:  Rounded shoulders and slight forward flexion in stand     Balance:  Sitting: Impaired  Sitting - Static: Fair (occasional)  Sitting - Dynamic: Poor (constant support)  Standing: Impaired;Pull to stand; With support  Standing - Static: Poor  Standing - Dynamic : Poor Bed Mobility:  Supine to Sit: Moderate assistance; Additional time  Scooting: Maximum assistance; Additional time  Level of Assistance: Maximum assistance  Interventions: Safety awareness training;Verbal cues  Wheelchair Mobility:     Transfers:  Sit to Stand: Moderate assistance  Stand to Sit: Minimum assistance  Stand Pivot Transfers:  Moderate assistance  Bed to Chair: Moderate assistance  Interventions: Safety awareness training;Verbal cues  Duration: 15 Minutes  Gait:            Body Structures Involved:  1. Joints  2. Muscles  3. Brain-cognition Body Functions Affected:  1. Mental  2. Neuromusculoskeletal  3. Movement Related Activities and Participation Affected:  1. Mobility   Number of elements that affect the Plan of Care: 4+: HIGH COMPLEXITY   CLINICAL PRESENTATION:   Presentation: Evolving clinical presentation with changing clinical characteristics: MODERATE COMPLEXITY   CLINICAL DECISION MAKIN [facility] Mobility Inpatient Short Form  How much difficulty does the patient currently have. .. Unable A Lot A Little None   1. Turning over in bed (including adjusting bedclothes, sheets and blankets)? [] 1   [] 2   [x] 3   [] 4   2. Sitting down on and standing up from a chair with arms ( e.g., wheelchair, bedside commode, etc.)   [] 1   [] 2   [x] 3   [] 4   3. Moving from lying on back to sitting on the side of the bed? [] 1   [x] 2   [] 3   [] 4   How much help from another person does the patient currently need. .. Total A Lot A Little None   4. Moving to and from a bed to a chair (including a wheelchair)? [] 1   [x] 2   [] 3   [] 4   5. Need to walk in hospital room? [] 1   [x] 2   [] 3   [] 4   6. Climbing 3-5 steps with a railing? [x] 1   [] 2   [] 3   [] 4   © , Trustees of [facility] [employer], under license to [employer]. All rights reserved      Score:  Initial: 13 Most Recent: X (Date: -- )    Interpretation of Tool:  Represents activities that are increasingly more difficult (i.e. Bed mobility, Transfers, Gait). Medical Necessity:     · Skilled intervention continues to be required due to decreased overall functional mobility and strength. Reason for Services/Other Comments:  · Patient continues to require skilled intervention due to   · Functioning below her baseline with generalized muscle weakness and overall mobility  · .    Use of outcome tool(s) and clinical judgement create a POC that gives a: Questionable prediction of patient's progress: MODERATE COMPLEXITY            TREATMENT:   (In addition to Assessment/Re-Assessment sessions the following treatments were rendered)   Pre-treatment Symptoms/Complaints:  Fatigue, increased confusion  Pain: Initial:   Pain Intensity 1: 0  Post Session:  0     Therapeutic Exercise: (15 Minutes):  Exercises per grid below to improve mobility and strength. Required minimal verbal and manual cues to promote proper body alignment and promote proper body posture. Progressed repetitions as indicated. Date:  10/26 Date:   Date:     Activity/Exercise Parameters Parameters Parameters   Long arc quads 10     Seated hip flexion 10     Ankle pumps 10     Hip ABD/ADD 10     Heel slides 10     Straight leg raises 10 (very weak)                 Therapeutic Activity: (  15 Minutes ):  Therapeutic activities including Bed transfers and Ambulation on level ground to improve mobility, strength, balance, and coordination. Required moderate verbal and tactile cues  to promote coordination of bilateral, lower extremity(s). Braces/Orthotics/Lines/Etc:   · IV  · mendes catheter  · telemetry monitoring  · O2 Device: Nasal cannula  Treatment/Session Assessment:    · Response to Treatment:  Pt tolerated treatment session well with need for increased assistance for all mobility. · Interdisciplinary Collaboration:   o Registered Nurse  · After treatment position/precautions:   o Up in chair  o Bed/Chair-wheels locked  o Call light within reach  o RN notified   · Compliance with Program/Exercises: Will assess as treatment progresses  · Recommendations/Intent for next treatment session: \"Next visit will focus on advancements to more challenging activities and reduction in assistance provided\".   Total Treatment Duration:  PT Patient Time In/Time Out  Time In: 1016  Time Out: [address]., PT

## 2021-10-26 NOTE — PROGRESS NOTES
Hospitalist Progress Note   Admit Date:  10/24/2021  4:23 PM   Name:  James Kuhn   Age:  80 y.o. Sex:  female  :  1940   MRN:  [id number]   Room:  [facility]/01    Presenting Complaint: Hypotension    Reason(s) for Admission: Acute renal failure (ARF) [facility]) [N17.9]     Hospital Course & Interval History:   80 y.o. female with medical history of breast cancer, recurrent UTI's, PAF on Eliquis, HFpEF, mild dementia who presented with decrease urine output and hypotension of 1 day duration. Pt took her Am meds and her daughter checks her BP and noted it was low. She notified home health RN who instructed for her to come to the ED. Pt has a history of recurrent UTI's and has been taking Macrobid for it. She went to see her PCP on 10/19 for confusion and urinary odor. She was started on Bactrim and home Losartan/Hctz was held. Of note, she was also admitted in August for KATEY and UTI, went to rehab until mid-September. Pt denies SOB, chest pain, abdominal pain, N/V, diarrhea, constipation. In ED, SBP 80-90's, DBP 40's. She was given 1.5L bolus in ED without much improvement. LA 2.7. WBC wnl. CMP with Cr 4.16 (baseline 1.1-1.2). UA negative for UTI but was treated with Bactrim for 5 days previously. Pt was given Rocephin. BCx and UCx obtained. Pt admitted for KATEY on CKD stage 3 as well as persistent hypotension requiring admission to ICU for Levophed. She was on vassopressor for 1 day and has been off it since 10/25. Kidney function improving with IV hydration; D/w Nephrology to hold off consult. Subjective (10/26/21):    Pt alert and oriented x3. Off of Levophed since yesterday. Cr improving. Feeling better. Denies SOB, chest pain, abdominal pain, N/V. Assessment & Plan:     KATEY on CKD stage 3, improving  Cr on admission was 4.16 (Cr 1.1-1.2 at baseline). Was recently taken off of home Losartan.  Most likely d/t recent UTI and hypotension/hypovolemia contributing  CTAP w/o contrast unremarkable  Avoid nephrotoxic meds  Accurate I&O's  Holding home diuretics/ACE-I/ARB  Cont MIVF  Urine studies pending--not helpful at this point as pt as been hydrated  Cr improving. Cont mIVF for another 24h. If Cr back to baseline in AM, will d/c IVF.     History of recurrent UTI's  Holding home Bactrim and Macrobid in acute setting  Abx:Rocephin (10/24-. ..)empirically for now since hypotensive pending UCx  BCX: NGTD  Ucx: NGTD     Hypotension, resolved  Most likely d/t hypovolemia and recent UTI and KATEY per above. S/p 2L bolus. Holding home antihypertensives  Off of Levophed since 10/25  Decrease midodrine down to 2.5mg TID, if BP remains stable, will consider d/c in AM     PAF  Cont home Eliquis, renally dose with age for now. Once Cr improves >1.5 then can resume home dose  BP improves, resume home metoprolol    Lactic acidosis, resolved  Most likely d/t hypotension and KATEY per above    Nocturnal hypoxemia  Cont 2LO2NC at baseline     HFpEF, chronic  Primary pulm HTN  TTE in 2020 with EF 50-55%  Holding home Bumex  I&O's, daily weights     Cognitive disorder  Dementia/autditory hallucination  Holding home gabapentin d/t KATEY  Cont home Namenda and Risperidone     Depression  Cont home Sertraline     Hx of HTN  Holding home antihypertensives (norvasc)  Resume home metoprolol for PAF     GERD  Cont home PPI     Gout  Holding home allopurinol d/t KATEY     Normocytic anemia  Chronic per daughter  Hgb baseline 9-10     SSS  Pacemaker in place  Noted     Hx of breast cancer  Cont home Tamoxifen     Frequent falls  Debility  Limit sedating meds  Fall precautions  PT/OT      Dispo/Discharge Planning:      SNF pending once Cr back to baseline and BP stable. Anticipate will be medically ready tomorrow.     Diet:  ADULT DIET Regular  DVT PPx: Eliquis  Code status: DNR    Hospital Problems as of 10/26/2021 Date Reviewed: 8/29/2021        Codes Class Noted - Resolved POA    * (Principal) Acute renal failure superimposed on stage 3b chronic kidney disease ([facility] ICD-10-CM: N17.9, N18.32  ICD-9-CM: 584.9, 585.3  10/24/2021 - Present         Hypotension ICD-10-CM: I95.9  ICD-9-CM: 458.9  10/24/2021 - Present Unknown        Normocytic anemia ICD-10-CM: D64.9  ICD-9-CM: 285.9  10/24/2021 - Present Unknown        Lactic acidosis ICD-10-CM: E87.2  ICD-9-CM: 276.2  10/24/2021 - Present Unknown        Chronic heart failure with preserved ejection fraction (HFpEF) ([facility] ICD-10-CM: I50.32  ICD-9-CM: 428.9  9/20/2021 - Present Yes        History of gout ICD-10-CM: Z87.39  ICD-9-CM: V12.29  8/24/2021 - Present Yes    Overview Signed 10/24/2021  7:23 PM by Liudmila Francois DO     Last Assessment & Plan:   Formatting of this note might be different from the original.  Continue Allopurinol 100 mg daily             History of recurrent UTIs ICD-10-CM: Z87.440  ICD-9-CM: V13.02  8/24/2021 - Present Yes    Overview Signed 10/24/2021  7:23 PM by Liudmila Francois DO     Last Assessment & Plan:   Formatting of this note might be different from the original.  Patient is on Macrodantin daily per PCP             Debility ICD-10-CM: R53.81  ICD-9-CM: 799.3  8/24/2021 - Present Yes    Overview Signed 10/24/2021  7:23 PM by Liudmila Francois DO     Last Assessment & Plan:   Formatting of this note might be different from the original.  Due to hx falls and recent hospitalization. Making progress with therapy  Recent COVID + - but thankfully no/min sx  9/13  - pt being discharged today to her Senior living apt. Will continue close f/u with Dr. Yenni Novoa for medication management for delusions/hallucinations etc (none observed in SNF). [facility] I advised lauren to look into [facility] for near future needs.              Current use of long term anticoagulation (Chronic) ICD-10-CM: Z79.01  ICD-9-CM: V58.61  8/18/2021 - Present Yes    Overview Signed 8/18/2021 10:04 PM by MD Alie Goldberg             Frequent falls (Chronic) ICD-10-CM: R29.6  ICD-9-CM: V15.88  8/18/2021 - Present Yes        Cognitive disorder (Chronic) ICD-10-CM: F09  ICD-9-CM: 294.9  4/20/2021 - Present Yes        Mild episode of recurrent major depressive disorder ([facility] ICD-10-CM: F33.0  ICD-9-CM: 296.31  1/24/2019 - Present Yes        Status cardiac pacemaker (Chronic) ICD-10-CM: Z95.0  ICD-9-CM: V45.01  8/30/2017 - Present Yes        Sick sinus syndrome ([facility] ICD-10-CM: I49.5  ICD-9-CM: 427.81  8/30/2017 - Present Yes        Diastolic dysfunction SDG-29-NS: I51.89  ICD-9-CM: 429.9  7/14/2017 - Present Yes        Hyperlipidemia ICD-10-CM: E78.5  ICD-9-CM: 272.4  7/28/2015 - Present Yes        Atrial fibrillation (HCC) (Chronic) ICD-10-CM: I48.91  ICD-9-CM: 427.31  1/22/2014 - Present Yes        Hypoxemia ICD-10-CM: R09.02  ICD-9-CM: 799.02  1/22/2014 - Present Yes        Hypertension ICD-10-CM: I10  ICD-9-CM: 401.9  9/29/2013 - Present Yes        Primary pulmonary hypertension ([facility] (Chronic) ICD-10-CM: I27.0  ICD-9-CM: 416.0  10/30/2000 - Present Yes              Objective:     Patient Vitals for the past 24 hrs:   Temp Pulse Resp BP SpO2   10/26/21 0700  83 17 (!) 141/63 90 %   10/26/21 0500  78 14 (!) 143/60 97 %   10/26/21 0400  79 13 138/71 98 %   10/26/21 0300 98.8 °F (37.1 °C) 81 16 (!) 147/64 97 %   10/26/21 0200  76 13 (!) 143/61 98 %   10/26/21 0100  75 16 (!) 142/57 98 %   10/26/21 0000  84 21 (!) 147/57 96 %   10/25/21 2300 99.4 °F (37.4 °C) 74 14 (!) 142/57 97 %   10/25/21 2200  79 15 (!) 148/57 96 %   10/25/21 2100  75 10 (!) 140/58 98 %   10/25/21 2000  69 11 (!) 127/52 92 %   10/25/21 1900 99.3 °F (37.4 °C) 71 12 (!) 131/48 91 %   10/25/21 1845  74 15 138/64 93 %   10/25/21 1530  76 11 (!) 107/52 93 %   10/25/21 1430  83 14 (!) 103/44 95 %   10/25/21 1330  97 12 (!) 149/59 93 %   10/25/21 1230  74 13 (!) 126/53 96 %   10/25/21 1200  75 14 (!) 119/49 97 %   10/25/21 1130  81 15 (!) 110/48 97 %   10/25/21 1100 98.9 °F (37.2 °C) 83 13 (!) 99/47 98 % 10/25/21 1015  88 26 101/78 94 %   10/25/21 1000  92 20 108/71 96 %   10/25/21 0945  94 16 (!) 142/60 96 %   10/25/21 0930  92 28 133/64 95 %   10/25/21 0915  74 14 112/62 (!) 88 %   10/25/21 0845  70 10 (!) 99/52 98 %   10/25/21 0830  69 10 (!) 98/49 97 %   10/25/21 0815  69 10 (!) 110/52 96 %   10/25/21 0800  69 21 (!) 119/51 96 %   10/25/21 0750  69  130/66    10/25/21 0745  73 20 130/66 96 %   10/25/21 0730  69 11 (!) 127/54 96 %   10/25/21 0719 98 °F (36.7 °C) 69 21 (!) 140/55 98 %     Oxygen Therapy  O2 Sat (%): 90 % (10/26/21 0700)  Pulse via Oximetry: 84 beats per minute (10/26/21 0700)  O2 Device: Nasal cannula (10/26/21 0300)  Skin Assessment: Clean, dry, & intact (10/25/21 2300)  Skin Protection for O2 Device: N/A (10/25/21 0430)  O2 Flow Rate (L/min): 2 l/min (10/26/21 0300)    Estimated body mass index is 29.26 kg/m² as calculated from the following:    Height as of this encounter: 5' 2\" (1.575 m). Weight as of this encounter: 72.6 kg (160 lb). Intake/Output Summary (Last 24 hours) at 10/26/2021 0718  Last data filed at 10/26/2021 0600  Gross per 24 hour   Intake 1911.25 ml   Output 1800 ml   Net 111.25 ml         Physical Exam:     Blood pressure (!) 141/63, pulse 83, temperature 98.8 °F (37.1 °C), resp. rate 17, height 5' 2\" (1.575 m), weight 72.6 kg (160 lb), SpO2 90 %, not currently breastfeeding. General:    Well nourished. No overt distress  Head:  Normocephalic, atraumatic  Eyes:  Sclerae appear normal.  Pupils equally round. ENT:  Nares appear normal, no drainage. Moist oral mucosa  Neck:  No restricted ROM. Trachea midline   CV:   Irregular rhythm, regular rate. No m/r/g. No jugular venous distension. Lungs:   CTAB. No wheezing, rhonchi, or rales. Respirations even, unlabored  Abdomen: Bowel sounds present. Soft, nontender, nondistended. Extremities: No cyanosis or clubbing. No edema  Skin:     No rashes and normal coloration. Warm and dry.     Neuro:  CN II-XII grossly intact. Sensation intact. A&Ox3  Psych:  Normal mood and affect. I have reviewed ordered lab tests and independently visualized imaging below:    Recent Labs:  Recent Results (from the past 48 hour(s))   CBC WITH AUTOMATED DIFF    Collection Time: 10/24/21  4:42 PM   Result Value Ref Range    WBC 10.7 4.3 - 11.1 K/uL    RBC 3.54 (L) 4.05 - 5.2 M/uL    HGB 9.9 (L) 11.7 - 15.4 g/dL    HCT 30.9 (L) 35.8 - 46.3 %    MCV 87.3 79.6 - 97.8 FL    MCH 28.0 26.1 - 32.9 PG    MCHC 32.0 31.4 - 35.0 g/dL    RDW 13.3 11.9 - 14.6 %    PLATELET 891 164 - 387 K/uL    MPV 10.1 9.4 - 12.3 FL    ABSOLUTE NRBC 0.00 0.0 - 0.2 K/uL    DF AUTOMATED      NEUTROPHILS 71 43 - 78 %    LYMPHOCYTES 16 13 - 44 %    MONOCYTES 7 4.0 - 12.0 %    EOSINOPHILS 5 0.5 - 7.8 %    BASOPHILS 1 0.0 - 2.0 %    IMMATURE GRANULOCYTES 1 0.0 - 5.0 %    ABS. NEUTROPHILS 7.6 1.7 - 8.2 K/UL    ABS. LYMPHOCYTES 1.8 0.5 - 4.6 K/UL    ABS. MONOCYTES 0.8 0.1 - 1.3 K/UL    ABS. EOSINOPHILS 0.5 0.0 - 0.8 K/UL    ABS. BASOPHILS 0.1 0.0 - 0.2 K/UL    ABS. IMM. GRANS. 0.1 0.0 - 0.5 K/UL   METABOLIC PANEL, COMPREHENSIVE    Collection Time: 10/24/21  4:42 PM   Result Value Ref Range    Sodium 137 136 - 145 mmol/L    Potassium 3.8 3.5 - 5.1 mmol/L    Chloride 103 98 - 107 mmol/L    CO2 24 21 - 32 mmol/L    Anion gap 10 7 - 16 mmol/L    Glucose 179 (H) 65 - 100 mg/dL    BUN 41 (H) 8 - 23 MG/DL    Creatinine 4.16 (H) 0.6 - 1.0 MG/DL    GFR est AA 13 (L) >60 ml/min/1.73m2    GFR est non-AA 11 (L) >60 ml/min/1.73m2    Calcium 9.6 8.3 - 10.4 MG/DL    Bilirubin, total 0.2 0.2 - 1.1 MG/DL    ALT (SGPT) 19 12 - 65 U/L    AST (SGOT) 25 15 - 37 U/L    Alk.  phosphatase 55 50 - 136 U/L    Protein, total 6.4 6.3 - 8.2 g/dL    Albumin 2.9 (L) 3.2 - 4.6 g/dL    Globulin 3.5 2.3 - 3.5 g/dL    A-G Ratio 0.8 (L) 1.2 - 3.5     LACTIC ACID    Collection Time: 10/24/21  4:42 PM   Result Value Ref Range    Lactic acid 2.7 (H) 0.4 - 2.0 MMOL/L   LIPASE    Collection Time: 10/24/21  4:42 PM   Result Value Ref Range    Lipase 85 73 - 393 U/L   NT-PRO BNP    Collection Time: 10/24/21  4:42 PM   Result Value Ref Range    NT pro- (H) <450 PG/ML   TROPONIN-HIGH SENSITIVITY    Collection Time: 10/24/21  4:42 PM   Result Value Ref Range    Troponin-High Sensitivity 14.0 0 - 14 pg/mL   CULTURE, BLOOD    Collection Time: 10/24/21  4:42 PM    Specimen: Blood   Result Value Ref Range    Special Requests: NO SPECIAL REQUESTS  LEFT FOREARM        Culture result: NO GROWTH AFTER 13 HOURS     CULTURE, BLOOD    Collection Time: 10/24/21  4:42 PM    Specimen: Blood   Result Value Ref Range    Special Requests: NO SPECIAL REQUESTS  LEFT UPPER ARM        Culture result: NO GROWTH AFTER 13 HOURS     EKG, 12 LEAD, INITIAL    Collection Time: 10/24/21  4:58 PM   Result Value Ref Range    Ventricular Rate 70 BPM    Atrial Rate 70 BPM    P-R Interval 248 ms    QRS Duration 92 ms    Q-T Interval 454 ms    QTC Calculation (Bezet) 490 ms    Calculated R Axis 5 degrees    Calculated T Axis 20 degrees    Diagnosis       Atrial-paced rhythm  Low voltage QRS  Nonspecific T wave abnormality  Prolonged QT  Abnormal ECG  When compared with ECG of 24-OCT-2021 04:16,  No significant change was found    Confirmed by ANTHONY GARCIA ([id number]) on 10/25/2021 6:55:03 AM     URINALYSIS W/ RFLX MICROSCOPIC    Collection Time: 10/24/21  6:02 PM   Result Value Ref Range    Color CHANEL      Appearance CLOUDY      Specific gravity 1.023 1.001 - 1.023      pH (UA) 5.0 5.0 - 9.0      Protein Negative NEG mg/dL    Glucose Negative mg/dL    Ketone TRACE (A) NEG mg/dL    Bilirubin MODERATE (A) NEG      Blood Negative NEG      Urobilinogen 0.2 0.2 - 1.0 EU/dL    Nitrites Negative NEG      Leukocyte Esterase Negative NEG     CULTURE, URINE    Collection Time: 10/24/21  6:03 PM    Specimen: Cath Urine   Result Value Ref Range    Special Requests: NO SPECIAL REQUESTS      Culture result:        NO GROWTH AFTER SHORT PERIOD OF INCUBATION. FURTHER RESULTS TO FOLLOW AFTER OVERNIGHT INCUBATION.    TROPONIN-HIGH SENSITIVITY    Collection Time: 10/24/21  6:40 PM   Result Value Ref Range    Troponin-High Sensitivity 13.5 0 - 14 pg/mL   LACTIC ACID    Collection Time: 10/24/21  6:40 PM   Result Value Ref Range    Lactic acid 0.7 0.4 - 2.0 MMOL/L   METABOLIC PANEL, BASIC    Collection Time: 10/25/21  3:00 AM   Result Value Ref Range    Sodium 141 136 - 145 mmol/L    Potassium 3.6 3.5 - 5.1 mmol/L    Chloride 110 (H) 98 - 107 mmol/L    CO2 24 21 - 32 mmol/L    Anion gap 7 7 - 16 mmol/L    Glucose 96 65 - 100 mg/dL    BUN 34 (H) 8 - 23 MG/DL    Creatinine 2.97 (H) 0.6 - 1.0 MG/DL    GFR est AA 19 (L) >60 ml/min/1.73m2    GFR est non-AA 16 (L) >60 ml/min/1.73m2    Calcium 8.7 8.3 - 10.4 MG/DL   CBC W/O DIFF    Collection Time: 10/25/21  3:00 AM   Result Value Ref Range    WBC 7.1 4.3 - 11.1 K/uL    RBC 3.38 (L) 4.05 - 5.2 M/uL    HGB 9.3 (L) 11.7 - 15.4 g/dL    HCT 29.3 (L) 35.8 - 46.3 %    MCV 86.7 79.6 - 97.8 FL    MCH 27.5 26.1 - 32.9 PG    MCHC 31.7 31.4 - 35.0 g/dL    RDW 13.2 11.9 - 14.6 %    PLATELET 775 730 - 364 K/uL    MPV 9.5 9.4 - 12.3 FL    ABSOLUTE NRBC 0.00 0.0 - 0.2 K/uL   METABOLIC PANEL, BASIC    Collection Time: 10/26/21  4:39 AM   Result Value Ref Range    Sodium 141 136 - 145 mmol/L    Potassium 4.1 3.5 - 5.1 mmol/L    Chloride 111 (H) 98 - 107 mmol/L    CO2 25 21 - 32 mmol/L    Anion gap 5 (L) 7 - 16 mmol/L    Glucose 89 65 - 100 mg/dL    BUN 22 8 - 23 MG/DL    Creatinine 1.73 (H) 0.6 - 1.0 MG/DL    GFR est AA 36 (L) >60 ml/min/1.73m2    GFR est non-AA 30 (L) >60 ml/min/1.73m2    Calcium 9.5 8.3 - 10.4 MG/DL   CBC W/O DIFF    Collection Time: 10/26/21  4:39 AM   Result Value Ref Range    WBC 6.5 4.3 - 11.1 K/uL    RBC 3.42 (L) 4.05 - 5.2 M/uL    HGB 9.5 (L) 11.7 - 15.4 g/dL    HCT 29.8 (L) 35.8 - 46.3 %    MCV 87.1 79.6 - 97.8 FL    MCH 27.8 26.1 - 32.9 PG    MCHC 31.9 31.4 - 35.0 g/dL    RDW 13.3 11.9 - 14.6 %    PLATELET 304 150 - 450 K/uL    MPV 9.2 (L) 9.4 - 12.3 FL    ABSOLUTE NRBC 0.00 0.0 - 0.2 K/uL       All Micro Results     Procedure Component Value Units Date/Time    CULTURE, BLOOD [[id number]] Collected: 10/24/21 1642    Order Status: Completed Specimen: Blood Updated: 10/25/21 0714     Special Requests: --        NO SPECIAL REQUESTS  LEFT UPPER ARM       Culture result: NO GROWTH AFTER 13 HOURS       CULTURE, BLOOD [[id number]] Collected: 10/24/21 1642    Order Status: Completed Specimen: Blood Updated: 10/25/21 0714     Special Requests: --        NO SPECIAL REQUESTS  LEFT FOREARM       Culture result: NO GROWTH AFTER 13 HOURS       CULTURE, URINE [[id number]] Collected: 10/24/21 1803    Order Status: Completed Specimen: Cath Urine Updated: 10/25/21 0620     Special Requests: NO SPECIAL REQUESTS        Culture result:       NO GROWTH AFTER SHORT PERIOD OF INCUBATION. FURTHER RESULTS TO FOLLOW AFTER OVERNIGHT INCUBATION. Other Studies:  No results found.     Current Meds:  Current Facility-Administered Medications   Medication Dose Route Frequency    midodrine (PROAMATINE) tablet 2.5 mg  2.5 mg Oral TID WITH MEALS    albuterol (PROVENTIL VENTOLIN) nebulizer solution 2.5 mg  2.5 mg Nebulization Q6H PRN    apixaban (ELIQUIS) tablet 2.5 mg  2.5 mg Oral Q12H    aspirin delayed-release tablet 81 mg  81 mg Oral DAILY    dicyclomine (BENTYL) capsule 10 mg  10 mg Oral TID    pantoprazole (PROTONIX) tablet 40 mg  40 mg Oral ACB    memantine (NAMENDA) tablet 5 mg  5 mg Oral Q12H    montelukast (SINGULAIR) tablet 10 mg  10 mg Oral DAILY    risperiDONE (RisperDAL) tablet 1 mg  1 mg Oral Q12H    sertraline (ZOLOFT) tablet 50 mg  50 mg Oral DAILY    tamoxifen (NOLVADEX) tablet 20 mg  20 mg Oral DAILY    sodium chloride (NS) flush 5-40 mL  5-40 mL IntraVENous Q8H    sodium chloride (NS) flush 5-40 mL  5-40 mL IntraVENous PRN    acetaminophen (TYLENOL) tablet 650 mg  650 mg Oral Q6H PRN    Or    acetaminophen (TYLENOL) suppository 650 mg  650 mg Rectal Q6H PRN    polyethylene glycol (MIRALAX) packet 17 g  17 g Oral DAILY PRN    ondansetron (ZOFRAN ODT) tablet 4 mg  4 mg Oral Q8H PRN    Or    ondansetron (ZOFRAN) injection 4 mg  4 mg IntraVENous Q6H PRN    lactated Ringers infusion  75 mL/hr IntraVENous CONTINUOUS    cefTRIAXone (ROCEPHIN) 1 g in 0.9% sodium chloride (MBP/ADV) 50 mL MBP  1 g IntraVENous Q24H       Signed: Bianca Martini DO    Part of this note may have been written by using a voice dictation software. The note has been proof read but may still contain some grammatical/other typographical errors.

## 2021-10-26 NOTE — CONSULTS
Came to discuss any concerns patient had in regards to her renal function. She did not have any specific questions for me. Will sign off. Renal function continues to improve. She is non oliguric and electrolytes are acceptable.      Marissa Castro MD  [facility]

## 2021-10-26 NOTE — PROGRESS NOTES
Care Management Interventions  PCP Verified by CM: Yes (Mera Cunha MD)  Mode of Transport at Discharge: BLS  Transition of Care Consult (CM Consult): Discharge Planning  Support Systems: Child(viktor)  Confirm Follow Up Transport: Family  The Patient and/or Patient Representative was Provided with a Choice of Provider and Agrees with the Discharge Plan?: Yes  Freedom of Choice List was Provided with Basic Dialogue that Supports the Patient's Individualized Plan of Care/Goals, Treatment Preferences and Shares the Quality Data Associated with the Providers?: Yes  Discharge Location  Discharge Placement: Skilled nursing facility  Saw pt in interdisciplinarily rounds with the following disciplines: Physician, Case Management, Nursing, Dietary,Therapy and Pharmacy. The plan of care was discussed along with goals for discharge date/ location. Per MD pt may be medically ready to d/c tomorrow. Pt spoke with her dtr and they are interested in [facility], I have sent a referral in [facility] [place]. Also left message for [place]. Will follow up later. @[phone] [facility] does not have any beds. I called and discussed this with Tabby/barbara and her other choices are #[id number]  or #2 [facility]. [facility] has a bed and precert has been started, they will accept a negative Rapid b/c pt is vaccinated. Family and pt aware.

## 2021-10-27 VITALS
OXYGEN SATURATION: 94 % | HEIGHT: 62 IN | WEIGHT: 160 LBS | TEMPERATURE: 98.5 F | RESPIRATION RATE: 20 BRPM | DIASTOLIC BLOOD PRESSURE: 64 MMHG | SYSTOLIC BLOOD PRESSURE: 131 MMHG | BODY MASS INDEX: 29.44 KG/M2 | HEART RATE: 69 BPM

## 2021-10-27 LAB
ANION GAP SERPL CALC-SCNC: 5 MMOL/L (ref 7–16)
BUN SERPL-MCNC: 13 MG/DL (ref 8–23)
CALCIUM SERPL-MCNC: 9.4 MG/DL (ref 8.3–10.4)
CHLORIDE SERPL-SCNC: 110 MMOL/L (ref 98–107)
CO2 SERPL-SCNC: 26 MMOL/L (ref 21–32)
CREAT SERPL-MCNC: 1.18 MG/DL (ref 0.6–1)
ERYTHROCYTE [DISTWIDTH] IN BLOOD BY AUTOMATED COUNT: 13.2 % (ref 11.9–14.6)
GLUCOSE SERPL-MCNC: 101 MG/DL (ref 65–100)
HCT VFR BLD AUTO: 29.8 % (ref 35.8–46.3)
HGB BLD-MCNC: 9.5 G/DL (ref 11.7–15.4)
MCH RBC QN AUTO: 27.7 PG (ref 26.1–32.9)
MCHC RBC AUTO-ENTMCNC: 31.9 G/DL (ref 31.4–35)
MCV RBC AUTO: 86.9 FL (ref 79.6–97.8)
NRBC # BLD: 0 K/UL (ref 0–0.2)
PLATELET # BLD AUTO: 236 K/UL (ref 150–450)
PMV BLD AUTO: 9.2 FL (ref 9.4–12.3)
POTASSIUM SERPL-SCNC: 4.2 MMOL/L (ref 3.5–5.1)
RBC # BLD AUTO: 3.43 M/UL (ref 4.05–5.2)
SARS-COV-2, COV2: NOT DETECTED
SODIUM SERPL-SCNC: 141 MMOL/L (ref 136–145)
SPECIMEN SOURCE, FCOV2M: NORMAL
WBC # BLD AUTO: 6.3 K/UL (ref 4.3–11.1)

## 2021-10-27 PROCEDURE — 2709999900 HC NON-CHARGEABLE SUPPLY

## 2021-10-27 PROCEDURE — 74011250636 HC RX REV CODE- 250/636: Performed by: FAMILY MEDICINE

## 2021-10-27 PROCEDURE — 74011250637 HC RX REV CODE- 250/637: Performed by: FAMILY MEDICINE

## 2021-10-27 PROCEDURE — 36415 COLL VENOUS BLD VENIPUNCTURE: CPT

## 2021-10-27 PROCEDURE — 97530 THERAPEUTIC ACTIVITIES: CPT

## 2021-10-27 PROCEDURE — 85027 COMPLETE CBC AUTOMATED: CPT

## 2021-10-27 PROCEDURE — 80048 BASIC METABOLIC PNL TOTAL CA: CPT

## 2021-10-27 RX ORDER — CEFPODOXIME PROXETIL 100 MG/1
100 TABLET, FILM COATED ORAL 2 TIMES DAILY
Qty: 10 TABLET | Refills: 0 | Status: SHIPPED | OUTPATIENT
Start: 2021-10-27 | End: 2021-11-01

## 2021-10-27 RX ADMIN — RISPERIDONE 1 MG: 1 TABLET ORAL at 08:49

## 2021-10-27 RX ADMIN — Medication 10 ML: at 06:27

## 2021-10-27 RX ADMIN — TAMOXIFEN CITRATE 20 MG: 10 TABLET, FILM COATED ORAL at 09:18

## 2021-10-27 RX ADMIN — SERTRALINE 50 MG: 50 TABLET, FILM COATED ORAL at 08:49

## 2021-10-27 RX ADMIN — DICYCLOMINE HYDROCHLORIDE 10 MG: 10 CAPSULE ORAL at 08:49

## 2021-10-27 RX ADMIN — METOPROLOL TARTRATE 25 MG: 25 TABLET, FILM COATED ORAL at 08:49

## 2021-10-27 RX ADMIN — PANTOPRAZOLE SODIUM 40 MG: 40 TABLET, DELAYED RELEASE ORAL at 08:49

## 2021-10-27 RX ADMIN — ASPIRIN 81 MG: 81 TABLET, COATED ORAL at 08:49

## 2021-10-27 RX ADMIN — APIXABAN 2.5 MG: 2.5 TABLET, FILM COATED ORAL at 08:49

## 2021-10-27 RX ADMIN — MONTELUKAST 10 MG: 10 TABLET, FILM COATED ORAL at 08:49

## 2021-10-27 RX ADMIN — SODIUM CHLORIDE, SODIUM LACTATE, POTASSIUM CHLORIDE, AND CALCIUM CHLORIDE 75 ML/HR: 600; 310; 30; 20 INJECTION, SOLUTION INTRAVENOUS at 04:26

## 2021-10-27 RX ADMIN — MEMANTINE 5 MG: 5 TABLET ORAL at 08:49

## 2021-10-27 NOTE — PROGRESS NOTES
Problem: Pressure Injury - Risk of  Goal: *Prevention of pressure injury  Description: Document Fransico Scale and appropriate interventions in the flowsheet. Outcome: Progressing Towards Goal  Note: Pressure Injury Interventions:       Moisture Interventions: Absorbent underpads, Apply protective barrier, creams and emollients, Check for incontinence Q2 hours and as needed, Internal/External urinary devices, Maintain skin hydration (lotion/cream), Minimize layers, Moisture barrier    Activity Interventions: Assess need for specialty bed, Pressure redistribution bed/mattress(bed type), PT/OT evaluation    Mobility Interventions: Float heels, HOB 30 degrees or less, Pressure redistribution bed/mattress (bed type), PT/OT evaluation, Turn and reposition approx. every two hours(pillow and wedges)    Nutrition Interventions: Document food/fluid/supplement intake, Discuss nutritional consult with provider    Friction and Shear Interventions: Apply protective barrier, creams and emollients, Foam dressings/transparent film/skin sealants, HOB 30 degrees or less, Lift sheet, Minimize layers, Transferring/repositioning devices                Problem: Falls - Risk of  Goal: *Absence of Falls  Description: Document Luz Elena Fall Risk and appropriate interventions in the flowsheet.   Outcome: Progressing Towards Goal  Note: Fall Risk Interventions:  Mobility Interventions: Bed/chair exit alarm, Communicate number of staff needed for ambulation/transfer, OT consult for ADLs, Patient to call before getting OOB, PT Consult for mobility concerns, PT Consult for assist device competence, Strengthening exercises (ROM-active/passive), Utilize walker, cane, or other assistive device         Medication Interventions: Bed/chair exit alarm, Patient to call before getting OOB, Teach patient to arise slowly    Elimination Interventions: Bed/chair exit alarm, Call light in reach, Patient to call for help with toileting needs, Stay With Me (per policy), Toilet paper/wipes in reach, Toileting schedule/hourly rounds    History of Falls Interventions: Bed/chair exit alarm, Door open when patient unattended, Evaluate medications/consider consulting pharmacy, Room close to nurse's station

## 2021-10-27 NOTE — PROGRESS NOTES
Attempted to call report for patient 3 times. The  took my name and number and said they would call me back. Pt  time is 1100. Will attempt one more time before  if I do not receive a call back from [phone] [facility].

## 2021-10-27 NOTE — PROGRESS NOTES
Problem: Mobility Impaired (Adult and Pediatric)  Goal: *Acute Goals and Plan of Care (Insert Text)  Description: STG:  (1.)Ms. Amanda Soto will move from supine to sit and sit to supine , scoot up and down, and roll side to side with CONTACT GUARD ASSIST within 3 treatment day(s). (2.)Ms. Amanda Soto will transfer from bed to chair and chair to bed with MINIMAL ASSIST using the least restrictive device within 3 treatment day(s). (3.)Ms. Amanda Soto will ambulate with MINIMAL ASSIST for 20 feet with the least restrictive device within 3 treatment day(s). LTG:  (1.)Ms. Amanda Soto will move from supine to sit and sit to supine , scoot up and down, and roll side to side in bed with MODIFIED INDEPENDENCE within 7 treatment day(s). (2.)Ms. Amanda Soto will transfer from bed to chair and chair to bed with SUPERVISION using the least restrictive device within 7 treatment day(s). (3.)Ms. Amanda Soto will ambulate with SUPERVISION for a minimum of 50 feet with the least restrictive device within 7 treatment day(s).   ________________________________________________________________________________________________      Outcome: Progressing Towards Goal       PHYSICAL THERAPY: Daily Note and AM 10/27/2021  INPATIENT: PT Visit Days : 3  Payor: Jaiden Martin / Plan: [id number] PPO/PFFS / Product Type: [employer] /       NAME/AGE/GENDER: Paulette Rae is a 80 y.o. female   PRIMARY DIAGNOSIS: Acute renal failure (ARF) ([facility] [address] [N17.9] Acute renal failure superimposed on stage 3b chronic kidney disease ([facility] [address] Acute renal failure superimposed on stage 3b chronic kidney disease ([facility] [address]       ICD-10: Treatment Diagnosis:    · Generalized Muscle Weakness (M62.81)  · Other lack of cordination (R27.8)  · Difficulty in walking, Not elsewhere classified (R26.2)  · Repeated Falls (R29.6)  · History of falling (Z91.81)   Precaution/Allergies:  Advair diskus [fluticasone propion-salmeterol], Aspirin, Codeine, and Lipitor [atorvastatin]      ASSESSMENT:     Ms. Ariana Kwan presents with acute renal failure and AMS. She was recently DC from [address] [place] [address] and has been home for about a month. Pt reports living alone at baseline but presents with some confusion and is a poor historian. She was able to perform bed mobility with mod A and VC for safety. Tactile cueing needed intermittently with ambulation for safety and increased stability. She presents with overall decreased stability and safety with mobility at this time and would benefit from continued skilled PT.   10/26- pt supine on contact, remains in ICU. She is agreeable to work with therapy and get out of bed. We worked on bed mobility, supine to sit and scooting. Pt with poor sitting balance in static and dynamic. Worked on sit to stand, some weight shifting in standing to help her get her balance and then took a few steps to the chair. Pt very weak and needed mod assist to get to chair, do not think she could have gone any further due to her weakness and decreased balance. In chair she was able to scoot better than in the bed and she scooted to the back of the chair. Worked on some leg exercises with pt with verbal cues. She stayed up in chair with needs in reach and RN notified. Per CM note they are looking at [facility] for continued therapy. 10/27 supine upon arrival.  Attempted to sit>EOB, but unable, work on scooting in the bed and rolling from L><R 3 to get up in the bed. Work on B UE/LE with guidance. She is going to [facility] today. This section established at most recent assessment   PROBLEM LIST (Impairments causing functional limitations):  1. Decreased Strength  2. Decreased ADL/Functional Activities  3. Decreased Transfer Abilities  4. Decreased Ambulation Ability/Technique  5. Decreased Balance  6. Decreased Activity Tolerance  7.  Decreased Cognition   INTERVENTIONS PLANNED: (Benefits and precautions of physical therapy have been discussed with the patient.)  1. Balance Exercise  2. Bed Mobility  3. Gait Training  4. Home Exercise Program (HEP)  5. Neuromuscular Re-education/Strengthening  6. Range of Motion (ROM)  7. Therapeutic Activites  8. Therapeutic Exercise/Strengthening  9. Transfer Training     TREATMENT PLAN: Frequency/Duration: daily for duration of hospital stay  Rehabilitation Potential For Stated Goals: [facility] (at time of discharge pending progress):    Placement: It is my opinion, based on this patient's performance to date, that Ms. Khai Cooper may benefit from intensive therapy at a [address] [place] [address] after discharge due to the functional deficits listed above that are likely to improve with skilled rehabilitation and concerns that he/she may be unsafe to be unsupervised at home due to high fall risk and decreased stability and safety with mobility . Equipment:    None at this time              HISTORY:   History of Present Injury/Illness (Reason for Referral):  Mia Nowak is a 80 y.o. female with medical history of breast cancer, recurrent UTI's, PAF on Eliquis, HFpEF, mild dementia who presented with decrease urine output and hypotension of 1 day duration. Pt took her Am meds and her daughter checks her BP and noted it was low. She notified home health RN who instructed for her to come to the ED. Pt has a history of recurrent UTI's and has been taking Macrobid for it. She went to see her PCP on 10/19 for confusion and urinary odor. She was started on Bactrim and home Losartan/Hctz was held. Of note, she was also admitted in August for KTAEY and UTI, went to rehab until mid-September. Pt denies SOB, chest pain, abdominal pain, N/V, diarrhea, constipation. In ED, SBP 80-90's, DBP 40's. She was given 1.5L bolus in ED without much improvement. LA 2.7. WBC wnl. CMP with Cr 4.16 (baseline 1.1-1.2). UA negative for UTI but was treated with Bactrim for 5 days previously. Pt was given Rocephin.  BCx and UCx obtained. Past Medical History/Comorbidities:   Ms. Bethanie Cameron  has a past medical history of Abdominal pain (9/29/2013), COPD (chronic obstructive pulmonary disease) ([facility] [address] DJD (degenerative joint disease) (9/29/2013), Esophageal reflux (9/29/2013), HTN (hypertension) (9/29/2013), Hypercholesterolemia, Hypertension, Other and unspecified hyperlipidemia (9/29/2013), Pyelonephritis (9/29/2013), Sensory neuropathy (12/23/2020), and UTI (lower urinary tract infection) (9/29/2013). Ms. Bethanie Cameron  has a past surgical history that includes hx pacemaker; pr chest surgery procedure unlisted; pr lap,cholecystectomy; hx hysterectomy; pr cardiac surg procedure unlist (2/05 8/2015); and pr breast surgery procedure unlisted (01/07/2019). Social History/Living Environment:   Home Environment: Apartment  # Steps to Enter: 1  One/Two Story Residence: One story  Living Alone: Yes  Support Systems: Child(viktor)  Patient Expects to be Discharged to[de-identified] Apartment  Current DME Used/Available at Home: Walker, rollator  Prior Level of Function/Work/Activity:  Pt is a poor historian but reports living alone and needing assistance with bathing. Confusion and dementia at baseline with history of UTIs. Number of Personal Factors/Comorbidities that affect the Plan of Care: 3+: HIGH COMPLEXITY   EXAMINATION:   Most Recent Physical Functioning:   Gross Assessment:   Generalized muscle weakness               Posture:  Rounded shoulders and slight forward flexion in stand     Balance:    Bed Mobility:  Rolling: Moderate assistance; Additional time  Supine to Sit:  (attempted, but unable)  Wheelchair Mobility:     Transfers:  Duration: 23 Minutes  Gait:            Body Structures Involved:  1. Joints  2. Muscles  3. Brain-cognition Body Functions Affected:  1. Mental  2. Neuromusculoskeletal  3. Movement Related Activities and Participation Affected:  1.  Mobility   Number of elements that affect the Plan of Care: 4+: HIGH COMPLEXITY   CLINICAL PRESENTATION:   Presentation: Evolving clinical presentation with changing clinical characteristics: MODERATE COMPLEXITY   CLINICAL DECISION MAKIN [facility] Mobility Inpatient Short Form  How much difficulty does the patient currently have. .. Unable A Lot A Little None   1. Turning over in bed (including adjusting bedclothes, sheets and blankets)? [] 1   [] 2   [x] 3   [] 4   2. Sitting down on and standing up from a chair with arms ( e.g., wheelchair, bedside commode, etc.)   [] 1   [] 2   [x] 3   [] 4   3. Moving from lying on back to sitting on the side of the bed? [] 1   [x] 2   [] 3   [] 4   How much help from another person does the patient currently need. .. Total A Lot A Little None   4. Moving to and from a bed to a chair (including a wheelchair)? [] 1   [x] 2   [] 3   [] 4   5. Need to walk in hospital room? [] 1   [x] 2   [] 3   [] 4   6. Climbing 3-5 steps with a railing? [x] 1   [] 2   [] 3   [] 4   © 2007, Trustees of [facility] [employer], under license to [employer]. All rights reserved      Score:  Initial: 13 Most Recent: X (Date: -- )    Interpretation of Tool:  Represents activities that are increasingly more difficult (i.e. Bed mobility, Transfers, Gait). Medical Necessity:     · Skilled intervention continues to be required due to decreased overall functional mobility and strength. Reason for Services/Other Comments:  · Patient continues to require skilled intervention due to   · Functioning below her baseline with generalized muscle weakness and overall mobility  · .    Use of outcome tool(s) and clinical judgement create a POC that gives a: Questionable prediction of patient's progress: MODERATE COMPLEXITY            TREATMENT:   (In addition to Assessment/Re-Assessment sessions the following treatments were rendered)   Pre-treatment Symptoms/Complaints:  Fatigue, increased confusion  Pain: Initial:   Pain Intensity 1: 0  Post Session:  0 Therapeutic Exercise: (0 Minutes):  Exercises per grid below to improve mobility and strength. Required minimal verbal and manual cues to promote proper body alignment and promote proper body posture. Progressed repetitions as indicated. Date:  10/26 Date:  10/27   Date:     Activity/Exercise Parameters Parameters Parameters   Long arc quads 10     Seated hip flexion 10     Ankle pumps 10 10 B    Hip ABD/ADD 10 10 B    Heel slides 10 10 B    Straight leg raises 10 (very weak) 10 B    Shoulder flex/ext  10 B    Shoulder abd/add  10 B    Elbow flex/ext  10 B    Push/pull  10 B          Therapeutic Activity: (  23 Minutes ):  Therapeutic activities including Bed transfers and Ambulation on level ground to improve mobility, strength, balance, and coordination. Required moderate verbal and tactile cues  to promote coordination of bilateral, lower extremity(s). Braces/Orthotics/Lines/Etc:   · IV  · mendes catheter  · telemetry monitoring  · O2 Device: Nasal cannula  Treatment/Session Assessment:    · Response to Treatment:  Pt tolerated treatment session well with need for increased assistance for all mobility. · Interdisciplinary Collaboration:   o Registered Nurse  · After treatment position/precautions:   o Up in chair  o Bed/Chair-wheels locked  o Call light within reach  o RN notified   · Compliance with Program/Exercises: Will assess as treatment progresses  · Recommendations/Intent for next treatment session: \"Next visit will focus on advancements to more challenging activities and reduction in assistance provided\".   Total Treatment Duration:  PT Patient Time In/Time Out  Time In: 0930  Time Out: [address] [facility]

## 2021-10-27 NOTE — PROGRESS NOTES
Care Management Interventions  PCP Verified by CM: Yes (Laura Harrison MD)  Mode of Transport at Discharge: BLS  Transition of Care Consult (CM Consult): Discharge Planning  Support Systems: Child(viktor)  Confirm Follow Up Transport: Family  The Patient and/or Patient Representative was Provided with a Choice of Provider and Agrees with the Discharge Plan?: Yes  Freedom of Choice List was Provided with Basic Dialogue that Supports the Patient's Individualized Plan of Care/Goals, Treatment Preferences and Shares the Quality Data Associated with the Providers?: Yes  Discharge Location  Discharge Placement: Skilled nursing facility  Pt is ready for d/c to [address] , Patient and family aware, RN given # to call report. [facility] scheduled for  @ 1100. Packet on desk.

## 2021-10-27 NOTE — DISCHARGE SUMMARY
Hospitalist Discharge Summary   Admit Date:  10/24/2021  4:23 PM   DC Note date: 10/27/2021  Name:  Mia Nowak   Age:  80 y.o. Sex:  female  :  1940   MRN:  [id number]   Room:  [facility]  PCP:  Aj Faria MD    Presenting Complaint: Hypotension    Initial Admission Diagnosis: Acute renal failure (ARF) ([facility] [N17.9]     Problem List for this Hospitalization:  Hospital Problems as of 10/27/2021 Date Reviewed: 2021        Codes Class Noted - Resolved POA    * (Principal) Acute renal failure superimposed on stage 3b chronic kidney disease ([facility] ICD-10-CM: N17.9, N18.32  ICD-9-CM: 584.9, 585.3  10/24/2021 - Present         Hypotension ICD-10-CM: I95.9  ICD-9-CM: 458.9  10/24/2021 - Present Unknown        Normocytic anemia ICD-10-CM: D64.9  ICD-9-CM: 285.9  10/24/2021 - Present Unknown        Lactic acidosis ICD-10-CM: E87.2  ICD-9-CM: 276.2  10/24/2021 - Present Unknown        Chronic heart failure with preserved ejection fraction (HFpEF) ([facility] ICD-10-CM: I50.32  ICD-9-CM: 428.9  2021 - Present Yes        History of gout ICD-10-CM: Z87.39  ICD-9-CM: V12.29  2021 - Present Yes    Overview Signed 10/24/2021  7:23 PM by Hilton Orozco DO     Last Assessment & Plan:   Formatting of this note might be different from the original.  Continue Allopurinol 100 mg daily             History of recurrent UTIs ICD-10-CM: Z87.440  ICD-9-CM: V13.02  2021 - Present Yes    Overview Signed 10/24/2021  7:23 PM by Hilton Orozco DO     Last Assessment & Plan:   Formatting of this note might be different from the original.  Patient is on Macrodantin daily per PCP             Debility ICD-10-CM: R53.81  ICD-9-CM: 799.3  2021 - Present Yes    Overview Signed 10/24/2021  7:23 PM by Hilton Orozco,      Last Assessment & Plan:   Formatting of this note might be different from the original.  Due to hx falls and recent hospitalization.   Making progress with therapy  Recent COVID + - but thankfully no/min sx   - pt being discharged today to her Senior living apt. Will continue close f/u with Dr. Kasey Barrett for medication management for delusions/hallucinations etc (none observed in SNF). SW referred to [facility] pending). I advised lauren to look into [facility] for near future needs. Current use of long term anticoagulation (Chronic) ICD-10-CM: Z79.01  ICD-9-CM: V58.61  8/18/2021 - Present Yes    Overview Signed 8/18/2021 10:04 PM by MD Alie Lee             Frequent falls (Chronic) ICD-10-CM: R29.6  ICD-9-CM: V15.88  8/18/2021 - Present Yes        Cognitive disorder (Chronic) ICD-10-CM: F09  ICD-9-CM: 294.9  4/20/2021 - Present Yes        Mild episode of recurrent major depressive disorder ([facility] ICD-10-CM: F33.0  ICD-9-CM: 296.31  1/24/2019 - Present Yes        Status cardiac pacemaker (Chronic) ICD-10-CM: Z95.0  ICD-9-CM: V45.01  8/30/2017 - Present Yes        Sick sinus syndrome ([facility] ICD-10-CM: I49.5  ICD-9-CM: 427.81  8/30/2017 - Present Yes        Diastolic dysfunction EGB-85-OY: I51.89  ICD-9-CM: 429.9  7/14/2017 - Present Yes        Hyperlipidemia ICD-10-CM: E78.5  ICD-9-CM: 272.4  7/28/2015 - Present Yes        Atrial fibrillation (HCC) (Chronic) ICD-10-CM: I48.91  ICD-9-CM: 427.31  1/22/2014 - Present Yes        Hypoxemia ICD-10-CM: R09.02  ICD-9-CM: 799.02  1/22/2014 - Present Yes        Hypertension ICD-10-CM: I10  ICD-9-CM: 401.9  9/29/2013 - Present Yes        Primary pulmonary hypertension ([facility] (Chronic) ICD-10-CM: I27.0  ICD-9-CM: 416.0  10/30/2000 - Present Yes            Did Patient have Sepsis (YES OR NO): YES    Hospital Course:    Patient with past medical history of breast cancer, recurrent UTI, paroxysmal atrial fibrillation on apixaban , history of heart failure with preserved ejection fraction, mild dementia. She presented to hospital with decreased urine output, and hypotension for 1 day.      She was found to have acute kidney injury, with elevated creatinine to 4.16. From her baseline of 1.1. She was treated with Rocephin for possible UTI. Urine culture came back as mixed yovani. Patient was given IV fluid. Was admitted to ICU for Levophed administration for 1 day. She was improved. Blood pressure is maintained off of vasopressor. Renal function is back to her baseline. She is being discharged to a nursing home in stable condition. She will continue Vantin 100 mg p.o. twice a day for 5 days. Chest x-ray was done. It shows no acute abnormalities. No signs of active tuberculosis. Patient has no symptoms of active tuberculosis. Disposition: Skilled Nursing Facility  Diet: ADULT DIET Regular  Code Status: DNR    Follow Up Orders: Follow-up Appointments   Procedures    FOLLOW UP VISIT Appointment in: 3 - 5 Days See your primary doctor. See your primary doctor. Standing Status:   Standing     Number of Occurrences:   1     Order Specific Question:   Appointment in     Answer:   3 - 5 Days       Follow-up Information     Follow up With Specialties Details Why Contact Info    [address] [facility]  Valeria Hasasn MD Internal Medicine   [address]   [address]  [address] [facility]  [phone]            Follow up labs/diagnostics (ultimately defer to outpatient provider): Follow-up with her primary doctor    Time spent in patient discharge and coordination 32 minutes. Plan was discussed with patient and care team.  All questions answered. Patient was stable at time of discharge. Instructions given to call a physician or return if any concerns. Discharge Info:   Current Discharge Medication List      START taking these medications    Details   cefpodoxime (VANTIN) 100 mg tablet Take 1 Tablet by mouth two (2) times a day for 5 days.   Qty: 10 Tablet, Refills: 0  Start date: 10/27/2021, End date: 11/1/2021         CONTINUE these medications which have NOT CHANGED    Details   risperiDONE (RisperDAL) 1 mg tablet Take 1 Tablet by mouth two (2) times a day. 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 Qty: 60 Tablet, Refills: 5    Associated Diagnoses: Moderate dementia with behavioral disturbance (HCC)      montelukast (SINGULAIR) 10 mg tablet Take 1 Tablet by mouth daily. Qty: 90 Tablet, Refills: 4    Associated Diagnoses: Allergic rhinitis, unspecified seasonality, unspecified trigger      gabapentin (NEURONTIN) 400 mg capsule Take 1 Cap by mouth three (3) times daily. Qty: 90 Cap, Refills: 5    Associated Diagnoses: Essential tremor      dicyclomine (BENTYL) 10 mg capsule TAKE 1 CAPSULE BY MOUTH 3 TIMES A DAY  Qty: 270 Cap, Refills: 3    Associated Diagnoses: Laryngopharyngeal reflux (LPR)      allopurinoL (ZYLOPRIM) 100 mg tablet TAKE 1 TABLET BY MOUTH EVERY DAY  Qty: 90 Tab, Refills: 4    Associated Diagnoses: Idiopathic gout involving toe, unspecified chronicity, unspecified laterality      lansoprazole (PREVACID) 30 mg capsule Take 1 Cap by mouth two (2) times a day. Qty: 60 Cap, Refills: 12    Associated Diagnoses: Laryngopharyngeal reflux (LPR)      sertraline (ZOLOFT) 50 mg tablet TAKE 1 TABLET BY MOUTH EVERY DAY  Qty: 90 Tab, Refills: 3    Associated Diagnoses: Mild depression (HCC)      apixaban (Eliquis) 5 mg tablet Take 1 Tab by mouth two (2) times a day. Qty: 180 Tab, Refills: 3      metoprolol tartrate (LOPRESSOR) 25 mg tablet TAKE 1 TABLET BY MOUTH TWICE A DAY  Qty: 180 Tab, Refills: 4    Associated Diagnoses: Essential hypertension      aspirin delayed-release 81 mg tablet Take 1 Tab by mouth daily.   Qty: 90 Tab, Refills: 3      tamoxifen (NOLVADEX) 20 mg tablet TAKE 1 TABLET BY MOUTH EVERY DAY  Refills: 3      Oxygen 2 liters at night only hydrOXYzine HCL (ATARAX) 25 mg tablet TAKE 1 TO 2 TABLETS BY MOUTH 4 TIMES A DAY AS NEEDED FOR ITCHING      DISABLED PLACARD (DISABLED PLACARD) DMV As directed  Qty: 1 Each, Refills: 0      diclofenac (VOLTAREN) 1 % gel Apply  to affected area four (4) times daily. Qty: 5 Each, Refills: 5    Associated Diagnoses: Acute pain of right knee      albuterol (VENTOLIN HFA) 90 mcg/actuation inhaler Take 2 Puffs by inhalation every four (4) hours as needed for Shortness of Breath. Qty: 1 Inhaler, Refills: 11         STOP taking these medications       trimethoprim-sulfamethoxazole (BACTRIM DS, SEPTRA DS) 160-800 mg per tablet Comments:   Reason for Stopping:         clorazepate (TRANXENE) 7.5 mg tablet Comments:   Reason for Stopping:         amLODIPine (NORVASC) 5 mg tablet Comments:   Reason for Stopping:         nitrofurantoin, macrocrystal-monohydrate, (MACROBID) 100 mg capsule Comments:   Reason for Stopping:         famotidine (PEPCID) 40 mg tablet Comments:   Reason for Stopping:         bumetanide (BUMEX) 1 mg tablet Comments:   Reason for Stopping:               Procedures done this admission:  * No surgery found *    Consults this admission:  IP CONSULT TO NEPHROLOGY    Echocardiogram/EKG results:  No results found for this or any previous visit.        EKG Results     Procedure 720 Value Units Date/Time    EKG, 12 LEAD, INITIAL [[id number]] Collected: 10/24/21 1658    Order Status: Completed Updated: 10/25/21 0655     Ventricular Rate 70 BPM      Atrial Rate 70 BPM      P-R Interval 248 ms      QRS Duration 92 ms      Q-T Interval 454 ms      QTC Calculation (Bezet) 490 ms      Calculated R Axis 5 degrees      Calculated T Axis 20 degrees      Diagnosis --     Atrial-paced rhythm  Low voltage QRS  Nonspecific T wave abnormality  Prolonged QT  Abnormal ECG  When compared with ECG of 24-OCT-2021 04:16,  No significant change was found    Confirmed by Hannah Patrick ([id number]) on 10/25/2021 6:55:03 AM Diagnostic Imaging/Tests:   XR CHEST PORT    Result Date: 10/24/2021  CHEST X-RAY, single portable view  10/24/2021 History: Hypotension. Technique: Single frontal view of the chest. Comparison: Chest x-ray 8/18/2021 Findings: A grossly stable left-sided intracardiac device is seen. The heart is moderately enlarged although stable. No pneumothorax is seen. No evolving consolidation, or significant pleural effusion is seen. 1.  Grossly stable cardiomegaly without evolving acute changes suggested by plain film. This report was made using voice transcription. Despite my best efforts to avoid any, transcription errors may persist. If there is any question about the accuracy of the report or need for clarification, then please call [phone], or text me through perfectserv for clarification or correction. CT ABD/PEL FOR RENAL STONE    Result Date: 10/25/2021  Clinical history: Acute kidney injury with UTI. TECHNIQUE: Axial and coronal CT of the abdomen/pelvis without IV contrast. CT dose reduction was achieved through use of a standardized protocol tailored for this examination and automatic exposure control for dose modulation. FINDINGS: Absence of IV contrast limits sensitivity. There is no renal calculus or hydronephrosis. Gallbladder is surgically absent. The visualized liver and the spleen are normal in contour. Pancreas is atrophic. Unenhanced adrenal glands are unremarkable. Abdominal aorta is normal in course and caliber with prominent atherosclerotic calcifications. No evidence of lymphadenopathy. Stomach is normal in contour. Small bowel loops are normal in caliber. No small bowel obstruction. Pelvic findings: Urinary bladder is decompressed by Pnio catheter. The colon is normal in course and caliber. No evidence of appendicitis. There is no free air or free fluid. There are 6 lumbar-type vertebral bodies.  There is mild compression deformity of T12.     1. No renal calculus or hydronephrosis. Urinary bladder decompressed by Pino catheter. 2. Negative for colitis, diverticulitis, appendicitis or bowel obstruction. 3. Mild compression deformity of T12, age indeterminate. This was not definitely seen on the April 2021 x-ray. All Micro Results     Procedure Component Value Units Date/Time    SARS-COV-2, PCR [[id number]] Collected: 10/26/21 1501    Order Status: Completed Specimen: Nasopharyngeal Updated: 10/27/21 0853     Specimen source Nasopharyngeal        SARS-CoV-2 Not detected        Comment:      The specimen is NEGATIVE for SARS-CoV-2, the novel coronavirus associated with COVID-19. This test has been authorized by the FDA under an Emergency Use Authorization (EUA) for use by authorized laboratories. Fact sheet for Healthcare Providers: ConventionTransgenomicdate.co.nz       Fact sheet for Patients: Cocodotdate.co.nz       Methodology: RT-PCR         CULTURE, BLOOD [[id number]] Collected: 10/24/21 1642    Order Status: Completed Specimen: Blood Updated: 10/27/21 0733     Special Requests: --        NO SPECIAL REQUESTS  LEFT UPPER ARM       Culture result: NO GROWTH 3 DAYS       CULTURE, BLOOD [[id number]] Collected: 10/24/21 1642    Order Status: Completed Specimen: Blood Updated: 10/27/21 0733     Special Requests: --        NO SPECIAL REQUESTS  LEFT FOREARM       Culture result: NO GROWTH 3 DAYS       COVID-19 RAPID TEST [[id number]] Collected: 10/26/21 1501    Order Status: Completed Specimen: Nasopharyngeal Updated: 10/26/21 1526     Specimen source NP Swab        COVID-19 rapid test Not detected        Comment:      The specimen is NEGATIVE for SARS-CoV-2, the novel coronavirus associated with COVID-19. A negative result does not rule out COVID-19. This test has been authorized by the FDA under an Emergency Use Authorization (EUA) for use by authorized laboratories.         Fact sheet for Healthcare Providers: GameChanger Mediadate.co.nz  Fact sheet for Patients: ConventionUpdate.co.nz       Methodology: Isothermal Nucleic Acid Amplification         CULTURE, URINE [[id number]] Collected: 10/24/21 1803    Order Status: Completed Specimen: Cath Urine Updated: 10/26/21 0824     Special Requests: NO SPECIAL REQUESTS        Culture result:       50,000-100,000 COLONIES/mL MIXED SKIN TOMAS ISOLATED                Labs: Results:       BMP, Mg, Phos Recent Labs     10/27/21  0347 10/26/21  0439 10/25/21  0300    141 141   K 4.2 4.1 3.6   * 111* 110*   CO2 26 25 24   AGAP 5* 5* 7   BUN 13 22 34*   CREA 1.18* 1.73* 2.97*   CA 9.4 9.5 8.7   * 89 96      CBC Recent Labs     10/27/21  0347 10/26/21  0439 10/25/21  0300 10/24/21  1642 10/24/21  1642   WBC 6.3 6.5 7.1   < > 10.7   RBC 3.43* 3.42* 3.38*   < > 3.54*   HGB 9.5* 9.5* 9.3*   < > 9.9*   HCT 29.8* 29.8* 29.3*   < > 30.9*    241 239   < > 274   GRANS  --   --   --   --  71   LYMPH  --   --   --   --  16   EOS  --   --   --   --  5   MONOS  --   --   --   --  7   BASOS  --   --   --   --  1   IG  --   --   --   --  1   ANEU  --   --   --   --  7.6   ABL  --   --   --   --  1.8   MARIANN  --   --   --   --  0.5   ABM  --   --   --   --  0.8   ABB  --   --   --   --  0.1   AIG  --   --   --   --  0.1    < > = values in this interval not displayed.       LFT Recent Labs     10/24/21  1642   ALT 19   AP 55   TP 6.4   ALB 2.9*   GLOB 3.5   AGRAT 0.8*      Cardiac Testing No results found for: BNPP, BNP, CPK, RCK1, RCK2, RCK3, RCK4, CKMB, CKNDX, CKND1, TROPT, TROIQ   Coagulation Tests No results found for: PTP, INR, APTT, INREXT   A1c Lab Results   Component Value Date/Time    Hemoglobin A1c 5.7 (H) 08/17/2020 10:14 AM      Lipid Panel Lab Results   Component Value Date/Time    Cholesterol, total 143 06/18/2019 08:34 AM    Cholesterol (POC) 178 05/13/2014 04:00 PM    HDL Cholesterol 46 06/18/2019 08:34 AM    LDL, calculated 61 06/18/2019 08:34 AM    VLDL, calculated 36 06/18/2019 08:34 AM    Triglyceride 181 (H) 06/18/2019 08:34 AM    Triglycerides (POC) 257 05/13/2014 04:00 PM      Thyroid Panel Lab Results   Component Value Date/Time    TSH 3.230 08/17/2020 10:14 AM    TSH 0.691 09/19/2016 10:11 AM    T4, Total 9.6 03/27/2015 09:11 AM    T4, Total 10.5 12/17/2014 08:41 AM        Most Recent UA Lab Results   Component Value Date/Time    Color CHANEL 10/24/2021 06:02 PM    Appearance CLOUDY 10/24/2021 06:02 PM    Specific gravity 1.023 10/24/2021 06:02 PM    pH (UA) 5.0 10/24/2021 06:02 PM    Protein Negative 10/24/2021 06:02 PM    Glucose Negative 10/24/2021 06:02 PM    Ketone TRACE (A) 10/24/2021 06:02 PM    Bilirubin MODERATE (A) 10/24/2021 06:02 PM    Blood Negative 10/24/2021 06:02 PM    Urobilinogen 0.2 10/24/2021 06:02 PM    Nitrites Negative 10/24/2021 06:02 PM    Leukocyte Esterase Negative 10/24/2021 06:02 PM    WBC 10-20 08/18/2021 07:49 PM    RBC 0-3 08/18/2021 07:49 PM    Epithelial cells 5-10 08/18/2021 07:49 PM    Bacteria 4+ (H) 08/18/2021 07:49 PM    Casts 20-50 08/18/2021 07:49 PM          All Labs from Last 24 Hrs:  Recent Results (from the past 24 hour(s))   COVID-19 RAPID TEST    Collection Time: 10/26/21  3:01 PM   Result Value Ref Range    Specimen source NP Swab      COVID-19 rapid test Not detected NOTD     SARS-COV-2, PCR    Collection Time: 10/26/21  3:01 PM    Specimen: Nasopharyngeal   Result Value Ref Range    Specimen source Nasopharyngeal      SARS-CoV-2 Not detected NOTD     SARS-COV-2    Collection Time: 10/26/21  3:01 PM   Result Value Ref Range    SARS-CoV-2 Please find results under separate order     CBC W/O DIFF    Collection Time: 10/27/21  3:47 AM   Result Value Ref Range    WBC 6.3 4.3 - 11.1 K/uL    RBC 3.43 (L) 4.05 - 5.2 M/uL    HGB 9.5 (L) 11.7 - 15.4 g/dL    HCT 29.8 (L) 35.8 - 46.3 %    MCV 86.9 79.6 - 97.8 FL    MCH 27.7 26.1 - 32.9 PG    MCHC 31.9 31.4 - 35.0 g/dL    RDW 13.2 11.9 - 14.6 % PLATELET 585 211 - 596 K/uL    MPV 9.2 (L) 9.4 - 12.3 FL    ABSOLUTE NRBC 0.00 0.0 - 0.2 K/uL   METABOLIC PANEL, BASIC    Collection Time: 10/27/21  3:47 AM   Result Value Ref Range    Sodium 141 136 - 145 mmol/L    Potassium 4.2 3.5 - 5.1 mmol/L    Chloride 110 (H) 98 - 107 mmol/L    CO2 26 21 - 32 mmol/L    Anion gap 5 (L) 7 - 16 mmol/L    Glucose 101 (H) 65 - 100 mg/dL    BUN 13 8 - 23 MG/DL    Creatinine 1.18 (H) 0.6 - 1.0 MG/DL    GFR est AA 57 (L) >60 ml/min/1.73m2    GFR est non-AA 47 (L) >60 ml/min/1.73m2    Calcium 9.4 8.3 - 10.4 MG/DL       Current Med List in Hospital:   Current Facility-Administered Medications   Medication Dose Route Frequency    metoprolol tartrate (LOPRESSOR) tablet 25 mg  25 mg Oral BID    albuterol (PROVENTIL VENTOLIN) nebulizer solution 2.5 mg  2.5 mg Nebulization Q6H PRN    apixaban (ELIQUIS) tablet 2.5 mg  2.5 mg Oral Q12H    aspirin delayed-release tablet 81 mg  81 mg Oral DAILY    dicyclomine (BENTYL) capsule 10 mg  10 mg Oral TID    pantoprazole (PROTONIX) tablet 40 mg  40 mg Oral ACB    memantine (NAMENDA) tablet 5 mg  5 mg Oral Q12H    montelukast (SINGULAIR) tablet 10 mg  10 mg Oral DAILY    risperiDONE (RisperDAL) tablet 1 mg  1 mg Oral Q12H    sertraline (ZOLOFT) tablet 50 mg  50 mg Oral DAILY    tamoxifen (NOLVADEX) tablet 20 mg  20 mg Oral DAILY    sodium chloride (NS) flush 5-40 mL  5-40 mL IntraVENous Q8H    sodium chloride (NS) flush 5-40 mL  5-40 mL IntraVENous PRN    acetaminophen (TYLENOL) tablet 650 mg  650 mg Oral Q6H PRN    Or    acetaminophen (TYLENOL) suppository 650 mg  650 mg Rectal Q6H PRN    polyethylene glycol (MIRALAX) packet 17 g  17 g Oral DAILY PRN    ondansetron (ZOFRAN ODT) tablet 4 mg  4 mg Oral Q8H PRN    Or    ondansetron (ZOFRAN) injection 4 mg  4 mg IntraVENous Q6H PRN    cefTRIAXone (ROCEPHIN) 1 g in 0.9% sodium chloride (MBP/ADV) 50 mL MBP  1 g IntraVENous Q24H       Allergies   Allergen Reactions    Advair Diskus [Fluticasone Propion-Salmeterol] Other (comments)     shakes    Aspirin Nausea Only     Pt can take aspirin 81mg Pt c/o upset stomach with higher dose    Codeine Nausea Only    Lipitor [Atorvastatin] Other (comments)     Leg weakness     Immunization History   Administered Date(s) Administered    COVID-19, PFIZER, MRNA, LNP-S, PF, 30MCG/0.3ML DOSE 02/09/2021, 03/06/2021    Influenza High Dose Vaccine PF 10/09/2017, 10/19/2018    Influenza Vaccine 10/01/2012    Influenza Vaccine (Quad) PF (>6 Mo Flulaval, Fluarix, and >3 Yrs Afluria, Fluzone 17304) 09/29/2016    Influenza Vaccine (Tri) Adjuvanted (>65 Yrs FLUAD TRI 02014) 10/28/2019    Influenza, Quadrivalent, Adjuvanted (>65 Yrs FLUAD QUAD 69830) 09/10/2020, 10/12/2021    Pneumococcal Conjugate (PCV-13) 01/01/2018    Pneumococcal Vaccine (Unspecified Type) 01/01/2002, 10/01/2007, 09/01/2016    TB Skin Test (PPD) Intradermal 08/19/2021    Tdap 05/17/2017    Zoster Vaccine, Live 01/14/2016       Recent Vital Data:  Patient Vitals for the past 24 hrs:   Temp Pulse Resp BP SpO2   10/27/21 0849  69  (!) 132/59    10/27/21 0715 98.5 °F (36.9 °C) 69 15 (!) 140/70 98 %   10/27/21 0501  69 20 (!) 149/60 99 %   10/27/21 0401  69 (!) 41 (!) 150/71 98 %   10/27/21 0301 98.7 °F (37.1 °C) 69 19 (!) 151/88 99 %   10/27/21 0201  71 (!) 36 (!) 153/78 100 %   10/27/21 0101  69 15 (!) 154/81 100 %   10/27/21 0001  69 16 (!) 160/63 100 %   10/26/21 2301 98.7 °F (37.1 °C) 69 15 (!) 159/71 100 %   10/26/21 2201  69 17 (!) 154/67 99 %   10/26/21 2101  69 23 (!) 162/64 (!) 87 %   10/26/21 2053  71  (!) 146/63    10/26/21 2001  69 14 (!) 146/63 100 %   10/26/21 1901 99.1 °F (37.3 °C) 69 30 (!) 149/64 100 %   10/26/21 1800  69 15 (!) 141/62 99 %   10/26/21 1600  69 12 (!) 135/58 94 %   10/26/21 1500 98.8 °F (37.1 °C) 69 18 (!) 124/50 96 %   10/26/21 1400  73 23 (!) 114/48 (!) 80 %   10/26/21 1300  69 18 134/70 90 %   10/26/21 1200  69 18 (!) 151/65 96 % 10/26/21 1100 99.2 °F (37.3 °C) 69 11 (!) 140/64 94 %   10/26/21 1000  69 17 (!) 144/57 95 %     Oxygen Therapy  O2 Sat (%): 98 % (10/27/21 0715)  Pulse via Oximetry: 70 beats per minute (10/27/21 0501)  O2 Device: None (Room air) (10/27/21 0715)  Skin Assessment: Clean, dry, & intact (10/26/21 1901)  Skin Protection for O2 Device: N/A (10/25/21 0430)  O2 Flow Rate (L/min): 2 l/min (10/27/21 0301)    Estimated body mass index is 29.26 kg/m² as calculated from the following:    Height as of this encounter: 5' 2\" (1.575 m). Weight as of this encounter: 72.6 kg (160 lb). Intake/Output Summary (Last 24 hours) at 10/27/2021 0957  Last data filed at 10/27/2021 5384  Gross per 24 hour   Intake 1834 ml   Output 3000 ml   Net -1166 ml         Physical Exam:    Visit Vitals  BP (!) 132/59   Pulse 69   Temp 98.5 °F (36.9 °C)   Resp 15   Ht 5' 2\" (1.575 m)   Wt 72.6 kg (160 lb)   SpO2 98%   Breastfeeding No   BMI 29.26 kg/m²        General:    Well nourished. No overt distress. Patient is lying flat in bed and talking well when asked questions  Head:  Normocephalic, atraumatic  Eyes:  Sclerae appear normal.  Pupils equally round. HENT:  Nares appear normal, no drainage. Moist mucous membranes  Neck:  No restricted ROM. Trachea midline  CV:   RRR. No m/r/g. No JVD  Lungs:   CTAB. No wheezing, rhonchi, or rales. Even, unlabored  Abdomen:   Soft, nontender, nondistended. Extremities: Warm and dry. No cyanosis or clubbing. No edema. Skin:     No rashes. Normal coloration  Neuro:  CN II-XII grossly intact. Psych:  Normal mood and affect. Signed:  Ayesha Mccormick MD    Part of this note may have been written by using a voice dictation software. The note has been proof read but may still contain some grammatical/other typographical errors.

## 2021-10-27 NOTE — DISCHARGE INSTRUCTIONS
Patient Education        Urinary Tract Infection (UTI) in Women: Care Instructions  Overview     A urinary tract infection, or UTI, is a general term for an infection anywhere between the kidneys and the urethra (where urine comes out). Most UTIs are bladder infections. They often cause pain or burning when you urinate. UTIs are caused by bacteria and can be cured with antibiotics. Be sure to complete your treatment so that the infection does not get worse. Follow-up care is a key part of your treatment and safety. Be sure to make and go to all appointments, and call your doctor if you are having problems. It's also a good idea to know your test results and keep a list of the medicines you take. How can you care for yourself at home? · Take your antibiotics as directed. Do not stop taking them just because you feel better. You need to take the full course of antibiotics. · Drink extra water and other fluids for the next day or two. This will help make the urine less concentrated and help wash out the bacteria that are causing the infection. (If you have kidney, heart, or liver disease and have to limit fluids, talk with your doctor before you increase the amount of fluids you drink.)  · Avoid drinks that are carbonated or have caffeine. They can irritate the bladder. · Urinate often. Try to empty your bladder each time. · To relieve pain, take a hot bath or lay a heating pad set on low over your lower belly or genital area. Never go to sleep with a heating pad in place. To prevent UTIs  · Drink plenty of water each day. This helps you urinate often, which clears bacteria from your system. (If you have kidney, heart, or liver disease and have to limit fluids, talk with your doctor before you increase the amount of fluids you drink.)  · Urinate when you need to. · If you are sexually active, urinate right after you have sex. · Change sanitary pads often.   · Avoid douches, bubble baths, feminine hygiene sprays, and other feminine hygiene products that have deodorants. · After going to the bathroom, wipe from front to back. When should you call for help? Call your doctor now or seek immediate medical care if:    · Symptoms such as fever, chills, nausea, or vomiting get worse or appear for the first time.     · You have new pain in your back just below your rib cage. This is called flank pain.     · There is new blood or pus in your urine.     · You have any problems with your antibiotic medicine. Watch closely for changes in your health, and be sure to contact your doctor if:    · You are not getting better after taking an antibiotic for 2 days.     · Your symptoms go away but then come back. Where can you learn more? Go to http://www.gray.com/  Enter L846 in the search box to learn more about \"Urinary Tract Infection (UTI) in Women: Care Instructions. \"  Current as of: February 10, 2021               Content Version: 13.0  © 2006-2021 [employer]. Care instructions adapted under license by [employer] (which disclaims liability or warranty for this information). If you have questions about a medical condition or this instruction, always ask your healthcare professional. [place] [address] any warranty or liability for your use of this information. Patient Education        Acute Kidney Injury: Care Instructions  Overview     Acute kidney injury (KATEY) is a sudden decrease in kidney function. This can happen over a period of hours, days or, in some cases, weeks. KATEY used to be called acute renal failure, but kidney failure doesn't always happen with KATEY. Common causes of KATEY are serious infection, blood loss, and some medicines. When KATEY happens, the kidneys have trouble removing waste and excess fluids from the body. The waste and fluids build up and become harmful.   Kidney function may return to normal if the cause of KATEY is treated quickly. Your chance of a full recovery depends on what caused the problem, how quickly the cause was treated, and what other medical problems you have. You may have a treatment called dialysis. It does the work of healthy kidneys to remove waste and fluids for a short time. Follow-up care is a key part of your treatment and safety. Be sure to make and go to all appointments, and call your doctor if you are having problems. It's also a good idea to know your test results and keep a list of the medicines you take. How can you care for yourself at home? · Talk to your doctor about how much fluid you should drink. · Eat a balanced diet. Talk to your doctor or a dietitian about what type of diet may be best for you. You may need to limit sodium, potassium, and phosphorus. · If you need dialysis, follow the instructions and schedule for dialysis that your doctor gives you. · Do not smoke. Smoking can make your condition worse. If you need help quitting, talk to your doctor about stop-smoking programs and medicines. These can increase your chances of quitting for good. · Limit alcohol. · Review all of your medicines with your doctor. Do not take any medicines, including nonsteroidal anti-inflammatory drugs (NSAIDs), such as ibuprofen (Advil, Motrin) or naproxen (Aleve), unless your doctor says it is safe for you to do so. · Make sure that anyone treating you for any health problem knows that you have had KATEY. When should you call for help? Call 911 anytime you think you may need emergency care. For example, call if:    · You passed out (lost consciousness).    Call your doctor now or seek immediate medical care if:    · You have new or worse nausea and vomiting.     · You have much less urine than normal, or you have no urine.     · You are feeling confused or cannot think clearly.     · You have new or more blood in your urine.     · You have new swelling.     · You are dizzy or lightheaded, or feel like you may faint. Watch closely for changes in your health, and be sure to contact your doctor if:    · You do not get better as expected. Where can you learn more? Go to http://www.gray.com/  Enter G285 in the search box to learn more about \"Acute Kidney Injury: Care Instructions. \"  Current as of: December 17, 2020               Content Version: 13.0  © 0765-2221 [employer]. Care instructions adapted under license by [employer] (which disclaims liability or warranty for this information). If you have questions about a medical condition or this instruction, always ask your healthcare professional. [place] [address] any warranty or liability for your use of this information.

## 2021-10-29 LAB
BACTERIA SPEC CULT: NORMAL
BACTERIA SPEC CULT: NORMAL
SERVICE CMNT-IMP: NORMAL
SERVICE CMNT-IMP: NORMAL

## 2021-11-23 PROBLEM — I95.9 HYPOTENSION: Status: RESOLVED | Noted: 2021-10-24 | Resolved: 2021-11-23

## 2022-03-07 PROBLEM — N17.9 ACUTE RENAL FAILURE SUPERIMPOSED ON STAGE 3B CHRONIC KIDNEY DISEASE (HCC): Status: RESOLVED | Noted: 2021-10-24 | Resolved: 2022-03-07

## 2022-03-07 PROBLEM — N18.32 ACUTE RENAL FAILURE SUPERIMPOSED ON STAGE 3B CHRONIC KIDNEY DISEASE (HCC): Status: RESOLVED | Noted: 2021-10-24 | Resolved: 2022-03-07

## 2022-03-18 PROBLEM — F33.0 MILD EPISODE OF RECURRENT MAJOR DEPRESSIVE DISORDER (HCC): Status: ACTIVE | Noted: 2019-01-24

## 2022-03-18 PROBLEM — E87.20 LACTIC ACIDOSIS: Status: ACTIVE | Noted: 2021-10-24

## 2022-03-18 PROBLEM — Z74.09 IMPAIRED FUNCTIONAL MOBILITY, BALANCE, GAIT, AND ENDURANCE: Status: ACTIVE | Noted: 2020-12-03

## 2022-03-18 PROBLEM — I25.10 CAD IN NATIVE ARTERY: Status: ACTIVE | Noted: 2017-05-18

## 2022-03-18 PROBLEM — F33.42 RECURRENT MAJOR DEPRESSIVE DISORDER, IN FULL REMISSION (HCC): Status: ACTIVE | Noted: 2019-03-12

## 2022-03-18 PROBLEM — D64.9 NORMOCYTIC ANEMIA: Status: ACTIVE | Noted: 2021-10-24

## 2022-03-19 PROBLEM — I51.89 DIASTOLIC DYSFUNCTION: Status: ACTIVE | Noted: 2017-07-14

## 2022-03-19 PROBLEM — E66.811 OBESITY (BMI 30.0-34.9): Status: ACTIVE | Noted: 2017-08-30

## 2022-03-19 PROBLEM — G62.9 SENSORY NEUROPATHY: Status: ACTIVE | Noted: 2020-12-23

## 2022-03-19 PROBLEM — R29.6 FREQUENT FALLS: Status: ACTIVE | Noted: 2021-08-18

## 2022-03-19 PROBLEM — E66.9 OBESITY (BMI 30.0-34.9): Status: ACTIVE | Noted: 2017-08-30

## 2022-03-19 PROBLEM — M10.9 GOUT INVOLVING TOE: Status: ACTIVE | Noted: 2018-01-03

## 2022-03-19 PROBLEM — I08.0 MITRAL AND AORTIC REGURGITATION: Status: ACTIVE | Noted: 2018-01-03

## 2022-03-19 PROBLEM — N17.9 AKI (ACUTE KIDNEY INJURY) (HCC): Status: ACTIVE | Noted: 2021-08-18

## 2022-03-19 PROBLEM — Z79.01 CURRENT USE OF LONG TERM ANTICOAGULATION: Status: ACTIVE | Noted: 2021-08-18

## 2022-03-19 PROBLEM — F09 COGNITIVE DISORDER: Status: ACTIVE | Noted: 2021-04-20

## 2022-03-19 PROBLEM — Z87.440 HISTORY OF RECURRENT UTIS: Status: ACTIVE | Noted: 2021-08-24

## 2022-03-19 PROBLEM — I49.5 SICK SINUS SYNDROME (HCC): Status: ACTIVE | Noted: 2017-08-30

## 2022-03-19 PROBLEM — R53.81 DEBILITY: Status: ACTIVE | Noted: 2021-08-24

## 2022-03-19 PROBLEM — Z95.0 CARDIAC PACEMAKER IN SITU: Status: ACTIVE | Noted: 2017-09-21

## 2022-03-19 PROBLEM — J30.9 ALLERGIC RHINITIS: Status: ACTIVE | Noted: 2018-05-03

## 2022-03-19 PROBLEM — I50.32 CHRONIC HEART FAILURE WITH PRESERVED EJECTION FRACTION (HFPEF) (HCC): Status: ACTIVE | Noted: 2021-09-20

## 2022-03-20 PROBLEM — K21.9 LARYNGOPHARYNGEAL REFLUX (LPR): Status: ACTIVE | Noted: 2018-01-03

## 2022-03-20 PROBLEM — C50.411 MALIGNANT NEOPLASM OF UPPER-OUTER QUADRANT OF RIGHT BREAST IN FEMALE, ESTROGEN RECEPTOR POSITIVE (HCC): Status: ACTIVE | Noted: 2018-12-31

## 2022-03-20 PROBLEM — Z87.39 HISTORY OF GOUT: Status: ACTIVE | Noted: 2021-08-24

## 2022-03-20 PROBLEM — Z17.0 MALIGNANT NEOPLASM OF UPPER-OUTER QUADRANT OF RIGHT BREAST IN FEMALE, ESTROGEN RECEPTOR POSITIVE (HCC): Status: ACTIVE | Noted: 2018-12-31

## 2022-03-20 PROBLEM — Z95.0 STATUS CARDIAC PACEMAKER: Status: ACTIVE | Noted: 2017-08-30

## 2022-05-23 ENCOUNTER — OFFICE VISIT (OUTPATIENT)
Dept: NEUROLOGY | Age: 82
End: 2022-05-23
Payer: MEDICARE

## 2022-05-23 VITALS
SYSTOLIC BLOOD PRESSURE: 145 MMHG | HEIGHT: 62 IN | HEART RATE: 70 BPM | DIASTOLIC BLOOD PRESSURE: 93 MMHG | WEIGHT: 138 LBS | BODY MASS INDEX: 25.4 KG/M2

## 2022-05-23 DIAGNOSIS — R44.1 VISUAL HALLUCINATIONS: ICD-10-CM

## 2022-05-23 DIAGNOSIS — R26.9 GAIT DISTURBANCE: ICD-10-CM

## 2022-05-23 DIAGNOSIS — I67.9 CEREBROVASCULAR SMALL VESSEL DISEASE: ICD-10-CM

## 2022-05-23 DIAGNOSIS — I69.919 COGNITIVE DEFICITS AS LATE EFFECT OF CEREBROVASCULAR DISEASE: ICD-10-CM

## 2022-05-23 DIAGNOSIS — G31.83 DEMENTIA WITH LEWY BODIES (CODE): Primary | ICD-10-CM

## 2022-05-23 DIAGNOSIS — G62.9 PERIPHERAL POLYNEUROPATHY: ICD-10-CM

## 2022-05-23 PROCEDURE — G8399 PT W/DXA RESULTS DOCUMENT: HCPCS | Performed by: PSYCHIATRY & NEUROLOGY

## 2022-05-23 PROCEDURE — 1090F PRES/ABSN URINE INCON ASSESS: CPT | Performed by: PSYCHIATRY & NEUROLOGY

## 2022-05-23 PROCEDURE — 1123F ACP DISCUSS/DSCN MKR DOCD: CPT | Performed by: PSYCHIATRY & NEUROLOGY

## 2022-05-23 PROCEDURE — 4004F PT TOBACCO SCREEN RCVD TLK: CPT | Performed by: PSYCHIATRY & NEUROLOGY

## 2022-05-23 PROCEDURE — 99215 OFFICE O/P EST HI 40 MIN: CPT | Performed by: PSYCHIATRY & NEUROLOGY

## 2022-05-23 PROCEDURE — G8417 CALC BMI ABV UP PARAM F/U: HCPCS | Performed by: PSYCHIATRY & NEUROLOGY

## 2022-05-23 PROCEDURE — G8428 CUR MEDS NOT DOCUMENT: HCPCS | Performed by: PSYCHIATRY & NEUROLOGY

## 2022-05-23 NOTE — PROGRESS NOTES
Samantha Sebastian   [address], [address] [facility], [address] [place] [address]  Phone: [phone] Fax [phone]  Maria Fernanda Decker MD      Patient: Lesly Salvador  Provider: Maria Fernanda Decker MD    CC:   Chief Complaint   Patient presents with    Neurologic Problem     3 month follow up     Referring Provider:    History of Present Illness:     Lesly Salvador is a 80 y.o. RH female who presents for evaluation of gait disorder. She is accompanied by her daughter. She was last seen February 2022. She presents for follow-up and continued management of progressive cognitive impairment manifesting as short-term memory loss, frequent confusion and disorientation, in addition to hallucinations and psychosis. This certainly raises concern for an underlying neurodegenerative disease and we have suspected dementia with Lewy bodies. She also possesses a longstanding postural and action tremor of the hands without a clear resting component but there have been other emergent features of parkinsonism noted particularly after addition of low-dose risperidone. There is also a head and vocal tremor with a component of spasmodic dysphonia. Gait and balance is also an ongoing issue.     Current medications include:  Gabapentin 400 mg 3 times a day  Sertraline 50 mg daily  Risperidone 0.5 mg in the morning and 1 mg at night   Memantine 5 mg twice a day     Previous medication trials include: Seroquel     He presents for follow-up. Overall things have been relatively stable. We attempted to reduce her risperidone as she was noted to have worsening parkinsonian features at her last visit. However there was re-emergence of some motor symptoms including frequent jerking and hyperkinetic activity and so we increased her nighttime dose with improvement of symptoms. Cognitively things are relatively stable. She is no longer experiencing any significant hallucinations or psychotic behavior.   The addition of risperidone has been substantially helpful for these issues. Memantine has been well-tolerated. She continues to live at home but her family lives close by and are able to keep a close eye on her. Her daughter helps set up her medications and for the most part patient is able to remember to take them on time. Patient is no longer driving. She is relatively independent with respect to self-care and personal hygiene. She is sleeping well.       Review of Systems:   Review of Systems   Constitutional: Positive for fatigue and fever. HENT: Negative for congestion. Eyes: Negative for photophobia. Respiratory: Negative for cough. Cardiovascular: Negative for chest pain. Gastrointestinal: Negative for constipation. Endocrine: Negative for polyuria. Genitourinary: Negative for dysuria. Musculoskeletal: Positive for gait problem. Skin: Negative for rash. Allergic/Immunologic: Negative for immunocompromised state. Neurological: Positive for tremors and weakness. Psychiatric/Behavioral: Positive for confusion and hallucinations. Lab/Imaging Review:   I REVIEWED PERTINENT LABS, IMAGES, AND REPORTS WITH THE PATIENT PERSONALLY, DIRECTLY AND FULLY. THE MOST PERTINENT FINDINGS ARE NOTED BELOW:    CT Head August 2021:  FINDINGS:  No evidence of intracranial mass, hemorrhage, or large territorial infarct. Generalized parenchymal volume loss with commensurate enlargement of the ventricles and sulci. The ventricles remain midline and symmetric. Basal cisterns are patent. There is scattered deep and periventricular white matter lucency which is nonspecific but unchanged and most compatible with chronic microvascular ischemic changes. No extra-axial fluid collection or mass effect.    The orbital contents are within normal limits. The paranasal sinuses are clear. The mastoid air cells and middle ears are clear. No significant osseous or extracranial soft tissue lesions.   IMPRESSION  Stable chronic changes without acute intracranial abnormality.     MRI Brain August 2021:  Findings:   Chronic age-related senescent changes are again seen with confluent  periventricular white matter disease and lacunae throughout the corona radiata as well as centrum semiovale. . There is no evidence of restricted diffusion to suggest acute ischemia. There are no abnormal extra-axial fluid collections. No evidence of mass or mass effect is seen. There is no diffusion signal abnormality. Expected flow voids are maintained in the major intracranial vessels. Involutional changes. Visualized brainstem structures are unremarkable. There is no evidence of Chiari malformation. The ventricular system and CSF containing spaces are unremarkable in appearance. Visualized extracranial soft tissues are unremarkable. The paranasal sinuses are well pneumatized and aerated. Minimal fluid signal throughout the dependent mastoid air cells bilaterally.    IMPRESSION:   1.  Age-related senescent changes and chronic microvascular disease without acute intracranial abnormality.      EMG/NCV December 2020:  Conclusion: This study showed neurophysiologic evidence of a distal symmetrical sensoy polyneuropathy, mild in degree.  Motor nerves were normal.  Needle EMG to bilateral lower limb and lumbar paraspinal muscles was normal showing no evidence of femoral/sciatic neuropathy, right-sided L2/3/4/5/S1 radiculopathy, left-sided L3/4/5/S1 radiculopathy.  No plexopathy or myopathic process.     MRI Brain August 2020:  IMPRESSION:  1. Mild/moderate cerebral volume loss. 2. White matter findings compatible with moderate chronic small vessel ischemic disease. Past Medical History:     Past medical history, surgical history, social history, family history, medications, and allergies were reviewed and updated as appropriate.      PAST MEDICAL HISTORY:  Past Medical History:   Diagnosis Date    Abdominal pain 9/29/2013    [facility]     DJD (degenerative joint disease) 2013    Esophageal reflux 2013    HTN (hypertension) 2013    Hypercholesterolemia     Hypertension     Other and unspecified hyperlipidemia 2013    Pyelonephritis 2013    Sensory neuropathy 2020    UTI (lower urinary tract infection) 2013     PAST SURGICAL HISTORY:   Past Surgical History:   Procedure Laterality Date    BREAST SURGERY  2019    right breast cancer-lumpectomy    CHEST SURGERY      HYSTERECTOMY      LAP,CHOLECYSTECTOMY      PACEMAKER      [place] CARDIAC SURG PROCEDURE UNLIST      pacemaker     FAMILY HISTORY:  Family History   Problem Relation Age of Onset    Heart Disease Mother     Hypertension Mother     Hypertension Father     Heart Disease Father       SOCIAL HISTORY:  Social History     Socioeconomic History    Marital status:      Spouse name: Not on file    Number of children: Not on file    Years of education: Not on file    Highest education level: Not on file   Occupational History    Not on file   Tobacco Use    Smoking status: Former Smoker     Packs/day: 1.00     Quit date: 1989     Years since quittin.4    Smokeless tobacco: Never Used   Substance and Sexual Activity    Alcohol use: No    Drug use: No    Sexual activity: Not on file   Other Topics Concern    Not on file   Social History Narrative    Not on file     Social Determinants of Health     Financial Resource Strain:     Difficulty of Paying Living Expenses: Not on file   Food Insecurity:     Worried About [address] in the Last Year: Not on file    Yanira of Food in the Last Year: Not on file   Transportation Needs:     Lack of Transportation (Medical): Not on file    Lack of Transportation (Non-Medical):  Not on file   Physical Activity:     Days of Exercise per Week: Not on file    Minutes of Exercise per Session: Not on file   Stress:     Feeling of Stress : Not on file   Social Connections:     Frequency of Communication with Friends and Family: Not on file    Frequency of Social Gatherings with Friends and Family: Not on file    Attends Church Services: Not on file    Active Member of Clubs or Organizations: Not on file    Attends Club or Organization Meetings: Not on file    Marital Status: Not on file   Intimate Partner Violence:     Fear of Current or Ex-Partner: Not on file    Emotionally Abused: Not on file    Physically Abused: Not on file    Sexually Abused: Not on file   Housing Stability:     Unable to Pay for Housing in the Last Year: Not on file    Number of [place] in the Last Year: Not on file    Unstable Housing in the Last Year: Not on file       Medications/Allergies:     MEDICATIONS:   Outpatient Encounter Medications as of 5/23/2022   Medication Sig Dispense Refill    risperiDONE (RISPERDAL) 1 MG tablet Take 1 tablet by mouth 2 times daily 180 tablet 1    memantine (NAMENDA) 10 MG tablet Take 1 tablet by mouth 2 times daily 180 tablet 1    OXYGEN 2 liters at night only      albuterol sulfate  (90 Base) MCG/ACT inhaler Inhale 2 puffs into the lungs every 4 hours as needed      allopurinol (ZYLOPRIM) 100 MG tablet TAKE 1 TABLET BY MOUTH EVERY DAY      amLODIPine (NORVASC) 5 MG tablet Take 5 mg by mouth daily      apixaban (ELIQUIS) 5 MG TABS tablet Take 5 mg by mouth 2 times daily      aspirin 81 MG EC tablet Take 81 mg by mouth daily      bumetanide (BUMEX) 1 MG tablet Take 1 mg by mouth every other day      diclofenac sodium (VOLTAREN) 1 % GEL Apply topically 4 times daily      gabapentin (NEURONTIN) 400 MG capsule Take 400 mg by mouth 3 times daily.       hydrOXYzine (ATARAX) 25 MG tablet TAKE 1 TO 2 TABLETS BY MOUTH 4 TIMES A DAY AS NEEDED FOR ITCHING      lansoprazole (PREVACID) 30 MG delayed release capsule TAKE 1 CAPSULE BY MOUTH TWICE A DAY      metoprolol tartrate (LOPRESSOR) 25 MG tablet Take 25 mg by mouth 2 times daily      montelukast (SINGULAIR) 10 MG tablet Take 10 mg by mouth daily      nitrofurantoin (MACRODANTIN) 100 MG capsule Take 100 mg by mouth      sertraline (ZOLOFT) 50 MG tablet TAKE 1 TABLET BY MOUTH EVERY DAY      tamoxifen (NOLVADEX) 20 MG tablet TAKE 1 TABLET BY MOUTH EVERY DAY      [DISCONTINUED] dicyclomine (BENTYL) 10 MG capsule TAKE 1 CAPSULE BY MOUTH 3 TIMES A DAY      [DISCONTINUED] memantine (NAMENDA) 10 MG tablet Take 10 mg by mouth 2 times daily      [DISCONTINUED] risperiDONE (RISPERDAL) 0.5 MG tablet Take by mouth 0.5 mg qam and 1 mg qhs       No facility-administered encounter medications on file as of 5/23/2022. ALLERGIES:  Allergies   Allergen Reactions    Aspirin Nausea Only     Pt can take aspirin 81mg Pt c/o upset stomach with higher dose    Atorvastatin Other (See Comments)     Leg weakness    Codeine Nausea Only    Fluticasone-Salmeterol Other (See Comments)     shakes       Physical Exam:     BP (!) 145/93   Pulse 70   Ht 5' 2\" (1.575 m)   Wt 138 lb (62.6 kg)   BMI 25.24 kg/m²     General Exam:  General: Elderly female in no apparent distress. HEENT: Normocephalic, atraumatic. Sclera anicteric. Oropharynx clear. Neck: Supple without masses  Cardiovascular: Regular rate and rhythm. No carotid bruits. Lungs: Non-labored breathing.   Abdomen: Soft, nontender, nondistended. Extremities: Peripheral pulses intact. No edema and no rashes.      Neurological Exam:      MS/Language/Speech:  Alert and attentive. She is oriented to person, year, month, and city. She has difficulty stating the months backwards. She is able to recall a 5 digit span forwards but cannot accurately state a 3 digit span in reverse. Speech is more hypophonic but less vocal tremor noted. Paucity of speech in general.     Cranial Nerves: PERRL. Eye movements full. No nystagmus. There is diminished facial expression. Facial activation is reduced but symmetric.  Tongue and palate were midline. Shoulder shrug symmetric.     Motor: Strength appears grossly intact throughout without focal deficits. There is mild to moderate rigidity on the right with cogwheeling and there is mild rigidity on the left. Rapid alternating movements do show moderate slowing bilaterally in both upper and lower extremities.     Abnormal Movements: Mild postural and action tremor noted - less pronounced than at prior visits. No resting tremor or postural tremor seen. Handwriting is legible but there is some micrographia noted.      Sensory: Intact to vibration in the distal lower extremities bilaterally.       Cerebellar: No ataxia or dysmetria.      Reflexes (R/L): Biceps (2+/2+), Brachioradialis (2+/2+), Patellar (2+/2+), Ankle (2+/2+). Greenwood's was negative and plantar responses were flexor.      Gait: She is slow to rise from a seated position and requires use of her hands. He is able to walk unassisted for short distance and does so with a stooped posture and markedly shuffled strides. Her turns are slow and en bloc. Assessment and Plan:     Bria Garcia is a 80 y.o. female who presents with the following issues:     Shantell Felder was seen today for neurologic problem. Diagnoses and all orders for this visit:    [facility] with Lewy bodies [facility]  -     memantine (NAMENDA) 10 MG tablet; Take 1 tablet by mouth 2 times daily    Visual hallucinations  -     risperiDONE (RISPERDAL) 1 MG tablet; Take 1 tablet by mouth 2 times daily    Cognitive deficits as late effect of cerebrovascular disease    Cerebrovascular small vessel disease    Gait disturbance    Peripheral polyneuropathy      Ms. Viry Zimmerman presents for follow-up and continued management of progressive cognitive impairment with concern for an underlying neurodegenerative disease, most likely dementia with Lewy bodies. There has been the recent emergence of worsening parkinsonian features after addition of risperidone.   Risperidone has been markedly helpful for her psychiatric symptoms including her visual hallucinations. Attempts to reduce it only resulted in worsening hyperkinetic motor symptoms that were interrupting her sleep at night. Therefore we will have her remain on a dose of Risperdal 1 mg twice a day. I would be hesitant to increase it any further. We will continue memantine 10 mg twice a day and this medication has been well-tolerated. Continue gait dysfunction and I have recommended continued use of a Rollator for fall prevention. Function studies have shown evidence of a distal sensory neuropathy which could be contributing to her gait disturbance to some degree but I feel the most contributing factor to her gait dysfunction at this time is for DLB. We spent some time discussing long-term care planning in this patient with cognitive impairment.        Follow up in 3-4 months. Signature: Glenys Tillman MD      Date:  5/25/2022    [facility]   [address]  Ph: [phone]  Fax: [phone]         I have personally interviewed and examined Ms. Gage Oliveira and I have personally reviewed all relevant records including labs and imaging as noted above. I have written all aspects of this note. More than 50% of this time was used for counseling regarding my diagnosis, prognosis, and plans for management. Total visit time: 42 minutes.

## 2022-05-24 RX ORDER — MEMANTINE HYDROCHLORIDE 10 MG/1
10 TABLET ORAL 2 TIMES DAILY
Qty: 180 TABLET | Refills: 1 | Status: SHIPPED | OUTPATIENT
Start: 2022-05-24

## 2022-05-24 RX ORDER — RISPERIDONE 1 MG/1
1 TABLET, FILM COATED ORAL 2 TIMES DAILY
Qty: 180 TABLET | Refills: 1 | Status: SHIPPED | OUTPATIENT
Start: 2022-05-24 | End: 2022-09-21

## 2022-05-25 ASSESSMENT — ENCOUNTER SYMPTOMS
PHOTOPHOBIA: 0
CONSTIPATION: 0
COUGH: 0

## 2022-05-27 DIAGNOSIS — I10 HYPERTENSION, UNSPECIFIED TYPE: Primary | ICD-10-CM

## 2022-05-27 RX ORDER — AMLODIPINE BESYLATE 5 MG/1
TABLET ORAL
Qty: 90 TABLET | Refills: 3 | Status: SHIPPED | OUTPATIENT
Start: 2022-05-27 | End: 2022-09-22 | Stop reason: ALTCHOICE

## 2022-08-08 ENCOUNTER — HOSPITAL ENCOUNTER (EMERGENCY)
Age: 82
Discharge: HOME | End: 2022-08-09
Attending: EMERGENCY MEDICINE
Payer: MEDICARE
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
## 2022-08-08 DIAGNOSIS — I47.1 PAROXYSMAL SUPRAVENTRICULAR TACHYCARDIA (HCC): ICD-10-CM

## 2022-08-08 LAB
ALBUMIN SERPL-MCNC: 2.9 G/DL (ref 3.2–4.6)
ALBUMIN/GLOB SERPL: 0.9 {RATIO} (ref 1.2–3.5)
ALP SERPL-CCNC: 59 U/L (ref 50–136)
ALT SERPL-CCNC: 12 U/L (ref 12–65)
ANION GAP SERPL CALC-SCNC: 1 MMOL/L (ref 7–16)
APPEARANCE UR: CLEAR
AST SERPL-CCNC: 27 U/L (ref 15–37)
BILIRUB SERPL-MCNC: 0.5 MG/DL (ref 0.2–1.1)
BILIRUB UR QL: NEGATIVE
BUN SERPL-MCNC: 16 MG/DL (ref 8–23)
CALCIUM SERPL-MCNC: 9.6 MG/DL (ref 8.3–10.4)
CHLORIDE SERPL-SCNC: 108 MMOL/L (ref 98–107)
CK SERPL-CCNC: 496 U/L (ref 21–215)
CO2 SERPL-SCNC: 29 MMOL/L (ref 21–32)
COLOR UR: ABNORMAL
CREAT SERPL-MCNC: 0.9 MG/DL (ref 0.6–1)
ERYTHROCYTE [DISTWIDTH] IN BLOOD BY AUTOMATED COUNT: 14.6 % (ref 11.9–14.6)
GLOBULIN SER CALC-MCNC: 3.1 G/DL (ref 2.3–3.5)
GLUCOSE SERPL-MCNC: 116 MG/DL (ref 65–100)
GLUCOSE UR STRIP.AUTO-MCNC: NEGATIVE MG/DL
HCT VFR BLD AUTO: 36.4 % (ref 35.8–46.3)
HGB BLD-MCNC: 11.5 G/DL (ref 11.7–15.4)
HGB UR QL STRIP: NEGATIVE
KETONES UR QL STRIP.AUTO: ABNORMAL MG/DL
LEUKOCYTE ESTERASE UR QL STRIP.AUTO: NEGATIVE
MAGNESIUM SERPL-MCNC: 2 MG/DL (ref 1.8–2.4)
MCH RBC QN AUTO: 29.9 PG (ref 26.1–32.9)
MCHC RBC AUTO-ENTMCNC: 31.6 G/DL (ref 31.4–35)
MCV RBC AUTO: 94.8 FL (ref 79.6–97.8)
NITRITE UR QL STRIP.AUTO: NEGATIVE
NRBC # BLD: 0 K/UL (ref 0–0.2)
PH UR STRIP: 7.5 [PH] (ref 5–9)
PLATELET # BLD AUTO: 211 K/UL (ref 150–450)
PMV BLD AUTO: 10.7 FL (ref 9.4–12.3)
POTASSIUM SERPL-SCNC: 4 MMOL/L (ref 3.5–5.1)
PROT SERPL-MCNC: 6 G/DL (ref 6.3–8.2)
PROT UR STRIP-MCNC: NEGATIVE MG/DL
RBC # BLD AUTO: 3.84 M/UL (ref 4.05–5.2)
SODIUM SERPL-SCNC: 138 MMOL/L (ref 136–145)
SP GR UR REFRACTOMETRY: 1.01 (ref 1–1.02)
TROPONIN I SERPL HS-MCNC: 20.6 PG/ML (ref 0–14)
TROPONIN I SERPL HS-MCNC: 22.5 PG/ML (ref 0–14)
UROBILINOGEN UR QL STRIP.AUTO: 0.2 EU/DL (ref 0.2–1)
WBC # BLD AUTO: 9.4 K/UL (ref 4.3–11.1)

## 2022-08-08 PROCEDURE — 85027 COMPLETE CBC AUTOMATED: CPT

## 2022-08-08 PROCEDURE — 81003 URINALYSIS AUTO W/O SCOPE: CPT

## 2022-08-08 PROCEDURE — 99284 EMERGENCY DEPT VISIT MOD MDM: CPT

## 2022-08-08 PROCEDURE — 83735 ASSAY OF MAGNESIUM: CPT

## 2022-08-08 PROCEDURE — 80053 COMPREHEN METABOLIC PANEL: CPT

## 2022-08-08 PROCEDURE — 82550 ASSAY OF CK (CPK): CPT

## 2022-08-08 PROCEDURE — 2580000003 HC RX 258: Performed by: EMERGENCY MEDICINE

## 2022-08-08 PROCEDURE — 84484 ASSAY OF TROPONIN QUANT: CPT

## 2022-08-08 RX ORDER — 0.9 % SODIUM CHLORIDE 0.9 %
1000 INTRAVENOUS SOLUTION INTRAVENOUS
Status: COMPLETED | OUTPATIENT
Start: 2022-08-08 | End: 2022-08-09

## 2022-08-08 RX ADMIN — SODIUM CHLORIDE 1000 ML: 9 INJECTION, SOLUTION INTRAVENOUS at 22:00

## 2022-08-08 ASSESSMENT — PAIN - FUNCTIONAL ASSESSMENT: PAIN_FUNCTIONAL_ASSESSMENT: NONE - DENIES PAIN

## 2022-08-08 NOTE — ED TRIAGE NOTES
Pt arrives via [facility] from home. Pt was found on the floor and apparently slid out of bed. Pt was on the floor for approximately 12 hours. During transport, Pt began having chest pain, neck pain, and left shoulder pain. En route, Pt received 1L NS, and 1 nitro.

## 2022-08-08 NOTE — ED PROVIDER NOTES
[employer] Emergency Department Provider Note                   PCP:                Pamela Iqbal MD               Age: 80 y.o. Sex: female     No diagnosis found. DISPOSITION         MDM  Number of Diagnoses or Management Options  Accidental fall from bed, initial encounter  Paroxysmal supraventricular tachycardia ([address]  Diagnosis management comments: Patient rolled out of bed and ended up in the floor for some time has elevation of her CK but not in a dangerous level. Does not have any acute bony injury such as hip pain or other. States that she does not feel as though she injured anything to that degree but was unable to get out of the floor by herself. She was brought to our department by EMS who found a period where she had some SVT this was interrogated by pacemaker technician found to have a brief period of SVT approximately 4:04 PM.  This lasted for 16 seconds or so per tech report. On review of her studies in the past she has had this in the past as well. She does have some recurring slight increase in heart rate to the area of 510-115 range. She has not taken any of her medicines including her metoprolol on this day. Amount and/or Complexity of Data Reviewed  Clinical lab tests: ordered and reviewed  Obtain history from someone other than the patient: yes    Risk of Complications, Morbidity, and/or Mortality  Presenting problems: high  Diagnostic procedures: high  Management options: high  General comments: Patient overall stable and requesting not to stay in the hospital.  She has both her son and daughter here with her. The daughter in discussions as to whether the patient should be placed in hospital observation versus home offers that she can stay with her mother this evening. Patient has pacemaker interrogation which showed no malignant type changes. CK is elevated but only minorly so.   Discussions as to ongoing care include low threshold to return with any worsening Orders Placed This Encounter   Procedures    CBC    Comprehensive Metabolic Panel    Troponin    Magnesium    Pulse Oximetry    EKG 12 Lead    Saline lock IV        Kelli Coleman is a 80 y.o. female who presents to the Emergency Department with chief complaint of  No chief complaint on file. Patient's family went to check on the patient found her in the floor where she had been probably since approximately 1:00 this morning. Patient states that she rolled out of bed. She did not pass out or have any acute neuro changes associated with this. She does have some early dementia changes as a baseline issue. We will after her daughter found her they contacted 911 and during transit to the hospital if she developed some chest discomfort that is discontinued at time of my contact. She is known to have a pacemaker but not known to have significant coronary artery disease requiring intervention per patient awareness. Historically patient has had recurring urinary tract infections but has been on suppressive therapy since prior fall and has done well no GI symptoms such as vomiting diarrhea have been identified associated with this. No melena or other bleeding changes    The history is provided by the patient and a relative. Fall  The accident occurred 12 to 24 hours ago. Fall occurred: rolled out of bed. She fell from a height of 1 to 2 ft. Impact surface: floor. Pertinent negatives include no fever. All other systems reviewed and are negative. Review of Systems   Constitutional:  Negative for chills and fever. HENT: Negative. Respiratory:  Negative for cough and shortness of breath. Cardiovascular:  Negative for chest pain. Gastrointestinal: Negative. Genitourinary: Negative. Neurological:         No acute focal changes   Psychiatric/Behavioral:  Negative for agitation and behavioral problems.          Some baseline dementia changes that are at her baseline according to family   All other systems reviewed and are negative. Past Medical History:   Diagnosis Date    Abdominal pain 2013    COPD (chronic obstructive pulmonary disease) (HCC)     DJD (degenerative joint disease) 2013    Esophageal reflux 2013    HTN (hypertension) 2013    Hypercholesterolemia     Hypertension     Other and unspecified hyperlipidemia 2013    Pyelonephritis 2013    Sensory neuropathy 2020    UTI (lower urinary tract infection) 2013        Past Surgical History:   Procedure Laterality Date    BREAST SURGERY  2019    right breast cancer-lumpectomy    CHEST SURGERY      HYSTERECTOMY      LAP,CHOLECYSTECTOMY      PACEMAKER      AR CARDIAC SURG PROCEDURE UNLIST      pacemaker        Family History   Problem Relation Age of Onset    Heart Disease Mother     Hypertension Mother     Hypertension Father     Heart Disease Father         Social History     Socioeconomic History    Marital status:    Tobacco Use    Smoking status: Former     Packs/day: 1.00     Types: Cigarettes     Quit date: 1989     Years since quittin.6    Smokeless tobacco: Never   Substance and Sexual Activity    Alcohol use: No    Drug use: No        Allergies: Aspirin, Atorvastatin, Codeine, and Fluticasone-salmeterol    Previous Medications    ALBUTEROL SULFATE  (90 BASE) MCG/ACT INHALER    Inhale 2 puffs into the lungs every 4 hours as needed    ALLOPURINOL (ZYLOPRIM) 100 MG TABLET    TAKE 1 TABLET BY MOUTH EVERY DAY    AMLODIPINE (NORVASC) 5 MG TABLET    TAKE 1 TABLET BY MOUTH EVERY DAY    APIXABAN (ELIQUIS) 5 MG TABS TABLET    Take 5 mg by mouth 2 times daily    ASPIRIN 81 MG EC TABLET    Take 81 mg by mouth daily    BUMETANIDE (BUMEX) 1 MG TABLET    Take 1 mg by mouth every other day    DICLOFENAC SODIUM (VOLTAREN) 1 % GEL    Apply topically 4 times daily    GABAPENTIN (NEURONTIN) 400 MG CAPSULE    Take 400 mg by mouth 3 times daily.     HYDROXYZINE (ATARAX) 25 MG Somewhat distant but pleasant and cooperative and interactive        Procedures    ED EKG Interpretation  EKG was interpreted in the absence of a cardiologist.    Rate: Rate: Normal  EKG Interpretation: EKG Interpretation: no acute changes  ST Segments: Nonspecific ST segments - NO STEMI    Labs Reviewed   CBC - Abnormal; Notable for the following components:       Result Value    RBC 3.84 (*)     Hemoglobin 11.5 (*)     All other components within normal limits   COMPREHENSIVE METABOLIC PANEL - Abnormal; Notable for the following components:    Chloride 108 (*)     Anion Gap 1 (*)     Glucose 116 (*)     Total Protein 6.0 (*)     Albumin 2.9 (*)     Albumin/Globulin Ratio 0.9 (*)     All other components within normal limits   TROPONIN - Abnormal; Notable for the following components:    Troponin, High Sensitivity 20.6 (*)     All other components within normal limits   MAGNESIUM   TROPONIN        No orders to display                            Voice dictation software was used during the making of this note. This software is not perfect and grammatical and other typographical errors may be present. This note has not been completely proofread for errors.        Cecilia Sams MD  08/11/22 Cece Cross

## 2022-08-09 VITALS
BODY MASS INDEX: 22.82 KG/M2 | OXYGEN SATURATION: 94 % | RESPIRATION RATE: 9 BRPM | WEIGHT: 124 LBS | HEIGHT: 62 IN | TEMPERATURE: 98.6 F | HEART RATE: 115 BPM | DIASTOLIC BLOOD PRESSURE: 89 MMHG | SYSTOLIC BLOOD PRESSURE: 157 MMHG

## 2022-08-09 LAB
EKG ATRIAL RATE: 80 BPM
EKG DIAGNOSIS: NORMAL
EKG P AXIS: 52 DEGREES
EKG P-R INTERVAL: 177 MS
EKG Q-T INTERVAL: 381 MS
EKG QRS DURATION: 89 MS
EKG QTC CALCULATION (BAZETT): 437 MS
EKG R AXIS: 19 DEGREES
EKG T AXIS: 17 DEGREES
EKG VENTRICULAR RATE: 79 BPM

## 2022-08-09 PROCEDURE — 6370000000 HC RX 637 (ALT 250 FOR IP): Performed by: EMERGENCY MEDICINE

## 2022-08-09 RX ADMIN — METOPROLOL TARTRATE 25 MG: 25 TABLET, FILM COATED ORAL at 00:30

## 2022-08-09 ASSESSMENT — PAIN - FUNCTIONAL ASSESSMENT: PAIN_FUNCTIONAL_ASSESSMENT: NONE - DENIES PAIN

## 2022-08-09 NOTE — DISCHARGE INSTRUCTIONS
Cardiac enzymes,urinalysis are all within normal limits no acute changes or evidence of recent infection  Encourage extra liquids  Return at any point with worsening

## 2022-08-10 ASSESSMENT — ENCOUNTER SYMPTOMS
SHORTNESS OF BREATH: 0
COUGH: 0

## 2022-08-11 ASSESSMENT — ENCOUNTER SYMPTOMS: GASTROINTESTINAL NEGATIVE: 1

## 2022-08-16 ENCOUNTER — OFFICE VISIT (OUTPATIENT)
Dept: INTERNAL MEDICINE CLINIC | Facility: CLINIC | Age: 82
End: 2022-08-16
Payer: MEDICARE

## 2022-08-16 ENCOUNTER — HOME HEALTH ADMISSION (OUTPATIENT)
Dept: HOME HEALTH SERVICES | Facility: HOME HEALTH | Age: 82
End: 2022-08-16
Payer: MEDICARE

## 2022-08-16 VITALS
BODY MASS INDEX: 24.29 KG/M2 | SYSTOLIC BLOOD PRESSURE: 128 MMHG | DIASTOLIC BLOOD PRESSURE: 70 MMHG | HEIGHT: 62 IN | WEIGHT: 132 LBS
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
## 2022-08-16 DIAGNOSIS — Z74.09 IMPAIRED FUNCTIONAL MOBILITY, BALANCE, GAIT, AND ENDURANCE: ICD-10-CM

## 2022-08-16 DIAGNOSIS — F03.90 DEMENTIA WITHOUT BEHAVIORAL DISTURBANCE, UNSPECIFIED DEMENTIA TYPE: ICD-10-CM

## 2022-08-16 DIAGNOSIS — G21.9 SECONDARY PARKINSONISM, UNSPECIFIED SECONDARY PARKINSONISM TYPE (HCC): ICD-10-CM

## 2022-08-16 PROCEDURE — G8399 PT W/DXA RESULTS DOCUMENT: HCPCS | Performed by: INTERNAL MEDICINE

## 2022-08-16 PROCEDURE — G8427 DOCREV CUR MEDS BY ELIG CLIN: HCPCS | Performed by: INTERNAL MEDICINE

## 2022-08-16 PROCEDURE — 1123F ACP DISCUSS/DSCN MKR DOCD: CPT | Performed by: INTERNAL MEDICINE

## 2022-08-16 PROCEDURE — 99214 OFFICE O/P EST MOD 30 MIN: CPT | Performed by: INTERNAL MEDICINE

## 2022-08-16 PROCEDURE — G8420 CALC BMI NORM PARAMETERS: HCPCS | Performed by: INTERNAL MEDICINE

## 2022-08-16 PROCEDURE — 1090F PRES/ABSN URINE INCON ASSESS: CPT | Performed by: INTERNAL MEDICINE

## 2022-08-16 PROCEDURE — 1036F TOBACCO NON-USER: CPT | Performed by: INTERNAL MEDICINE

## 2022-08-16 NOTE — PROGRESS NOTES
SUBJECTIVE:   Claire Dove is a 80 y.o. female seen for a visit regarding   Chief Complaint   Patient presents with    Follow-Up from Hospital     ER after falling at home and was in the floor for a long period and was advised by the ER to follow up with BP-been running high at home    Referral - General     Daughter for PT in the home for weakness        Other  This is a new problem. The current episode started in the past 7 days (had ER visit 8/8 after rolled out of bed at night -on floor to the afternoon due to weakness -has general weakness past 4 mo, cilfford right side). Episode frequency: Hb was 11.5 better-EKG ok; troponin slightly above normal. Associated symptoms include weakness. Past Medical History, Past Surgical History, Family history, Social History, and Medications were all reviewed with the patient today and updated as necessary. Current Outpatient Medications   Medication Sig Dispense Refill    amLODIPine (NORVASC) 5 MG tablet TAKE 1 TABLET BY MOUTH EVERY DAY 90 tablet 3    risperiDONE (RISPERDAL) 1 MG tablet Take 1 tablet by mouth 2 times daily 180 tablet 1    memantine (NAMENDA) 10 MG tablet Take 1 tablet by mouth 2 times daily 180 tablet 1    OXYGEN 2 liters at night only      albuterol sulfate  (90 Base) MCG/ACT inhaler Inhale 2 puffs into the lungs every 4 hours as needed      allopurinol (ZYLOPRIM) 100 MG tablet TAKE 1 TABLET BY MOUTH EVERY DAY      apixaban (ELIQUIS) 5 MG TABS tablet Take 5 mg by mouth 2 times daily      aspirin 81 MG EC tablet Take 81 mg by mouth daily      bumetanide (BUMEX) 1 MG tablet Take 1 mg by mouth every other day      diclofenac sodium (VOLTAREN) 1 % GEL Apply topically 4 times daily      gabapentin (NEURONTIN) 400 MG capsule Take 400 mg by mouth 3 times daily.       lansoprazole (PREVACID) 30 MG delayed release capsule TAKE 1 CAPSULE BY MOUTH TWICE A DAY      metoprolol tartrate (LOPRESSOR) 25 MG tablet Take 25 mg by mouth 2 times daily montelukast (SINGULAIR) 10 MG tablet Take 10 mg by mouth daily      nitrofurantoin (MACRODANTIN) 100 MG capsule Take 100 mg by mouth nightly      sertraline (ZOLOFT) 50 MG tablet TAKE 1 TABLET BY MOUTH EVERY DAY      tamoxifen (NOLVADEX) 20 MG tablet TAKE 1 TABLET BY MOUTH EVERY DAY       No current facility-administered medications for this visit. Allergies   Allergen Reactions    Aspirin Nausea Only     Pt can take aspirin 81mg Pt c/o upset stomach with higher dose    Atorvastatin Other (See Comments)     Leg weakness    Codeine Nausea Only    Fluticasone-Salmeterol Other (See Comments)     alma     Patient Active Problem List   Diagnosis    Cataract, bilateral    Recurrent major depressive disorder, in full remission ([address]    Hyperlipidemia    CAD in native artery    Lactic acidosis    Normocytic anemia    Bradycardia    Impaired functional mobility, balance, gait, and endurance    Mild episode of recurrent major depressive disorder (HCC)    Edema of left lower extremity    Allergic rhinitis    Current use of long term anticoagulation    Debility    Sick sinus syndrome (HCC)    Shortness of breath    Pacemaker generator end of life    Personal history of tobacco use, presenting hazards to health    Cardiac pacemaker in situ    Diastolic dysfunction    Recurrent UTI    Atherosclerosis of native coronary artery of native heart without angina pectoris    Sensory neuropathy    DJD (degenerative joint disease)    Gout involving toe    Cognitive disorder    Hypertension    Mitral and aortic regurgitation    Pulmonary HTN ([address]    CLARISA (acute kidney injury) ([address]    History of recurrent UTIs    Chronic heart failure with preserved ejection fraction (HFpEF) (HCC)    Obesity (BMI 30.0-34. 9)    Frequent falls    Atrial fibrillation (HCC)    Malignant neoplasm of upper-outer quadrant of right breast in female, estrogen receptor positive ([address]    Primary pulmonary hypertension ([address]    History of gout    Hypoxemia Status cardiac pacemaker    Laryngopharyngeal reflux (LPR)     Social History     Tobacco Use    Smoking status: Former     Packs/day: 1.00     Types: Cigarettes     Quit date: 1989     Years since quittin.6    Smokeless tobacco: Never   Substance Use Topics    Alcohol use: No          Review of Systems   Respiratory: Hx breast ca 2017-referred to palliative care recently also   Musculoskeletal:  Positive for gait problem (uses lift chair to get to walker-goes to BR by self and toilets-needs assist to bathe-aid is 2x per week now). Neurological:  Positive for weakness. 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 Cardiovascular:      Rate and Rhythm: Normal rate and regular rhythm. Musculoskeletal:      Right lower leg: No edema. Left lower leg: No edema. Neurological:      Comments: Masked facies; no drift of arms,  =; walks with clear shuffle; slow turns ; no arm movement ; increased tone , no cogwheel          Medical problems and test results were reviewed with the patient today. ASSESSMENT and PLAN     1. Fall, subsequent encounter  2. Impaired functional mobility, balance, gait, and endurance  -     [facility]  3. Dementia without behavioral disturbance, unspecified dementia type ([facility]  4. Secondary parkinsonism, unspecified secondary Parkinsonism type ([facility]   One fall out of bed; parkinsonian features-? Drug induced or degenerative ; has neuro appt couple weeks; get PT at home ; eliquis a consideration to stop for further falls but not yet  lab results and schedule for future lab studies reviewed with patient  reviewed medications and side effects in detail   Reviewed ER record, lab and EKG  Return in about 3 months (around 2022), or cancel sept, for For Medicare wellness.

## 2022-08-18 ENCOUNTER — TELEPHONE (OUTPATIENT)
Dept: INTERNAL MEDICINE CLINIC | Facility: CLINIC | Age: 82
End: 2022-08-18

## 2022-08-18 ENCOUNTER — HOME CARE VISIT (OUTPATIENT)
Dept: SCHEDULING | Facility: HOME HEALTH | Age: 82
End: 2022-08-18
Payer: MEDICARE

## 2022-08-18 VITALS
RESPIRATION RATE: 16 BRPM | DIASTOLIC BLOOD PRESSURE: 60 MMHG | TEMPERATURE: 98.1 F | OXYGEN SATURATION: 96 % | HEART RATE: 72 BPM | SYSTOLIC BLOOD PRESSURE: 100 MMHG

## 2022-08-18 PROCEDURE — 1090000002 HH PPS REVENUE DEBIT

## 2022-08-18 PROCEDURE — 1090000001 HH PPS REVENUE CREDIT

## 2022-08-18 PROCEDURE — G0151 HHCP-SERV OF PT,EA 15 MIN: HCPCS

## 2022-08-18 PROCEDURE — 400013 HH SOC

## 2022-08-18 PROCEDURE — 0221000100 HH NO PAY CLAIM PROCEDURE

## 2022-08-18 ASSESSMENT — ENCOUNTER SYMPTOMS
DYSPNEA ACTIVITY LEVEL: AFTER AMBULATING MORE THAN 20 FT
HEMOPTYSIS: 0

## 2022-08-18 NOTE — TELEPHONE ENCOUNTER
Adrienne Coats called for verbal orders:  -PT twice a week for 3 weeks  -occupational therapy and speech therapy      -pt reported dizziness when up walking, bp sitting 110/70, bp standing 100/60.

## 2022-08-19 PROCEDURE — 1090000002 HH PPS REVENUE DEBIT

## 2022-08-19 PROCEDURE — 1090000001 HH PPS REVENUE CREDIT

## 2022-08-20 PROCEDURE — 1090000002 HH PPS REVENUE DEBIT

## 2022-08-20 PROCEDURE — 1090000001 HH PPS REVENUE CREDIT

## 2022-08-21 PROCEDURE — 1090000002 HH PPS REVENUE DEBIT

## 2022-08-21 PROCEDURE — 1090000001 HH PPS REVENUE CREDIT

## 2022-08-22 ENCOUNTER — HOME CARE VISIT (OUTPATIENT)
Dept: SCHEDULING | Facility: HOME HEALTH | Age: 82
End: 2022-08-22
Payer: MEDICARE

## 2022-08-22 VITALS
DIASTOLIC BLOOD PRESSURE: 60 MMHG | RESPIRATION RATE: 16 BRPM | HEART RATE: 72 BPM | SYSTOLIC BLOOD PRESSURE: 104 MMHG | OXYGEN SATURATION: 96 % | TEMPERATURE: 97.5 F

## 2022-08-22 PROCEDURE — G0153 HHCP-SVS OF S/L PATH,EA 15MN: HCPCS

## 2022-08-22 PROCEDURE — 1090000001 HH PPS REVENUE CREDIT

## 2022-08-22 PROCEDURE — 1090000002 HH PPS REVENUE DEBIT

## 2022-08-23 ENCOUNTER — HOME CARE VISIT (OUTPATIENT)
Dept: HOME HEALTH SERVICES | Facility: HOME HEALTH | Age: 82
End: 2022-08-23
Payer: MEDICARE

## 2022-08-23 PROCEDURE — 1090000001 HH PPS REVENUE CREDIT

## 2022-08-23 PROCEDURE — 1090000002 HH PPS REVENUE DEBIT

## 2022-08-24 ENCOUNTER — HOME CARE VISIT (OUTPATIENT)
Dept: SCHEDULING | Facility: HOME HEALTH | Age: 82
End: 2022-08-24
Payer: MEDICARE

## 2022-08-24 VITALS
OXYGEN SATURATION: 98 % | SYSTOLIC BLOOD PRESSURE: 118 MMHG | RESPIRATION RATE: 16 BRPM | DIASTOLIC BLOOD PRESSURE: 76 MMHG | TEMPERATURE: 97.7 F | HEART RATE: 76 BPM

## 2022-08-24 PROCEDURE — 1090000002 HH PPS REVENUE DEBIT

## 2022-08-24 PROCEDURE — G0152 HHCP-SERV OF OT,EA 15 MIN: HCPCS

## 2022-08-24 PROCEDURE — 1090000001 HH PPS REVENUE CREDIT

## 2022-08-24 PROCEDURE — G0153 HHCP-SVS OF S/L PATH,EA 15MN: HCPCS

## 2022-08-25 ENCOUNTER — HOME CARE VISIT (OUTPATIENT)
Dept: SCHEDULING | Facility: HOME HEALTH | Age: 82
End: 2022-08-25
Payer: MEDICARE

## 2022-08-25 VITALS
DIASTOLIC BLOOD PRESSURE: 70 MMHG | SYSTOLIC BLOOD PRESSURE: 126 MMHG | RESPIRATION RATE: 18 BRPM | TEMPERATURE: 97.3 F | HEART RATE: 73 BPM

## 2022-08-25 DIAGNOSIS — J30.9 ALLERGIC RHINITIS, UNSPECIFIED: ICD-10-CM

## 2022-08-25 PROCEDURE — 1090000001 HH PPS REVENUE CREDIT

## 2022-08-25 PROCEDURE — G0157 HHC PT ASSISTANT EA 15: HCPCS

## 2022-08-25 PROCEDURE — 1090000002 HH PPS REVENUE DEBIT

## 2022-08-26 ENCOUNTER — HOME CARE VISIT (OUTPATIENT)
Dept: SCHEDULING | Facility: HOME HEALTH | Age: 82
End: 2022-08-26
Payer: MEDICARE

## 2022-08-26 VITALS
TEMPERATURE: 98.4 F | DIASTOLIC BLOOD PRESSURE: 70 MMHG | OXYGEN SATURATION: 98 % | HEART RATE: 70 BPM | SYSTOLIC BLOOD PRESSURE: 122 MMHG | RESPIRATION RATE: 16 BRPM

## 2022-08-26 VITALS
DIASTOLIC BLOOD PRESSURE: 66 MMHG | HEART RATE: 74 BPM | TEMPERATURE: 97.5 F | SYSTOLIC BLOOD PRESSURE: 126 MMHG | RESPIRATION RATE: 18 BRPM

## 2022-08-26 PROCEDURE — 1090000002 HH PPS REVENUE DEBIT

## 2022-08-26 PROCEDURE — G0157 HHC PT ASSISTANT EA 15: HCPCS

## 2022-08-26 PROCEDURE — 1090000001 HH PPS REVENUE CREDIT

## 2022-08-26 RX ORDER — MONTELUKAST SODIUM 10 MG/1
TABLET ORAL
Qty: 90 TABLET | Refills: 4 | Status: SHIPPED | OUTPATIENT
Start: 2022-08-26

## 2022-08-26 RX ORDER — NITROFURANTOIN MACROCRYSTALS 100 MG/1
CAPSULE ORAL
Qty: 90 CAPSULE | Refills: 1 | Status: SHIPPED | OUTPATIENT
Start: 2022-08-26

## 2022-08-27 PROCEDURE — 1090000001 HH PPS REVENUE CREDIT

## 2022-08-27 PROCEDURE — 1090000002 HH PPS REVENUE DEBIT

## 2022-08-28 PROCEDURE — 1090000002 HH PPS REVENUE DEBIT

## 2022-08-28 PROCEDURE — 1090000001 HH PPS REVENUE CREDIT

## 2022-08-29 ENCOUNTER — HOME CARE VISIT (OUTPATIENT)
Dept: HOME HEALTH SERVICES | Facility: HOME HEALTH | Age: 82
End: 2022-08-29
Payer: MEDICARE

## 2022-08-29 PROCEDURE — 1090000002 HH PPS REVENUE DEBIT

## 2022-08-29 PROCEDURE — 1090000001 HH PPS REVENUE CREDIT

## 2022-08-30 ENCOUNTER — OFFICE VISIT (OUTPATIENT)
Dept: NEUROLOGY | Age: 82
End: 2022-08-30
Payer: MEDICARE

## 2022-08-30 ENCOUNTER — HOME CARE VISIT (OUTPATIENT)
Dept: SCHEDULING | Facility: HOME HEALTH | Age: 82
End: 2022-08-30
Payer: MEDICARE

## 2022-08-30 VITALS
TEMPERATURE: 98 F | SYSTOLIC BLOOD PRESSURE: 122 MMHG | HEART RATE: 70 BPM | RESPIRATION RATE: 16 BRPM | OXYGEN SATURATION: 95 % | DIASTOLIC BLOOD PRESSURE: 68 MMHG

## 2022-08-30 VITALS
HEIGHT: 62 IN | SYSTOLIC BLOOD PRESSURE: 90 MMHG | BODY MASS INDEX: 24.29 KG/M2 | WEIGHT: 132 LBS | DIASTOLIC BLOOD PRESSURE: 60 MMHG | HEART RATE: 69 BPM

## 2022-08-30 DIAGNOSIS — G31.83 DEMENTIA WITH LEWY BODIES (CODE): Primary | ICD-10-CM

## 2022-08-30 DIAGNOSIS — I69.919 COGNITIVE DEFICITS AS LATE EFFECT OF CEREBROVASCULAR DISEASE: ICD-10-CM

## 2022-08-30 DIAGNOSIS — G62.9 PERIPHERAL POLYNEUROPATHY: ICD-10-CM

## 2022-08-30 DIAGNOSIS — R26.9 GAIT DISTURBANCE: ICD-10-CM

## 2022-08-30 PROCEDURE — 99215 OFFICE O/P EST HI 40 MIN: CPT | Performed by: PSYCHIATRY & NEUROLOGY

## 2022-08-30 PROCEDURE — 1090000001 HH PPS REVENUE CREDIT

## 2022-08-30 PROCEDURE — 1090000002 HH PPS REVENUE DEBIT

## 2022-08-30 PROCEDURE — G8427 DOCREV CUR MEDS BY ELIG CLIN: HCPCS | Performed by: PSYCHIATRY & NEUROLOGY

## 2022-08-30 PROCEDURE — 1090F PRES/ABSN URINE INCON ASSESS: CPT | Performed by: PSYCHIATRY & NEUROLOGY

## 2022-08-30 PROCEDURE — G8399 PT W/DXA RESULTS DOCUMENT: HCPCS | Performed by: PSYCHIATRY & NEUROLOGY

## 2022-08-30 PROCEDURE — 1036F TOBACCO NON-USER: CPT | Performed by: PSYCHIATRY & NEUROLOGY

## 2022-08-30 PROCEDURE — 1123F ACP DISCUSS/DSCN MKR DOCD: CPT | Performed by: PSYCHIATRY & NEUROLOGY

## 2022-08-30 PROCEDURE — G8420 CALC BMI NORM PARAMETERS: HCPCS | Performed by: PSYCHIATRY & NEUROLOGY

## 2022-08-30 PROCEDURE — G0158 HHC OT ASSISTANT EA 15: HCPCS

## 2022-08-30 ASSESSMENT — ENCOUNTER SYMPTOMS
CONSTIPATION: 0
COUGH: 0
PHOTOPHOBIA: 0

## 2022-08-30 NOTE — PATIENT INSTRUCTIONS
Decreased risperidone:  Tue: Take half a tablet tonight. Wed: Starting tomorrow take half a tablet in the morning and half a tablet at night. Thurs: The next day take half a tablet in the morning. Friday: Stop the medication.

## 2022-08-30 NOTE — PROGRESS NOTES
[place] [address], [address] [facility]  Phone: [phone] Fax [phone]  Dayron Lackey MD      Patient: Kailey Caldwell  Provider: Dayron Lackey MD    CC:   Chief Complaint   Patient presents with    Follow-up    Neurologic Problem     Dementia      Referring Provider:    History of Present Illness:     Kailey Caldwell is a 80 y.o. RH female who presents for follow-up of cognitive impairment. She is accompanied by her daughter. She was last seen May 2022. Patient presents for follow-up and continued management of progressive cognitive impairment with features of short-term memory loss, frequent confusion and disorientation, and psychosis with visual hallucinations. This is raise concern for an underlying neurodegenerative disease, possible dementia with Lewy bodies. She has a longstanding postural and action tremor of the hands but again taken on more parkinsonian features particularly after the addition of low-dose risperidone which was eventually added for refractory psychotic symptoms. There has been an ongoing gait disturbance requiring use of a walker for assistance. Current medications include:  Gabapentin 400 mg 3 times a day  Sertraline 50 mg daily  Risperidone 1 mg in the morning and 1 mg at night   Memantine 10 mg twice a day     Previous medication trials include: Seroquel     Patient presents for follow-up. Recall that we eventually added low-dose risperidone to help with psychotic features. This was very beneficial however it has been noted that she has taken on a more parkinsonian appearance since addition of risperidone. Attempts to wean it were met with an emergence of jerking and other hyperkinetic activity so she was ultimately placed back on her original dose as noted above. Cognitively things appear to be relatively stable. She is no longer experiencing any significant hallucinations or psychotic behavior.   Memantine has been well-tolerated. She still has fairly significant cognitive impairment with loss of executive function and short-term memory deficits though things have been relatively stable. She continues to live at home and her family lives close by and are able to keep a close eye on her. Her daughter sets up her medications and patient no longer drives. She remains fairly independent with respect to self-care and personal hygiene though her poor mobility and loss of dexterity have become more of a concern. She reports sleeping well at night. Review of Systems:   Review of Systems   Constitutional:  Positive for fatigue and fever. HENT:  Negative for congestion. Eyes:  Negative for photophobia. Respiratory:  Negative for cough. Cardiovascular:  Negative for chest pain. Gastrointestinal:  Negative for constipation. Endocrine: Negative for polyuria. Genitourinary:  Negative for dysuria. Musculoskeletal:  Positive for gait problem. Skin:  Negative for rash. Allergic/Immunologic: Negative for immunocompromised state. Neurological:  Positive for tremors and weakness. Psychiatric/Behavioral:  Positive for confusion and hallucinations. Lab/Imaging Review:   I REVIEWED PERTINENT LABS, IMAGES, AND REPORTS WITH THE PATIENT PERSONALLY, DIRECTLY AND FULLY. THE MOST PERTINENT FINDINGS ARE NOTED BELOW:    CT Head August 2021:  FINDINGS:  No evidence of intracranial mass, hemorrhage, or large territorial infarct. Generalized parenchymal volume loss with commensurate enlargement of the ventricles and sulci. The ventricles remain midline and symmetric. Basal cisterns are patent. There is scattered deep and periventricular white matter lucency which is nonspecific but unchanged and most compatible with chronic microvascular ischemic changes. No extra-axial fluid collection or mass effect. The orbital contents are within normal limits. The paranasal sinuses are clear.  The mastoid air cells and middle ears are clear. No significant osseous or extracranial soft tissue lesions. IMPRESSION  Stable chronic changes without acute intracranial abnormality. MRI Brain August 2021:  Findings:   Chronic age-related senescent changes are again seen with confluent  periventricular white matter disease and lacunae throughout the corona radiata as well as centrum semiovale. . There is no evidence of restricted diffusion to suggest acute ischemia. There are no abnormal extra-axial fluid collections. No evidence of mass or mass effect is seen. There is no diffusion signal abnormality. Expected flow voids are maintained in the major intracranial vessels. Involutional changes. Visualized brainstem structures are unremarkable. There is no evidence of Chiari malformation. The ventricular system and CSF containing spaces are unremarkable in appearance. Visualized extracranial soft tissues are unremarkable. The paranasal sinuses are well pneumatized and aerated. Minimal fluid signal throughout the dependent mastoid air cells bilaterally. IMPRESSION:   1.  Age-related senescent changes and chronic microvascular disease without acute intracranial abnormality. 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 Motor nerves were normal.  Needle EMG to bilateral lower limb and lumbar paraspinal muscles was normal showing no evidence of femoral/sciatic neuropathy, right-sided L2/3/4/5/S1 radiculopathy, left-sided L3/4/5/S1 radiculopathy. No plexopathy or myopathic process. MRI Brain August 2020:  IMPRESSION:  1. Mild/moderate cerebral volume loss. 2. White matter findings compatible with moderate chronic small vessel ischemic disease. Past Medical History:     Past medical history, surgical history, social history, family history, medications, and allergies were reviewed and updated as appropriate.      PAST MEDICAL HISTORY:  Past Medical History:   Diagnosis Date    Abdominal pain 2013    COPD (chronic obstructive pulmonary disease) (HCC)     DJD (degenerative joint disease) 2013    Esophageal reflux 2013    HTN (hypertension) 2013    Hypercholesterolemia     Hypertension     Other and unspecified hyperlipidemia 2013    Pyelonephritis 2013    Sensory neuropathy 2020    UTI (lower urinary tract infection) 2013     PAST SURGICAL HISTORY:   Past Surgical History:   Procedure Laterality Date    BREAST SURGERY  2019    right breast cancer-lumpectomy    CHEST SURGERY      HYSTERECTOMY (CERVIX STATUS UNKNOWN)      LAP,CHOLECYSTECTOMY      PACEMAKER      GA CARDIAC SURG PROCEDURE UNLIST      pacemaker     FAMILY HISTORY:  Family History   Problem Relation Age of Onset    Heart Disease Mother     Hypertension Mother     Hypertension Father     Heart Disease Father       SOCIAL HISTORY:  Social History     Socioeconomic History    Marital status:       Spouse name: None    Number of children: None    Years of education: None    Highest education level: None   Tobacco Use    Smoking status: Former     Packs/day: 1.00     Types: Cigarettes     Quit date: 1989     Years since quittin.6    Smokeless tobacco: Never   Substance and Sexual Activity    Alcohol use: No    Drug use: No       Medications/Allergies:     MEDICATIONS:   Outpatient Encounter Medications as of 2022   Medication Sig Dispense Refill    montelukast (SINGULAIR) 10 MG tablet TAKE 1 TABLET BY MOUTH EVERY DAY 90 tablet 4    nitrofurantoin (MACRODANTIN) 100 MG capsule TAKE 1 CAPSULE BY MOUTH EVERY DAY AT NIGHT 90 capsule 1    amLODIPine (NORVASC) 5 MG tablet TAKE 1 TABLET BY MOUTH EVERY DAY (Patient taking differently: Take 2.5 mg by mouth daily) 90 tablet 3    risperiDONE (RISPERDAL) 1 MG tablet Take 1 tablet by mouth 2 times daily 180 tablet 1    memantine (NAMENDA) 10 MG tablet Take 1 tablet by with some effort required. She is not able to state the months backwards accurately. She was not able to perform any simple arithmetic consistently. She is not able to repeat a 3 digit span reverse. Speech exhibits some hypophonia but no vocal tremor. There is a paucity of speech in general.    Cranial Nerves: PERRL. Eye movements full with saccadic intrusions into her pursuits. No nystagmus. There is diminished facial expression. Facial activation is reduced but symmetric. Tongue and palate were midline. Shoulder shrug sluggish bilaterally. Motor: Strength appears grossly intact throughout without focal deficits. There is moderate rigidity on the right with cogwheeling in both upper extremities without clear asymmetry. Rapid alternating movements do show moderate slowing bilaterally in both upper and lower extremities. Abnormal Movements: Mild postural and action tremor noted - less pronounced than at prior visits. No resting tremor seen. Handwriting is legible but there is some micrographia noted. Sensory: Intact to vibration in the distal lower extremities bilaterally. Cerebellar: No ataxia or dysmetria. Reflexes (R/L): Biceps (2+/2+), Brachioradialis (2+/2+), Patellar (2+/2+), Ankle (2+/2+). Greenwood's was negative and plantar responses were flexor. Gait: Deferred today. Assessment and Plan:     Eli Craft is a 80 y.o. female who presents with the following issues:     Carter Petty was seen today for follow-up and neurologic problem. Diagnoses and all orders for this visit:    Dementia with Lewy bodies (CODE) ([facility] [address]    Cognitive deficits as late effect of cerebrovascular disease    Gait disturbance    Peripheral polyneuropathy    Ms. Yves Galaviz presents for follow-up and continued management of progressive cognitive impairment with concern for an underlying neurodegenerative disease, most likely dementia with Lewy bodies.     I am still concerned about the worsening parkinsonian features particularly in the setting of use of risperidone. Therefore we have discussed attempting a retrial of weaning her off of this medication. I have given her instructions to keep us updated and should we need to use alternative agents for any other hyperkinetic activity this could be considered. Given instructions to wean off of ropinirole over the next 3 days. We will continue memantine 10 mg twice a day as this has been well-tolerated and perhaps provided some stability with respect to her cognition. There is continued gait disturbance and recommended continued use of a Rollator for fall prevention. We will continue to monitor if symptoms improve with weaning and discontinuation of risperidone. Nerve conduction studies have shown evidence for distal sensory neuropathy which may be contributing to some of her balance impairment to some degree though the more contributing factor at this point is parkinsonism. We will continue to monitor for worsening neuropathic pain and she will continue gabapentin as prescribed. Follow up in 3-4 months. Signature: Umer Montes De Oca MD      Date:  8/30/2022    [facility]   [facility] [address] [place] [address]  Ph: [phone]  Fax: [phone]         I have personally interviewed and examined Ms. Estevan Telles and I have personally reviewed all relevant records including labs and imaging as noted above. I have written all aspects of this note. More than 50% of this time was used for counseling regarding my diagnosis, prognosis, and plans for management. Total visit time: 42 minutes.

## 2022-08-31 ENCOUNTER — HOME CARE VISIT (OUTPATIENT)
Dept: SCHEDULING | Facility: HOME HEALTH | Age: 82
End: 2022-08-31
Payer: MEDICARE

## 2022-08-31 VITALS
TEMPERATURE: 97.4 F | DIASTOLIC BLOOD PRESSURE: 68 MMHG | RESPIRATION RATE: 19 BRPM | HEART RATE: 75 BPM | SYSTOLIC BLOOD PRESSURE: 132 MMHG

## 2022-08-31 PROCEDURE — 1090000001 HH PPS REVENUE CREDIT

## 2022-08-31 PROCEDURE — 1090000002 HH PPS REVENUE DEBIT

## 2022-08-31 PROCEDURE — G0157 HHC PT ASSISTANT EA 15: HCPCS

## 2022-09-01 ENCOUNTER — HOME CARE VISIT (OUTPATIENT)
Dept: SCHEDULING | Facility: HOME HEALTH | Age: 82
End: 2022-09-01
Payer: MEDICARE

## 2022-09-01 VITALS
SYSTOLIC BLOOD PRESSURE: 122 MMHG | DIASTOLIC BLOOD PRESSURE: 65 MMHG | OXYGEN SATURATION: 94 % | RESPIRATION RATE: 15 BRPM | HEART RATE: 70 BPM | TEMPERATURE: 97 F

## 2022-09-01 PROCEDURE — 1090000002 HH PPS REVENUE DEBIT

## 2022-09-01 PROCEDURE — G0158 HHC OT ASSISTANT EA 15: HCPCS

## 2022-09-01 PROCEDURE — 1090000001 HH PPS REVENUE CREDIT

## 2022-09-02 ENCOUNTER — HOME CARE VISIT (OUTPATIENT)
Dept: SCHEDULING | Facility: HOME HEALTH | Age: 82
End: 2022-09-02
Payer: MEDICARE

## 2022-09-02 VITALS
SYSTOLIC BLOOD PRESSURE: 120 MMHG | RESPIRATION RATE: 18 BRPM | TEMPERATURE: 97.3 F | HEART RATE: 76 BPM | DIASTOLIC BLOOD PRESSURE: 68 MMHG

## 2022-09-02 PROCEDURE — G0157 HHC PT ASSISTANT EA 15: HCPCS

## 2022-09-02 PROCEDURE — 1090000001 HH PPS REVENUE CREDIT

## 2022-09-02 PROCEDURE — 1090000002 HH PPS REVENUE DEBIT

## 2022-09-03 PROCEDURE — 1090000001 HH PPS REVENUE CREDIT

## 2022-09-03 PROCEDURE — 1090000002 HH PPS REVENUE DEBIT

## 2022-09-04 PROCEDURE — 1090000001 HH PPS REVENUE CREDIT

## 2022-09-04 PROCEDURE — 1090000002 HH PPS REVENUE DEBIT

## 2022-09-05 PROCEDURE — 1090000002 HH PPS REVENUE DEBIT

## 2022-09-05 PROCEDURE — 1090000001 HH PPS REVENUE CREDIT

## 2022-09-06 ENCOUNTER — HOME CARE VISIT (OUTPATIENT)
Dept: SCHEDULING | Facility: HOME HEALTH | Age: 82
End: 2022-09-06
Payer: MEDICARE

## 2022-09-06 VITALS
DIASTOLIC BLOOD PRESSURE: 70 MMHG | SYSTOLIC BLOOD PRESSURE: 134 MMHG | TEMPERATURE: 97.4 F | RESPIRATION RATE: 19 BRPM | HEART RATE: 75 BPM

## 2022-09-06 PROCEDURE — 1090000001 HH PPS REVENUE CREDIT

## 2022-09-06 PROCEDURE — 1090000002 HH PPS REVENUE DEBIT

## 2022-09-06 PROCEDURE — G0157 HHC PT ASSISTANT EA 15: HCPCS

## 2022-09-07 ENCOUNTER — HOME CARE VISIT (OUTPATIENT)
Dept: SCHEDULING | Facility: HOME HEALTH | Age: 82
End: 2022-09-07
Payer: MEDICARE

## 2022-09-07 VITALS
SYSTOLIC BLOOD PRESSURE: 128 MMHG | OXYGEN SATURATION: 95 % | RESPIRATION RATE: 16 BRPM | TEMPERATURE: 98.3 F | HEART RATE: 74 BPM | DIASTOLIC BLOOD PRESSURE: 68 MMHG

## 2022-09-07 DIAGNOSIS — G62.9 PERIPHERAL POLYNEUROPATHY: Primary | ICD-10-CM

## 2022-09-07 PROCEDURE — G0158 HHC OT ASSISTANT EA 15: HCPCS

## 2022-09-07 PROCEDURE — 1090000001 HH PPS REVENUE CREDIT

## 2022-09-07 PROCEDURE — 1090000002 HH PPS REVENUE DEBIT

## 2022-09-07 NOTE — TELEPHONE ENCOUNTER
Soosarah Montaño, daughter left vmt hat the pt was seen last week and needs a refill for Gabapentin 400 mg TID sent to [facility]. Rx is in to [facility] to [facility].

## 2022-09-08 ENCOUNTER — HOME CARE VISIT (OUTPATIENT)
Dept: SCHEDULING | Facility: HOME HEALTH | Age: 82
End: 2022-09-08
Payer: MEDICARE

## 2022-09-08 VITALS
OXYGEN SATURATION: 98 % | DIASTOLIC BLOOD PRESSURE: 72 MMHG | RESPIRATION RATE: 18 BRPM | HEART RATE: 76 BPM | TEMPERATURE: 98.6 F | SYSTOLIC BLOOD PRESSURE: 118 MMHG

## 2022-09-08 PROCEDURE — G0151 HHCP-SERV OF PT,EA 15 MIN: HCPCS

## 2022-09-08 PROCEDURE — 1090000001 HH PPS REVENUE CREDIT

## 2022-09-08 PROCEDURE — 1090000002 HH PPS REVENUE DEBIT

## 2022-09-08 RX ORDER — GABAPENTIN 400 MG/1
400 CAPSULE ORAL 3 TIMES DAILY
Qty: 270 CAPSULE | Refills: 3 | Status: SHIPPED | OUTPATIENT
Start: 2022-09-08 | End: 2023-09-08

## 2022-09-09 PROCEDURE — 1090000001 HH PPS REVENUE CREDIT

## 2022-09-09 PROCEDURE — 1090000002 HH PPS REVENUE DEBIT

## 2022-09-10 PROCEDURE — 1090000001 HH PPS REVENUE CREDIT

## 2022-09-10 PROCEDURE — 1090000002 HH PPS REVENUE DEBIT

## 2022-09-11 PROCEDURE — 1090000002 HH PPS REVENUE DEBIT

## 2022-09-11 PROCEDURE — 1090000001 HH PPS REVENUE CREDIT

## 2022-09-12 PROCEDURE — 1090000001 HH PPS REVENUE CREDIT

## 2022-09-12 PROCEDURE — 1090000002 HH PPS REVENUE DEBIT

## 2022-09-13 PROCEDURE — 1090000002 HH PPS REVENUE DEBIT

## 2022-09-13 PROCEDURE — 1090000001 HH PPS REVENUE CREDIT

## 2022-09-14 ENCOUNTER — HOME CARE VISIT (OUTPATIENT)
Dept: SCHEDULING | Facility: HOME HEALTH | Age: 82
End: 2022-09-14
Payer: MEDICARE

## 2022-09-14 PROCEDURE — G0152 HHCP-SERV OF OT,EA 15 MIN: HCPCS

## 2022-09-14 PROCEDURE — 1090000002 HH PPS REVENUE DEBIT

## 2022-09-14 PROCEDURE — 1090000001 HH PPS REVENUE CREDIT

## 2022-09-15 VITALS
SYSTOLIC BLOOD PRESSURE: 126 MMHG | OXYGEN SATURATION: 98 % | HEART RATE: 69 BPM | RESPIRATION RATE: 16 BRPM | DIASTOLIC BLOOD PRESSURE: 78 MMHG | TEMPERATURE: 98.4 F

## 2022-09-15 ASSESSMENT — ENCOUNTER SYMPTOMS: CONSTIPATION: 1

## 2022-09-20 ENCOUNTER — PATIENT MESSAGE (OUTPATIENT)
Dept: NEUROLOGY | Age: 82
End: 2022-09-20

## 2022-09-20 DIAGNOSIS — G20 PRIMARY PARKINSONISM (HCC): Primary | ICD-10-CM

## 2022-09-20 NOTE — PROGRESS NOTES
Atorvastatin Other (See Comments)     Leg weakness    Codeine Nausea Only    Fluticasone-Salmeterol Other (See Comments)     shakes         ROS:  No obvious pertinent positives on review of systems except for what was outlined above.        Objective:       /82   Pulse 64   Ht 5' 2\" (1.575 m)   Wt 125 lb 12.8 oz (57.1 kg) Comment: with shoes  BMI 23.01 kg/m²     BP Readings from Last 3 Encounters:   09/22/22 114/82   09/14/22 126/78   09/08/22 118/72       Wt Readings from Last 3 Encounters:   09/22/22 125 lb 12.8 oz (57.1 kg)   08/30/22 132 lb (59.9 kg)   08/16/22 132 lb (59.9 kg)       General/Constitutional:   Alert and oriented x 3, no acute distress  HEENT:   normocephalic, atraumatic, moist mucous membranes  Neck:   No JVD or carotid bruits bilaterally  Cardiovascular:   regular rate and rhythm, no rub/gallop appreciated  Pulmonary:   clear to auscultation bilaterally, no respiratory distress  Abdomen:   soft, non-tender, non-distended  Ext:   No sig LE edema bilaterally  Skin:  warm and dry, no obvious rashes seen  Neuro:   no obvious sensory or motor deficits  Psychiatric:   normal mood and affect    Data Review:   Lab Results   Component Value Date    CHOL 143 06/18/2019     Lab Results   Component Value Date    TRIG 181 (H) 06/18/2019     Lab Results   Component Value Date    HDL 46 06/18/2019     Lab Results   Component Value Date    LDLCALC 61 06/18/2019     Lab Results   Component Value Date    LABVLDL 36 06/18/2019     No results found for: [facility]     Lab Results   Component Value Date/Time     08/08/2022 06:14 PM     10/27/2021 03:47 AM     10/26/2021 04:39 AM    K 4.0 08/08/2022 06:14 PM    K 4.2 10/27/2021 03:47 AM    K 4.1 10/26/2021 04:39 AM     08/08/2022 06:14 PM     10/27/2021 03:47 AM     10/26/2021 04:39 AM    CO2 29 08/08/2022 06:14 PM    CO2 26 10/27/2021 03:47 AM    CO2 25 10/26/2021 04:39 AM    BUN 16 08/08/2022 06:14 PM    BUN 13 10/27/2021 03:47 AM    BUN 22 10/26/2021 04:39 AM    CREATININE 0.90 08/08/2022 06:14 PM    CREATININE 1.18 10/27/2021 03:47 AM    CREATININE 1.73 10/26/2021 04:39 AM    GLUCOSE 116 08/08/2022 06:14 PM    GLUCOSE 101 10/27/2021 03:47 AM    GLUCOSE 89 10/26/2021 04:39 AM    CALCIUM 9.6 08/08/2022 06:14 PM    CALCIUM 9.4 10/27/2021 03:47 AM    CALCIUM 9.5 10/26/2021 04:39 AM         Lab Results   Component Value Date    ALT 12 08/08/2022    ALT 19 10/24/2021    ALT 17 08/18/2021    AST 27 08/08/2022    AST 25 10/24/2021    AST 15 08/18/2021        Assessment/Plan:   1. Paroxysmal atrial fibrillation (HCC)  - [id number] equals 6  - Continue with Lopressor and Eliquis    2. History of CHF (congestive heart failure)  - H2FPEF score = 5 with intermediate probability of HFpEF (atrial fibrillation, elder, filling pressure)  - Patient no longer on at least 2 antihypertensives and had a recent CLARISA noted  - Currently on p.o. Bumex every other day    3. Cardiac pacemaker in situ  - Continue to follow in device clinic    4. CAD in native artery  - Continue with baby aspirin daily  - Discussed the favorable cardiovascular effects of statin therapy  - Patient with intolerance to Lipitor  - Patient's daughter declined statin therapy at this time    5. Hyperlipidemia, unspecified hyperlipidemia type  - See \"CAD in native artery\"    6. Hypertension, unspecified type  - Patient with a normal BP in clinic today on a subtherapeutic dose of amlodipine  - Discontinue amlodipine  - Continue with Lopressor    7.  Near syncope  - Continue to follow in device clinic    F/U: 6 months    Samson Brooks MD

## 2022-09-21 NOTE — TELEPHONE ENCOUNTER
Lc Cross, [address] [place] 9/20/2022 10:45 AM EDT      ----- Message -----  From: Sheila Wharton  Sent: 9/20/2022 10:04 AM EDT  To: , *  Subject: Leg movement     My mom is having continuous leg movement. Ex: while standing she walks in place. This is in both legs sitting or standing. Can a medication help this?   Please call Letty at [phone]  Thanks

## 2022-09-22 ENCOUNTER — OFFICE VISIT (OUTPATIENT)
Dept: CARDIOLOGY CLINIC | Age: 82
End: 2022-09-22
Payer: MEDICARE

## 2022-09-22 VITALS
HEART RATE: 64 BPM | BODY MASS INDEX: 23.15 KG/M2 | SYSTOLIC BLOOD PRESSURE: 114 MMHG | WEIGHT: 125.8 LBS | HEIGHT: 62 IN | DIASTOLIC BLOOD PRESSURE: 82 MMHG

## 2022-09-22 DIAGNOSIS — Z95.0 CARDIAC PACEMAKER IN SITU: ICD-10-CM

## 2022-09-22 DIAGNOSIS — I48.0 PAROXYSMAL ATRIAL FIBRILLATION (HCC): Primary | ICD-10-CM

## 2022-09-22 DIAGNOSIS — I10 HYPERTENSION, UNSPECIFIED TYPE: ICD-10-CM

## 2022-09-22 DIAGNOSIS — I25.10 CAD IN NATIVE ARTERY: ICD-10-CM

## 2022-09-22 DIAGNOSIS — R55 NEAR SYNCOPE: ICD-10-CM

## 2022-09-22 DIAGNOSIS — E78.5 HYPERLIPIDEMIA, UNSPECIFIED HYPERLIPIDEMIA TYPE: ICD-10-CM

## 2022-09-22 DIAGNOSIS — Z86.79 HISTORY OF CHF (CONGESTIVE HEART FAILURE): ICD-10-CM

## 2022-09-22 PROCEDURE — 99214 OFFICE O/P EST MOD 30 MIN: CPT | Performed by: INTERNAL MEDICINE

## 2022-09-22 PROCEDURE — 1036F TOBACCO NON-USER: CPT | Performed by: INTERNAL MEDICINE

## 2022-09-22 PROCEDURE — 1123F ACP DISCUSS/DSCN MKR DOCD: CPT | Performed by: INTERNAL MEDICINE

## 2022-09-22 PROCEDURE — 1090F PRES/ABSN URINE INCON ASSESS: CPT | Performed by: INTERNAL MEDICINE

## 2022-09-22 PROCEDURE — G8399 PT W/DXA RESULTS DOCUMENT: HCPCS | Performed by: INTERNAL MEDICINE

## 2022-09-22 PROCEDURE — G8420 CALC BMI NORM PARAMETERS: HCPCS | Performed by: INTERNAL MEDICINE

## 2022-09-22 PROCEDURE — G8427 DOCREV CUR MEDS BY ELIG CLIN: HCPCS | Performed by: INTERNAL MEDICINE

## 2022-09-28 ENCOUNTER — OFFICE VISIT (OUTPATIENT)
Dept: INTERNAL MEDICINE CLINIC | Facility: CLINIC | Age: 82
End: 2022-09-28
Payer: MEDICARE

## 2022-09-28 VITALS
TEMPERATURE: 98.7 F | OXYGEN SATURATION: 97 % | BODY MASS INDEX: 22.54 KG/M2 | SYSTOLIC BLOOD PRESSURE: 138 MMHG | DIASTOLIC BLOOD PRESSURE: 89 MMHG | WEIGHT: 122.5 LBS | HEIGHT: 62 IN | HEART RATE: 70 BPM

## 2022-09-28 DIAGNOSIS — I10 ESSENTIAL (PRIMARY) HYPERTENSION: ICD-10-CM

## 2022-09-28 DIAGNOSIS — N30.01 ACUTE CYSTITIS WITH HEMATURIA: Primary | ICD-10-CM

## 2022-09-28 DIAGNOSIS — C50.411 MALIGNANT NEOPLASM OF UPPER-OUTER QUADRANT OF RIGHT BREAST IN FEMALE, ESTROGEN RECEPTOR POSITIVE (HCC): ICD-10-CM

## 2022-09-28 DIAGNOSIS — Z17.0 MALIGNANT NEOPLASM OF UPPER-OUTER QUADRANT OF RIGHT BREAST IN FEMALE, ESTROGEN RECEPTOR POSITIVE (HCC): ICD-10-CM

## 2022-09-28 PROBLEM — E87.20 LACTIC ACIDOSIS: Status: RESOLVED | Noted: 2021-10-24 | Resolved: 2022-09-28

## 2022-09-28 PROBLEM — N17.9 AKI (ACUTE KIDNEY INJURY) (HCC): Status: RESOLVED | Noted: 2021-08-18 | Resolved: 2022-09-28

## 2022-09-28 LAB
BILIRUBIN, URINE, POC: NEGATIVE
BLOOD URINE, POC: NEGATIVE
GLUCOSE URINE, POC: NEGATIVE
KETONES, URINE, POC: NEGATIVE
LEUKOCYTE ESTERASE, URINE, POC: NEGATIVE
NITRITE, URINE, POC: NEGATIVE
PH, URINE, POC: 6 (ref 4.6–8)
PROTEIN,URINE, POC: NORMAL
SPECIFIC GRAVITY, URINE, POC: 1.01 (ref 1–1.03)
URINALYSIS CLARITY, POC: CLEAR
URINALYSIS COLOR, POC: NORMAL
UROBILINOGEN, POC: NORMAL

## 2022-09-28 PROCEDURE — 99213 OFFICE O/P EST LOW 20 MIN: CPT | Performed by: INTERNAL MEDICINE

## 2022-09-28 PROCEDURE — G8427 DOCREV CUR MEDS BY ELIG CLIN: HCPCS | Performed by: INTERNAL MEDICINE

## 2022-09-28 PROCEDURE — G8399 PT W/DXA RESULTS DOCUMENT: HCPCS | Performed by: INTERNAL MEDICINE

## 2022-09-28 PROCEDURE — 1123F ACP DISCUSS/DSCN MKR DOCD: CPT | Performed by: INTERNAL MEDICINE

## 2022-09-28 PROCEDURE — G8420 CALC BMI NORM PARAMETERS: HCPCS | Performed by: INTERNAL MEDICINE

## 2022-09-28 PROCEDURE — 1036F TOBACCO NON-USER: CPT | Performed by: INTERNAL MEDICINE

## 2022-09-28 PROCEDURE — 81002 URINALYSIS NONAUTO W/O SCOPE: CPT | Performed by: INTERNAL MEDICINE

## 2022-09-28 PROCEDURE — 1090F PRES/ABSN URINE INCON ASSESS: CPT | Performed by: INTERNAL MEDICINE

## 2022-09-28 RX ORDER — CEPHALEXIN 500 MG/1
500 CAPSULE ORAL 3 TIMES DAILY
Qty: 15 CAPSULE | Refills: 0 | Status: SHIPPED | OUTPATIENT
Start: 2022-09-28 | End: 2022-10-03

## 2022-09-28 ASSESSMENT — PATIENT HEALTH QUESTIONNAIRE - PHQ9
SUM OF ALL RESPONSES TO PHQ QUESTIONS 1-9: 0
3. TROUBLE FALLING OR STAYING ASLEEP: 0
SUM OF ALL RESPONSES TO PHQ QUESTIONS 1-9: 0
2. FEELING DOWN, DEPRESSED OR HOPELESS: 0
1. LITTLE INTEREST OR PLEASURE IN DOING THINGS: 0
SUM OF ALL RESPONSES TO PHQ QUESTIONS 1-9: 0
6. FEELING BAD ABOUT YOURSELF - OR THAT YOU ARE A FAILURE OR HAVE LET YOURSELF OR YOUR FAMILY DOWN: 0
7. TROUBLE CONCENTRATING ON THINGS, SUCH AS READING THE NEWSPAPER OR WATCHING TELEVISION: 0
4. FEELING TIRED OR HAVING LITTLE ENERGY: 0
5. POOR APPETITE OR OVEREATING: 0
SUM OF ALL RESPONSES TO PHQ9 QUESTIONS 1 & 2: 0
10. IF YOU CHECKED OFF ANY PROBLEMS, HOW DIFFICULT HAVE THESE PROBLEMS MADE IT FOR YOU TO DO YOUR WORK, TAKE CARE OF THINGS AT HOME, OR GET ALONG WITH OTHER PEOPLE: 0
8. MOVING OR SPEAKING SO SLOWLY THAT OTHER PEOPLE COULD HAVE NOTICED. OR THE OPPOSITE, BEING SO FIGETY OR RESTLESS THAT YOU HAVE BEEN MOVING AROUND A LOT MORE THAN USUAL: 0
9. THOUGHTS THAT YOU WOULD BE BETTER OFF DEAD, OR OF HURTING YOURSELF: 0
SUM OF ALL RESPONSES TO PHQ QUESTIONS 1-9: 0

## 2022-09-28 NOTE — PROGRESS NOTES
SUBJECTIVE:   Wander Jang is a 80 y.o. female seen for a visit regarding   Chief Complaint   Patient presents with    Urinary Pain     Blood in urine yesterday, nausea no vomiting. Abdominal Pain     LRQ pan radiating in RT lower back. Urinary Pain   This is a new problem. The current episode started yesterday. The problem occurs every urination. The problem has been unchanged. Quality: stinging. There has been no fever. Associated symptoms include frequency and urgency. Treatments tried: been on night macrobid since last year for suppression. Hypertension  This is a chronic problem. The current episode started more than 1 year ago. The problem is unchanged. The problem is controlled (home values in teens --was 114/82 at cardiology week ago--told to stop 1/2 of 5 mg amlodipine -has not done yet). Past Medical History, Past Surgical History, Family history, Social History, and Medications were all reviewed with the patient today and updated as necessary. Current Outpatient Medications   Medication Sig Dispense Refill    cephALEXin (KEFLEX) 500 MG capsule Take 1 capsule by mouth 3 times daily for 5 days 15 capsule 0    carbidopa-levodopa (SINEMET)  MG per tablet Take 1 tablet by mouth 2 times daily 60 tablet 5    gabapentin (NEURONTIN) 400 MG capsule Take 1 capsule by mouth 3 times daily.  270 capsule 3    montelukast (SINGULAIR) 10 MG tablet TAKE 1 TABLET BY MOUTH EVERY DAY 90 tablet 4    nitrofurantoin (MACRODANTIN) 100 MG capsule TAKE 1 CAPSULE BY MOUTH EVERY DAY AT NIGHT 90 capsule 1    memantine (NAMENDA) 10 MG tablet Take 1 tablet by mouth 2 times daily 180 tablet 1    OXYGEN Inhale into the lungs 2 liters at night only      albuterol sulfate  (90 Base) MCG/ACT inhaler Inhale 2 puffs into the lungs every 4 hours as needed for Shortness of Breath or Wheezing      allopurinol (ZYLOPRIM) 100 MG tablet TAKE 1 TABLET BY MOUTH EVERY DAY      apixaban (ELIQUIS) 5 MG TABS tablet Take 5 mg by mouth 2 times daily      aspirin 81 MG EC tablet Take 81 mg by mouth daily      bumetanide (BUMEX) 1 MG tablet Take 1 mg by mouth every other day      diclofenac sodium (VOLTAREN) 1 % GEL Apply topically 4 times daily      lansoprazole (PREVACID) 30 MG delayed release capsule TAKE 1 CAPSULE BY MOUTH TWICE A DAY      metoprolol tartrate (LOPRESSOR) 25 MG tablet Take 25 mg by mouth 2 times daily      sertraline (ZOLOFT) 50 MG tablet TAKE 1 TABLET BY MOUTH EVERY DAY      tamoxifen (NOLVADEX) 20 MG tablet TAKE 1 TABLET BY MOUTH EVERY DAY       No current facility-administered medications for this visit. Allergies   Allergen Reactions    Aspirin Nausea Only     Pt can take aspirin 81mg Pt c/o upset stomach with higher dose    Atorvastatin Other (See Comments)     Leg weakness    Codeine Nausea Only    Fluticasone-Salmeterol Other (See Comments)     alma     Patient Active Problem List   Diagnosis    Cataract, bilateral    Recurrent major depressive disorder, in full remission ([facility] [address]    Hyperlipidemia    CAD in native artery    Normocytic anemia    Bradycardia    Impaired functional mobility, balance, gait, and endurance    Mild episode of recurrent major depressive disorder (HCC)    Edema of left lower extremity    Allergic rhinitis    Current use of long term anticoagulation    Debility    Sick sinus syndrome (HCC)    Shortness of breath    Pacemaker generator end of life    Personal history of tobacco use, presenting hazards to health    Cardiac pacemaker in situ    Diastolic dysfunction    Recurrent UTI    Atherosclerosis of native coronary artery of native heart without angina pectoris    Sensory neuropathy    DJD (degenerative joint disease)    Gout involving toe    Cognitive disorder    Hypertension    Mitral and aortic regurgitation    Pulmonary HTN ([address]    History of recurrent UTIs    Chronic heart failure with preserved ejection fraction (HFpEF) ([facility])    Obesity (BMI 30.0-34. 9) Frequent falls    Atrial fibrillation (HCC)    Malignant neoplasm of upper-outer quadrant of right breast in female, estrogen receptor positive ([address]    Primary pulmonary hypertension (HCC)    History of gout    Hypoxemia    Status cardiac pacemaker    Laryngopharyngeal reflux (LPR)    Near syncope     Social History     Tobacco Use    Smoking status: Former     Packs/day: 1.00     Types: Cigarettes     Quit date: 1989     Years since quittin.7    Smokeless tobacco: Never   Substance Use Topics    Alcohol use: No          Review of Systems   Genitourinary:  Positive for dysuria, frequency and urgency. Neurological:  Positive for dizziness (when walking). OBJECTIVE:  /89   Pulse 70   Temp 98.7 °F (37.1 °C)   Ht 5' 2\" (1.575 m)   Wt 122 lb 8 oz (55.6 kg)   SpO2 97%   BMI 22.41 kg/m²      Physical Exam  Constitutional:       General: She is not in acute distress. Appearance: Normal appearance. Abdominal:      Tenderness: There is no abdominal tenderness. There is no guarding. Medical problems and test results were reviewed with the patient today. ASSESSMENT and PLAN     1. Acute cystitis with hematuria  -     cephALEXin (KEFLEX) 500 MG capsule; Take 1 capsule by mouth 3 times daily for 5 days, Disp-15 capsule, R-0Normal  2. Malignant neoplasm of upper-outer quadrant of right breast in female, estrogen receptor positive ([facility] [address]  3. Essential (primary) hypertension     the following changes in treatment are made use keflex for now -try to get UA and culture  though and bring back if needed  lab results and schedule for future lab studies reviewed with patient  reviewed medications and side effects in detail     Return if symptoms worsen or fail to improve.

## 2022-10-01 LAB
BACTERIA SPEC CULT: NORMAL
SERVICE CMNT-IMP: NORMAL

## 2022-10-18 PROCEDURE — 93296 REM INTERROG EVL PM/IDS: CPT | Performed by: INTERNAL MEDICINE

## 2022-10-18 PROCEDURE — 93294 REM INTERROG EVL PM/LDLS PM: CPT | Performed by: INTERNAL MEDICINE

## 2022-10-31 DIAGNOSIS — R00.1 BRADYCARDIA: ICD-10-CM

## 2022-10-31 DIAGNOSIS — Z95.0 CARDIAC PACEMAKER IN SITU: ICD-10-CM

## 2022-10-31 DIAGNOSIS — R00.1 BRADYCARDIA: Primary | ICD-10-CM

## 2022-11-09 ENCOUNTER — TELEPHONE (OUTPATIENT)
Dept: PULMONOLOGY | Age: 82
End: 2022-11-09

## 2022-11-09 NOTE — TELEPHONE ENCOUNTER
Spoke to the patient's daughter about [facility] request to renew script and office notes to them. Patient not sure on this so I gave her DME's ph#. She will contact them and get more information, then she will call back the office to make appt. For her mom.  She voiced understanding Sarah Rowe

## 2022-11-23 ENCOUNTER — OFFICE VISIT (OUTPATIENT)
Dept: INTERNAL MEDICINE CLINIC | Facility: CLINIC | Age: 82
End: 2022-11-23
Payer: MEDICARE

## 2022-11-23 VITALS
HEART RATE: 72 BPM | BODY MASS INDEX: 22.73 KG/M2 | WEIGHT: 123.5 LBS | TEMPERATURE: 98 F | SYSTOLIC BLOOD PRESSURE: 137 MMHG | DIASTOLIC BLOOD PRESSURE: 86 MMHG | OXYGEN SATURATION: 98 % | HEIGHT: 62 IN

## 2022-11-23 DIAGNOSIS — Z00.00 MEDICARE ANNUAL WELLNESS VISIT, SUBSEQUENT: Primary | ICD-10-CM

## 2022-11-23 DIAGNOSIS — F33.0 MILD EPISODE OF RECURRENT MAJOR DEPRESSIVE DISORDER (HCC): ICD-10-CM

## 2022-11-23 DIAGNOSIS — F09 COGNITIVE DISORDER: ICD-10-CM

## 2022-11-23 DIAGNOSIS — I48.0 PAROXYSMAL ATRIAL FIBRILLATION (HCC): ICD-10-CM

## 2022-11-23 DIAGNOSIS — G62.9 SENSORY NEUROPATHY: ICD-10-CM

## 2022-11-23 DIAGNOSIS — Z74.09 IMPAIRED FUNCTIONAL MOBILITY, BALANCE, GAIT, AND ENDURANCE: ICD-10-CM

## 2022-11-23 DIAGNOSIS — G24.9 DYSKINESIA: ICD-10-CM

## 2022-11-23 LAB
ALBUMIN SERPL-MCNC: 3.7 G/DL (ref 3.2–4.6)
ALBUMIN/GLOB SERPL: 1.4 {RATIO} (ref 0.4–1.6)
ALP SERPL-CCNC: 73 U/L (ref 50–136)
ALT SERPL-CCNC: 11 U/L (ref 12–65)
ANION GAP SERPL CALC-SCNC: 7 MMOL/L (ref 2–11)
AST SERPL-CCNC: 6 U/L (ref 15–37)
BASOPHILS # BLD: 0.1 K/UL (ref 0–0.2)
BASOPHILS NFR BLD: 1 % (ref 0–2)
BILIRUB SERPL-MCNC: 0.6 MG/DL (ref 0.2–1.1)
BUN SERPL-MCNC: 15 MG/DL (ref 8–23)
CALCIUM SERPL-MCNC: 10 MG/DL (ref 8.3–10.4)
CHLORIDE SERPL-SCNC: 108 MMOL/L (ref 101–110)
CO2 SERPL-SCNC: 28 MMOL/L (ref 21–32)
CREAT SERPL-MCNC: 1.2 MG/DL (ref 0.6–1)
DIFFERENTIAL METHOD BLD: ABNORMAL
EOSINOPHIL # BLD: 0.3 K/UL (ref 0–0.8)
EOSINOPHIL NFR BLD: 3 % (ref 0.5–7.8)
ERYTHROCYTE [DISTWIDTH] IN BLOOD BY AUTOMATED COUNT: 14.6 % (ref 11.9–14.6)
GLOBULIN SER CALC-MCNC: 2.6 G/DL (ref 2.8–4.5)
GLUCOSE SERPL-MCNC: 99 MG/DL (ref 65–100)
HCT VFR BLD AUTO: 37.9 % (ref 35.8–46.3)
HGB BLD-MCNC: 12.1 G/DL (ref 11.7–15.4)
IMM GRANULOCYTES # BLD AUTO: 0 K/UL (ref 0–0.5)
IMM GRANULOCYTES NFR BLD AUTO: 0 % (ref 0–5)
LYMPHOCYTES # BLD: 2.5 K/UL (ref 0.5–4.6)
LYMPHOCYTES NFR BLD: 27 % (ref 13–44)
MCH RBC QN AUTO: 30 PG (ref 26.1–32.9)
MCHC RBC AUTO-ENTMCNC: 31.9 G/DL (ref 31.4–35)
MCV RBC AUTO: 94 FL (ref 82–102)
MONOCYTES # BLD: 0.6 K/UL (ref 0.1–1.3)
MONOCYTES NFR BLD: 7 % (ref 4–12)
NEUTS SEG # BLD: 5.8 K/UL (ref 1.7–8.2)
NEUTS SEG NFR BLD: 62 % (ref 43–78)
NRBC # BLD: 0 K/UL (ref 0–0.2)
PLATELET # BLD AUTO: 229 K/UL (ref 150–450)
PMV BLD AUTO: 11 FL (ref 9.4–12.3)
POTASSIUM SERPL-SCNC: 3.7 MMOL/L (ref 3.5–5.1)
PROT SERPL-MCNC: 6.3 G/DL (ref 6.3–8.2)
RBC # BLD AUTO: 4.03 M/UL (ref 4.05–5.2)
SODIUM SERPL-SCNC: 143 MMOL/L (ref 133–143)
WBC # BLD AUTO: 9.2 K/UL (ref 4.3–11.1)

## 2022-11-23 PROCEDURE — 3074F SYST BP LT 130 MM HG: CPT | Performed by: INTERNAL MEDICINE

## 2022-11-23 PROCEDURE — G8484 FLU IMMUNIZE NO ADMIN: HCPCS | Performed by: INTERNAL MEDICINE

## 2022-11-23 PROCEDURE — 3078F DIAST BP <80 MM HG: CPT | Performed by: INTERNAL MEDICINE

## 2022-11-23 PROCEDURE — 1123F ACP DISCUSS/DSCN MKR DOCD: CPT | Performed by: INTERNAL MEDICINE

## 2022-11-23 PROCEDURE — G0439 PPPS, SUBSEQ VISIT: HCPCS | Performed by: INTERNAL MEDICINE

## 2022-11-23 ASSESSMENT — PATIENT HEALTH QUESTIONNAIRE - PHQ9
SUM OF ALL RESPONSES TO PHQ QUESTIONS 1-9: 0
4. FEELING TIRED OR HAVING LITTLE ENERGY: 0
9. THOUGHTS THAT YOU WOULD BE BETTER OFF DEAD, OR OF HURTING YOURSELF: 0
SUM OF ALL RESPONSES TO PHQ QUESTIONS 1-9: 0
10. IF YOU CHECKED OFF ANY PROBLEMS, HOW DIFFICULT HAVE THESE PROBLEMS MADE IT FOR YOU TO DO YOUR WORK, TAKE CARE OF THINGS AT HOME, OR GET ALONG WITH OTHER PEOPLE: 0
2. FEELING DOWN, DEPRESSED OR HOPELESS: 0
1. LITTLE INTEREST OR PLEASURE IN DOING THINGS: 0
SUM OF ALL RESPONSES TO PHQ9 QUESTIONS 1 & 2: 0
8. MOVING OR SPEAKING SO SLOWLY THAT OTHER PEOPLE COULD HAVE NOTICED. OR THE OPPOSITE, BEING SO FIGETY OR RESTLESS THAT YOU HAVE BEEN MOVING AROUND A LOT MORE THAN USUAL: 0
6. FEELING BAD ABOUT YOURSELF - OR THAT YOU ARE A FAILURE OR HAVE LET YOURSELF OR YOUR FAMILY DOWN: 0
7. TROUBLE CONCENTRATING ON THINGS, SUCH AS READING THE NEWSPAPER OR WATCHING TELEVISION: 0
SUM OF ALL RESPONSES TO PHQ QUESTIONS 1-9: 0
3. TROUBLE FALLING OR STAYING ASLEEP: 0
5. POOR APPETITE OR OVEREATING: 0
SUM OF ALL RESPONSES TO PHQ QUESTIONS 1-9: 0

## 2022-11-23 ASSESSMENT — LIFESTYLE VARIABLES
HOW MANY STANDARD DRINKS CONTAINING ALCOHOL DO YOU HAVE ON A TYPICAL DAY: PATIENT DOES NOT DRINK
HOW OFTEN DO YOU HAVE A DRINK CONTAINING ALCOHOL: NEVER

## 2022-11-23 NOTE — PATIENT INSTRUCTIONS
Personalized Preventive Plan for Kayce Croft - 11/23/2022  Medicare offers a range of preventive health benefits. Some of the tests and screenings are paid in full while other may be subject to a deductible, co-insurance, and/or copay. Some of these benefits include a comprehensive review of your medical history including lifestyle, illnesses that may run in your family, and various assessments and screenings as appropriate. After reviewing your medical record and screening and assessments performed today your provider may have ordered immunizations, labs, imaging, and/or referrals for you. A list of these orders (if applicable) as well as your Preventive Care list are included within your After Visit Summary for your review. Other Preventive Recommendations:    A preventive eye exam performed by an eye specialist is recommended every 1-2 years to screen for glaucoma; cataracts, macular degeneration, and other eye disorders. A preventive dental visit is recommended every 6 months. Try to get at least 150 minutes of exercise per week or 10,000 steps per day on a pedometer . Order or download the FREE \"Exercise & Physical Activity: Your Everyday Guide\" from [employer]. Call 9-554.467.3797 or search [employer] online. You need 6534-1428 mg of calcium and 2059-1658 IU of vitamin D per day. It is possible to meet your calcium requirement with diet alone, but a vitamin D supplement is usually necessary to meet this goal.  When exposed to the sun, use a sunscreen that protects against both UVA and UVB radiation with an SPF of 30 or greater. Reapply every 2 to 3 hours or after sweating, drying off with a towel, or swimming. Always wear a seat belt when traveling in a car. Always wear a helmet when riding a bicycle or motorcycle.

## 2022-12-04 DIAGNOSIS — I48.0 PAROXYSMAL ATRIAL FIBRILLATION (HCC): Primary | ICD-10-CM

## 2022-12-05 DIAGNOSIS — F32.0 MAJOR DEPRESSIVE DISORDER, SINGLE EPISODE, MILD (HCC): ICD-10-CM

## 2022-12-08 RX ORDER — APIXABAN 5 MG/1
TABLET, FILM COATED ORAL
Qty: 180 TABLET | Refills: 3 | Status: SHIPPED | OUTPATIENT
Start: 2022-12-08

## 2023-01-30 ENCOUNTER — HOME HEALTH ADMISSION (OUTPATIENT)
Dept: HOME HEALTH SERVICES | Facility: HOME HEALTH | Age: 83
End: 2023-01-30
Payer: MEDICARE

## 2023-01-30 ENCOUNTER — OFFICE VISIT (OUTPATIENT)
Dept: NEUROLOGY | Age: 83
End: 2023-01-30
Payer: MEDICARE

## 2023-01-30 VITALS
DIASTOLIC BLOOD PRESSURE: 84 MMHG | BODY MASS INDEX: 22.63 KG/M2 | HEIGHT: 62 IN | SYSTOLIC BLOOD PRESSURE: 122 MMHG | WEIGHT: 123 LBS

## 2023-01-30 DIAGNOSIS — F02.B2 MODERATE LEWY BODY DEMENTIA WITH PSYCHOTIC DISTURBANCE (HCC): Primary | ICD-10-CM

## 2023-01-30 DIAGNOSIS — G31.83 MODERATE LEWY BODY DEMENTIA WITH PSYCHOTIC DISTURBANCE (HCC): Primary | ICD-10-CM

## 2023-01-30 DIAGNOSIS — Z74.09 IMPAIRED FUNCTIONAL MOBILITY, BALANCE, GAIT, AND ENDURANCE: ICD-10-CM

## 2023-01-30 DIAGNOSIS — G62.9 PERIPHERAL POLYNEUROPATHY: ICD-10-CM

## 2023-01-30 PROCEDURE — G8484 FLU IMMUNIZE NO ADMIN: HCPCS | Performed by: PSYCHIATRY & NEUROLOGY

## 2023-01-30 PROCEDURE — G8427 DOCREV CUR MEDS BY ELIG CLIN: HCPCS | Performed by: PSYCHIATRY & NEUROLOGY

## 2023-01-30 PROCEDURE — 1090F PRES/ABSN URINE INCON ASSESS: CPT | Performed by: PSYCHIATRY & NEUROLOGY

## 2023-01-30 PROCEDURE — 1123F ACP DISCUSS/DSCN MKR DOCD: CPT | Performed by: PSYCHIATRY & NEUROLOGY

## 2023-01-30 PROCEDURE — G8399 PT W/DXA RESULTS DOCUMENT: HCPCS | Performed by: PSYCHIATRY & NEUROLOGY

## 2023-01-30 PROCEDURE — 1036F TOBACCO NON-USER: CPT | Performed by: PSYCHIATRY & NEUROLOGY

## 2023-01-30 PROCEDURE — 3079F DIAST BP 80-89 MM HG: CPT | Performed by: PSYCHIATRY & NEUROLOGY

## 2023-01-30 PROCEDURE — 3074F SYST BP LT 130 MM HG: CPT | Performed by: PSYCHIATRY & NEUROLOGY

## 2023-01-30 PROCEDURE — G8420 CALC BMI NORM PARAMETERS: HCPCS | Performed by: PSYCHIATRY & NEUROLOGY

## 2023-01-30 PROCEDURE — 99214 OFFICE O/P EST MOD 30 MIN: CPT | Performed by: PSYCHIATRY & NEUROLOGY

## 2023-01-30 RX ORDER — MEMANTINE HYDROCHLORIDE 10 MG/1
10 TABLET ORAL 2 TIMES DAILY
Qty: 180 TABLET | Refills: 3 | Status: SHIPPED | OUTPATIENT
Start: 2023-01-30

## 2023-01-30 ASSESSMENT — ENCOUNTER SYMPTOMS
CONSTIPATION: 0
COUGH: 0
PHOTOPHOBIA: 0

## 2023-01-30 NOTE — PROGRESS NOTES
[facility]  [address], [address] [facility], [address] [place] [address]  Phone: [phone] Fax [phone]  Sari Sharif MD      Patient: Fiorella De León  Provider: Sari Sharif MD    CC:   Chief Complaint   Patient presents with    Follow-up     Dementia      Referring Provider:    History of Present Illness:     Fiorella De León is a 80 y.o. RH female who presents for follow-up of cognitive impairment, most likely Dementia Lewy Bodies. She is accompanied by her daughter. She was last seen August 2022. Patient presents for follow-up and continued management of progressive cognitive impairment with features of short-term memory loss, frequent confusion and disorientation, and psychosis with visual hallucinations. This is raise concern for an underlying neurodegenerative disease, possible dementia with Lewy bodies. She has a longstanding postural and action tremor of the hands but again taken on more parkinsonian features particularly after the addition of low-dose risperidone which was eventually added for refractory psychotic symptoms. There has been an ongoing gait disturbance requiring use of a walker for assistance. Current medications include:  Gabapentin 400 mg 3 times a day  Sertraline 50 mg daily  Memantine 10 mg twice a day     Previous medication trials include: Seroquel, risperidone     Patient presents for follow-up. Overall things have been relatively stable. She has now been weaned off of low-dose risperidone which was previously added to help with psychotic features. She has no longer experiencing any significant visual hallucinations or psychotic disturbances. She continues to live at home and she has a caregiver in for several hours during the day who assist with household chores and other ADLs. Family also lives close by us are able to keep an eye on her. Her daughter sets up her medications and patient no longer drives.  Otherwise patient is able to spend time at home by herself and no significant safety issues at visit. No recent falls. She uses her cane in the house and also has a walker which she uses when outdoors. She does complain of some continued balance difficulty and a feeling of \"swimmy headedness\" when on her feet. To our knowledge there have been no other issues with hypotension. Cognitively things appear to be relatively stable though she does have some persistent short-term memory loss of loss of executive function. She remains on memantine. She reports sleeping well at night. Previous trial of Sinemet  mg was not met with any benefit with respect to some dyskinetic type movements of her lower extremities. She has now stopped the medication. She continues to have some breakthrough movements but they do not appear to be very bothersome or disruptive to her. She denies any pain. They do not prohibit the onset of sleep. They are not interfering with her walking for disrupting her day-to-day activities. Review of Systems:   Review of Systems   Constitutional:  Positive for fatigue and fever. HENT:  Negative for congestion. Eyes:  Negative for photophobia. Respiratory:  Negative for cough. Cardiovascular:  Negative for chest pain. Gastrointestinal:  Negative for constipation. Endocrine: Negative for polyuria. Genitourinary:  Negative for dysuria. Musculoskeletal:  Positive for gait problem. Skin:  Negative for rash. Allergic/Immunologic: Negative for immunocompromised state. Neurological:  Positive for tremors and weakness. Psychiatric/Behavioral:  Positive for confusion and hallucinations. Lab/Imaging Review:   I REVIEWED PERTINENT LABS, IMAGES, AND REPORTS WITH THE PATIENT PERSONALLY, DIRECTLY AND FULLY. THE MOST PERTINENT FINDINGS ARE NOTED BELOW:    CT Head August 2021:  FINDINGS:  No evidence of intracranial mass, hemorrhage, or large territorial infarct.   Generalized parenchymal volume loss with commensurate enlargement of the ventricles and sulci. The ventricles remain midline and symmetric. Basal cisterns are patent. There is scattered deep and periventricular white matter lucency which is nonspecific but unchanged and most compatible with chronic microvascular ischemic changes. No extra-axial fluid collection or mass effect. The orbital contents are within normal limits. The paranasal sinuses are clear. The mastoid air cells and middle ears are clear. No significant osseous or extracranial soft tissue lesions. IMPRESSION  Stable chronic changes without acute intracranial abnormality. MRI Brain August 2021:  Findings:   Chronic age-related senescent changes are again seen with confluent  periventricular white matter disease and lacunae throughout the corona radiata as well as centrum semiovale. . There is no evidence of restricted diffusion to suggest acute ischemia. There are no abnormal extra-axial fluid collections. No evidence of mass or mass effect is seen. There is no diffusion signal abnormality. Expected flow voids are maintained in the major intracranial vessels. Involutional changes. Visualized brainstem structures are unremarkable. There is no evidence of Chiari malformation. The ventricular system and CSF containing spaces are unremarkable in appearance. Visualized extracranial soft tissues are unremarkable. The paranasal sinuses are well pneumatized and aerated. Minimal fluid signal throughout the dependent mastoid air cells bilaterally. IMPRESSION:   1.  Age-related senescent changes and chronic microvascular disease without acute intracranial abnormality. 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   Motor nerves were normal.  Needle EMG to bilateral lower limb and lumbar paraspinal muscles was normal showing no evidence of femoral/sciatic neuropathy, right-sided L2/3/4/5/S1 radiculopathy, left-sided L3/4/5/S1 radiculopathy. No plexopathy or myopathic process. MRI Brain 2020:  IMPRESSION:  1. Mild/moderate cerebral volume loss. 2. White matter findings compatible with moderate chronic small vessel ischemic disease. Past Medical History:     Past medical history, surgical history, social history, family history, medications, and allergies were reviewed and updated as appropriate. PAST MEDICAL HISTORY:  Past Medical History:   Diagnosis Date    Abdominal pain 2013    COPD (chronic obstructive pulmonary disease) (HCC)     DJD (degenerative joint disease) 2013    Esophageal reflux 2013    HTN (hypertension) 2013    Hypercholesterolemia     Hypertension     Other and unspecified hyperlipidemia 2013    Pyelonephritis 2013    Sensory neuropathy 2020    UTI (lower urinary tract infection) 2013     PAST SURGICAL HISTORY:   Past Surgical History:   Procedure Laterality Date    BREAST SURGERY  2019    right breast cancer-lumpectomy    CHEST SURGERY      HYSTERECTOMY (CERVIX STATUS UNKNOWN)      LAP,CHOLECYSTECTOMY      PACEMAKER      GA UNLISTED PROCEDURE CARDIAC SURGERY      pacemaker     FAMILY HISTORY:  Family History   Problem Relation Age of Onset    Heart Disease Mother     Hypertension Mother     Hypertension Father     Heart Disease Father       SOCIAL HISTORY:  Social History     Socioeconomic History    Marital status:       Spouse name: None    Number of children: None    Years of education: None    Highest education level: None   Tobacco Use    Smoking status: Former     Packs/day: 1.00     Types: Cigarettes     Quit date: 1989     Years since quittin.1    Smokeless tobacco: Never   Vaping Use    Vaping Use: Never used   Substance and Sexual Activity    Alcohol use: No    Drug use: No     Social Determinants of Health     Physical Activity: Inactive    Days of Exercise per Week: 0 days Minutes of Exercise per Session: 0 min       Medications/Allergies:     MEDICATIONS:   Outpatient Encounter Medications as of 1/30/2023   Medication Sig Dispense Refill    memantine (NAMENDA) 10 MG tablet Take 1 tablet by mouth 2 times daily 180 tablet 3    ELIQUIS 5 MG TABS tablet TAKE 1 TABLET BY MOUTH TWO TIMES A DAY. 180 tablet 3    sertraline (ZOLOFT) 50 MG tablet TAKE 1 TABLET BY MOUTH EVERY DAY 90 tablet 3    gabapentin (NEURONTIN) 400 MG capsule Take 1 capsule by mouth 3 times daily. 270 capsule 3    montelukast (SINGULAIR) 10 MG tablet TAKE 1 TABLET BY MOUTH EVERY DAY 90 tablet 4    nitrofurantoin (MACRODANTIN) 100 MG capsule TAKE 1 CAPSULE BY MOUTH EVERY DAY AT NIGHT 90 capsule 1    OXYGEN Inhale into the lungs 2 liters at night only      albuterol sulfate  (90 Base) MCG/ACT inhaler Inhale 2 puffs into the lungs every 4 hours as needed for Shortness of Breath or Wheezing      allopurinol (ZYLOPRIM) 100 MG tablet TAKE 1 TABLET BY MOUTH EVERY DAY      aspirin 81 MG EC tablet Take 81 mg by mouth daily      bumetanide (BUMEX) 1 MG tablet Take 1 mg by mouth every other day      diclofenac sodium (VOLTAREN) 1 % GEL Apply topically 4 times daily      lansoprazole (PREVACID) 30 MG delayed release capsule TAKE 1 CAPSULE BY MOUTH TWICE A DAY      metoprolol tartrate (LOPRESSOR) 25 MG tablet Take 25 mg by mouth 2 times daily      tamoxifen (NOLVADEX) 20 MG tablet TAKE 1 TABLET BY MOUTH EVERY DAY      [DISCONTINUED] carbidopa-levodopa (SINEMET)  MG per tablet Take 1 tablet by mouth 2 times daily 60 tablet 5    [DISCONTINUED] memantine (NAMENDA) 10 MG tablet Take 1 tablet by mouth 2 times daily 180 tablet 1     No facility-administered encounter medications on file as of 1/30/2023.      ALLERGIES:  Allergies   Allergen Reactions    Aspirin Nausea Only     Pt can take aspirin 81mg Pt c/o upset stomach with higher dose    Atorvastatin Other (See Comments)     Leg weakness    Codeine Nausea Only Fluticasone-Salmeterol Other (See Comments)     shakes       Physical Exam:     /84   Ht 5' 2\" (1.575 m)   Wt 123 lb (55.8 kg)   BMI 22.50 kg/m²     General Exam:  General: Elderly female in no apparent distress. HEENT: Normocephalic, atraumatic. Sclera anicteric. Oropharynx clear. Neck: Supple without masses  Cardiovascular: Regular rate and rhythm. No carotid bruits. Lungs: Non-labored breathing. Abdomen: Soft, nontender, nondistended. Extremities: Peripheral pulses intact. No edema and no rashes. Neurological Exam:      MS/Language/Speech:  Alert and attentive. She is oriented to person, and able to state the month and year with some effort required. She is not able to state the months backwards accurately. She was not able to perform any simple arithmetic consistently. She is not able to repeat a 3 digit span reverse. Speech exhibits some hypophonia but no vocal tremor. There is a paucity of speech in general.    Cranial Nerves: PERRL. Eye movements full with saccadic intrusions into her pursuits. No nystagmus. There is diminished facial expression. Facial activation is reduced but symmetric. Tongue and palate were midline. Shoulder shrug sluggish bilaterally. Motor: Strength appears grossly intact throughout without focal deficits. There is only slight rigidity on the right in both upper extremities without clear asymmetry. Rapid alternating movements do show mild slowing bilaterally in both upper and lower extremities. Abnormal Movements: No postural or action tremor noted. No resting tremor seen. Handwriting is legible but there is some micrographia noted. Brief low amplitude dyskinetic movements of the lower extremities seen during the evaluation but only very briefly. Slight oral buccal movements noted intermittently. Sensory: Intact to vibration in the distal lower extremities bilaterally. Cerebellar: No ataxia or dysmetria.       Reflexes (R/L): Biceps (2+/2+), Brachioradialis (2+/2+), Patellar (2+/2+), Ankle (2+/2+). Greenwood's was negative and plantar responses were flexor. Gait: She needs assistance to rise from her chair but was able to walk a short distance with use of a cane. Posture is stooped. Stride length is reduced bilaterally. No freezing of gait was noted. Turns are slow and en bloc. Balance and postural stability is very much impaired. Assessment and Plan:     Jakob Barney is a 80 y.o. female who presents with the following issues:     Luis Alfredo Brooks was seen today for follow-up. 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 Samantha Hammond presents for follow-up and continued management of progressive cognitive impairment with concern for an underlying neurodegenerative disease, most likely Lewy body dementia. Parkinsonism seems to have improvement on her exam since weaning off of risperidone. She does continue to have some other dyskinetic-like movements of the lower extremities that occur throughout the day but these are not described as being very disruptive. Trials of levodopa were also not beneficial so have been stopped. We had a long discussion regarding these movements and at this time she will continue to monitor for any worsening symptoms. She will continue memantine 10 mg twice a day for dementia. There is continued gait disturbance and recommended continued use of a Rollator for fall prevention. She has significant balance difficulties and recommended a retrial of home health PT and OT for continued gait and balance training. Also recommended we monitor for any orthostatic symptoms.     Nerve conduction studies have shown evidence for distal sensory neuropathy which may be contributing to some of her balance impairment to some degree though the more contributing factor at this point is parkinsonism. We will continue to monitor for worsening neuropathic pain and she will continue gabapentin as prescribed. Follow up in 3 months. Signature: Den Oliver MD      Date:  1/30/2023    [facility]   [address], [facility], [address] [place] [address]  Ph: [phone]  Fax: [phone]         I have personally interviewed and examined Ms. Anne Kimble and I have personally reviewed all relevant records including labs and imaging as noted above. I have written all aspects of this note. More than 50% of this time was used for counseling regarding my diagnosis, prognosis, and plans for management. Total visit time: 35 minutes.

## 2023-01-31 ENCOUNTER — HOME CARE VISIT (OUTPATIENT)
Dept: SCHEDULING | Facility: HOME HEALTH | Age: 83
End: 2023-01-31

## 2023-01-31 PROCEDURE — G0151 HHCP-SERV OF PT,EA 15 MIN: HCPCS

## 2023-01-31 PROCEDURE — 0221000100 HH NO PAY CLAIM PROCEDURE

## 2023-02-01 VITALS
TEMPERATURE: 98.5 F | SYSTOLIC BLOOD PRESSURE: 132 MMHG | OXYGEN SATURATION: 99 % | HEART RATE: 82 BPM | DIASTOLIC BLOOD PRESSURE: 84 MMHG | RESPIRATION RATE: 18 BRPM

## 2023-02-01 ASSESSMENT — ENCOUNTER SYMPTOMS: DYSPNEA ACTIVITY LEVEL: AFTER AMBULATING MORE THAN 20 FT

## 2023-02-02 ENCOUNTER — HOME CARE VISIT (OUTPATIENT)
Dept: SCHEDULING | Facility: HOME HEALTH | Age: 83
End: 2023-02-02

## 2023-02-02 VITALS
DIASTOLIC BLOOD PRESSURE: 60 MMHG | HEART RATE: 79 BPM | RESPIRATION RATE: 18 BRPM | TEMPERATURE: 97.2 F | SYSTOLIC BLOOD PRESSURE: 114 MMHG

## 2023-02-02 PROCEDURE — G0157 HHC PT ASSISTANT EA 15: HCPCS

## 2023-02-06 ENCOUNTER — HOME CARE VISIT (OUTPATIENT)
Dept: SCHEDULING | Facility: HOME HEALTH | Age: 83
End: 2023-02-06
Payer: MEDICARE

## 2023-02-06 VITALS
SYSTOLIC BLOOD PRESSURE: 123 MMHG | TEMPERATURE: 98.3 F | DIASTOLIC BLOOD PRESSURE: 80 MMHG | HEART RATE: 73 BPM | OXYGEN SATURATION: 99 % | RESPIRATION RATE: 18 BRPM

## 2023-02-06 PROCEDURE — G0152 HHCP-SERV OF OT,EA 15 MIN: HCPCS

## 2023-02-07 ENCOUNTER — HOME CARE VISIT (OUTPATIENT)
Dept: SCHEDULING | Facility: HOME HEALTH | Age: 83
End: 2023-02-07
Payer: MEDICARE

## 2023-02-07 VITALS
DIASTOLIC BLOOD PRESSURE: 64 MMHG | RESPIRATION RATE: 18 BRPM | TEMPERATURE: 97.2 F | HEART RATE: 67 BPM | SYSTOLIC BLOOD PRESSURE: 122 MMHG

## 2023-02-07 PROCEDURE — G0157 HHC PT ASSISTANT EA 15: HCPCS

## 2023-02-09 ENCOUNTER — HOME CARE VISIT (OUTPATIENT)
Dept: SCHEDULING | Facility: HOME HEALTH | Age: 83
End: 2023-02-09
Payer: MEDICARE

## 2023-02-09 VITALS
DIASTOLIC BLOOD PRESSURE: 60 MMHG | SYSTOLIC BLOOD PRESSURE: 122 MMHG | HEART RATE: 74 BPM | TEMPERATURE: 97.2 F | RESPIRATION RATE: 19 BRPM

## 2023-02-09 PROCEDURE — G0157 HHC PT ASSISTANT EA 15: HCPCS

## 2023-02-13 ENCOUNTER — HOME CARE VISIT (OUTPATIENT)
Dept: SCHEDULING | Facility: HOME HEALTH | Age: 83
End: 2023-02-13
Payer: MEDICARE

## 2023-02-13 VITALS
DIASTOLIC BLOOD PRESSURE: 60 MMHG | TEMPERATURE: 97.4 F | HEART RATE: 76 BPM | SYSTOLIC BLOOD PRESSURE: 122 MMHG | RESPIRATION RATE: 19 BRPM

## 2023-02-13 PROCEDURE — G0157 HHC PT ASSISTANT EA 15: HCPCS

## 2023-02-13 PROCEDURE — G0152 HHCP-SERV OF OT,EA 15 MIN: HCPCS

## 2023-02-15 ENCOUNTER — HOME CARE VISIT (OUTPATIENT)
Dept: SCHEDULING | Facility: HOME HEALTH | Age: 83
End: 2023-02-15
Payer: MEDICARE

## 2023-02-15 VITALS
RESPIRATION RATE: 18 BRPM | SYSTOLIC BLOOD PRESSURE: 122 MMHG | DIASTOLIC BLOOD PRESSURE: 60 MMHG | HEART RATE: 74 BPM | TEMPERATURE: 97.3 F

## 2023-02-15 PROCEDURE — G0157 HHC PT ASSISTANT EA 15: HCPCS

## 2023-02-16 VITALS
TEMPERATURE: 98.2 F | DIASTOLIC BLOOD PRESSURE: 92 MMHG | SYSTOLIC BLOOD PRESSURE: 139 MMHG | HEART RATE: 87 BPM | OXYGEN SATURATION: 99 % | RESPIRATION RATE: 16 BRPM

## 2023-02-20 ENCOUNTER — HOME CARE VISIT (OUTPATIENT)
Dept: SCHEDULING | Facility: HOME HEALTH | Age: 83
End: 2023-02-20
Payer: MEDICARE

## 2023-02-20 VITALS
TEMPERATURE: 97.4 F | RESPIRATION RATE: 19 BRPM | DIASTOLIC BLOOD PRESSURE: 64 MMHG | HEART RATE: 76 BPM | SYSTOLIC BLOOD PRESSURE: 124 MMHG

## 2023-02-20 PROCEDURE — G0157 HHC PT ASSISTANT EA 15: HCPCS

## 2023-02-21 ENCOUNTER — HOME CARE VISIT (OUTPATIENT)
Dept: SCHEDULING | Facility: HOME HEALTH | Age: 83
End: 2023-02-21
Payer: MEDICARE

## 2023-02-21 PROCEDURE — G0152 HHCP-SERV OF OT,EA 15 MIN: HCPCS

## 2023-02-22 ENCOUNTER — HOME CARE VISIT (OUTPATIENT)
Dept: SCHEDULING | Facility: HOME HEALTH | Age: 83
End: 2023-02-22
Payer: MEDICARE

## 2023-02-22 VITALS
OXYGEN SATURATION: 96 % | SYSTOLIC BLOOD PRESSURE: 134 MMHG | HEART RATE: 82 BPM | RESPIRATION RATE: 18 BRPM | TEMPERATURE: 98.2 F | DIASTOLIC BLOOD PRESSURE: 92 MMHG

## 2023-02-22 VITALS
SYSTOLIC BLOOD PRESSURE: 136 MMHG | TEMPERATURE: 98.4 F | RESPIRATION RATE: 16 BRPM | DIASTOLIC BLOOD PRESSURE: 96 MMHG | HEART RATE: 71 BPM | OXYGEN SATURATION: 99 %

## 2023-02-22 PROCEDURE — G0151 HHCP-SERV OF PT,EA 15 MIN: HCPCS

## 2023-02-27 DIAGNOSIS — I10 ESSENTIAL (PRIMARY) HYPERTENSION: ICD-10-CM

## 2023-02-27 DIAGNOSIS — K21.9 GASTRO-ESOPHAGEAL REFLUX DISEASE WITHOUT ESOPHAGITIS: ICD-10-CM

## 2023-02-28 RX ORDER — LANSOPRAZOLE 30 MG/1
CAPSULE, DELAYED RELEASE ORAL
Qty: 180 CAPSULE | Refills: 4 | Status: SHIPPED | OUTPATIENT
Start: 2023-02-28

## 2023-02-28 RX ORDER — BUMETANIDE 1 MG/1
TABLET ORAL
Qty: 45 TABLET | Refills: 3 | Status: SHIPPED | OUTPATIENT
Start: 2023-02-28

## 2023-02-28 RX ORDER — NITROFURANTOIN MACROCRYSTALS 100 MG/1
100 CAPSULE ORAL NIGHTLY
Qty: 90 CAPSULE | Refills: 1 | Status: SHIPPED | OUTPATIENT
Start: 2023-02-28

## 2023-03-03 ENCOUNTER — HOME CARE VISIT (OUTPATIENT)
Dept: SCHEDULING | Facility: HOME HEALTH | Age: 83
End: 2023-03-03
Payer: MEDICARE

## 2023-03-03 PROCEDURE — G0152 HHCP-SERV OF OT,EA 15 MIN: HCPCS

## 2023-03-06 VITALS
TEMPERATURE: 98.2 F | RESPIRATION RATE: 18 BRPM | HEART RATE: 72 BPM | SYSTOLIC BLOOD PRESSURE: 132 MMHG | OXYGEN SATURATION: 100 % | DIASTOLIC BLOOD PRESSURE: 82 MMHG

## 2023-03-08 ENCOUNTER — HOME CARE VISIT (OUTPATIENT)
Dept: SCHEDULING | Facility: HOME HEALTH | Age: 83
End: 2023-03-08
Payer: MEDICARE

## 2023-03-08 PROCEDURE — G0152 HHCP-SERV OF OT,EA 15 MIN: HCPCS

## 2023-03-09 VITALS
SYSTOLIC BLOOD PRESSURE: 136 MMHG | TEMPERATURE: 98.6 F | OXYGEN SATURATION: 97 % | RESPIRATION RATE: 16 BRPM | HEART RATE: 74 BPM | DIASTOLIC BLOOD PRESSURE: 90 MMHG

## 2023-03-13 ENCOUNTER — HOME CARE VISIT (OUTPATIENT)
Dept: SCHEDULING | Facility: HOME HEALTH | Age: 83
End: 2023-03-13
Payer: MEDICARE

## 2023-03-13 PROCEDURE — G0152 HHCP-SERV OF OT,EA 15 MIN: HCPCS

## 2023-03-14 VITALS
HEART RATE: 84 BPM | OXYGEN SATURATION: 99 % | RESPIRATION RATE: 16 BRPM | SYSTOLIC BLOOD PRESSURE: 128 MMHG | TEMPERATURE: 97.6 F | DIASTOLIC BLOOD PRESSURE: 84 MMHG

## 2023-03-25 NOTE — PROGRESS NOTES
11/23/2022 10:44 AM    BUN 16 08/08/2022 06:14 PM    BUN 13 10/27/2021 03:47 AM    CREATININE 1.20 11/23/2022 10:44 AM    CREATININE 0.90 08/08/2022 06:14 PM    CREATININE 1.18 10/27/2021 03:47 AM    GLUCOSE 99 11/23/2022 10:44 AM    GLUCOSE 116 08/08/2022 06:14 PM    GLUCOSE 101 10/27/2021 03:47 AM    CALCIUM 10.0 11/23/2022 10:44 AM    CALCIUM 9.6 08/08/2022 06:14 PM    CALCIUM 9.4 10/27/2021 03:47 AM         Lab Results   Component Value Date    ALT 11 (L) 11/23/2022    ALT 12 08/08/2022    ALT 19 10/24/2021    AST 6 (L) 11/23/2022    AST 27 08/08/2022    AST 25 10/24/2021        Assessment/Plan:   1. History of peripheral edema  - H2FPEF score = 5 with intermediate probability of HFpEF (atrial fibrillation, elder, filling pressure)  - Currently on p.o. Bumex    2. Cardiac pacemaker in situ  - Continue to follow in device clinic    3. History of atrial fibrillation  - [id number] equals 4  - Decrease Eliquis to 2.5 mg twice daily (age >80 years and weight <60 kg)  - Continue with Lopressor    4. CAD in native artery  - Continue with baby aspirin daily  - Patient and family declined statin therapy in the past    5. Hypertension, unspecified type  - Well-controlled  - Continue with Lopressor    6.  Hyperlipidemia, unspecified hyperlipidemia type  - Patient and family declined statin therapy in the past    F/U: 6 months    Chelsea Jorgensen MD

## 2023-03-27 ENCOUNTER — OFFICE VISIT (OUTPATIENT)
Dept: CARDIOLOGY CLINIC | Age: 83
End: 2023-03-27
Payer: MEDICARE

## 2023-03-27 VITALS
SYSTOLIC BLOOD PRESSURE: 118 MMHG | BODY MASS INDEX: 23 KG/M2 | DIASTOLIC BLOOD PRESSURE: 62 MMHG | HEART RATE: 72 BPM | WEIGHT: 125 LBS | HEIGHT: 62 IN

## 2023-03-27 DIAGNOSIS — I10 HYPERTENSION, UNSPECIFIED TYPE: ICD-10-CM

## 2023-03-27 DIAGNOSIS — E78.5 HYPERLIPIDEMIA, UNSPECIFIED HYPERLIPIDEMIA TYPE: ICD-10-CM

## 2023-03-27 DIAGNOSIS — Z86.79 HISTORY OF ATRIAL FIBRILLATION: ICD-10-CM

## 2023-03-27 DIAGNOSIS — Z87.898 HISTORY OF PERIPHERAL EDEMA: Primary | ICD-10-CM

## 2023-03-27 DIAGNOSIS — I25.10 CAD IN NATIVE ARTERY: ICD-10-CM

## 2023-03-27 DIAGNOSIS — Z95.0 CARDIAC PACEMAKER IN SITU: ICD-10-CM

## 2023-03-27 PROBLEM — I49.5 SICK SINUS SYNDROME (HCC): Status: RESOLVED | Noted: 2017-08-30 | Resolved: 2023-03-27

## 2023-03-27 PROBLEM — I50.32 CHRONIC HEART FAILURE WITH PRESERVED EJECTION FRACTION (HFPEF) (HCC): Status: RESOLVED | Noted: 2021-09-20 | Resolved: 2023-03-27

## 2023-03-27 PROCEDURE — 3078F DIAST BP <80 MM HG: CPT | Performed by: INTERNAL MEDICINE

## 2023-03-27 PROCEDURE — G8420 CALC BMI NORM PARAMETERS: HCPCS | Performed by: INTERNAL MEDICINE

## 2023-03-27 PROCEDURE — 3074F SYST BP LT 130 MM HG: CPT | Performed by: INTERNAL MEDICINE

## 2023-03-27 PROCEDURE — 1090F PRES/ABSN URINE INCON ASSESS: CPT | Performed by: INTERNAL MEDICINE

## 2023-03-27 PROCEDURE — G8427 DOCREV CUR MEDS BY ELIG CLIN: HCPCS | Performed by: INTERNAL MEDICINE

## 2023-03-27 PROCEDURE — 99214 OFFICE O/P EST MOD 30 MIN: CPT | Performed by: INTERNAL MEDICINE

## 2023-03-27 PROCEDURE — G8399 PT W/DXA RESULTS DOCUMENT: HCPCS | Performed by: INTERNAL MEDICINE

## 2023-03-27 PROCEDURE — 1036F TOBACCO NON-USER: CPT | Performed by: INTERNAL MEDICINE

## 2023-03-27 PROCEDURE — G8484 FLU IMMUNIZE NO ADMIN: HCPCS | Performed by: INTERNAL MEDICINE

## 2023-03-27 PROCEDURE — 1123F ACP DISCUSS/DSCN MKR DOCD: CPT | Performed by: INTERNAL MEDICINE

## 2023-03-28 PROCEDURE — 93296 REM INTERROG EVL PM/IDS: CPT | Performed by: INTERNAL MEDICINE

## 2023-03-28 PROCEDURE — 93294 REM INTERROG EVL PM/LDLS PM: CPT | Performed by: INTERNAL MEDICINE

## 2023-05-09 ENCOUNTER — OFFICE VISIT (OUTPATIENT)
Dept: NEUROLOGY | Age: 83
End: 2023-05-09
Payer: MEDICARE

## 2023-05-09 VITALS
DIASTOLIC BLOOD PRESSURE: 80 MMHG | WEIGHT: 124 LBS | OXYGEN SATURATION: 94 % | BODY MASS INDEX: 22.68 KG/M2 | SYSTOLIC BLOOD PRESSURE: 148 MMHG | HEART RATE: 70 BPM

## 2023-05-09 DIAGNOSIS — F02.B2 MODERATE LEWY BODY DEMENTIA WITH PSYCHOTIC DISTURBANCE (HCC): Primary | ICD-10-CM

## 2023-05-09 DIAGNOSIS — G62.9 PERIPHERAL POLYNEUROPATHY: ICD-10-CM

## 2023-05-09 DIAGNOSIS — G31.83 MODERATE LEWY BODY DEMENTIA WITH PSYCHOTIC DISTURBANCE (HCC): Primary | ICD-10-CM

## 2023-05-09 DIAGNOSIS — Z74.09 IMPAIRED FUNCTIONAL MOBILITY, BALANCE, GAIT, AND ENDURANCE: ICD-10-CM

## 2023-05-09 PROCEDURE — G8427 DOCREV CUR MEDS BY ELIG CLIN: HCPCS | Performed by: PSYCHIATRY & NEUROLOGY

## 2023-05-09 PROCEDURE — 1036F TOBACCO NON-USER: CPT | Performed by: PSYCHIATRY & NEUROLOGY

## 2023-05-09 PROCEDURE — G8420 CALC BMI NORM PARAMETERS: HCPCS | Performed by: PSYCHIATRY & NEUROLOGY

## 2023-05-09 PROCEDURE — 1090F PRES/ABSN URINE INCON ASSESS: CPT | Performed by: PSYCHIATRY & NEUROLOGY

## 2023-05-09 PROCEDURE — 3074F SYST BP LT 130 MM HG: CPT | Performed by: PSYCHIATRY & NEUROLOGY

## 2023-05-09 PROCEDURE — 99214 OFFICE O/P EST MOD 30 MIN: CPT | Performed by: PSYCHIATRY & NEUROLOGY

## 2023-05-09 PROCEDURE — 3078F DIAST BP <80 MM HG: CPT | Performed by: PSYCHIATRY & NEUROLOGY

## 2023-05-09 PROCEDURE — G8399 PT W/DXA RESULTS DOCUMENT: HCPCS | Performed by: PSYCHIATRY & NEUROLOGY

## 2023-05-09 PROCEDURE — 1123F ACP DISCUSS/DSCN MKR DOCD: CPT | Performed by: PSYCHIATRY & NEUROLOGY

## 2023-05-09 ASSESSMENT — ENCOUNTER SYMPTOMS
PHOTOPHOBIA: 0
COUGH: 0
CONSTIPATION: 0

## 2023-05-09 NOTE — PROGRESS NOTES
allopurinol (ZYLOPRIM) 100 MG tablet TAKE 1 TABLET BY MOUTH EVERY DAY      aspirin 81 MG EC tablet Take 1 tablet by mouth daily      diclofenac sodium (VOLTAREN) 1 % GEL Apply 2 g topically 4 times daily apply to back      tamoxifen (NOLVADEX) 20 MG tablet TAKE 1 TABLET BY MOUTH EVERY DAY       No facility-administered encounter medications on file as of 5/9/2023. ALLERGIES:  Allergies   Allergen Reactions    Aspirin Nausea Only     Pt can take aspirin 81mg Pt c/o upset stomach with higher dose    Atorvastatin Other (See Comments)     Leg weakness    Codeine Nausea Only    Fluticasone-Salmeterol Other (See Comments)     shakes       Physical Exam:     BP (!) 148/80   Pulse 70   Wt 124 lb (56.2 kg)   SpO2 94%   BMI 22.68 kg/m²     General Exam:  General: Elderly female in no apparent distress. HEENT: Normocephalic, atraumatic. Sclera anicteric. Oropharynx clear. Neck: Supple without masses  Cardiovascular: Regular rate and rhythm. No carotid bruits. Lungs: Non-labored breathing. Abdomen: Soft, nontender, nondistended. Extremities: Peripheral pulses intact. No edema and no rashes. Neurological Exam:      MS/Language/Speech:  Alert and attentive. She is oriented to person, and able to state the month and year with some effort required. She is not able to state the months backwards accurately. She was not able to perform any simple arithmetic consistently. She is not able to repeat a 3 digit span reverse. Speech exhibits some hypophonia but no vocal tremor. There is a paucity of speech in general.    Cranial Nerves: PERRL. Eye movements full with saccadic intrusions into her pursuits. No nystagmus. There is diminished facial expression. Facial activation is reduced but symmetric. Tongue and palate were midline. Shoulder shrug sluggish bilaterally. Motor: Strength appears grossly intact throughout without focal deficits.  There is only slight rigidity on the right in both upper

## 2023-05-11 DIAGNOSIS — R00.1 BRADYCARDIA: ICD-10-CM

## 2023-05-11 DIAGNOSIS — Z95.0 CARDIAC PACEMAKER IN SITU: ICD-10-CM

## 2023-05-22 DIAGNOSIS — M10.079 IDIOPATHIC GOUT, UNSPECIFIED ANKLE AND FOOT: ICD-10-CM

## 2023-05-23 ENCOUNTER — OFFICE VISIT (OUTPATIENT)
Dept: INTERNAL MEDICINE CLINIC | Facility: CLINIC | Age: 83
End: 2023-05-23
Payer: MEDICARE

## 2023-05-23 VITALS
HEART RATE: 84 BPM | DIASTOLIC BLOOD PRESSURE: 72 MMHG | WEIGHT: 120 LBS | BODY MASS INDEX: 21.95 KG/M2 | SYSTOLIC BLOOD PRESSURE: 118 MMHG

## 2023-05-23 DIAGNOSIS — G31.83 LEWY BODY DEMENTIA WITH PSYCHOTIC DISTURBANCE, UNSPECIFIED DEMENTIA SEVERITY (HCC): ICD-10-CM

## 2023-05-23 DIAGNOSIS — F02.82 LEWY BODY DEMENTIA WITH PSYCHOTIC DISTURBANCE, UNSPECIFIED DEMENTIA SEVERITY (HCC): ICD-10-CM

## 2023-05-23 DIAGNOSIS — Z17.0 MALIGNANT NEOPLASM OF UPPER-OUTER QUADRANT OF RIGHT BREAST IN FEMALE, ESTROGEN RECEPTOR POSITIVE (HCC): ICD-10-CM

## 2023-05-23 DIAGNOSIS — F33.0 MILD EPISODE OF RECURRENT MAJOR DEPRESSIVE DISORDER (HCC): ICD-10-CM

## 2023-05-23 DIAGNOSIS — I10 HYPERTENSION, UNSPECIFIED TYPE: Primary | ICD-10-CM

## 2023-05-23 DIAGNOSIS — C50.411 MALIGNANT NEOPLASM OF UPPER-OUTER QUADRANT OF RIGHT BREAST IN FEMALE, ESTROGEN RECEPTOR POSITIVE (HCC): ICD-10-CM

## 2023-05-23 PROCEDURE — 1123F ACP DISCUSS/DSCN MKR DOCD: CPT | Performed by: INTERNAL MEDICINE

## 2023-05-23 PROCEDURE — G8420 CALC BMI NORM PARAMETERS: HCPCS | Performed by: INTERNAL MEDICINE

## 2023-05-23 PROCEDURE — 1090F PRES/ABSN URINE INCON ASSESS: CPT | Performed by: INTERNAL MEDICINE

## 2023-05-23 PROCEDURE — 3078F DIAST BP <80 MM HG: CPT | Performed by: INTERNAL MEDICINE

## 2023-05-23 PROCEDURE — 1036F TOBACCO NON-USER: CPT | Performed by: INTERNAL MEDICINE

## 2023-05-23 PROCEDURE — 3074F SYST BP LT 130 MM HG: CPT | Performed by: INTERNAL MEDICINE

## 2023-05-23 PROCEDURE — G8399 PT W/DXA RESULTS DOCUMENT: HCPCS | Performed by: INTERNAL MEDICINE

## 2023-05-23 PROCEDURE — 99214 OFFICE O/P EST MOD 30 MIN: CPT | Performed by: INTERNAL MEDICINE

## 2023-05-23 PROCEDURE — G8427 DOCREV CUR MEDS BY ELIG CLIN: HCPCS | Performed by: INTERNAL MEDICINE

## 2023-05-23 RX ORDER — ALLOPURINOL 100 MG/1
TABLET ORAL
Qty: 90 TABLET | Refills: 4 | Status: SHIPPED | OUTPATIENT
Start: 2023-05-23

## 2023-05-23 SDOH — ECONOMIC STABILITY: INCOME INSECURITY: HOW HARD IS IT FOR YOU TO PAY FOR THE VERY BASICS LIKE FOOD, HOUSING, MEDICAL CARE, AND HEATING?: NOT HARD AT ALL

## 2023-05-23 SDOH — ECONOMIC STABILITY: FOOD INSECURITY: WITHIN THE PAST 12 MONTHS, YOU WORRIED THAT YOUR FOOD WOULD RUN OUT BEFORE YOU GOT MONEY TO BUY MORE.: NEVER TRUE

## 2023-05-23 SDOH — ECONOMIC STABILITY: HOUSING INSECURITY
IN THE LAST 12 MONTHS, WAS THERE A TIME WHEN YOU DID NOT HAVE A STEADY PLACE TO SLEEP OR SLEPT IN A SHELTER (INCLUDING NOW)?: NO

## 2023-05-23 SDOH — ECONOMIC STABILITY: FOOD INSECURITY: WITHIN THE PAST 12 MONTHS, THE FOOD YOU BOUGHT JUST DIDN'T LAST AND YOU DIDN'T HAVE MONEY TO GET MORE.: NEVER TRUE

## 2023-05-23 ASSESSMENT — PATIENT HEALTH QUESTIONNAIRE - PHQ9
SUM OF ALL RESPONSES TO PHQ QUESTIONS 1-9: 0
SUM OF ALL RESPONSES TO PHQ9 QUESTIONS 1 & 2: 0
SUM OF ALL RESPONSES TO PHQ QUESTIONS 1-9: 0
1. LITTLE INTEREST OR PLEASURE IN DOING THINGS: 0
8. MOVING OR SPEAKING SO SLOWLY THAT OTHER PEOPLE COULD HAVE NOTICED. OR THE OPPOSITE, BEING SO FIGETY OR RESTLESS THAT YOU HAVE BEEN MOVING AROUND A LOT MORE THAN USUAL: 0
4. FEELING TIRED OR HAVING LITTLE ENERGY: 0
SUM OF ALL RESPONSES TO PHQ QUESTIONS 1-9: 0
5. POOR APPETITE OR OVEREATING: 0
9. THOUGHTS THAT YOU WOULD BE BETTER OFF DEAD, OR OF HURTING YOURSELF: 0
6. FEELING BAD ABOUT YOURSELF - OR THAT YOU ARE A FAILURE OR HAVE LET YOURSELF OR YOUR FAMILY DOWN: 0
10. IF YOU CHECKED OFF ANY PROBLEMS, HOW DIFFICULT HAVE THESE PROBLEMS MADE IT FOR YOU TO DO YOUR WORK, TAKE CARE OF THINGS AT HOME, OR GET ALONG WITH OTHER PEOPLE: 0
SUM OF ALL RESPONSES TO PHQ QUESTIONS 1-9: 0
7. TROUBLE CONCENTRATING ON THINGS, SUCH AS READING THE NEWSPAPER OR WATCHING TELEVISION: 0
3. TROUBLE FALLING OR STAYING ASLEEP: 0
2. FEELING DOWN, DEPRESSED OR HOPELESS: 0

## 2023-05-23 ASSESSMENT — ENCOUNTER SYMPTOMS
COUGH: 0
SHORTNESS OF BREATH: 0

## 2023-05-23 NOTE — PROGRESS NOTES
SUBJECTIVE:   Pretty Jay is a 80 y.o. female seen for a visit regarding   Chief Complaint   Patient presents with    6 Month Follow-Up     Pt presenting for 6 month follow up for blood pressure check. Pt checks BP every other day, pt reports it fluctuates. Takes medicine as prescribed. Hypertension  This is a chronic problem. The current episode started more than 1 year ago. The problem is unchanged (can be 180 and 118). Pertinent negatives include no shortness of breath. Past Medical History, Past Surgical History, Family history, Social History, and Medications were all reviewed with the patient today and updated as necessary. Current Outpatient Medications   Medication Sig Dispense Refill    allopurinol (ZYLOPRIM) 100 MG tablet TAKE 1 TABLET BY MOUTH EVERY DAY 90 tablet 4    apixaban (ELIQUIS) 2.5 MG TABS tablet Take 1 tablet by mouth 2 times daily 180 tablet 3    lansoprazole (PREVACID) 30 MG delayed release capsule TAKE 1 CAPSULE BY MOUTH TWICE A  capsule 4    metoprolol tartrate (LOPRESSOR) 25 MG tablet TAKE 1 TABLET BY MOUTH TWO TIMES A DAY. 180 tablet 4    bumetanide (BUMEX) 1 MG tablet TAKE 1 TABLET BY MOUTH EVERY OTHER DAY 45 tablet 3    nitrofurantoin (MACRODANTIN) 100 MG capsule Take 1 capsule by mouth nightly 90 capsule 1    memantine (NAMENDA) 10 MG tablet Take 1 tablet by mouth 2 times daily 180 tablet 3    sertraline (ZOLOFT) 50 MG tablet TAKE 1 TABLET BY MOUTH EVERY DAY 90 tablet 3    gabapentin (NEURONTIN) 400 MG capsule Take 1 capsule by mouth 3 times daily.  270 capsule 3    montelukast (SINGULAIR) 10 MG tablet TAKE 1 TABLET BY MOUTH EVERY DAY 90 tablet 4    albuterol sulfate  (90 Base) MCG/ACT inhaler Inhale 2 puffs into the lungs every 4 hours as needed for Shortness of Breath or Wheezing      aspirin 81 MG EC tablet Take 1 tablet by mouth daily      diclofenac sodium (VOLTAREN) 1 % GEL Apply 2 g topically 4 times daily apply to back      tamoxifen

## 2023-07-05 PROCEDURE — 93296 REM INTERROG EVL PM/IDS: CPT | Performed by: INTERNAL MEDICINE

## 2023-07-05 PROCEDURE — 93294 REM INTERROG EVL PM/LDLS PM: CPT | Performed by: INTERNAL MEDICINE

## 2023-07-13 RX ORDER — NITROFURANTOIN MACROCRYSTALS 100 MG/1
CAPSULE ORAL
Qty: 90 CAPSULE | Refills: 1 | Status: SHIPPED | OUTPATIENT
Start: 2023-07-13

## 2023-09-06 DIAGNOSIS — J30.9 ALLERGIC RHINITIS, UNSPECIFIED: ICD-10-CM

## 2023-09-06 RX ORDER — MONTELUKAST SODIUM 10 MG/1
10 TABLET ORAL NIGHTLY
Qty: 90 TABLET | Refills: 4 | Status: SHIPPED | OUTPATIENT
Start: 2023-09-06

## 2023-09-11 DIAGNOSIS — G62.9 PERIPHERAL POLYNEUROPATHY: ICD-10-CM

## 2023-09-12 RX ORDER — GABAPENTIN 400 MG/1
400 CAPSULE ORAL 3 TIMES DAILY
Qty: 270 CAPSULE | Refills: 3 | Status: SHIPPED | OUTPATIENT
Start: 2023-09-12 | End: 2024-09-11

## 2023-09-27 ENCOUNTER — OFFICE VISIT (OUTPATIENT)
Age: 83
End: 2023-09-27
Payer: MEDICARE

## 2023-09-27 VITALS
HEART RATE: 68 BPM | HEIGHT: 62 IN | SYSTOLIC BLOOD PRESSURE: 138 MMHG | BODY MASS INDEX: 22.39 KG/M2 | DIASTOLIC BLOOD PRESSURE: 84 MMHG | WEIGHT: 121.7 LBS

## 2023-09-27 DIAGNOSIS — Z95.0 CARDIAC PACEMAKER IN SITU: Primary | ICD-10-CM

## 2023-09-27 DIAGNOSIS — E78.5 HYPERLIPIDEMIA, UNSPECIFIED HYPERLIPIDEMIA TYPE: ICD-10-CM

## 2023-09-27 DIAGNOSIS — I10 HYPERTENSION, UNSPECIFIED TYPE: ICD-10-CM

## 2023-09-27 DIAGNOSIS — Z86.79 HISTORY OF ATRIAL FIBRILLATION: ICD-10-CM

## 2023-09-27 DIAGNOSIS — I25.10 CAD IN NATIVE ARTERY: ICD-10-CM

## 2023-09-27 PROCEDURE — 3075F SYST BP GE 130 - 139MM HG: CPT | Performed by: INTERNAL MEDICINE

## 2023-09-27 PROCEDURE — 99214 OFFICE O/P EST MOD 30 MIN: CPT | Performed by: INTERNAL MEDICINE

## 2023-09-27 PROCEDURE — 1090F PRES/ABSN URINE INCON ASSESS: CPT | Performed by: INTERNAL MEDICINE

## 2023-09-27 PROCEDURE — G8420 CALC BMI NORM PARAMETERS: HCPCS | Performed by: INTERNAL MEDICINE

## 2023-09-27 PROCEDURE — 3079F DIAST BP 80-89 MM HG: CPT | Performed by: INTERNAL MEDICINE

## 2023-09-27 PROCEDURE — G8427 DOCREV CUR MEDS BY ELIG CLIN: HCPCS | Performed by: INTERNAL MEDICINE

## 2023-09-27 PROCEDURE — G8399 PT W/DXA RESULTS DOCUMENT: HCPCS | Performed by: INTERNAL MEDICINE

## 2023-09-27 PROCEDURE — 1123F ACP DISCUSS/DSCN MKR DOCD: CPT | Performed by: INTERNAL MEDICINE

## 2023-09-27 PROCEDURE — 1036F TOBACCO NON-USER: CPT | Performed by: INTERNAL MEDICINE

## 2023-10-10 PROCEDURE — 93296 REM INTERROG EVL PM/IDS: CPT | Performed by: INTERNAL MEDICINE

## 2023-10-10 PROCEDURE — 93294 REM INTERROG EVL PM/LDLS PM: CPT | Performed by: INTERNAL MEDICINE

## 2023-11-15 DIAGNOSIS — R09.02 HYPOXEMIA: Primary | ICD-10-CM

## 2023-11-29 ENCOUNTER — OFFICE VISIT (OUTPATIENT)
Dept: INTERNAL MEDICINE CLINIC | Facility: CLINIC | Age: 83
End: 2023-11-29
Payer: MEDICARE

## 2023-11-29 VITALS
WEIGHT: 127 LBS | SYSTOLIC BLOOD PRESSURE: 173 MMHG | DIASTOLIC BLOOD PRESSURE: 110 MMHG | HEIGHT: 62 IN | HEART RATE: 76 BPM | BODY MASS INDEX: 23.37 KG/M2

## 2023-11-29 DIAGNOSIS — Z74.09 IMPAIRED FUNCTIONAL MOBILITY, BALANCE, GAIT, AND ENDURANCE: ICD-10-CM

## 2023-11-29 DIAGNOSIS — I10 ESSENTIAL (PRIMARY) HYPERTENSION: ICD-10-CM

## 2023-11-29 DIAGNOSIS — I48.0 PAROXYSMAL ATRIAL FIBRILLATION (HCC): ICD-10-CM

## 2023-11-29 DIAGNOSIS — K21.9 GASTRO-ESOPHAGEAL REFLUX DISEASE WITHOUT ESOPHAGITIS: ICD-10-CM

## 2023-11-29 DIAGNOSIS — Z17.0 MALIGNANT NEOPLASM OF UPPER-OUTER QUADRANT OF RIGHT BREAST IN FEMALE, ESTROGEN RECEPTOR POSITIVE (HCC): ICD-10-CM

## 2023-11-29 DIAGNOSIS — Z78.0 ASYMPTOMATIC MENOPAUSAL STATE: ICD-10-CM

## 2023-11-29 DIAGNOSIS — Z00.00 MEDICARE ANNUAL WELLNESS VISIT, SUBSEQUENT: Primary | ICD-10-CM

## 2023-11-29 DIAGNOSIS — F03.A0 MILD DEMENTIA WITHOUT BEHAVIORAL DISTURBANCE, PSYCHOTIC DISTURBANCE, MOOD DISTURBANCE, OR ANXIETY, UNSPECIFIED DEMENTIA TYPE (HCC): ICD-10-CM

## 2023-11-29 DIAGNOSIS — G62.9 SENSORY NEUROPATHY: ICD-10-CM

## 2023-11-29 DIAGNOSIS — F32.0 MAJOR DEPRESSIVE DISORDER, SINGLE EPISODE, MILD (HCC): ICD-10-CM

## 2023-11-29 DIAGNOSIS — C50.411 MALIGNANT NEOPLASM OF UPPER-OUTER QUADRANT OF RIGHT BREAST IN FEMALE, ESTROGEN RECEPTOR POSITIVE (HCC): ICD-10-CM

## 2023-11-29 LAB
ALBUMIN SERPL-MCNC: 3.5 G/DL (ref 3.2–4.6)
ALBUMIN/GLOB SERPL: 0.9 (ref 0.4–1.6)
ALP SERPL-CCNC: 66 U/L (ref 50–136)
ALT SERPL-CCNC: 11 U/L (ref 12–65)
ANION GAP SERPL CALC-SCNC: 2 MMOL/L (ref 2–11)
AST SERPL-CCNC: 12 U/L (ref 15–37)
BASOPHILS # BLD: 0.1 K/UL (ref 0–0.2)
BASOPHILS NFR BLD: 1 % (ref 0–2)
BILIRUB SERPL-MCNC: 0.5 MG/DL (ref 0.2–1.1)
BUN SERPL-MCNC: 18 MG/DL (ref 8–23)
CALCIUM SERPL-MCNC: 10.4 MG/DL (ref 8.3–10.4)
CHLORIDE SERPL-SCNC: 112 MMOL/L (ref 101–110)
CO2 SERPL-SCNC: 25 MMOL/L (ref 21–32)
CREAT SERPL-MCNC: 1.3 MG/DL (ref 0.6–1)
DIFFERENTIAL METHOD BLD: ABNORMAL
EOSINOPHIL # BLD: 0.3 K/UL (ref 0–0.8)
EOSINOPHIL NFR BLD: 3 % (ref 0.5–7.8)
ERYTHROCYTE [DISTWIDTH] IN BLOOD BY AUTOMATED COUNT: 15.5 % (ref 11.9–14.6)
GLOBULIN SER CALC-MCNC: 4.1 G/DL (ref 2.8–4.5)
GLUCOSE SERPL-MCNC: 87 MG/DL (ref 65–100)
HCT VFR BLD AUTO: 37 % (ref 35.8–46.3)
HGB BLD-MCNC: 11.2 G/DL (ref 11.7–15.4)
IMM GRANULOCYTES # BLD AUTO: 0 K/UL (ref 0–0.5)
IMM GRANULOCYTES NFR BLD AUTO: 0 % (ref 0–5)
LYMPHOCYTES # BLD: 2.3 K/UL (ref 0.5–4.6)
LYMPHOCYTES NFR BLD: 25 % (ref 13–44)
MCH RBC QN AUTO: 27 PG (ref 26.1–32.9)
MCHC RBC AUTO-ENTMCNC: 30.3 G/DL (ref 31.4–35)
MCV RBC AUTO: 89.2 FL (ref 82–102)
MONOCYTES # BLD: 0.7 K/UL (ref 0.1–1.3)
MONOCYTES NFR BLD: 8 % (ref 4–12)
NEUTS SEG # BLD: 5.9 K/UL (ref 1.7–8.2)
NEUTS SEG NFR BLD: 64 % (ref 43–78)
NRBC # BLD: 0 K/UL (ref 0–0.2)
PLATELET # BLD AUTO: 284 K/UL (ref 150–450)
PMV BLD AUTO: 10.3 FL (ref 9.4–12.3)
POTASSIUM SERPL-SCNC: 4.1 MMOL/L (ref 3.5–5.1)
PROT SERPL-MCNC: 7.6 G/DL (ref 6.3–8.2)
RBC # BLD AUTO: 4.15 M/UL (ref 4.05–5.2)
SODIUM SERPL-SCNC: 139 MMOL/L (ref 133–143)
WBC # BLD AUTO: 9.2 K/UL (ref 4.3–11.1)

## 2023-11-29 PROCEDURE — G8399 PT W/DXA RESULTS DOCUMENT: HCPCS | Performed by: INTERNAL MEDICINE

## 2023-11-29 PROCEDURE — G8427 DOCREV CUR MEDS BY ELIG CLIN: HCPCS | Performed by: INTERNAL MEDICINE

## 2023-11-29 PROCEDURE — 1090F PRES/ABSN URINE INCON ASSESS: CPT | Performed by: INTERNAL MEDICINE

## 2023-11-29 PROCEDURE — 3077F SYST BP >= 140 MM HG: CPT | Performed by: INTERNAL MEDICINE

## 2023-11-29 PROCEDURE — 99213 OFFICE O/P EST LOW 20 MIN: CPT | Performed by: INTERNAL MEDICINE

## 2023-11-29 PROCEDURE — G8484 FLU IMMUNIZE NO ADMIN: HCPCS | Performed by: INTERNAL MEDICINE

## 2023-11-29 PROCEDURE — G0439 PPPS, SUBSEQ VISIT: HCPCS | Performed by: INTERNAL MEDICINE

## 2023-11-29 PROCEDURE — 3080F DIAST BP >= 90 MM HG: CPT | Performed by: INTERNAL MEDICINE

## 2023-11-29 PROCEDURE — G8420 CALC BMI NORM PARAMETERS: HCPCS | Performed by: INTERNAL MEDICINE

## 2023-11-29 PROCEDURE — 1036F TOBACCO NON-USER: CPT | Performed by: INTERNAL MEDICINE

## 2023-11-29 PROCEDURE — 1123F ACP DISCUSS/DSCN MKR DOCD: CPT | Performed by: INTERNAL MEDICINE

## 2023-11-29 RX ORDER — AMLODIPINE BESYLATE 2.5 MG/1
2.5 TABLET ORAL DAILY
Qty: 90 TABLET | Refills: 3 | Status: SHIPPED | OUTPATIENT
Start: 2023-11-29

## 2023-11-29 RX ORDER — BUMETANIDE 1 MG/1
1 TABLET ORAL
Qty: 45 TABLET | Refills: 1 | Status: SHIPPED | OUTPATIENT
Start: 2023-11-29

## 2023-11-29 RX ORDER — LANSOPRAZOLE 30 MG/1
30 CAPSULE, DELAYED RELEASE ORAL 2 TIMES DAILY
Qty: 180 CAPSULE | Refills: 3 | Status: SHIPPED | OUTPATIENT
Start: 2023-11-29

## 2023-11-29 ASSESSMENT — PATIENT HEALTH QUESTIONNAIRE - PHQ9
SUM OF ALL RESPONSES TO PHQ QUESTIONS 1-9: 0
SUM OF ALL RESPONSES TO PHQ9 QUESTIONS 1 & 2: 0
SUM OF ALL RESPONSES TO PHQ QUESTIONS 1-9: 0
2. FEELING DOWN, DEPRESSED OR HOPELESS: 0
SUM OF ALL RESPONSES TO PHQ QUESTIONS 1-9: 0
SUM OF ALL RESPONSES TO PHQ QUESTIONS 1-9: 0
1. LITTLE INTEREST OR PLEASURE IN DOING THINGS: 0

## 2023-11-29 ASSESSMENT — ENCOUNTER SYMPTOMS
SHORTNESS OF BREATH: 0
ABDOMINAL PAIN: 0

## 2023-11-29 ASSESSMENT — LIFESTYLE VARIABLES
HOW MANY STANDARD DRINKS CONTAINING ALCOHOL DO YOU HAVE ON A TYPICAL DAY: 1 OR 2
HOW OFTEN DO YOU HAVE A DRINK CONTAINING ALCOHOL: NEVER

## 2023-12-01 ENCOUNTER — HOSPITAL ENCOUNTER (OUTPATIENT)
Dept: GENERAL RADIOLOGY | Age: 83
End: 2023-12-01
Payer: MEDICARE

## 2023-12-01 ENCOUNTER — OFFICE VISIT (OUTPATIENT)
Dept: PULMONOLOGY | Age: 83
End: 2023-12-01
Payer: MEDICARE

## 2023-12-01 VITALS
HEART RATE: 79 BPM | SYSTOLIC BLOOD PRESSURE: 142 MMHG | OXYGEN SATURATION: 98 % | TEMPERATURE: 97.3 F | DIASTOLIC BLOOD PRESSURE: 98 MMHG | HEIGHT: 62 IN | BODY MASS INDEX: 23.37 KG/M2 | WEIGHT: 127 LBS | RESPIRATION RATE: 18 BRPM

## 2023-12-01 DIAGNOSIS — Z87.891 HISTORY OF TOBACCO USE: ICD-10-CM

## 2023-12-01 DIAGNOSIS — R09.02 HYPOXEMIA: Primary | ICD-10-CM

## 2023-12-01 DIAGNOSIS — R09.02 HYPOXEMIA: ICD-10-CM

## 2023-12-01 DIAGNOSIS — I27.20 PULMONARY HYPERTENSION (HCC): ICD-10-CM

## 2023-12-01 DIAGNOSIS — I51.89 DIASTOLIC DYSFUNCTION: ICD-10-CM

## 2023-12-01 DIAGNOSIS — I48.0 PAF (PAROXYSMAL ATRIAL FIBRILLATION) (HCC): ICD-10-CM

## 2023-12-01 DIAGNOSIS — R06.02 SHORTNESS OF BREATH: ICD-10-CM

## 2023-12-01 DIAGNOSIS — I34.0 MITRAL VALVE INSUFFICIENCY, UNSPECIFIED ETIOLOGY: ICD-10-CM

## 2023-12-01 PROCEDURE — 3080F DIAST BP >= 90 MM HG: CPT | Performed by: NURSE PRACTITIONER

## 2023-12-01 PROCEDURE — 1123F ACP DISCUSS/DSCN MKR DOCD: CPT | Performed by: NURSE PRACTITIONER

## 2023-12-01 PROCEDURE — 1036F TOBACCO NON-USER: CPT | Performed by: NURSE PRACTITIONER

## 2023-12-01 PROCEDURE — 99214 OFFICE O/P EST MOD 30 MIN: CPT | Performed by: NURSE PRACTITIONER

## 2023-12-01 PROCEDURE — G8427 DOCREV CUR MEDS BY ELIG CLIN: HCPCS | Performed by: NURSE PRACTITIONER

## 2023-12-01 PROCEDURE — 1090F PRES/ABSN URINE INCON ASSESS: CPT | Performed by: NURSE PRACTITIONER

## 2023-12-01 PROCEDURE — G8484 FLU IMMUNIZE NO ADMIN: HCPCS | Performed by: NURSE PRACTITIONER

## 2023-12-01 PROCEDURE — 71046 X-RAY EXAM CHEST 2 VIEWS: CPT

## 2023-12-01 PROCEDURE — G8420 CALC BMI NORM PARAMETERS: HCPCS | Performed by: NURSE PRACTITIONER

## 2023-12-01 PROCEDURE — G8399 PT W/DXA RESULTS DOCUMENT: HCPCS | Performed by: NURSE PRACTITIONER

## 2023-12-01 PROCEDURE — 3077F SYST BP >= 140 MM HG: CPT | Performed by: NURSE PRACTITIONER

## 2023-12-01 RX ORDER — ALBUTEROL SULFATE 90 UG/1
AEROSOL, METERED RESPIRATORY (INHALATION)
Qty: 1 EACH | Refills: 11 | Status: SHIPPED | OUTPATIENT
Start: 2023-12-01

## 2023-12-01 ASSESSMENT — ENCOUNTER SYMPTOMS
SHORTNESS OF BREATH: 1
COUGH: 0
SPUTUM PRODUCTION: 0

## 2023-12-01 NOTE — PROGRESS NOTES
She is an 80year-old seen today at the request of Dr. Camila Corona secondary to hypoxia. Previously seen by us, last visit 7/2020 in follow-up of hypoxia, hx of pulmonary hypertension-felt secondary to cardiac disease and history of uncontrolled hypertension. At that time she was on oxygen at 2 L/min with sleep. History is also notable for dementia, breast cancer, PAF. Follow-up echocardiograms have demonstrated interval improvement in estimated RVSP, most recent is 29.7 mm. She had a chest CT 7/30/2020 secondary to chest pain, shortness of breath-demonstrated interval improvement in reticular nodular opacities in RML with few pleural-based opacities persisting, essentially stable to slightly smaller and considered benign    DIAGNOSTICS:       Multiple pulmonary function tests from 5063-2888, unable to pull results forward from prior medical record system. Multiple chest x-rays from 2482-0598, unable to pull results forward from prior medical record system. Echocardiogram 2/3291-ZBCAU 1 diastolic dysfunction, RVSP 34 mmHg, moderate TR, mild MR. Overnight oximetry 6/2017-desaturation down to 82%. Complete pulmonary function tests 7/2017-normal spirometry and lung volumes. Diffusion capacity is decreased. Overnight oximetry 9/2017-adequate saturation on 2 L/min. CXR 5/14/2019-lungs are clear, no acute findings. Chest CT 10/2019-nonspecific micro nodularity within the RML and felt secondary to infectious/inflammatory process. chest CT 7/30/2020 secondary to chest pain, shortness of breath-demonstrated interval improvement in reticular nodular opacities in RML with few pleural-based opacities persisting, essentially stable to slightly smaller and considered benign. Chest CT 10/2021-partially stable cardiomegaly, no acute changes. echo 12/16/2020-EF 50-55%, impaired LV relaxation, mild-moderate TR. Estimated RVSP 29.7 mm.

## 2023-12-01 NOTE — PROGRESS NOTES
[address] Cyndee Page. [address] [facility], [address] [place] [address]  [phone]    Patient Name:  Davey Kulkarni    YOB: 1940    Office Visit 12/1/2023      CHIEF COMPLAINT:      Chief Complaint   Patient presents with    New Patient    Other     Hypoxemia            ASSESSMENT:   (Medical Decision Making)                                                                                                                                          Encounter Diagnoses   Name Primary? Hypoxemia Yes    Diastolic dysfunction     PAF (paroxysmal atrial fibrillation) (HCC)     Shortness of breath     History of tobacco use     Mitral valve insufficiency, unspecified etiology      Previously, hypoxemia and pulmonary hypertension were felt to be secondary to cardiac disease and history of uncontrolled hypertension. Right ventricular pressure was 42 mm in 2002, Improved to 30-35 mmHg 2005 after being on nocturnal oxygen. Last ECHO in 9246 showed diastolic dysfunction, MR, and normal RVSP. Diastolic dysfunction appears compensated at this time. Ambulatory oximetry today on room air demonstrates adequate oqukflzaey-%. She needs to requalify for supplemental nocturnal oxygen. Reports intermittent wheezing. Previously on albuterol inhaler, questionable was definite benefit but she would like to try it again. Prior CPFT's 2017 demonstrated normal spirometry and lung volumes, decreased diffusion capacity. PLAN:     Overnight oximetry on room air, begin oxygen with sleep if indicated. Discussed this process and advised to call us if there are issues with hearing from oxygen company to perform the study or hearing results from us within 1 week of study. Discussed 30 day window from today from medicare standpoint to accomplish oxygen order, if needed. Albuterol inhaler, 2 puffs 4 times daily if needed for shortness of breath or wheezing. Use with spacer.     Follow-up and

## 2023-12-18 PROBLEM — N18.31 STAGE 3A CHRONIC KIDNEY DISEASE (HCC): Status: ACTIVE | Noted: 2023-12-18

## 2023-12-27 ENCOUNTER — HOSPITAL ENCOUNTER (OUTPATIENT)
Dept: MAMMOGRAPHY | Age: 83
Discharge: HOME | End: 2023-12-30
Attending: INTERNAL MEDICINE
Payer: MEDICARE

## 2023-12-27 ENCOUNTER — PATIENT MESSAGE (OUTPATIENT)
Dept: PULMONOLOGY | Age: 83
End: 2023-12-27

## 2023-12-27 DIAGNOSIS — Z78.0 ASYMPTOMATIC MENOPAUSAL STATE: ICD-10-CM

## 2023-12-27 PROCEDURE — 77080 DXA BONE DENSITY AXIAL: CPT

## 2023-12-29 DIAGNOSIS — R09.02 HYPOXEMIA: Primary | ICD-10-CM

## 2024-01-10 PROCEDURE — 93296 REM INTERROG EVL PM/IDS: CPT | Performed by: INTERNAL MEDICINE

## 2024-01-10 PROCEDURE — 93294 REM INTERROG EVL PM/LDLS PM: CPT | Performed by: INTERNAL MEDICINE

## 2024-01-29 DIAGNOSIS — G31.83 MODERATE LEWY BODY DEMENTIA WITH PSYCHOTIC DISTURBANCE (HCC): ICD-10-CM

## 2024-01-29 DIAGNOSIS — F02.B2 MODERATE LEWY BODY DEMENTIA WITH PSYCHOTIC DISTURBANCE (HCC): ICD-10-CM

## 2024-01-29 RX ORDER — MEMANTINE HYDROCHLORIDE 10 MG/1
10 TABLET ORAL 2 TIMES DAILY
Qty: 180 TABLET | Refills: 3 | Status: SHIPPED | OUTPATIENT
Start: 2024-01-29

## 2024-02-29 NOTE — TELEPHONE ENCOUNTER
Rx last written by Dr. Mcpherson 7/13/23 for #90 with 1 refill. Pt has NTP appt with Dr. Bales 6/3/24. Rx pended.

## 2024-03-01 RX ORDER — NITROFURANTOIN MACROCRYSTALS 100 MG/1
CAPSULE ORAL
Qty: 90 CAPSULE | Refills: 1 | Status: SHIPPED | OUTPATIENT
Start: 2024-03-01

## 2024-03-14 NOTE — PROGRESS NOTES
type  - Well-controlled  - Continue with amlodipine and Lopressor    7. Dementia, unspecified dementia severity, unspecified dementia type, unspecified whether behavioral, psychotic, or mood disturbance or anxiety (HCC)  - PCP note reviewed  - Continue to follow with PCP    8. Chronic respiratory failure, unspecified whether with hypoxia or hypercapnia (HCC)  - Pulmonology note reviewed  - Continue to follow with pulmonology  - Management of chronic HFpEF, as above    F/U: 6 months    Thee Thurston MD

## 2024-03-15 ENCOUNTER — NURSE ONLY (OUTPATIENT)
Age: 84
End: 2024-03-15

## 2024-03-15 ENCOUNTER — OFFICE VISIT (OUTPATIENT)
Age: 84
End: 2024-03-15

## 2024-03-15 VITALS
HEIGHT: 62 IN | HEART RATE: 68 BPM | DIASTOLIC BLOOD PRESSURE: 86 MMHG | WEIGHT: 129.6 LBS | BODY MASS INDEX: 23.85 KG/M2 | SYSTOLIC BLOOD PRESSURE: 138 MMHG

## 2024-03-15 DIAGNOSIS — Z86.79 HISTORY OF ATRIAL FIBRILLATION: ICD-10-CM

## 2024-03-15 DIAGNOSIS — I25.10 CAD IN NATIVE ARTERY: ICD-10-CM

## 2024-03-15 DIAGNOSIS — E78.5 HYPERLIPIDEMIA, UNSPECIFIED HYPERLIPIDEMIA TYPE: ICD-10-CM

## 2024-03-15 DIAGNOSIS — I50.32 CHRONIC HEART FAILURE WITH PRESERVED EJECTION FRACTION (HFPEF) (HCC): Primary | ICD-10-CM

## 2024-03-15 DIAGNOSIS — I10 HYPERTENSION, UNSPECIFIED TYPE: ICD-10-CM

## 2024-03-15 DIAGNOSIS — F03.90 DEMENTIA, UNSPECIFIED DEMENTIA SEVERITY, UNSPECIFIED DEMENTIA TYPE, UNSPECIFIED WHETHER BEHAVIORAL, PSYCHOTIC, OR MOOD DISTURBANCE OR ANXIETY (HCC): ICD-10-CM

## 2024-03-15 DIAGNOSIS — R00.1 BRADYCARDIA: Primary | ICD-10-CM

## 2024-03-15 DIAGNOSIS — J96.10 CHRONIC RESPIRATORY FAILURE, UNSPECIFIED WHETHER WITH HYPOXIA OR HYPERCAPNIA (HCC): ICD-10-CM

## 2024-03-15 DIAGNOSIS — Z95.0 CARDIAC PACEMAKER IN SITU: ICD-10-CM

## 2024-03-28 ENCOUNTER — OFFICE VISIT (OUTPATIENT)
Dept: NEUROLOGY | Age: 84
End: 2024-03-28
Payer: MEDICARE

## 2024-03-28 VITALS
WEIGHT: 125.8 LBS | OXYGEN SATURATION: 95 % | SYSTOLIC BLOOD PRESSURE: 173 MMHG | HEART RATE: 78 BPM | DIASTOLIC BLOOD PRESSURE: 90 MMHG | HEIGHT: 62 IN | BODY MASS INDEX: 23.15 KG/M2

## 2024-03-28 DIAGNOSIS — Z74.09 IMPAIRED FUNCTIONAL MOBILITY, BALANCE, GAIT, AND ENDURANCE: ICD-10-CM

## 2024-03-28 DIAGNOSIS — F02.B2 MODERATE LEWY BODY DEMENTIA WITH PSYCHOTIC DISTURBANCE (HCC): Primary | ICD-10-CM

## 2024-03-28 DIAGNOSIS — Z91.81 HISTORY OF RECENT FALL: ICD-10-CM

## 2024-03-28 DIAGNOSIS — G31.83 MODERATE LEWY BODY DEMENTIA WITH PSYCHOTIC DISTURBANCE (HCC): Primary | ICD-10-CM

## 2024-03-28 DIAGNOSIS — G62.9 PERIPHERAL POLYNEUROPATHY: ICD-10-CM

## 2024-03-28 DIAGNOSIS — Z91.81 HISTORY OF FALLING: ICD-10-CM

## 2024-03-28 DIAGNOSIS — I10 HYPERTENSION, UNSPECIFIED TYPE: ICD-10-CM

## 2024-03-28 PROCEDURE — 3080F DIAST BP >= 90 MM HG: CPT

## 2024-03-28 PROCEDURE — 1090F PRES/ABSN URINE INCON ASSESS: CPT

## 2024-03-28 PROCEDURE — 3077F SYST BP >= 140 MM HG: CPT

## 2024-03-28 PROCEDURE — G8484 FLU IMMUNIZE NO ADMIN: HCPCS

## 2024-03-28 PROCEDURE — 1123F ACP DISCUSS/DSCN MKR DOCD: CPT

## 2024-03-28 PROCEDURE — G8399 PT W/DXA RESULTS DOCUMENT: HCPCS

## 2024-03-28 PROCEDURE — G8420 CALC BMI NORM PARAMETERS: HCPCS

## 2024-03-28 PROCEDURE — 99215 OFFICE O/P EST HI 40 MIN: CPT

## 2024-03-28 PROCEDURE — 1036F TOBACCO NON-USER: CPT

## 2024-03-28 PROCEDURE — G8427 DOCREV CUR MEDS BY ELIG CLIN: HCPCS

## 2024-03-28 ASSESSMENT — ENCOUNTER SYMPTOMS
SORE THROAT: 0
BACK PAIN: 0
CONSTIPATION: 0
EYE PAIN: 0
CHEST TIGHTNESS: 0
ABDOMINAL PAIN: 0
COUGH: 0
TROUBLE SWALLOWING: 1

## 2024-03-28 NOTE — PROGRESS NOTES
follow-up and continued management of cognitive impairment. Neurologic exam as noted above.    Lewy body dementia:   -Will continue memantine 10 mg twice a day.    -Cognitive disturbances of appear to have remained stable.     Gait/mobility impairment:  -Reviewed and discussed general safety precautions and fall prevention strategies.  -In home physical therapy referral discussed; deferred at this time  -Encouraged consistent use of Rolator walker/cane for assistance with ambulation    -Continues to have some other dyskinetic movements in bilateral lower extremities; almost choreiform in appearance. Denies worsening. Not bothersome.  -Monitoring of symptoms is ongoing.     Peripheral neuropathy:    -Remains stable  -Will continue gabapentin as noted above.  -Nerve conduction studies have shown evidence of distal sensory neuropathy-likely contributing to impaired balance  -Monitoring is ongoing for worsening neuropathic pain    -Counseled patient on the importance of consistency pertaining to the following: daily routine, sleep hygiene, adequate hydration, minimal caffeine intake, physical activity/aerobic exercise, healthy coping tools/stress management, and adherence to medication regimen.   -Patient was encouraged to contact the office with any questions or concerns.  -All questions were answered to the best of my ability.  -To follow up with Dr. Elizondo in July/August.    Return Follow up with Dr. Elizondo in July/August..     Signature: EDUARD Worrell     [facility]   [address] [place] [address], [place] [address]  Ph: [phone]  Fax: [phone]       I have personally interviewed and examined Erica Jose is a 83 y.o. female and I have personally reviewed all relevant records including labs and imaging as noted above. I have written all aspects of this note. More than 50% of this time was used for counseling regarding my diagnosis, prognosis, and plans for management. Total visit

## 2024-03-31 NOTE — PROGRESS NOTES
[facility] CARDIOLOGY Follow Up                 Reason for Visit: History of atrial fibrillation    Subjective:     Patient is a 80 y.o. female with a PMH of status post PPM, nonobstructive CAD, paroxysmal atrial fibrillation, hypertension, hyperlipidemia, breast cancer, and dementia who presents for follow-up. The patient was last seen in 2023. Eliquis was decreased to 2.5 mg twice daily. She had a TTE in 2020 that was noted to demonstrate a normal EF. The patient uses a walker when she walks outside but uses a cane for shorter distances. She denies chest pain or dyspnea.     Past Medical History:   Diagnosis Date    Abdominal pain 2013    COPD (chronic obstructive pulmonary disease) (HCC)     DJD (degenerative joint disease) 2013    Esophageal reflux 2013    HTN (hypertension) 2013    Hypercholesterolemia     Hypertension     Other and unspecified hyperlipidemia 2013    Pyelonephritis 2013    Sensory neuropathy 2020    UTI (lower urinary tract infection) 2013      Past Surgical History:   Procedure Laterality Date    BREAST SURGERY  2019    right breast cancer-lumpectomy    CHEST SURGERY      HYSTERECTOMY (CERVIX STATUS UNKNOWN)      LAP,CHOLECYSTECTOMY      PACEMAKER      PA UNLISTED PROCEDURE CARDIAC SURGERY      pacemaker      Family History   Problem Relation Age of Onset    Heart Disease Mother     Hypertension Mother     Hypertension Father     Heart Disease Father       Social History     Tobacco Use    Smoking status: Former     Packs/day: 1     Types: Cigarettes     Quit date: 1989     Years since quittin.7    Smokeless tobacco: Never   Substance Use Topics    Alcohol use: No      Allergies   Allergen Reactions    Aspirin Nausea Only     Pt can take aspirin 81mg Pt c/o upset stomach with higher dose    Atorvastatin Other (See Comments)     Leg weakness    Codeine Nausea Only    Fluticasone-Salmeterol Other (See Comments) yes

## 2024-04-26 PROCEDURE — 93296 REM INTERROG EVL PM/IDS: CPT | Performed by: INTERNAL MEDICINE

## 2024-04-26 PROCEDURE — 93294 REM INTERROG EVL PM/LDLS PM: CPT | Performed by: INTERNAL MEDICINE

## 2024-06-03 ENCOUNTER — OFFICE VISIT (OUTPATIENT)
Dept: INTERNAL MEDICINE CLINIC | Facility: CLINIC | Age: 84
End: 2024-06-03
Payer: MEDICARE

## 2024-06-03 VITALS
WEIGHT: 126 LBS | HEART RATE: 71 BPM | HEIGHT: 62 IN | OXYGEN SATURATION: 98 % | BODY MASS INDEX: 23.19 KG/M2 | DIASTOLIC BLOOD PRESSURE: 64 MMHG | SYSTOLIC BLOOD PRESSURE: 122 MMHG

## 2024-06-03 DIAGNOSIS — F32.0 MAJOR DEPRESSIVE DISORDER, SINGLE EPISODE, MILD (HCC): ICD-10-CM

## 2024-06-03 DIAGNOSIS — Z74.09 IMPAIRED FUNCTIONAL MOBILITY, BALANCE, GAIT, AND ENDURANCE: ICD-10-CM

## 2024-06-03 DIAGNOSIS — F03.90 DEMENTIA, UNSPECIFIED DEMENTIA SEVERITY, UNSPECIFIED DEMENTIA TYPE, UNSPECIFIED WHETHER BEHAVIORAL, PSYCHOTIC, OR MOOD DISTURBANCE OR ANXIETY (HCC): ICD-10-CM

## 2024-06-03 DIAGNOSIS — I50.32 CHRONIC HEART FAILURE WITH PRESERVED EJECTION FRACTION (HFPEF) (HCC): ICD-10-CM

## 2024-06-03 DIAGNOSIS — Z87.39 HISTORY OF GOUT: ICD-10-CM

## 2024-06-03 DIAGNOSIS — I10 ESSENTIAL (PRIMARY) HYPERTENSION: Primary | ICD-10-CM

## 2024-06-03 DIAGNOSIS — K21.9 LARYNGOPHARYNGEAL REFLUX (LPR): ICD-10-CM

## 2024-06-03 DIAGNOSIS — R73.01 IMPAIRED FASTING BLOOD SUGAR: ICD-10-CM

## 2024-06-03 DIAGNOSIS — F33.42 RECURRENT MAJOR DEPRESSIVE DISORDER, IN FULL REMISSION (HCC): ICD-10-CM

## 2024-06-03 DIAGNOSIS — I10 HYPERTENSION, UNSPECIFIED TYPE: ICD-10-CM

## 2024-06-03 DIAGNOSIS — Z17.0 MALIGNANT NEOPLASM OF UPPER-OUTER QUADRANT OF RIGHT BREAST IN FEMALE, ESTROGEN RECEPTOR POSITIVE (HCC): ICD-10-CM

## 2024-06-03 DIAGNOSIS — M10.079 IDIOPATHIC GOUT, UNSPECIFIED ANKLE AND FOOT: ICD-10-CM

## 2024-06-03 DIAGNOSIS — C50.411 MALIGNANT NEOPLASM OF UPPER-OUTER QUADRANT OF RIGHT BREAST IN FEMALE, ESTROGEN RECEPTOR POSITIVE (HCC): ICD-10-CM

## 2024-06-03 DIAGNOSIS — N39.0 RECURRENT UTI: ICD-10-CM

## 2024-06-03 DIAGNOSIS — I10 ESSENTIAL (PRIMARY) HYPERTENSION: ICD-10-CM

## 2024-06-03 DIAGNOSIS — J30.9 ALLERGIC RHINITIS, UNSPECIFIED SEASONALITY, UNSPECIFIED TRIGGER: ICD-10-CM

## 2024-06-03 DIAGNOSIS — Z87.440 HISTORY OF RECURRENT UTIS: ICD-10-CM

## 2024-06-03 DIAGNOSIS — K21.9 GASTRO-ESOPHAGEAL REFLUX DISEASE WITHOUT ESOPHAGITIS: ICD-10-CM

## 2024-06-03 PROBLEM — Z86.79 HISTORY OF ATRIAL FIBRILLATION: Status: RESOLVED | Noted: 2023-03-27 | Resolved: 2024-06-03

## 2024-06-03 PROBLEM — I25.10 CAD IN NATIVE ARTERY: Status: RESOLVED | Noted: 2017-05-18 | Resolved: 2024-06-03

## 2024-06-03 PROBLEM — R55 NEAR SYNCOPE: Status: RESOLVED | Noted: 2022-09-22 | Resolved: 2024-06-03

## 2024-06-03 PROBLEM — Z87.898 HISTORY OF PERIPHERAL EDEMA: Status: RESOLVED | Noted: 2023-03-27 | Resolved: 2024-06-03

## 2024-06-03 PROBLEM — F33.0 MILD EPISODE OF RECURRENT MAJOR DEPRESSIVE DISORDER (HCC): Status: RESOLVED | Noted: 2019-01-24 | Resolved: 2024-06-03

## 2024-06-03 PROBLEM — R29.6 FREQUENT FALLS: Status: RESOLVED | Noted: 2021-08-18 | Resolved: 2024-06-03

## 2024-06-03 LAB
ALBUMIN SERPL-MCNC: 3.5 G/DL (ref 3.2–4.6)
ALBUMIN/GLOB SERPL: 1 (ref 1–1.9)
ALP SERPL-CCNC: 94 U/L (ref 35–104)
ALT SERPL-CCNC: 6 U/L (ref 12–65)
ANION GAP SERPL CALC-SCNC: 8 MMOL/L (ref 9–18)
AST SERPL-CCNC: 19 U/L (ref 15–37)
BASOPHILS # BLD: 0.1 K/UL (ref 0–0.2)
BASOPHILS NFR BLD: 1 % (ref 0–2)
BILIRUB SERPL-MCNC: 0.3 MG/DL (ref 0–1.2)
BUN SERPL-MCNC: 15 MG/DL (ref 8–23)
CALCIUM SERPL-MCNC: 10 MG/DL (ref 8.8–10.2)
CHLORIDE SERPL-SCNC: 104 MMOL/L (ref 98–107)
CHOLEST SERPL-MCNC: 204 MG/DL (ref 0–200)
CO2 SERPL-SCNC: 28 MMOL/L (ref 20–28)
CREAT SERPL-MCNC: 1.13 MG/DL (ref 0.6–1.1)
DIFFERENTIAL METHOD BLD: ABNORMAL
EOSINOPHIL # BLD: 0.3 K/UL (ref 0–0.8)
EOSINOPHIL NFR BLD: 5 % (ref 0.5–7.8)
ERYTHROCYTE [DISTWIDTH] IN BLOOD BY AUTOMATED COUNT: 15.2 % (ref 11.9–14.6)
EST. AVERAGE GLUCOSE BLD GHB EST-MCNC: 112 MG/DL
GLOBULIN SER CALC-MCNC: 3.5 G/DL (ref 2.3–3.5)
GLUCOSE SERPL-MCNC: 118 MG/DL (ref 70–99)
HBA1C MFR BLD: 5.5 % (ref 0–5.6)
HCT VFR BLD AUTO: 35.9 % (ref 35.8–46.3)
HDLC SERPL-MCNC: 49 MG/DL (ref 40–60)
HDLC SERPL: 4.1 (ref 0–5)
HGB BLD-MCNC: 10.9 G/DL (ref 11.7–15.4)
IMM GRANULOCYTES # BLD AUTO: 0 K/UL (ref 0–0.5)
IMM GRANULOCYTES NFR BLD AUTO: 0 % (ref 0–5)
LDLC SERPL CALC-MCNC: 117 MG/DL (ref 0–100)
LYMPHOCYTES # BLD: 2.1 K/UL (ref 0.5–4.6)
LYMPHOCYTES NFR BLD: 32 % (ref 13–44)
MCH RBC QN AUTO: 26.3 PG (ref 26.1–32.9)
MCHC RBC AUTO-ENTMCNC: 30.4 G/DL (ref 31.4–35)
MCV RBC AUTO: 86.7 FL (ref 82–102)
MONOCYTES # BLD: 0.6 K/UL (ref 0.1–1.3)
MONOCYTES NFR BLD: 9 % (ref 4–12)
NEUTS SEG # BLD: 3.5 K/UL (ref 1.7–8.2)
NEUTS SEG NFR BLD: 54 % (ref 43–78)
NRBC # BLD: 0 K/UL (ref 0–0.2)
PLATELET # BLD AUTO: 230 K/UL (ref 150–450)
PMV BLD AUTO: 10.8 FL (ref 9.4–12.3)
POTASSIUM SERPL-SCNC: 4 MMOL/L (ref 3.5–5.1)
PROT SERPL-MCNC: 7 G/DL (ref 6.3–8.2)
RBC # BLD AUTO: 4.14 M/UL (ref 4.05–5.2)
SODIUM SERPL-SCNC: 139 MMOL/L (ref 136–145)
TRIGL SERPL-MCNC: 191 MG/DL (ref 0–150)
TSH W FREE THYROID IF ABNORMAL: 0.75 UIU/ML (ref 0.27–4.2)
VLDLC SERPL CALC-MCNC: 38 MG/DL (ref 6–23)
WBC # BLD AUTO: 6.5 K/UL (ref 4.3–11.1)

## 2024-06-03 PROCEDURE — 1123F ACP DISCUSS/DSCN MKR DOCD: CPT | Performed by: STUDENT IN AN ORGANIZED HEALTH CARE EDUCATION/TRAINING PROGRAM

## 2024-06-03 PROCEDURE — 1090F PRES/ABSN URINE INCON ASSESS: CPT | Performed by: STUDENT IN AN ORGANIZED HEALTH CARE EDUCATION/TRAINING PROGRAM

## 2024-06-03 PROCEDURE — 3078F DIAST BP <80 MM HG: CPT | Performed by: STUDENT IN AN ORGANIZED HEALTH CARE EDUCATION/TRAINING PROGRAM

## 2024-06-03 PROCEDURE — G8420 CALC BMI NORM PARAMETERS: HCPCS | Performed by: STUDENT IN AN ORGANIZED HEALTH CARE EDUCATION/TRAINING PROGRAM

## 2024-06-03 PROCEDURE — 99214 OFFICE O/P EST MOD 30 MIN: CPT | Performed by: STUDENT IN AN ORGANIZED HEALTH CARE EDUCATION/TRAINING PROGRAM

## 2024-06-03 PROCEDURE — 3074F SYST BP LT 130 MM HG: CPT | Performed by: STUDENT IN AN ORGANIZED HEALTH CARE EDUCATION/TRAINING PROGRAM

## 2024-06-03 PROCEDURE — G8399 PT W/DXA RESULTS DOCUMENT: HCPCS | Performed by: STUDENT IN AN ORGANIZED HEALTH CARE EDUCATION/TRAINING PROGRAM

## 2024-06-03 PROCEDURE — G8427 DOCREV CUR MEDS BY ELIG CLIN: HCPCS | Performed by: STUDENT IN AN ORGANIZED HEALTH CARE EDUCATION/TRAINING PROGRAM

## 2024-06-03 PROCEDURE — 1036F TOBACCO NON-USER: CPT | Performed by: STUDENT IN AN ORGANIZED HEALTH CARE EDUCATION/TRAINING PROGRAM

## 2024-06-03 RX ORDER — BUMETANIDE 1 MG/1
1 TABLET ORAL
Qty: 45 TABLET | Refills: 2 | Status: SHIPPED | OUTPATIENT
Start: 2024-06-03

## 2024-06-03 RX ORDER — LANSOPRAZOLE 30 MG/1
30 CAPSULE, DELAYED RELEASE ORAL 2 TIMES DAILY
Qty: 180 CAPSULE | Refills: 3 | Status: SHIPPED | OUTPATIENT
Start: 2024-06-03

## 2024-06-03 RX ORDER — ALLOPURINOL 100 MG/1
100 TABLET ORAL DAILY
Qty: 90 TABLET | Refills: 3 | Status: SHIPPED | OUTPATIENT
Start: 2024-06-03

## 2024-06-03 RX ORDER — AMLODIPINE BESYLATE 5 MG/1
5 TABLET ORAL DAILY
Qty: 90 TABLET | Refills: 3 | Status: SHIPPED | OUTPATIENT
Start: 2024-06-03

## 2024-06-03 RX ORDER — NITROFURANTOIN MACROCRYSTALS 100 MG/1
CAPSULE ORAL
Qty: 90 CAPSULE | Refills: 3 | Status: SHIPPED | OUTPATIENT
Start: 2024-06-03

## 2024-06-03 RX ORDER — MONTELUKAST SODIUM 10 MG/1
10 TABLET ORAL NIGHTLY
Qty: 90 TABLET | Refills: 3 | Status: SHIPPED | OUTPATIENT
Start: 2024-06-03

## 2024-06-03 SDOH — ECONOMIC STABILITY: FOOD INSECURITY: WITHIN THE PAST 12 MONTHS, YOU WORRIED THAT YOUR FOOD WOULD RUN OUT BEFORE YOU GOT MONEY TO BUY MORE.: NEVER TRUE

## 2024-06-03 SDOH — ECONOMIC STABILITY: FOOD INSECURITY: WITHIN THE PAST 12 MONTHS, THE FOOD YOU BOUGHT JUST DIDN'T LAST AND YOU DIDN'T HAVE MONEY TO GET MORE.: NEVER TRUE

## 2024-06-03 SDOH — ECONOMIC STABILITY: INCOME INSECURITY: HOW HARD IS IT FOR YOU TO PAY FOR THE VERY BASICS LIKE FOOD, HOUSING, MEDICAL CARE, AND HEATING?: NOT HARD AT ALL

## 2024-06-03 SDOH — HEALTH STABILITY: PHYSICAL HEALTH: ON AVERAGE, HOW MANY DAYS PER WEEK DO YOU ENGAGE IN MODERATE TO STRENUOUS EXERCISE (LIKE A BRISK WALK)?: 5 DAYS

## 2024-06-03 ASSESSMENT — PATIENT HEALTH QUESTIONNAIRE - PHQ9
4. FEELING TIRED OR HAVING LITTLE ENERGY: NOT AT ALL
SUM OF ALL RESPONSES TO PHQ QUESTIONS 1-9: 4
6. FEELING BAD ABOUT YOURSELF - OR THAT YOU ARE A FAILURE OR HAVE LET YOURSELF OR YOUR FAMILY DOWN: NOT AT ALL
8. MOVING OR SPEAKING SO SLOWLY THAT OTHER PEOPLE COULD HAVE NOTICED. OR THE OPPOSITE, BEING SO FIGETY OR RESTLESS THAT YOU HAVE BEEN MOVING AROUND A LOT MORE THAN USUAL: NOT AT ALL
SUM OF ALL RESPONSES TO PHQ QUESTIONS 1-9: 4
9. THOUGHTS THAT YOU WOULD BE BETTER OFF DEAD, OR OF HURTING YOURSELF: NOT AT ALL
7. TROUBLE CONCENTRATING ON THINGS, SUCH AS READING THE NEWSPAPER OR WATCHING TELEVISION: SEVERAL DAYS
3. TROUBLE FALLING OR STAYING ASLEEP: NEARLY EVERY DAY
SUM OF ALL RESPONSES TO PHQ QUESTIONS 1-9: 4
SUM OF ALL RESPONSES TO PHQ QUESTIONS 1-9: 4
1. LITTLE INTEREST OR PLEASURE IN DOING THINGS: NOT AT ALL
5. POOR APPETITE OR OVEREATING: NOT AT ALL
10. IF YOU CHECKED OFF ANY PROBLEMS, HOW DIFFICULT HAVE THESE PROBLEMS MADE IT FOR YOU TO DO YOUR WORK, TAKE CARE OF THINGS AT HOME, OR GET ALONG WITH OTHER PEOPLE: SOMEWHAT DIFFICULT
SUM OF ALL RESPONSES TO PHQ9 QUESTIONS 1 & 2: 0
2. FEELING DOWN, DEPRESSED OR HOPELESS: NOT AT ALL

## 2024-06-04 NOTE — PROGRESS NOTES
She is a very pleasant 83-year-old seen today  in follow-up of hypoxia, hx of pulmonary hypertension-felt secondary to cardiac disease and history of uncontrolled hypertension.  History is also notable for dementia, breast cancer, PAF.     Follow-up echocardiograms have demonstrated interval improvement in estimated RVSP, most recent is 29.7 mm.     She had a chest CT 7/30/2020 secondary to chest pain, shortness of breath-demonstrated interval improvement in reticular nodular opacities in RML with few pleural-based opacities persisting, essentially stable to slightly smaller and considered benign.     DIAGNOSTICS:       Multiple pulmonary function tests from 3419-2895, unable to pull results forward from prior medical record system.  Multiple chest x-rays from 3612-1554, unable to pull results forward from prior medical record system.  Echocardiogram 5/2017-grade 1 diastolic dysfunction, RVSP 34 mmHg, moderate TR, mild MR.  Overnight oximetry 6/2017-desaturation down to 82%.   Complete pulmonary function tests 7/2017-normal spirometry and lung volumes.  Diffusion capacity is decreased.    Overnight oximetry 9/2017-adequate saturation on 2 L/min.  CXR 5/14/2019-lungs are clear, no acute findings.  Chest CT 10/2019-nonspecific micro nodularity within the RML and felt secondary to infectious/inflammatory process.  chest CT 7/30/2020 secondary to chest pain, shortness of breath-demonstrated interval improvement in reticular nodular opacities in RML with few pleural-based opacities persisting, essentially stable to slightly smaller and considered benign.  Chest CT 10/2021-partially stable cardiomegaly, no acute changes.  echo 12/16/2020-EF 50-55%, impaired LV relaxation, mild-moderate TR. Estimated RVSP 29.7 mm.  CXR 12/1/2023-lungs are clear, no acute abnormality.  Ambulatory oximetry 12/1/2023-room air at rest 98%, room air with ambulation %.  Baseline heart rate 72, maximum 77 with ambulation.  Overnight

## 2024-06-05 ENCOUNTER — OFFICE VISIT (OUTPATIENT)
Dept: PULMONOLOGY | Age: 84
End: 2024-06-05
Payer: MEDICARE

## 2024-06-05 VITALS
HEART RATE: 79 BPM | OXYGEN SATURATION: 79 % | SYSTOLIC BLOOD PRESSURE: 138 MMHG | DIASTOLIC BLOOD PRESSURE: 70 MMHG | BODY MASS INDEX: 22.63 KG/M2 | HEIGHT: 62 IN | RESPIRATION RATE: 18 BRPM | WEIGHT: 123 LBS | TEMPERATURE: 97.3 F

## 2024-06-05 DIAGNOSIS — I48.0 PAF (PAROXYSMAL ATRIAL FIBRILLATION) (HCC): ICD-10-CM

## 2024-06-05 DIAGNOSIS — I34.0 MITRAL VALVE INSUFFICIENCY, UNSPECIFIED ETIOLOGY: ICD-10-CM

## 2024-06-05 DIAGNOSIS — R06.02 SHORTNESS OF BREATH: ICD-10-CM

## 2024-06-05 DIAGNOSIS — I51.89 DIASTOLIC DYSFUNCTION: ICD-10-CM

## 2024-06-05 DIAGNOSIS — I27.20 PULMONARY HYPERTENSION (HCC): ICD-10-CM

## 2024-06-05 DIAGNOSIS — Z87.891 HISTORY OF TOBACCO USE: ICD-10-CM

## 2024-06-05 DIAGNOSIS — R09.02 HYPOXEMIA: Primary | ICD-10-CM

## 2024-06-05 LAB
EXPIRATORY TIME: NORMAL
FEF 25-75% %PRED-PRE: NORMAL
FEF 25-75% PRED: NORMAL
FEF 25-75-PRE: NORMAL
FEV1 %PRED-PRE: 90 %
FEV1 PRED: 1.72 L
FEV1/FVC %PRED-PRE: NORMAL
FEV1/FVC PRED: NORMAL
FEV1/FVC: 71 %
FEV1: 1.55 L
FVC %PRED-PRE: 96 %
FVC PRED: 2.27 L
FVC: 2.17 L
PEF %PRED-PRE: NORMAL
PEF PRED: NORMAL
PEF-PRE: NORMAL

## 2024-06-05 PROCEDURE — G8427 DOCREV CUR MEDS BY ELIG CLIN: HCPCS | Performed by: NURSE PRACTITIONER

## 2024-06-05 PROCEDURE — G8399 PT W/DXA RESULTS DOCUMENT: HCPCS | Performed by: NURSE PRACTITIONER

## 2024-06-05 PROCEDURE — 1036F TOBACCO NON-USER: CPT | Performed by: NURSE PRACTITIONER

## 2024-06-05 PROCEDURE — 3075F SYST BP GE 130 - 139MM HG: CPT | Performed by: NURSE PRACTITIONER

## 2024-06-05 PROCEDURE — 1123F ACP DISCUSS/DSCN MKR DOCD: CPT | Performed by: NURSE PRACTITIONER

## 2024-06-05 PROCEDURE — G8420 CALC BMI NORM PARAMETERS: HCPCS | Performed by: NURSE PRACTITIONER

## 2024-06-05 PROCEDURE — 3078F DIAST BP <80 MM HG: CPT | Performed by: NURSE PRACTITIONER

## 2024-06-05 PROCEDURE — 1090F PRES/ABSN URINE INCON ASSESS: CPT | Performed by: NURSE PRACTITIONER

## 2024-06-05 PROCEDURE — 99214 OFFICE O/P EST MOD 30 MIN: CPT | Performed by: NURSE PRACTITIONER

## 2024-06-05 ASSESSMENT — ENCOUNTER SYMPTOMS
WHEEZING: 0
COUGH: 0
HEMOPTYSIS: 0
SPUTUM PRODUCTION: 0
SHORTNESS OF BREATH: 1

## 2024-06-05 ASSESSMENT — PULMONARY FUNCTION TESTS
FVC_PREDICTED: 2.27
FEV1_PREDICTED: 1.72
FEV1: 1.55
FEV1/FVC: 71
FEV1_PERCENT_PREDICTED_PRE: 90
FVC_PERCENT_PREDICTED_PRE: 96
FVC: 2.17

## 2024-06-05 NOTE — PATIENT INSTRUCTIONS
Continue oxygen at 2 L/min with sleep.    Albuterol 2 puffs 4 times daily if needed for shortness of breath or wheezing.    Follow up in 6 months, sooner if needed.    Recommend newest COVID booster, RSV vaccine.    She is up-to-date on pneumonia and flu vaccine.

## 2024-06-05 NOTE — PROGRESS NOTES
[facility]  [address]  [place] [address]  [phone]    Patient Name:  Erica Jose    YOB: 1940    Office Visit 6/5/2024      CHIEF COMPLAINT:      Chief Complaint   Patient presents with    Follow-up         ASSESSMENT:   (Medical Decision Making)                                                                                                                                          Encounter Diagnoses   Name Primary?    Hypoxemia Yes    Diastolic dysfunction     PAF (paroxysmal atrial fibrillation) (HCC)     Shortness of breath     History of tobacco use     Mitral valve insufficiency, unspecified etiology     Pulmonary hypertension (HCC)      She is stable symptomatically, compliant with oxygen with sleep at 2 L/min.    Uses albuterol intermittently with good response.  Spirometry is normal.    She remains tobacco free.    Last echo demonstrated interval improvement, RVSP estimated 29.7 mmHg.  Diastolic dysfunction persists, but appears compensated.    She remains tobacco free.  She does not meet criteria for screening CT.                      PLAN:     Continue oxygen at 2 L/min with sleep.    Albuterol 2 puffs 4 times daily if needed for shortness of breath or wheezing.    Follow up in 6 months, sooner if needed.    Recommend newest COVID booster, RSV vaccine.    She is up-to-date on pneumonia and flu vaccine.    Orders Placed This Encounter   Procedures    Spirometry Without Bronchodilator           CELIA PROCTOR, APRN - CNP    Total  time spent with patient -  31 min.     Collaborating MD: Dr. Gee JURADO      HISTORY OF PRESENT ILLNESS:    She is a very pleasant 83-year-old seen today  in follow-up of hypoxia, hx of pulmonary hypertension-felt secondary to cardiac disease and history of uncontrolled hypertension.  History is also notable for dementia, breast cancer, PAF.     Follow-up echocardiogram has demonstrated interval improvement in

## 2024-08-08 ASSESSMENT — ENCOUNTER SYMPTOMS
COUGH: 0
PHOTOPHOBIA: 0
CONSTIPATION: 0

## 2024-08-08 NOTE — PROGRESS NOTES
capsule Take 1 capsule by mouth 2 times daily 180 capsule 3    metoprolol tartrate (LOPRESSOR) 25 MG tablet Take 1 tablet by mouth 2 times daily 180 tablet 3    nitrofurantoin (MACRODANTIN) 100 MG capsule TAKE 1 CAPSULE BY MOUTH EVERY DAY AT NIGHT 90 capsule 3    OXYGEN Inhale 2 L into the lungs nightly      apixaban (ELIQUIS) 2.5 MG TABS tablet Take 1 tablet by mouth 2 times daily 180 tablet 3    memantine (NAMENDA) 10 MG tablet Take 1 tablet by mouth 2 times daily 180 tablet 3    albuterol sulfate HFA (PROVENTIL;VENTOLIN;PROAIR) 108 (90 Base) MCG/ACT inhaler 2 puffs 4 times daily if needed for shortness of breath or wheezing.  May use generic or brand name as preferred by insurance. 1 each 11    gabapentin (NEURONTIN) 400 MG capsule Take 1 capsule by mouth 3 times daily. 270 capsule 3     No facility-administered encounter medications on file as of 8/9/2024.     ALLERGIES:  Allergies   Allergen Reactions    Aspirin Nausea Only     Pt can take aspirin 81mg Pt c/o upset stomach with higher dose    Atorvastatin Other (See Comments)     Leg weakness    Codeine Nausea Only    Fluticasone-Salmeterol Other (See Comments)     shakes       Physical Exam:     BP (!) 148/92   Pulse 82   Wt 57.2 kg (126 lb)   BMI 23.05 kg/m²     General Exam:  General: Elderly female in no apparent distress.   HEENT: Normocephalic, atraumatic. Sclera anicteric. Oropharynx clear.   Neck: Supple without masses  Cardiovascular: Regular rate and rhythm. No carotid bruits.   Lungs: Non-labored breathing.   Abdomen: Soft, nontender, nondistended.   Extremities: Peripheral pulses intact. No edema and no rashes.      Neurological Exam:      MS/Language/Speech:  Alert and attentive.  She is oriented to person, and able to state the month and year. She is not able to state the months backwards accurately.  She has difficulty with simple arithmetic consistently.  She has difficulty with short-term verbal recall.  She is able to draw a figure

## 2024-08-09 ENCOUNTER — OFFICE VISIT (OUTPATIENT)
Dept: NEUROLOGY | Age: 84
End: 2024-08-09
Payer: MEDICARE

## 2024-08-09 VITALS
WEIGHT: 126 LBS | DIASTOLIC BLOOD PRESSURE: 92 MMHG | HEART RATE: 82 BPM | BODY MASS INDEX: 23.05 KG/M2 | SYSTOLIC BLOOD PRESSURE: 148 MMHG

## 2024-08-09 DIAGNOSIS — R13.12 OROPHARYNGEAL DYSPHAGIA: ICD-10-CM

## 2024-08-09 DIAGNOSIS — F02.B2 MODERATE LEWY BODY DEMENTIA WITH PSYCHOTIC DISTURBANCE (HCC): Primary | ICD-10-CM

## 2024-08-09 DIAGNOSIS — R26.89 ABNORMALITY OF GAIT DUE TO IMPAIRMENT OF BALANCE: ICD-10-CM

## 2024-08-09 DIAGNOSIS — G31.83 MODERATE LEWY BODY DEMENTIA WITH PSYCHOTIC DISTURBANCE (HCC): Primary | ICD-10-CM

## 2024-08-09 DIAGNOSIS — G62.9 PERIPHERAL POLYNEUROPATHY: ICD-10-CM

## 2024-08-09 PROCEDURE — G8420 CALC BMI NORM PARAMETERS: HCPCS | Performed by: PSYCHIATRY & NEUROLOGY

## 2024-08-09 PROCEDURE — 1123F ACP DISCUSS/DSCN MKR DOCD: CPT | Performed by: PSYCHIATRY & NEUROLOGY

## 2024-08-09 PROCEDURE — 99214 OFFICE O/P EST MOD 30 MIN: CPT | Performed by: PSYCHIATRY & NEUROLOGY

## 2024-08-09 PROCEDURE — 1090F PRES/ABSN URINE INCON ASSESS: CPT | Performed by: PSYCHIATRY & NEUROLOGY

## 2024-08-09 PROCEDURE — 1036F TOBACCO NON-USER: CPT | Performed by: PSYCHIATRY & NEUROLOGY

## 2024-08-09 PROCEDURE — 3077F SYST BP >= 140 MM HG: CPT | Performed by: PSYCHIATRY & NEUROLOGY

## 2024-08-09 PROCEDURE — G8399 PT W/DXA RESULTS DOCUMENT: HCPCS | Performed by: PSYCHIATRY & NEUROLOGY

## 2024-08-09 PROCEDURE — G8427 DOCREV CUR MEDS BY ELIG CLIN: HCPCS | Performed by: PSYCHIATRY & NEUROLOGY

## 2024-08-09 PROCEDURE — 3080F DIAST BP >= 90 MM HG: CPT | Performed by: PSYCHIATRY & NEUROLOGY

## 2024-08-13 ENCOUNTER — TELEPHONE (OUTPATIENT)
Age: 84
End: 2024-08-13

## 2024-08-13 NOTE — TELEPHONE ENCOUNTER
Home Care PT, Kyler, states at today's first home visit, BP-170/86, then 170/100 after 30-45 minutes.   Yesterday's BP-150/80 on patient's home monitor.   HR-80s.   Has amlodipine 5 mg qd and metoprolol tartrate 25 mg BID in home, but has not been taking them, stating that Dr. Mcpherson D/C'd these meds before he retired.   Next scheduled appointment with Dr. Luke is 9/17/24.     Kyler asks for Dr. Thurston's recommendations on high BP and BP meds.

## 2024-08-14 ENCOUNTER — TELEPHONE (OUTPATIENT)
Dept: INTERNAL MEDICINE CLINIC | Facility: CLINIC | Age: 84
End: 2024-08-14

## 2024-08-14 NOTE — TELEPHONE ENCOUNTER
Call from Kyler with [facility] reporting patient's bp of 170/86; 170/100; Kyler also advises that patient is confused regarding bp medications and was not aware that she had seen Dr. Bales in June; Kyler called cardiologist who instructed him to call PCP; Kyler suggests that patient's daughter, Letty, be contacted

## 2024-08-14 NOTE — TELEPHONE ENCOUNTER
Thee Thurston MD Keener, Lynn F, RN  Caller: Unspecified (Yesterday, 12:59 PM)  The patient has seen Dr. Bales since.  Although I think it may be reasonable to resume one or both medications, it is unclear why it is stated that Dr. Mcpherson discontinued both when Dr. Bales's note, completed after she saw Dr. Mcpherson, notes to continue them.  I think she should reach out to her PCP first to clarify this.

## 2024-08-15 ENCOUNTER — TELEPHONE (OUTPATIENT)
Dept: NEUROLOGY | Age: 84
End: 2024-08-15

## 2024-08-15 NOTE — TELEPHONE ENCOUNTER
Mandeep with OT asked for a call back to get a verbal order for 4 OT visits. Can you please call him back at [phone]

## 2024-08-27 ENCOUNTER — TELEPHONE (OUTPATIENT)
Age: 84
End: 2024-08-27

## 2024-08-27 DIAGNOSIS — I10 ESSENTIAL (PRIMARY) HYPERTENSION: ICD-10-CM

## 2024-08-27 RX ORDER — AMLODIPINE BESYLATE 10 MG/1
10 TABLET ORAL DAILY
Qty: 90 TABLET | Refills: 3 | Status: SHIPPED | OUTPATIENT
Start: 2024-08-27

## 2024-08-27 NOTE — TELEPHONE ENCOUNTER
Nadeen w/[facility] reports that pt.BP has been running high.160-170'S/80'S hr 70'S-80'S.Pt.asymptomatic.Pt.is taking Norvasc 5mg qday and Lopressor 25mg bid.Nadeen is asking if any med adjustments needed?

## 2024-08-27 NOTE — TELEPHONE ENCOUNTER
Thee Thurston MD  [employer] [facility] (3:16 PM)       I increased amlodipine to 10 mg daily.   I called and informed Nadeen of MD response and that med was sent to pharmacy and she v/u.

## 2024-08-27 NOTE — TELEPHONE ENCOUNTER
Nadeen with [facility] is calling state=ing her b has been elevated 160/82 and she would like to know what Dr Thurston would lke to do about it

## 2024-09-03 ENCOUNTER — TELEPHONE (OUTPATIENT)
Age: 84
End: 2024-09-03

## 2024-09-03 DIAGNOSIS — I10 HYPERTENSION: ICD-10-CM

## 2024-09-03 DIAGNOSIS — Z79.899 LONG-TERM USE OF HIGH-RISK MEDICATION: Primary | ICD-10-CM

## 2024-09-03 DIAGNOSIS — N18.31 STAGE 3A CHRONIC KIDNEY DISEASE (HCC): ICD-10-CM

## 2024-09-04 ENCOUNTER — TELEPHONE (OUTPATIENT)
Age: 84
End: 2024-09-04

## 2024-09-04 NOTE — TELEPHONE ENCOUNTER
Advised Nadeen of Dr. Thurston's response. Nadeen verbalized understanding, and states [facility] nurse will draw BMP.   Orders Placed This Encounter   Procedures    Basic Metabolic Panel     Standing Status:   Future     Standing Expiration Date:   9/4/2025

## 2024-09-04 NOTE — TELEPHONE ENCOUNTER
Thee Thurston MD Keener, Lynn F, RN  Caller: Unspecified (Yesterday,  4:44 PM)  Please order a chemistry profile (BMP) and we will go from there.

## 2024-09-04 NOTE — TELEPHONE ENCOUNTER
After increasing amlodipine to 10 mg qd on 8/27/24, early morning systolic BP-170-160s before meds.   1 1/2 hours after meds, systolic [id number].  HR-70s-80s.  Feeling fine. Asymptomatic.   Continues Eliquis 2.5 mg BID and metoprolol tartrate 25 mg BID.     [facility] nurse, Nadeen, asks for Dr. Thurston's recommendations for high BP.

## 2024-09-05 ENCOUNTER — TELEPHONE (OUTPATIENT)
Dept: INTERNAL MEDICINE CLINIC | Facility: CLINIC | Age: 84
End: 2024-09-05

## 2024-09-05 DIAGNOSIS — N18.31 STAGE 3A CHRONIC KIDNEY DISEASE (HCC): ICD-10-CM

## 2024-09-05 DIAGNOSIS — Z79.899 LONG-TERM USE OF HIGH-RISK MEDICATION: ICD-10-CM

## 2024-09-05 DIAGNOSIS — I10 HYPERTENSION: ICD-10-CM

## 2024-09-05 LAB
ANION GAP SERPL CALC-SCNC: 9 MMOL/L (ref 9–18)
BUN SERPL-MCNC: 15 MG/DL (ref 8–23)
CALCIUM SERPL-MCNC: 9.9 MG/DL (ref 8.8–10.2)
CHLORIDE SERPL-SCNC: 103 MMOL/L (ref 98–107)
CO2 SERPL-SCNC: 26 MMOL/L (ref 20–28)
CREAT SERPL-MCNC: 1.12 MG/DL (ref 0.6–1.1)
GLUCOSE SERPL-MCNC: 119 MG/DL (ref 70–99)
POTASSIUM SERPL-SCNC: 4.1 MMOL/L (ref 3.5–5.1)
SODIUM SERPL-SCNC: 138 MMOL/L (ref 136–145)

## 2024-09-06 ENCOUNTER — TELEPHONE (OUTPATIENT)
Age: 84
End: 2024-09-06

## 2024-09-06 DIAGNOSIS — I10 HYPERTENSION, UNSPECIFIED TYPE: Primary | ICD-10-CM

## 2024-09-06 RX ORDER — LOSARTAN POTASSIUM 25 MG/1
25 TABLET ORAL DAILY
Qty: 90 TABLET | Refills: 3 | Status: SHIPPED | OUTPATIENT
Start: 2024-09-06

## 2024-09-06 NOTE — TELEPHONE ENCOUNTER
----- Message from Dr. Thee Thurston MD sent at 9/6/2024  8:46 AM EDT -----  Please let the patient know that her chemistry profile is acceptable to start losartan.  I started losartan 25 mg daily.  I ordered a repeat chemistry profile in 1 week to check her potassium level and creatinine.  Please encourage her to get her lab work at that time.

## 2024-09-06 NOTE — TELEPHONE ENCOUNTER
Advised daughter, Letty, of Dr. Thurston's response. Advised Letty that losartan 25 mg qd has been sent to [facility] on [address] in [place]. Instructed Letty to take patient to any [facility] outpatient lab for BMP and magnesium in 1 week.  Letty verbalized understanding.

## 2024-09-12 DIAGNOSIS — G62.9 PERIPHERAL POLYNEUROPATHY: ICD-10-CM

## 2024-09-13 DIAGNOSIS — I10 HYPERTENSION, UNSPECIFIED TYPE: ICD-10-CM

## 2024-09-13 LAB
ANION GAP SERPL CALC-SCNC: 8 MMOL/L (ref 9–18)
BUN SERPL-MCNC: 15 MG/DL (ref 8–23)
CALCIUM SERPL-MCNC: 9.8 MG/DL (ref 8.8–10.2)
CHLORIDE SERPL-SCNC: 104 MMOL/L (ref 98–107)
CO2 SERPL-SCNC: 28 MMOL/L (ref 20–28)
CREAT SERPL-MCNC: 1.19 MG/DL (ref 0.6–1.1)
GLUCOSE SERPL-MCNC: 94 MG/DL (ref 70–99)
MAGNESIUM SERPL-MCNC: 2 MG/DL (ref 1.8–2.4)
POTASSIUM SERPL-SCNC: 4.3 MMOL/L (ref 3.5–5.1)
SODIUM SERPL-SCNC: 139 MMOL/L (ref 136–145)

## 2024-09-13 RX ORDER — GABAPENTIN 400 MG/1
400 CAPSULE ORAL 3 TIMES DAILY
Qty: 270 CAPSULE | Refills: 3 | Status: SHIPPED | OUTPATIENT
Start: 2024-09-13 | End: 2025-09-13

## 2024-09-16 ENCOUNTER — TELEPHONE (OUTPATIENT)
Age: 84
End: 2024-09-16

## 2024-09-17 ENCOUNTER — NURSE ONLY (OUTPATIENT)
Age: 84
End: 2024-09-17
Payer: MEDICARE

## 2024-09-17 ENCOUNTER — OFFICE VISIT (OUTPATIENT)
Age: 84
End: 2024-09-17
Payer: MEDICARE

## 2024-09-17 VITALS
HEIGHT: 62 IN | DIASTOLIC BLOOD PRESSURE: 88 MMHG | WEIGHT: 127 LBS | SYSTOLIC BLOOD PRESSURE: 154 MMHG | BODY MASS INDEX: 23.37 KG/M2 | HEART RATE: 65 BPM

## 2024-09-17 DIAGNOSIS — J96.10 CHRONIC RESPIRATORY FAILURE, UNSPECIFIED WHETHER WITH HYPOXIA OR HYPERCAPNIA (HCC): ICD-10-CM

## 2024-09-17 DIAGNOSIS — Z86.79 HISTORY OF ATRIAL FIBRILLATION: ICD-10-CM

## 2024-09-17 DIAGNOSIS — I10 HYPERTENSION, UNSPECIFIED TYPE: ICD-10-CM

## 2024-09-17 DIAGNOSIS — Z95.0 CARDIAC PACEMAKER IN SITU: ICD-10-CM

## 2024-09-17 DIAGNOSIS — E78.5 HYPERLIPIDEMIA, UNSPECIFIED HYPERLIPIDEMIA TYPE: ICD-10-CM

## 2024-09-17 DIAGNOSIS — I50.32 CHRONIC HEART FAILURE WITH PRESERVED EJECTION FRACTION (HFPEF) (HCC): Primary | ICD-10-CM

## 2024-09-17 DIAGNOSIS — I48.0 PAROXYSMAL ATRIAL FIBRILLATION (HCC): ICD-10-CM

## 2024-09-17 DIAGNOSIS — R00.1 BRADYCARDIA: Primary | ICD-10-CM

## 2024-09-17 DIAGNOSIS — I25.10 CAD IN NATIVE ARTERY: ICD-10-CM

## 2024-09-17 DIAGNOSIS — I50.32 CHRONIC HEART FAILURE WITH PRESERVED EJECTION FRACTION (HFPEF) (HCC): ICD-10-CM

## 2024-09-17 DIAGNOSIS — F03.90 DEMENTIA, UNSPECIFIED DEMENTIA SEVERITY, UNSPECIFIED DEMENTIA TYPE, UNSPECIFIED WHETHER BEHAVIORAL, PSYCHOTIC, OR MOOD DISTURBANCE OR ANXIETY (HCC): ICD-10-CM

## 2024-09-17 PROCEDURE — 1090F PRES/ABSN URINE INCON ASSESS: CPT | Performed by: INTERNAL MEDICINE

## 2024-09-17 PROCEDURE — G8420 CALC BMI NORM PARAMETERS: HCPCS | Performed by: INTERNAL MEDICINE

## 2024-09-17 PROCEDURE — 99214 OFFICE O/P EST MOD 30 MIN: CPT | Performed by: INTERNAL MEDICINE

## 2024-09-17 PROCEDURE — G8399 PT W/DXA RESULTS DOCUMENT: HCPCS | Performed by: INTERNAL MEDICINE

## 2024-09-17 PROCEDURE — 3077F SYST BP >= 140 MM HG: CPT | Performed by: INTERNAL MEDICINE

## 2024-09-17 PROCEDURE — 3079F DIAST BP 80-89 MM HG: CPT | Performed by: INTERNAL MEDICINE

## 2024-09-17 PROCEDURE — 1123F ACP DISCUSS/DSCN MKR DOCD: CPT | Performed by: INTERNAL MEDICINE

## 2024-09-17 PROCEDURE — 1036F TOBACCO NON-USER: CPT | Performed by: INTERNAL MEDICINE

## 2024-09-17 PROCEDURE — 93280 PM DEVICE PROGR EVAL DUAL: CPT | Performed by: INTERNAL MEDICINE

## 2024-09-17 PROCEDURE — G8427 DOCREV CUR MEDS BY ELIG CLIN: HCPCS | Performed by: INTERNAL MEDICINE

## 2024-10-25 PROCEDURE — 93296 REM INTERROG EVL PM/IDS: CPT | Performed by: INTERNAL MEDICINE

## 2024-10-25 PROCEDURE — 93294 REM INTERROG EVL PM/LDLS PM: CPT | Performed by: INTERNAL MEDICINE

## 2024-11-19 ENCOUNTER — TELEPHONE (OUTPATIENT)
Age: 84
End: 2024-11-19

## 2024-11-19 DIAGNOSIS — R07.9 CHEST PAIN, UNSPECIFIED TYPE: Primary | ICD-10-CM

## 2024-12-02 SDOH — HEALTH STABILITY: PHYSICAL HEALTH: ON AVERAGE, HOW MANY DAYS PER WEEK DO YOU ENGAGE IN MODERATE TO STRENUOUS EXERCISE (LIKE A BRISK WALK)?: 0 DAYS

## 2024-12-02 ASSESSMENT — LIFESTYLE VARIABLES
HOW OFTEN DO YOU HAVE A DRINK CONTAINING ALCOHOL: NEVER
HOW MANY STANDARD DRINKS CONTAINING ALCOHOL DO YOU HAVE ON A TYPICAL DAY: 0
HOW OFTEN DO YOU HAVE SIX OR MORE DRINKS ON ONE OCCASION: 1
HOW MANY STANDARD DRINKS CONTAINING ALCOHOL DO YOU HAVE ON A TYPICAL DAY: PATIENT DOES NOT DRINK
HOW OFTEN DO YOU HAVE A DRINK CONTAINING ALCOHOL: 1

## 2024-12-02 ASSESSMENT — PATIENT HEALTH QUESTIONNAIRE - PHQ9
SUM OF ALL RESPONSES TO PHQ QUESTIONS 1-9: 0
2. FEELING DOWN, DEPRESSED OR HOPELESS: NOT AT ALL
SUM OF ALL RESPONSES TO PHQ QUESTIONS 1-9: 0
SUM OF ALL RESPONSES TO PHQ9 QUESTIONS 1 & 2: 0
1. LITTLE INTEREST OR PLEASURE IN DOING THINGS: NOT AT ALL
SUM OF ALL RESPONSES TO PHQ QUESTIONS 1-9: 0
SUM OF ALL RESPONSES TO PHQ QUESTIONS 1-9: 0

## 2024-12-04 ENCOUNTER — OFFICE VISIT (OUTPATIENT)
Dept: INTERNAL MEDICINE CLINIC | Facility: CLINIC | Age: 84
End: 2024-12-04

## 2024-12-04 VITALS
HEART RATE: 76 BPM | OXYGEN SATURATION: 94 % | DIASTOLIC BLOOD PRESSURE: 82 MMHG | WEIGHT: 128 LBS | SYSTOLIC BLOOD PRESSURE: 148 MMHG | BODY MASS INDEX: 23.55 KG/M2 | HEIGHT: 62 IN

## 2024-12-04 DIAGNOSIS — F03.90 DEMENTIA, UNSPECIFIED DEMENTIA SEVERITY, UNSPECIFIED DEMENTIA TYPE, UNSPECIFIED WHETHER BEHAVIORAL, PSYCHOTIC, OR MOOD DISTURBANCE OR ANXIETY (HCC): ICD-10-CM

## 2024-12-04 DIAGNOSIS — N39.0 RECURRENT UTI: ICD-10-CM

## 2024-12-04 DIAGNOSIS — D64.9 ANEMIA, UNSPECIFIED TYPE: ICD-10-CM

## 2024-12-04 DIAGNOSIS — N18.31 STAGE 3A CHRONIC KIDNEY DISEASE (HCC): ICD-10-CM

## 2024-12-04 DIAGNOSIS — G47.00 INSOMNIA, UNSPECIFIED TYPE: ICD-10-CM

## 2024-12-04 DIAGNOSIS — E55.9 VITAMIN D DEFICIENCY: ICD-10-CM

## 2024-12-04 DIAGNOSIS — I10 HYPERTENSION, UNSPECIFIED TYPE: ICD-10-CM

## 2024-12-04 DIAGNOSIS — Z00.00 MEDICARE ANNUAL WELLNESS VISIT, SUBSEQUENT: Primary | ICD-10-CM

## 2024-12-04 PROBLEM — Z86.79 HISTORY OF ATRIAL FIBRILLATION: Status: RESOLVED | Noted: 2023-03-27 | Resolved: 2024-12-04

## 2024-12-04 LAB
25(OH)D3 SERPL-MCNC: 25 NG/ML (ref 30–100)
ALBUMIN SERPL-MCNC: 3.4 G/DL (ref 3.2–4.6)
ALBUMIN/GLOB SERPL: 0.8 (ref 1–1.9)
ALP SERPL-CCNC: 106 U/L (ref 35–104)
ALT SERPL-CCNC: 7 U/L (ref 8–45)
ANION GAP SERPL CALC-SCNC: 10 MMOL/L (ref 7–16)
AST SERPL-CCNC: 15 U/L (ref 15–37)
BASOPHILS # BLD: 0.1 K/UL (ref 0–0.2)
BASOPHILS NFR BLD: 1 % (ref 0–2)
BILIRUB SERPL-MCNC: 0.4 MG/DL (ref 0–1.2)
BUN SERPL-MCNC: 14 MG/DL (ref 8–23)
CALCIUM SERPL-MCNC: 9.9 MG/DL (ref 8.8–10.2)
CHLORIDE SERPL-SCNC: 103 MMOL/L (ref 98–107)
CO2 SERPL-SCNC: 27 MMOL/L (ref 20–29)
CREAT SERPL-MCNC: 1.29 MG/DL (ref 0.6–1.1)
DIFFERENTIAL METHOD BLD: ABNORMAL
EOSINOPHIL # BLD: 0.5 K/UL (ref 0–0.8)
EOSINOPHIL NFR BLD: 6 % (ref 0.5–7.8)
ERYTHROCYTE [DISTWIDTH] IN BLOOD BY AUTOMATED COUNT: 16.1 % (ref 11.9–14.6)
FERRITIN SERPL-MCNC: 20 NG/ML (ref 8–388)
FOLATE SERPL-MCNC: 6.8 NG/ML (ref 3.1–17.5)
GLOBULIN SER CALC-MCNC: 4.1 G/DL (ref 2.3–3.5)
GLUCOSE SERPL-MCNC: 124 MG/DL (ref 70–99)
HCT VFR BLD AUTO: 37.1 % (ref 35.8–46.3)
HGB BLD-MCNC: 11.4 G/DL (ref 11.7–15.4)
IMM GRANULOCYTES # BLD AUTO: 0 K/UL (ref 0–0.5)
IMM GRANULOCYTES NFR BLD AUTO: 1 % (ref 0–5)
IRON SATN MFR SERPL: 4 % (ref 20–50)
IRON SERPL-MCNC: 19 UG/DL (ref 35–100)
LYMPHOCYTES # BLD: 2.5 K/UL (ref 0.5–4.6)
LYMPHOCYTES NFR BLD: 31 % (ref 13–44)
MCH RBC QN AUTO: 25.9 PG (ref 26.1–32.9)
MCHC RBC AUTO-ENTMCNC: 30.7 G/DL (ref 31.4–35)
MCV RBC AUTO: 84.1 FL (ref 82–102)
MONOCYTES # BLD: 0.8 K/UL (ref 0.1–1.3)
MONOCYTES NFR BLD: 10 % (ref 4–12)
NEUTS SEG # BLD: 4.1 K/UL (ref 1.7–8.2)
NEUTS SEG NFR BLD: 51 % (ref 43–78)
NRBC # BLD: 0 K/UL (ref 0–0.2)
PLATELET # BLD AUTO: 339 K/UL (ref 150–450)
PMV BLD AUTO: 10.2 FL (ref 9.4–12.3)
POTASSIUM SERPL-SCNC: 3.8 MMOL/L (ref 3.5–5.1)
PROT SERPL-MCNC: 7.5 G/DL (ref 6.3–8.2)
RBC # BLD AUTO: 4.41 M/UL (ref 4.05–5.2)
SODIUM SERPL-SCNC: 140 MMOL/L (ref 136–145)
TIBC SERPL-MCNC: 450 UG/DL (ref 240–450)
UIBC SERPL-MCNC: 431 UG/DL (ref 112–347)
VIT B12 SERPL-MCNC: 302 PG/ML (ref 193–986)
WBC # BLD AUTO: 8 K/UL (ref 4.3–11.1)

## 2024-12-04 RX ORDER — TRAZODONE HYDROCHLORIDE 50 MG/1
25-50 TABLET, FILM COATED ORAL NIGHTLY PRN
Qty: 30 TABLET | Refills: 0 | Status: SHIPPED | OUTPATIENT
Start: 2024-12-04

## 2024-12-04 ASSESSMENT — PATIENT HEALTH QUESTIONNAIRE - PHQ9
10. IF YOU CHECKED OFF ANY PROBLEMS, HOW DIFFICULT HAVE THESE PROBLEMS MADE IT FOR YOU TO DO YOUR WORK, TAKE CARE OF THINGS AT HOME, OR GET ALONG WITH OTHER PEOPLE: EXTREMELY DIFFICULT
SUM OF ALL RESPONSES TO PHQ QUESTIONS 1-9: 5
SUM OF ALL RESPONSES TO PHQ QUESTIONS 1-9: 5
9. THOUGHTS THAT YOU WOULD BE BETTER OFF DEAD, OR OF HURTING YOURSELF: NOT AT ALL
3. TROUBLE FALLING OR STAYING ASLEEP: NEARLY EVERY DAY
SUM OF ALL RESPONSES TO PHQ QUESTIONS 1-9: 5
8. MOVING OR SPEAKING SO SLOWLY THAT OTHER PEOPLE COULD HAVE NOTICED. OR THE OPPOSITE, BEING SO FIGETY OR RESTLESS THAT YOU HAVE BEEN MOVING AROUND A LOT MORE THAN USUAL: MORE THAN HALF THE DAYS
SUM OF ALL RESPONSES TO PHQ QUESTIONS 1-9: 5
7. TROUBLE CONCENTRATING ON THINGS, SUCH AS READING THE NEWSPAPER OR WATCHING TELEVISION: NOT AT ALL
5. POOR APPETITE OR OVEREATING: NOT AT ALL
4. FEELING TIRED OR HAVING LITTLE ENERGY: NOT AT ALL

## 2024-12-04 NOTE — PROGRESS NOTES
[facility]  [address]  [place] [address]  [phone]    Patient Name:  Erica Jose    YOB: 1940    Office Visit 2024      CHIEF COMPLAINT:      Chief Complaint   Patient presents with    Other     Hypoxia         ASSESSMENT:   (Medical Decision Making)                                                                                                                                          Encounter Diagnoses   Name Primary?    Hypoxemia Yes    Diastolic dysfunction     PAF (paroxysmal atrial fibrillation) (HCC)     Shortness of breath     History of tobacco use     Mitral valve insufficiency, unspecified etiology     Pulmonary hypertension (HCC)     Comment: echocardiogram has demonstrated interval improvement in estimated RVSP, most recent is 29.7 mm.      She is stable symptomatically, compliant with oxygen with sleep as prescribed.    Diastolic dysfunction appears compensated at this time.    She is to have follow-up echo as per cardiology.    She has found albuterol beneficial and uses it intermittently with good response.  Usually, prior to walking in her neighborhood.     She remains tobacco free, does not meet criteria for screening CT.                      PLAN:     Continue oxygen at 2 L/min with sleep as prescribed.    Albuterol 2 puffs 4 times daily if needed for shortness of breath or wheezing.    She is up-to-date on RSV, flu vaccine.  Recommend Prevnar 20 pneumonia vaccine if insurance will reimburse for it.  If not, she has had Prevnar 13 and booster of PPSV23 Pneumovax.    Follow-up in 6 months, sooner if needed.      Orders Placed This Encounter   Medications    albuterol sulfate HFA (PROVENTIL;VENTOLIN;PROAIR) 108 (90 Base) MCG/ACT inhaler     Si puffs 4 times daily if needed for shortness of breath or wheezing.  May use generic or brand name as preferred by insurance.     Dispense:  1 each     Refill:  11       Follow-up and

## 2024-12-04 NOTE — PROGRESS NOTES
Medicare Annual Wellness Visit    Erica Jose is here for Medicare AWV and Leg Swelling    1. Medicare annual wellness visit, subsequent  2. Recurrent UTI  Assessment & Plan:  She has been on Macrobid for suppression since around 2021, she had recurrent UTIs with hallucinations prior to this according to daughter.  Discussed that Keflex may be a better option, however she has been doing so well on Macrobid we can continue, discussed this with daughter and patient today, risk versus benefits  3. Stage 3a chronic kidney disease (HCC)  4. Dementia, unspecified dementia severity, unspecified dementia type, unspecified whether behavioral, psychotic, or mood disturbance or anxiety ([facility])  Overview:  Suspected lewy body  5. Insomnia, unspecified type  -     traZODone (DESYREL) 50 MG tablet; Take 0.5-1 tablets by mouth nightly as needed for Sleep, Disp-30 tablet, R-0Normal  6. Anemia, unspecified type  -     Ferritin; Future  -     Iron and TIBC; Future  -     Vitamin B12; Future  -     Folate; Future  7. Vitamin D deficiency  -     Vitamin D 25 Hydroxy; Future  8. Hypertension, unspecified type  Overview:  Controlled on amlodipine, metoprolol  Orders:  -     CBC with Auto Differential; Future  -     Comprehensive Metabolic Panel; Future    Trial of trazodone for her insomnia    Discussed BMI and healthy weight and diet, exercise, and medication compliance.  The patient was counseled regarding screening procedures and recommended schedules for Pap smears, mammography, colon cancer screening, BMD, and regular immunizations.       Recommendations for Preventive Services Due: see orders and patient instructions/AVS.  Recommended screening schedule for the next 5-10 years is provided to the patient in written form: see Patient Instructions/AVS.     Return in about 6 months (around 6/4/2025) for routine follow up.     Subjective       Hasn't needed bumex much for the leg swelling    She is seeing palliative care for her breast

## 2024-12-04 NOTE — ASSESSMENT & PLAN NOTE
She has been on Macrobid for suppression since around 2021, she had UTI with hallucinations prior to this according to daughter.  Discussed that Keflex may be a better option, however she has been doing so well on Macrobid we can continue, discussed this with daughter and patient today,

## 2024-12-04 NOTE — PATIENT INSTRUCTIONS
include a comprehensive review of your medical history including lifestyle, illnesses that may run in your family, and various assessments and screenings as appropriate.    After reviewing your medical record and screening and assessments performed today your provider may have ordered immunizations, labs, imaging, and/or referrals for you.  A list of these orders (if applicable) as well as your Preventive Care list are included within your After Visit Summary for your review.    Other Preventive Recommendations:    A preventive eye exam performed by an eye specialist is recommended every 1-2 years to screen for glaucoma; cataracts, macular degeneration, and other eye disorders.  A preventive dental visit is recommended every 6 months.  Try to get at least 150 minutes of exercise per week or 10,000 steps per day on a pedometer .  Order or download the FREE \"Exercise & Physical Activity: Your Everyday Guide\" from [employer] [employer]. Call 1-894.464.6812 or search [employer] [place] [employer] online.  You need 6498-7430 mg of calcium and 2716-1835 IU of vitamin D per day. It is possible to meet your calcium requirement with diet alone, but a vitamin D supplement is usually necessary to meet this goal.  When exposed to the sun, use a sunscreen that protects against both UVA and UVB radiation with an SPF of 30 or greater. Reapply every 2 to 3 hours or after sweating, drying off with a towel, or swimming.  Always wear a seat belt when traveling in a car. Always wear a helmet when riding a bicycle or motorcycle.

## 2024-12-05 ENCOUNTER — OFFICE VISIT (OUTPATIENT)
Dept: PULMONOLOGY | Age: 84
End: 2024-12-05
Payer: MEDICARE

## 2024-12-05 VITALS
RESPIRATION RATE: 18 BRPM | WEIGHT: 127 LBS | HEART RATE: 77 BPM | BODY MASS INDEX: 23.37 KG/M2 | SYSTOLIC BLOOD PRESSURE: 158 MMHG | DIASTOLIC BLOOD PRESSURE: 89 MMHG | TEMPERATURE: 97.2 F | OXYGEN SATURATION: 94 % | HEIGHT: 62 IN

## 2024-12-05 DIAGNOSIS — I51.89 DIASTOLIC DYSFUNCTION: Chronic | ICD-10-CM

## 2024-12-05 DIAGNOSIS — R06.02 SHORTNESS OF BREATH: ICD-10-CM

## 2024-12-05 DIAGNOSIS — I27.20 PULMONARY HYPERTENSION (HCC): Chronic | ICD-10-CM

## 2024-12-05 DIAGNOSIS — I48.0 PAF (PAROXYSMAL ATRIAL FIBRILLATION) (HCC): Chronic | ICD-10-CM

## 2024-12-05 DIAGNOSIS — R09.02 HYPOXEMIA: Primary | Chronic | ICD-10-CM

## 2024-12-05 DIAGNOSIS — I34.0 MITRAL VALVE INSUFFICIENCY, UNSPECIFIED ETIOLOGY: Chronic | ICD-10-CM

## 2024-12-05 DIAGNOSIS — Z87.891 HISTORY OF TOBACCO USE: Chronic | ICD-10-CM

## 2024-12-05 PROCEDURE — 3077F SYST BP >= 140 MM HG: CPT | Performed by: NURSE PRACTITIONER

## 2024-12-05 PROCEDURE — G8427 DOCREV CUR MEDS BY ELIG CLIN: HCPCS | Performed by: NURSE PRACTITIONER

## 2024-12-05 PROCEDURE — 1160F RVW MEDS BY RX/DR IN RCRD: CPT | Performed by: NURSE PRACTITIONER

## 2024-12-05 PROCEDURE — G8420 CALC BMI NORM PARAMETERS: HCPCS | Performed by: NURSE PRACTITIONER

## 2024-12-05 PROCEDURE — 1159F MED LIST DOCD IN RCRD: CPT | Performed by: NURSE PRACTITIONER

## 2024-12-05 PROCEDURE — 1036F TOBACCO NON-USER: CPT | Performed by: NURSE PRACTITIONER

## 2024-12-05 PROCEDURE — 1090F PRES/ABSN URINE INCON ASSESS: CPT | Performed by: NURSE PRACTITIONER

## 2024-12-05 PROCEDURE — 99214 OFFICE O/P EST MOD 30 MIN: CPT | Performed by: NURSE PRACTITIONER

## 2024-12-05 PROCEDURE — G8399 PT W/DXA RESULTS DOCUMENT: HCPCS | Performed by: NURSE PRACTITIONER

## 2024-12-05 PROCEDURE — 1126F AMNT PAIN NOTED NONE PRSNT: CPT | Performed by: NURSE PRACTITIONER

## 2024-12-05 PROCEDURE — 3079F DIAST BP 80-89 MM HG: CPT | Performed by: NURSE PRACTITIONER

## 2024-12-05 PROCEDURE — 1123F ACP DISCUSS/DSCN MKR DOCD: CPT | Performed by: NURSE PRACTITIONER

## 2024-12-05 PROCEDURE — G8484 FLU IMMUNIZE NO ADMIN: HCPCS | Performed by: NURSE PRACTITIONER

## 2024-12-05 RX ORDER — ALBUTEROL SULFATE 90 UG/1
INHALANT RESPIRATORY (INHALATION)
Qty: 1 EACH | Refills: 11 | Status: SHIPPED | OUTPATIENT
Start: 2024-12-05

## 2024-12-05 ASSESSMENT — ENCOUNTER SYMPTOMS
HEMOPTYSIS: 0
SHORTNESS OF BREATH: 1
COUGH: 0
WHEEZING: 0
SPUTUM PRODUCTION: 0

## 2024-12-05 NOTE — PATIENT INSTRUCTIONS
Continue oxygen at 2 L/min with sleep as prescribed.    Albuterol 2 puffs 4 times daily if needed for shortness of breath or wheezing.    She is up-to-date on RSV, flu vaccine.  Recommend Prevnar 20 pneumonia vaccine if insurance will reimburse for it.  If not, she has had Prevnar 13 and booster of PPSV23 Pneumovax.    Follow-up in 6 months, sooner if needed.

## 2024-12-27 DIAGNOSIS — G47.00 INSOMNIA, UNSPECIFIED TYPE: ICD-10-CM

## 2024-12-27 RX ORDER — TRAZODONE HYDROCHLORIDE 50 MG/1
TABLET, FILM COATED ORAL
Qty: 90 TABLET | Refills: 1 | Status: SHIPPED | OUTPATIENT
Start: 2024-12-27

## 2024-12-27 NOTE — TELEPHONE ENCOUNTER
Refill request     traZODone (DESYREL) 50 MG tablet qty 90; 1 refill     NOV 6/4/25    Preferred Pharm: [facility]/pharmacy #[id number] - [place], [place] - [address] [phone] - F [phone]

## 2025-01-14 ENCOUNTER — TELEPHONE (OUTPATIENT)
Age: 85
End: 2025-01-14

## 2025-01-14 NOTE — TELEPHONE ENCOUNTER
----- Message from Dr. Thee Thurston MD sent at 1/14/2025 12:04 PM EST -----  Please let the patient know that her heart squeezes normally.  She has impairment of heart relaxation in part.  Because of her history of atrial fibrillation, hypertension and age.  She also has evidence of pulmonary hypertension and follows with pulmonology as well as us.

## 2025-01-24 PROCEDURE — 93296 REM INTERROG EVL PM/IDS: CPT | Performed by: INTERNAL MEDICINE

## 2025-01-24 PROCEDURE — 93294 REM INTERROG EVL PM/LDLS PM: CPT | Performed by: INTERNAL MEDICINE

## 2025-01-28 DIAGNOSIS — G31.83 MODERATE LEWY BODY DEMENTIA WITH PSYCHOTIC DISTURBANCE (HCC): ICD-10-CM

## 2025-01-28 DIAGNOSIS — F02.B2 MODERATE LEWY BODY DEMENTIA WITH PSYCHOTIC DISTURBANCE (HCC): ICD-10-CM

## 2025-01-29 RX ORDER — MEMANTINE HYDROCHLORIDE 10 MG/1
10 TABLET ORAL 2 TIMES DAILY
Qty: 180 TABLET | Refills: 3 | Status: SHIPPED | OUTPATIENT
Start: 2025-01-29

## 2025-02-28 ENCOUNTER — TELEPHONE (OUTPATIENT)
Age: 85
End: 2025-02-28

## 2025-03-03 DIAGNOSIS — K21.9 GASTRO-ESOPHAGEAL REFLUX DISEASE WITHOUT ESOPHAGITIS: ICD-10-CM

## 2025-03-03 RX ORDER — LANSOPRAZOLE 30 MG/1
30 CAPSULE, DELAYED RELEASE ORAL 2 TIMES DAILY
Qty: 180 CAPSULE | Refills: 3 | OUTPATIENT
Start: 2025-03-03

## 2025-03-03 NOTE — TELEPHONE ENCOUNTER
Daughter says pt.having vitrectomy 3/10/25 wants to know how many days pt.should hold Eliquis.Pt.denies hx of TIA/CVA/blood clots.On Eliquis for Afib.Would like to know how long to hold Eliquis prior to surgery?   Ricardo Shipman is a 66 year old female presents to the Urgent Care clinic today with complaints of left wrist pain after falling.  She states she tripped on the strap of a bag and fell forward.  She denies hitting her head.  She is unable to move the wrist in certain positions.     Duration: this morning    Medication(s) used and last dose no meds, she did ice it    Standing Orders Obtained: XR    Allergies Reviewed    Triage completed, patient will x-ray, then lobby      Patient would like communication of their results via:      [place] or    Cell Phone:   Telephone Information:   Mobile [phone]     Okay to leave a message containing results? Yes

## 2025-03-04 ENCOUNTER — ANESTHESIA EVENT (OUTPATIENT)
Dept: SURGERY | Age: 85
End: 2025-03-04
Payer: MEDICARE

## 2025-03-04 NOTE — TELEPHONE ENCOUNTER
Thee Thurston MD Lemons, Wanda L RN  Caller: Unspecified (4 days ago,  3:22 PM)  - Recommend management of Eliquis perioperatively, as below, with bleeding risk determined by the     High bleeding risk  - give Eliquis last dose three days before procedure (ie, skip four doses on the two days before the procedure)  - resume Eliquis 48 to 72 hours after surgery (ie, postoperative day 2 to 3)    Low bleeding risk  - give Eliquis last dose two days before procedure (ie, skip two doses on the day before the procedure)  - resume Eliquis 24 hours after surgery (ie, postoperative day 1)

## 2025-03-09 RX ORDER — NALOXONE HYDROCHLORIDE 0.4 MG/ML
INJECTION, SOLUTION INTRAMUSCULAR; INTRAVENOUS; SUBCUTANEOUS PRN
Status: CANCELLED | OUTPATIENT
Start: 2025-03-09

## 2025-03-09 RX ORDER — OXYCODONE HYDROCHLORIDE 5 MG/1
5 TABLET ORAL
Refills: 0 | Status: CANCELLED | OUTPATIENT
Start: 2025-03-09 | End: 2025-03-10

## 2025-03-10 ENCOUNTER — ANESTHESIA (OUTPATIENT)
Dept: SURGERY | Age: 85
End: 2025-03-10
Payer: MEDICARE

## 2025-03-10 ENCOUNTER — HOSPITAL ENCOUNTER (OUTPATIENT)
Age: 85
Setting detail: OUTPATIENT SURGERY
Discharge: HOME | End: 2025-03-10
Attending: INTERNAL MEDICINE | Admitting: INTERNAL MEDICINE
Payer: MEDICARE

## 2025-03-10 VITALS
BODY MASS INDEX: 23.6 KG/M2 | DIASTOLIC BLOOD PRESSURE: 68 MMHG | HEART RATE: 69 BPM | SYSTOLIC BLOOD PRESSURE: 145 MMHG | TEMPERATURE: 97.8 F | WEIGHT: 125 LBS | RESPIRATION RATE: 16 BRPM | OXYGEN SATURATION: 98 % | HEIGHT: 61 IN

## 2025-03-10 LAB — POTASSIUM BLD-SCNC: 3.8 MMOL/L (ref 3.5–5.1)

## 2025-03-10 PROCEDURE — 6370000000 HC RX 637 (ALT 250 FOR IP): Performed by: INTERNAL MEDICINE

## 2025-03-10 PROCEDURE — 3600000014 HC SURGERY LEVEL 4 ADDTL 15MIN: Performed by: INTERNAL MEDICINE

## 2025-03-10 PROCEDURE — 6360000002 HC RX W HCPCS: Performed by: NURSE ANESTHETIST, CERTIFIED REGISTERED

## 2025-03-10 PROCEDURE — 2500000003 HC RX 250 WO HCPCS: Performed by: NURSE ANESTHETIST, CERTIFIED REGISTERED

## 2025-03-10 PROCEDURE — 3700000001 HC ADD 15 MINUTES (ANESTHESIA): Performed by: INTERNAL MEDICINE

## 2025-03-10 PROCEDURE — 7100000000 HC PACU RECOVERY - FIRST 15 MIN: Performed by: INTERNAL MEDICINE

## 2025-03-10 PROCEDURE — 7100000001 HC PACU RECOVERY - ADDTL 15 MIN: Performed by: INTERNAL MEDICINE

## 2025-03-10 PROCEDURE — 7100000010 HC PHASE II RECOVERY - FIRST 15 MIN: Performed by: INTERNAL MEDICINE

## 2025-03-10 PROCEDURE — 2720000010 HC SURG SUPPLY STERILE: Performed by: INTERNAL MEDICINE

## 2025-03-10 PROCEDURE — 84132 ASSAY OF SERUM POTASSIUM: CPT

## 2025-03-10 PROCEDURE — 7100000011 HC PHASE II RECOVERY - ADDTL 15 MIN: Performed by: INTERNAL MEDICINE

## 2025-03-10 PROCEDURE — 2709999900 HC NON-CHARGEABLE SUPPLY: Performed by: INTERNAL MEDICINE

## 2025-03-10 PROCEDURE — 3700000000 HC ANESTHESIA ATTENDED CARE: Performed by: INTERNAL MEDICINE

## 2025-03-10 PROCEDURE — 3600000004 HC SURGERY LEVEL 4 BASE: Performed by: INTERNAL MEDICINE

## 2025-03-10 PROCEDURE — 6360000002 HC RX W HCPCS: Performed by: INTERNAL MEDICINE

## 2025-03-10 PROCEDURE — 2580000003 HC RX 258: Performed by: ANESTHESIOLOGY

## 2025-03-10 RX ORDER — CEFAZOLIN SODIUM 1 G/3ML
INJECTION, POWDER, FOR SOLUTION INTRAMUSCULAR; INTRAVENOUS PRN
Status: DISCONTINUED | OUTPATIENT
Start: 2025-03-10 | End: 2025-03-10 | Stop reason: ALTCHOICE

## 2025-03-10 RX ORDER — SODIUM CHLORIDE 9 MG/ML
INJECTION, SOLUTION INTRAVENOUS PRN
Status: DISCONTINUED | OUTPATIENT
Start: 2025-03-10 | End: 2025-03-10 | Stop reason: HOSPADM

## 2025-03-10 RX ORDER — SODIUM CHLORIDE, SODIUM LACTATE, POTASSIUM CHLORIDE, CALCIUM CHLORIDE 600; 310; 30; 20 MG/100ML; MG/100ML; MG/100ML; MG/100ML
INJECTION, SOLUTION INTRAVENOUS CONTINUOUS
Status: DISCONTINUED | OUTPATIENT
Start: 2025-03-10 | End: 2025-03-10 | Stop reason: HOSPADM

## 2025-03-10 RX ORDER — PROPOFOL 10 MG/ML
INJECTION, EMULSION INTRAVENOUS
Status: DISCONTINUED | OUTPATIENT
Start: 2025-03-10 | End: 2025-03-10 | Stop reason: SDUPTHER

## 2025-03-10 RX ORDER — LIDOCAINE HYDROCHLORIDE 20 MG/ML
INJECTION, SOLUTION EPIDURAL; INFILTRATION; INTRACAUDAL; PERINEURAL PRN
Status: DISCONTINUED | OUTPATIENT
Start: 2025-03-10 | End: 2025-03-10 | Stop reason: ALTCHOICE

## 2025-03-10 RX ORDER — ROCURONIUM BROMIDE 10 MG/ML
INJECTION, SOLUTION INTRAVENOUS
Status: DISCONTINUED | OUTPATIENT
Start: 2025-03-10 | End: 2025-03-10 | Stop reason: SDUPTHER

## 2025-03-10 RX ORDER — PHENYLEPHRINE HYDROCHLORIDE 25 MG/ML
1 SOLUTION/ DROPS OPHTHALMIC
Status: COMPLETED | OUTPATIENT
Start: 2025-03-10 | End: 2025-03-10

## 2025-03-10 RX ORDER — TROPICAMIDE 10 MG/ML
1 SOLUTION/ DROPS OPHTHALMIC
Status: COMPLETED | OUTPATIENT
Start: 2025-03-10 | End: 2025-03-10

## 2025-03-10 RX ORDER — ONDANSETRON 2 MG/ML
INJECTION INTRAMUSCULAR; INTRAVENOUS
Status: DISCONTINUED | OUTPATIENT
Start: 2025-03-10 | End: 2025-03-10 | Stop reason: SDUPTHER

## 2025-03-10 RX ORDER — LIDOCAINE HYDROCHLORIDE 10 MG/ML
1 INJECTION, SOLUTION INFILTRATION; PERINEURAL
Status: DISCONTINUED | OUTPATIENT
Start: 2025-03-10 | End: 2025-03-10 | Stop reason: HOSPADM

## 2025-03-10 RX ORDER — MIDAZOLAM HYDROCHLORIDE 2 MG/2ML
2 INJECTION, SOLUTION INTRAMUSCULAR; INTRAVENOUS
Status: DISCONTINUED | OUTPATIENT
Start: 2025-03-10 | End: 2025-03-10 | Stop reason: HOSPADM

## 2025-03-10 RX ORDER — SODIUM CHLORIDE 0.9 % (FLUSH) 0.9 %
5-40 SYRINGE (ML) INJECTION PRN
Status: DISCONTINUED | OUTPATIENT
Start: 2025-03-10 | End: 2025-03-10 | Stop reason: HOSPADM

## 2025-03-10 RX ORDER — LIDOCAINE HYDROCHLORIDE 20 MG/ML
INJECTION, SOLUTION EPIDURAL; INFILTRATION; INTRACAUDAL; PERINEURAL
Status: DISCONTINUED | OUTPATIENT
Start: 2025-03-10 | End: 2025-03-10 | Stop reason: SDUPTHER

## 2025-03-10 RX ORDER — BETAMETHASONE SODIUM PHOSPHATE AND BETAMETHASONE ACETATE 3; 3 MG/ML; MG/ML
INJECTION, SUSPENSION INTRA-ARTICULAR; INTRALESIONAL; INTRAMUSCULAR; SOFT TISSUE PRN
Status: DISCONTINUED | OUTPATIENT
Start: 2025-03-10 | End: 2025-03-10 | Stop reason: ALTCHOICE

## 2025-03-10 RX ORDER — SODIUM CHLORIDE 0.9 % (FLUSH) 0.9 %
5-40 SYRINGE (ML) INJECTION EVERY 12 HOURS SCHEDULED
Status: DISCONTINUED | OUTPATIENT
Start: 2025-03-10 | End: 2025-03-10 | Stop reason: HOSPADM

## 2025-03-10 RX ORDER — GLYCOPYRROLATE 0.2 MG/ML
INJECTION INTRAMUSCULAR; INTRAVENOUS
Status: DISCONTINUED | OUTPATIENT
Start: 2025-03-10 | End: 2025-03-10 | Stop reason: SDUPTHER

## 2025-03-10 RX ORDER — FENTANYL CITRATE 50 UG/ML
100 INJECTION, SOLUTION INTRAMUSCULAR; INTRAVENOUS
Status: DISCONTINUED | OUTPATIENT
Start: 2025-03-10 | End: 2025-03-10 | Stop reason: HOSPADM

## 2025-03-10 RX ORDER — BUPIVACAINE HYDROCHLORIDE 5 MG/ML
INJECTION, SOLUTION EPIDURAL; INTRACAUDAL PRN
Status: DISCONTINUED | OUTPATIENT
Start: 2025-03-10 | End: 2025-03-10 | Stop reason: ALTCHOICE

## 2025-03-10 RX ORDER — NEOSTIGMINE METHYLSULFATE 1 MG/ML
INJECTION, SOLUTION INTRAVENOUS
Status: DISCONTINUED | OUTPATIENT
Start: 2025-03-10 | End: 2025-03-10 | Stop reason: SDUPTHER

## 2025-03-10 RX ORDER — EPHEDRINE SULFATE 5 MG/ML
INJECTION INTRAVENOUS
Status: DISCONTINUED | OUTPATIENT
Start: 2025-03-10 | End: 2025-03-10 | Stop reason: SDUPTHER

## 2025-03-10 RX ORDER — ATROPINE SULFATE 10 MG/ML
1 SOLUTION/ DROPS OPHTHALMIC
Status: COMPLETED | OUTPATIENT
Start: 2025-03-10 | End: 2025-03-10

## 2025-03-10 RX ADMIN — TROPICAMIDE 1 DROP: 10 SOLUTION/ DROPS OPHTHALMIC at 10:00

## 2025-03-10 RX ADMIN — PHENYLEPHRINE HYDROCHLORIDE 100 MCG: 0.1 INJECTION, SOLUTION INTRAVENOUS at 12:42

## 2025-03-10 RX ADMIN — ONDANSETRON 4 MG: 2 INJECTION, SOLUTION INTRAMUSCULAR; INTRAVENOUS at 13:20

## 2025-03-10 RX ADMIN — PHENYLEPHRINE HYDROCHLORIDE 1 DROP: 25 SOLUTION/ DROPS OPHTHALMIC at 09:50

## 2025-03-10 RX ADMIN — GLYCOPYRROLATE 6 MG: 0.2 INJECTION INTRAMUSCULAR; INTRAVENOUS at 13:20

## 2025-03-10 RX ADMIN — ROCURONIUM BROMIDE 40 MG: 10 INJECTION, SOLUTION INTRAVENOUS at 12:10

## 2025-03-10 RX ADMIN — LIDOCAINE HYDROCHLORIDE 100 MG: 20 INJECTION, SOLUTION EPIDURAL; INFILTRATION; INTRACAUDAL; PERINEURAL at 12:10

## 2025-03-10 RX ADMIN — EPHEDRINE SULFATE 10 MG: 5 INJECTION INTRAVENOUS at 12:30

## 2025-03-10 RX ADMIN — TROPICAMIDE 1 DROP: 10 SOLUTION/ DROPS OPHTHALMIC at 09:50

## 2025-03-10 RX ADMIN — NEOSTIGMINE METHYLSULFATE 4 MG: 1 INJECTION, SOLUTION INTRAVENOUS at 13:20

## 2025-03-10 RX ADMIN — PHENYLEPHRINE HYDROCHLORIDE 1 DROP: 25 SOLUTION/ DROPS OPHTHALMIC at 09:55

## 2025-03-10 RX ADMIN — ATROPINE SULFATE 1 DROP: 10 SOLUTION/ DROPS OPHTHALMIC at 09:50

## 2025-03-10 RX ADMIN — TROPICAMIDE 1 DROP: 10 SOLUTION/ DROPS OPHTHALMIC at 09:55

## 2025-03-10 RX ADMIN — PROPOFOL 130 MG: 10 INJECTION, EMULSION INTRAVENOUS at 12:10

## 2025-03-10 RX ADMIN — SODIUM CHLORIDE, SODIUM LACTATE, POTASSIUM CHLORIDE, AND CALCIUM CHLORIDE: 600; 310; 30; 20 INJECTION, SOLUTION INTRAVENOUS at 10:46

## 2025-03-10 RX ADMIN — PHENYLEPHRINE HYDROCHLORIDE 1 DROP: 25 SOLUTION/ DROPS OPHTHALMIC at 10:00

## 2025-03-10 RX ADMIN — EPHEDRINE SULFATE 10 MG: 5 INJECTION INTRAVENOUS at 12:22

## 2025-03-10 RX ADMIN — EPHEDRINE SULFATE 10 MG: 5 INJECTION INTRAVENOUS at 12:53

## 2025-03-10 RX ADMIN — PHENYLEPHRINE HYDROCHLORIDE 100 MCG: 0.1 INJECTION, SOLUTION INTRAVENOUS at 13:09

## 2025-03-10 RX ADMIN — ATROPINE SULFATE 1 DROP: 10 SOLUTION/ DROPS OPHTHALMIC at 09:55

## 2025-03-10 RX ADMIN — EPHEDRINE SULFATE 10 MG: 5 INJECTION INTRAVENOUS at 12:39

## 2025-03-10 ASSESSMENT — COPD QUESTIONNAIRES: CAT_SEVERITY: MILD

## 2025-03-10 ASSESSMENT — ENCOUNTER SYMPTOMS: SHORTNESS OF BREATH: 1

## 2025-03-10 ASSESSMENT — PAIN - FUNCTIONAL ASSESSMENT: PAIN_FUNCTIONAL_ASSESSMENT: 0-10

## 2025-03-10 NOTE — OP NOTE
internal limiting membrane.  Using forceps, epiretinal membrane and the ILM were peeled atraumatically from the macula. Additional stain was used to ensure complete removal of the ILM.     The retina was inspected 360 degrees and found to have no tears or holes. An air fluid exchange was performed for a complete fill. A gas timeout was performed, confirming the appropriate type and concentration of gas. The eye was then flushed with 40 cc of 10% C3F8 gas to a moderate tactile tension.      The cannulas were pulled and the wounds were leak-free, and pressure was moderate and physiologic to tactile tension.  6-0 plain gut suture was used to suture the sclerotomy sites.  A subconjunctival injection of ancef and betamethasone was performed with care taken to avoid the sclerotomy sites. Topical betadine was instilled and rinsed with BSS. At the end of case the IOP was physiologic and < 15 mmHg, the nerve was perfused, and the retina was attached 360 degrees. The eye was dressed with atropine and maxitrol ointment. A patch was placed over the eye. The patient tolerated the procedure well. There were no complications.  The patient received post operative instructions including follow up with [facility].                 Estimated Blood Loss:     Less than 1 cc           Specimens:     None           Complications:     None           Signed by: Rolanda Combs MD     03/10/25 1:24 PM

## 2025-03-10 NOTE — H&P
83yo F presents for macular hole repair in the right eye.     Vitals:    03/10/25 1044   BP:    Pulse:    Resp:    Temp:    SpO2: 96%     Past Medical History:   Diagnosis Date    Abdominal pain 9/29/2013    Atrial fibrillation (HCC)     Breast cancer (HCC) 2017    right    COPD (chronic obstructive pulmonary disease) (HCC)     Depression     DJD (degenerative joint disease) 9/29/2013    Esophageal reflux 9/29/2013    H/O echocardiogram 01/13/2025    Nrml L ventricular systolic fxn w/visually est EF 55 - 60%. L ventricle size nrml. Mildly increased wall thickness. Diastolic dysfxn present w/increased LAP w/nrml LV EF. Aortic Valve: Trileaflet valve. Mod sclerosis aortic valve cusps. Mild stenosis aortic valve. Noted by apical view. AV mean gradient 9 mmHg. AV peak velocity 2.0 m/s. LVOT:AV VTI Index 0.46. 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)     Near syncope 09/22/2022    Neuropathy     Oropharyngeal dysphagia     Other and unspecified hyperlipidemia 9/29/2013    Peripheral polyneuropathy     Pyelonephritis 9/29/2013    Sensory neuropathy 12/23/2020    Sick sinus syndrome (HCC)     UTI (lower urinary tract infection) 9/29/2013     Past Surgical History:   Procedure Laterality Date    BREAST SURGERY  01/07/2019    right breast cancer-lumpectomy    CHEST SURGERY      EYE SURGERY      HYSTERECTOMY (CERVIX STATUS UNKNOWN)      HYSTERECTOMY, TOTAL ABDOMINAL (CERVIX REMOVED)      LAP,CHOLECYSTECTOMY      PACEMAKER      [place] UNLISTED PROCEDURE CARDIAC SURGERY  2/05 8/2015    pacemaker     Review of Systems:   Constitutional - Negative   Heart - Negative  Lungs - Negative  GI - Negative    Physical Exam:   Constitutional - Alert and oriented   Heart - RRR  Lungs - Clear  GI - Soft, nontender    Assessment and Plan:   Plan to

## 2025-03-10 NOTE — DISCHARGE INSTRUCTIONS
You had successful surgery to repair your macular hole.     Please plan on being face down for 5 days.     Do not travel to any high elevations with a gas bubble in the eye.     You have a shield on your eye, which we will remove tomorrow in the clinic.     Your vision will be blurry for the first month or so with the gas bubble in it.

## 2025-03-10 NOTE — ANESTHESIA POSTPROCEDURE EVALUATION
Department of Anesthesiology  Postprocedure Note    Patient: Erica Jose  MRN: [id number]  YOB: 1940  Date of evaluation: 3/10/2025    Procedure Summary       Date: 03/10/25 Room / Location: [facility] OP OR 06 / [facility]    Anesthesia Start: 1201 Anesthesia Stop: 1340    Procedure: Vitrecotmy with removal of internal limiting membrane of retina with macular hole repair of right eye with retrobulbar block, gas, 25G PT HAS MEDTRONIC PACEMAKER (Right: Eye) Diagnosis:       Full thickness hole of macula lutea, right      (Full thickness hole of macula lutea, right [H35.341])    Surgeons: Rolanda Combs MD Responsible Provider: Donis Reyes MD    Anesthesia Type: general ASA Status: 3            Anesthesia Type: No value filed.    Sherman Phase I: Sherman Score: 10    Sherman Phase II: Sherman Score: 10    Anesthesia Post Evaluation    Patient location during evaluation: PACU  Patient participation: complete - patient participated  Level of consciousness: awake and alert  Airway patency: patent  Nausea & Vomiting: no nausea and no vomiting  Cardiovascular status: hemodynamically stable  Respiratory status: acceptable, nonlabored ventilation and spontaneous ventilation  Hydration status: euvolemic  Comments: BP (!) 145/68   Pulse 69   Temp 97.8 °F (36.6 °C)   Resp 16   Ht 1.549 m (5' 1\")   Wt 56.7 kg (125 lb)   SpO2 98%   BMI 23.62 kg/m²   Based on chart review, not called for sign out.    Multimodal analgesia pain management approach  Pain management: adequate and satisfactory to patient    No notable events documented.

## 2025-03-10 NOTE — ANESTHESIA PROCEDURE NOTES
Airway  Date/Time: 3/10/2025 12:13 PM  Urgency: elective    Airway not difficult    General Information and Staff    Patient location during procedure: OR  Resident/CRNA: Chidi Hamilton APRN - CRNA  Performed: resident/CRNA   Performed by: Chidi Hamilton APRN - CRNA  Authorized by: Camilla Narvaez MD      Indications and Patient Condition  Indications for airway management: anesthesia  Spontaneous Ventilation: absent  Sedation level: deep  Preoxygenated: yes  Patient position: sniffing  MILS not maintained throughout  Mask difficulty assessment: vent by bag mask    Final Airway Details  Final airway type: endotracheal airway      Successful airway: ETT  Cuffed: yes   Successful intubation technique: direct laryngoscopy  Endotracheal tube insertion site: oral  Blade: Quinones  Blade size: #3  ETT size (mm): 7.0  Cormack-Lehane Classification: grade I - full view of glottis  Placement verified by: chest auscultation and capnometry   Inital cuff pressure (cm H2O): 25  Measured from: lips  ETT to lips (cm): 21  Number of attempts at approach: 1  Number of other approaches attempted: 0    no

## 2025-03-10 NOTE — ANESTHESIA PRE PROCEDURE
Department of Anesthesiology  Preprocedure Note       Name:  Erica Jose   Age:  84 y.o.  :  1940                                          MRN:  [id number]         Date:  3/10/2025      Surgeon: Surgeon(s):  Rolanda Combs MD    Procedure: Procedure(s):  Vitrecotmy with removal of internal limiting membrane of retina with macular hole repair of right eye with retrobulbar block, gas C3F8, 25G PT HAS MEDTRONIC PACEMAKER    Medications prior to admission:   Prior to Admission medications    Medication Sig Start Date End Date Taking? Authorizing Provider   memantine (NAMENDA) 10 MG tablet Take 1 tablet by mouth 2 times daily 25  Yes Edmond Elizondo MD   albuterol sulfate HFA (PROVENTIL;VENTOLIN;PROAIR) 108 (90 Base) MCG/ACT inhaler 2 puffs 4 times daily if needed for shortness of breath or wheezing.  May use generic or brand name as preferred by insurance. 24  Yes Beatrice De Santiago, APRN - CNP   gabapentin (NEURONTIN) 400 MG capsule Take 1 capsule by mouth 3 times daily. 24 Yes Edmond Elizondo MD   losartan (COZAAR) 25 MG tablet Take 1 tablet by mouth daily 24  Yes Thee Thurston MD   amLODIPine (NORVASC) 10 MG tablet Take 1 tablet by mouth daily 24  Yes Thee Thurston MD   montelukast (SINGULAIR) 10 MG tablet Take 1 tablet by mouth nightly 6/3/24  Yes Nichole Bales MD   sertraline (ZOLOFT) 50 MG tablet Take 1 tablet by mouth daily 6/3/24  Yes Nichole Bales MD   allopurinol (ZYLOPRIM) 100 MG tablet Take 1 tablet by mouth daily 6/3/24  Yes Nichole Bales MD   lansoprazole (PREVACID) 30 MG delayed release capsule Take 1 capsule by mouth 2 times daily 6/3/24  Yes Nichole Bales MD   metoprolol tartrate (LOPRESSOR) 25 MG tablet Take 1 tablet by mouth 2 times daily 6/3/24  Yes Nichole Bales MD   nitrofurantoin (MACRODANTIN) 100 MG capsule TAKE 1 CAPSULE BY MOUTH EVERY DAY AT NIGHT 6/3/24  Yes Nichole Bales MD   OXYGEN Inhale 2 L into the lungs

## 2025-03-16 ENCOUNTER — APPOINTMENT (OUTPATIENT)
Dept: GENERAL RADIOLOGY | Age: 85
DRG: 640 | End: 2025-03-16
Payer: MEDICARE

## 2025-03-16 ENCOUNTER — HOSPITAL ENCOUNTER (INPATIENT)
Age: 85
LOS: 1 days | Discharge: HOME HEALTH | DRG: 640 | End: 2025-03-18
Attending: EMERGENCY MEDICINE | Admitting: INTERNAL MEDICINE
Payer: MEDICARE

## 2025-03-16 ENCOUNTER — APPOINTMENT (OUTPATIENT)
Dept: CT IMAGING | Age: 85
DRG: 640 | End: 2025-03-16
Payer: MEDICARE

## 2025-03-16 DIAGNOSIS — G93.40 ENCEPHALOPATHY ACUTE: Primary | ICD-10-CM

## 2025-03-16 PROBLEM — G93.41 METABOLIC ENCEPHALOPATHY: Status: ACTIVE | Noted: 2025-03-16

## 2025-03-16 PROBLEM — E86.0 DEHYDRATION: Status: ACTIVE | Noted: 2025-03-16

## 2025-03-16 LAB
ALBUMIN SERPL-MCNC: 3.3 G/DL (ref 3.2–4.6)
ALBUMIN/GLOB SERPL: 1 (ref 1–1.9)
ALP SERPL-CCNC: 103 U/L (ref 35–104)
ALT SERPL-CCNC: 9 U/L (ref 8–45)
AMMONIA PLAS-SCNC: <10 UMOL/L (ref 11–51)
AMPHET UR QL SCN: NEGATIVE
ANION GAP SERPL CALC-SCNC: 10 MMOL/L (ref 7–16)
APPEARANCE UR: CLEAR
AST SERPL-CCNC: 21 U/L (ref 15–37)
BACTERIA URNS QL MICRO: NEGATIVE /HPF
BARBITURATES UR QL SCN: NEGATIVE
BASOPHILS # BLD: 0.04 K/UL (ref 0–0.2)
BASOPHILS NFR BLD: 0.6 % (ref 0–2)
BENZODIAZ UR QL: NEGATIVE
BILIRUB SERPL-MCNC: 0.5 MG/DL (ref 0–1.2)
BILIRUB UR QL: NEGATIVE
BUN SERPL-MCNC: 11 MG/DL (ref 8–23)
CALCIUM SERPL-MCNC: 9.8 MG/DL (ref 8.8–10.2)
CANNABINOIDS UR QL SCN: NEGATIVE
CHLORIDE SERPL-SCNC: 103 MMOL/L (ref 98–107)
CO2 SERPL-SCNC: 27 MMOL/L (ref 20–29)
COCAINE UR QL SCN: NEGATIVE
COLOR UR: ABNORMAL
CREAT SERPL-MCNC: 0.98 MG/DL (ref 0.6–1.1)
DIFFERENTIAL METHOD BLD: ABNORMAL
EOSINOPHIL # BLD: 0.27 K/UL (ref 0–0.8)
EOSINOPHIL NFR BLD: 4.1 % (ref 0.5–7.8)
EPI CELLS #/AREA URNS HPF: ABNORMAL /HPF
ERYTHROCYTE [DISTWIDTH] IN BLOOD BY AUTOMATED COUNT: 17.8 % (ref 11.9–14.6)
GLOBULIN SER CALC-MCNC: 3.4 G/DL (ref 2.3–3.5)
GLUCOSE SERPL-MCNC: 89 MG/DL (ref 70–99)
GLUCOSE UR STRIP.AUTO-MCNC: NEGATIVE MG/DL
HCT VFR BLD AUTO: 33.2 % (ref 35.8–46.3)
HGB BLD-MCNC: 10.3 G/DL (ref 11.7–15.4)
HGB UR QL STRIP: NEGATIVE
HYALINE CASTS URNS QL MICRO: ABNORMAL /LPF
IMM GRANULOCYTES # BLD AUTO: 0.03 K/UL (ref 0–0.5)
IMM GRANULOCYTES NFR BLD AUTO: 0.5 % (ref 0–5)
KETONES UR QL STRIP.AUTO: NEGATIVE MG/DL
LEUKOCYTE ESTERASE UR QL STRIP.AUTO: NEGATIVE
LYMPHOCYTES # BLD: 1.89 K/UL (ref 0.5–4.6)
LYMPHOCYTES NFR BLD: 29 % (ref 13–44)
MCH RBC QN AUTO: 24.1 PG (ref 26.1–32.9)
MCHC RBC AUTO-ENTMCNC: 31 G/DL (ref 31.4–35)
MCV RBC AUTO: 77.8 FL (ref 82–102)
METHADONE UR QL: NEGATIVE
MONOCYTES # BLD: 0.77 K/UL (ref 0.1–1.3)
MONOCYTES NFR BLD: 11.8 % (ref 4–12)
NEUTS SEG # BLD: 3.51 K/UL (ref 1.7–8.2)
NEUTS SEG NFR BLD: 54 % (ref 43–78)
NITRITE UR QL STRIP.AUTO: NEGATIVE
NRBC # BLD: 0 K/UL (ref 0–0.2)
OPIATES UR QL: NEGATIVE
PCP UR QL: NEGATIVE
PH UR STRIP: 8 (ref 5–9)
PLATELET # BLD AUTO: 272 K/UL (ref 150–450)
PMV BLD AUTO: 10 FL (ref 9.4–12.3)
POTASSIUM SERPL-SCNC: 3.9 MMOL/L (ref 3.5–5.1)
PROT SERPL-MCNC: 6.6 G/DL (ref 6.3–8.2)
PROT UR STRIP-MCNC: ABNORMAL MG/DL
RBC # BLD AUTO: 4.27 M/UL (ref 4.05–5.2)
RBC #/AREA URNS HPF: ABNORMAL /HPF
SARS-COV-2 RDRP RESP QL NAA+PROBE: NOT DETECTED
SODIUM SERPL-SCNC: 140 MMOL/L (ref 136–145)
SOURCE: NORMAL
SP GR UR REFRACTOMETRY: 1.01 (ref 1–1.02)
TROPONIN T SERPL HS-MCNC: 15 NG/L (ref 0–14)
UROBILINOGEN UR QL STRIP.AUTO: 0.2 EU/DL (ref 0.2–1)
WBC # BLD AUTO: 6.5 K/UL (ref 4.3–11.1)
WBC URNS QL MICRO: ABNORMAL /HPF

## 2025-03-16 PROCEDURE — 80053 COMPREHEN METABOLIC PANEL: CPT

## 2025-03-16 PROCEDURE — 70450 CT HEAD/BRAIN W/O DYE: CPT

## 2025-03-16 PROCEDURE — 85025 COMPLETE CBC W/AUTO DIFF WBC: CPT

## 2025-03-16 PROCEDURE — 6360000002 HC RX W HCPCS: Performed by: INTERNAL MEDICINE

## 2025-03-16 PROCEDURE — 71046 X-RAY EXAM CHEST 2 VIEWS: CPT

## 2025-03-16 PROCEDURE — 2500000003 HC RX 250 WO HCPCS: Performed by: INTERNAL MEDICINE

## 2025-03-16 PROCEDURE — 87635 SARS-COV-2 COVID-19 AMP PRB: CPT

## 2025-03-16 PROCEDURE — 96374 THER/PROPH/DIAG INJ IV PUSH: CPT

## 2025-03-16 PROCEDURE — 82140 ASSAY OF AMMONIA: CPT

## 2025-03-16 PROCEDURE — 2500000003 HC RX 250 WO HCPCS: Performed by: PHYSICIAN ASSISTANT

## 2025-03-16 PROCEDURE — 81001 URINALYSIS AUTO W/SCOPE: CPT

## 2025-03-16 PROCEDURE — 6370000000 HC RX 637 (ALT 250 FOR IP): Performed by: INTERNAL MEDICINE

## 2025-03-16 PROCEDURE — 84484 ASSAY OF TROPONIN QUANT: CPT

## 2025-03-16 PROCEDURE — G0378 HOSPITAL OBSERVATION PER HR: HCPCS

## 2025-03-16 PROCEDURE — 6370000000 HC RX 637 (ALT 250 FOR IP): Performed by: PHYSICIAN ASSISTANT

## 2025-03-16 PROCEDURE — 80307 DRUG TEST PRSMV CHEM ANLYZR: CPT

## 2025-03-16 PROCEDURE — 99285 EMERGENCY DEPT VISIT HI MDM: CPT

## 2025-03-16 PROCEDURE — 2580000003 HC RX 258: Performed by: PHYSICIAN ASSISTANT

## 2025-03-16 RX ORDER — NEOMYCIN SULFATE, POLYMYXIN B SULFATE, AND DEXAMETHASONE 3.5; 10000; 1 MG/G; [USP'U]/G; MG/G
1 OINTMENT OPHTHALMIC NIGHTLY
Status: DISCONTINUED | OUTPATIENT
Start: 2025-03-16 | End: 2025-03-18 | Stop reason: HOSPADM

## 2025-03-16 RX ORDER — NITROFURANTOIN 25; 75 MG/1; MG/1
100 CAPSULE ORAL NIGHTLY
Status: DISCONTINUED | OUTPATIENT
Start: 2025-03-16 | End: 2025-03-18 | Stop reason: HOSPADM

## 2025-03-16 RX ORDER — SODIUM CHLORIDE 0.9 % (FLUSH) 0.9 %
5-40 SYRINGE (ML) INJECTION EVERY 12 HOURS SCHEDULED
Status: DISCONTINUED | OUTPATIENT
Start: 2025-03-16 | End: 2025-03-18 | Stop reason: HOSPADM

## 2025-03-16 RX ORDER — MEMANTINE HYDROCHLORIDE 5 MG/1
10 TABLET ORAL 2 TIMES DAILY
Status: DISCONTINUED | OUTPATIENT
Start: 2025-03-16 | End: 2025-03-18 | Stop reason: HOSPADM

## 2025-03-16 RX ORDER — ENOXAPARIN SODIUM 100 MG/ML
40 INJECTION SUBCUTANEOUS DAILY
Status: DISCONTINUED | OUTPATIENT
Start: 2025-03-17 | End: 2025-03-16

## 2025-03-16 RX ORDER — ACETAMINOPHEN 325 MG/1
650 TABLET ORAL EVERY 6 HOURS PRN
Status: DISCONTINUED | OUTPATIENT
Start: 2025-03-16 | End: 2025-03-18 | Stop reason: HOSPADM

## 2025-03-16 RX ORDER — NEOMYCIN SULFATE, POLYMYXIN B SULFATE, AND DEXAMETHASONE 3.5; 10000; 1 MG/G; [USP'U]/G; MG/G
1 OINTMENT OPHTHALMIC NIGHTLY
COMMUNITY

## 2025-03-16 RX ORDER — LOSARTAN POTASSIUM 25 MG/1
25 TABLET ORAL DAILY
Status: DISCONTINUED | OUTPATIENT
Start: 2025-03-16 | End: 2025-03-18 | Stop reason: HOSPADM

## 2025-03-16 RX ORDER — GABAPENTIN 400 MG/1
400 CAPSULE ORAL 3 TIMES DAILY
Status: DISCONTINUED | OUTPATIENT
Start: 2025-03-16 | End: 2025-03-18 | Stop reason: HOSPADM

## 2025-03-16 RX ORDER — HYDRALAZINE HYDROCHLORIDE 20 MG/ML
10 INJECTION INTRAMUSCULAR; INTRAVENOUS EVERY 8 HOURS PRN
Status: DISCONTINUED | OUTPATIENT
Start: 2025-03-16 | End: 2025-03-18 | Stop reason: HOSPADM

## 2025-03-16 RX ORDER — POLYETHYLENE GLYCOL 3350 17 G/17G
17 POWDER, FOR SOLUTION ORAL DAILY PRN
Status: DISCONTINUED | OUTPATIENT
Start: 2025-03-16 | End: 2025-03-18 | Stop reason: HOSPADM

## 2025-03-16 RX ORDER — ALLOPURINOL 100 MG/1
100 TABLET ORAL DAILY
Status: DISCONTINUED | OUTPATIENT
Start: 2025-03-17 | End: 2025-03-18 | Stop reason: HOSPADM

## 2025-03-16 RX ORDER — METOPROLOL TARTRATE 25 MG/1
25 TABLET, FILM COATED ORAL 2 TIMES DAILY
Status: DISCONTINUED | OUTPATIENT
Start: 2025-03-16 | End: 2025-03-17

## 2025-03-16 RX ORDER — MAGNESIUM SULFATE IN WATER 40 MG/ML
2000 INJECTION, SOLUTION INTRAVENOUS PRN
Status: DISCONTINUED | OUTPATIENT
Start: 2025-03-16 | End: 2025-03-18 | Stop reason: HOSPADM

## 2025-03-16 RX ORDER — MONTELUKAST SODIUM 10 MG/1
10 TABLET ORAL NIGHTLY
Status: DISCONTINUED | OUTPATIENT
Start: 2025-03-16 | End: 2025-03-18 | Stop reason: HOSPADM

## 2025-03-16 RX ORDER — AMLODIPINE BESYLATE 10 MG/1
10 TABLET ORAL DAILY
Status: DISCONTINUED | OUTPATIENT
Start: 2025-03-16 | End: 2025-03-18 | Stop reason: HOSPADM

## 2025-03-16 RX ORDER — CIPROFLOXACIN HYDROCHLORIDE 3.5 MG/ML
1 SOLUTION/ DROPS TOPICAL 4 TIMES DAILY
Status: DISCONTINUED | OUTPATIENT
Start: 2025-03-16 | End: 2025-03-18 | Stop reason: HOSPADM

## 2025-03-16 RX ORDER — POTASSIUM CHLORIDE 7.45 MG/ML
10 INJECTION INTRAVENOUS PRN
Status: DISCONTINUED | OUTPATIENT
Start: 2025-03-16 | End: 2025-03-18 | Stop reason: HOSPADM

## 2025-03-16 RX ORDER — SODIUM CHLORIDE 9 MG/ML
INJECTION, SOLUTION INTRAVENOUS CONTINUOUS
Status: DISCONTINUED | OUTPATIENT
Start: 2025-03-16 | End: 2025-03-17

## 2025-03-16 RX ORDER — ATROPINE SULFATE 10 MG/ML
1 SOLUTION/ DROPS OPHTHALMIC 2 TIMES DAILY
COMMUNITY

## 2025-03-16 RX ORDER — POTASSIUM CHLORIDE 1500 MG/1
40 TABLET, EXTENDED RELEASE ORAL PRN
Status: DISCONTINUED | OUTPATIENT
Start: 2025-03-16 | End: 2025-03-18 | Stop reason: HOSPADM

## 2025-03-16 RX ORDER — TRAZODONE HYDROCHLORIDE 50 MG/1
25 TABLET ORAL NIGHTLY PRN
Status: DISCONTINUED | OUTPATIENT
Start: 2025-03-16 | End: 2025-03-18 | Stop reason: HOSPADM

## 2025-03-16 RX ORDER — OFLOXACIN 3 MG/ML
1 SOLUTION/ DROPS OPHTHALMIC 4 TIMES DAILY
COMMUNITY

## 2025-03-16 RX ORDER — ALBUTEROL SULFATE 90 UG/1
2 INHALANT RESPIRATORY (INHALATION) EVERY 4 HOURS PRN
Status: DISCONTINUED | OUTPATIENT
Start: 2025-03-16 | End: 2025-03-16

## 2025-03-16 RX ORDER — ACETAMINOPHEN 650 MG/1
650 SUPPOSITORY RECTAL EVERY 6 HOURS PRN
Status: DISCONTINUED | OUTPATIENT
Start: 2025-03-16 | End: 2025-03-18 | Stop reason: HOSPADM

## 2025-03-16 RX ORDER — ALBUTEROL SULFATE 0.83 MG/ML
2.5 SOLUTION RESPIRATORY (INHALATION) EVERY 6 HOURS PRN
Status: DISCONTINUED | OUTPATIENT
Start: 2025-03-16 | End: 2025-03-18 | Stop reason: HOSPADM

## 2025-03-16 RX ORDER — SODIUM CHLORIDE 0.9 % (FLUSH) 0.9 %
5-40 SYRINGE (ML) INJECTION PRN
Status: DISCONTINUED | OUTPATIENT
Start: 2025-03-16 | End: 2025-03-18 | Stop reason: HOSPADM

## 2025-03-16 RX ORDER — PREDNISOLONE ACETATE 10 MG/ML
1 SUSPENSION/ DROPS OPHTHALMIC 4 TIMES DAILY
COMMUNITY

## 2025-03-16 RX ORDER — SODIUM CHLORIDE 9 MG/ML
INJECTION, SOLUTION INTRAVENOUS PRN
Status: DISCONTINUED | OUTPATIENT
Start: 2025-03-16 | End: 2025-03-18 | Stop reason: HOSPADM

## 2025-03-16 RX ORDER — PANTOPRAZOLE SODIUM 40 MG/1
40 TABLET, DELAYED RELEASE ORAL
Status: DISCONTINUED | OUTPATIENT
Start: 2025-03-17 | End: 2025-03-18 | Stop reason: HOSPADM

## 2025-03-16 RX ORDER — ONDANSETRON 4 MG/1
4 TABLET, ORALLY DISINTEGRATING ORAL EVERY 8 HOURS PRN
Status: DISCONTINUED | OUTPATIENT
Start: 2025-03-16 | End: 2025-03-18 | Stop reason: HOSPADM

## 2025-03-16 RX ORDER — PREDNISOLONE ACETATE 10 MG/ML
1 SUSPENSION/ DROPS OPHTHALMIC 4 TIMES DAILY
Status: DISCONTINUED | OUTPATIENT
Start: 2025-03-16 | End: 2025-03-18 | Stop reason: HOSPADM

## 2025-03-16 RX ORDER — HALOPERIDOL 5 MG/ML
5 INJECTION INTRAMUSCULAR ONCE
Status: COMPLETED | OUTPATIENT
Start: 2025-03-16 | End: 2025-03-17

## 2025-03-16 RX ORDER — ONDANSETRON 2 MG/ML
4 INJECTION INTRAMUSCULAR; INTRAVENOUS EVERY 6 HOURS PRN
Status: DISCONTINUED | OUTPATIENT
Start: 2025-03-16 | End: 2025-03-18 | Stop reason: HOSPADM

## 2025-03-16 RX ORDER — OLANZAPINE 5 MG/1
5 TABLET, ORALLY DISINTEGRATING ORAL 2 TIMES DAILY PRN
Status: DISCONTINUED | OUTPATIENT
Start: 2025-03-16 | End: 2025-03-18 | Stop reason: HOSPADM

## 2025-03-16 RX ORDER — ATROPINE SULFATE 10 MG/ML
1 SOLUTION/ DROPS OPHTHALMIC 2 TIMES DAILY
Status: DISCONTINUED | OUTPATIENT
Start: 2025-03-16 | End: 2025-03-17

## 2025-03-16 RX ADMIN — METOPROLOL TARTRATE 25 MG: 25 TABLET, FILM COATED ORAL at 20:56

## 2025-03-16 RX ADMIN — MEMANTINE 10 MG: 5 TABLET ORAL at 20:56

## 2025-03-16 RX ADMIN — ATROPINE SULFATE 1 DROP: 10 SOLUTION/ DROPS OPHTHALMIC at 21:08

## 2025-03-16 RX ADMIN — SODIUM CHLORIDE: 0.9 INJECTION, SOLUTION INTRAVENOUS at 21:08

## 2025-03-16 RX ADMIN — MONTELUKAST 10 MG: 10 TABLET, FILM COATED ORAL at 20:56

## 2025-03-16 RX ADMIN — APIXABAN 2.5 MG: 2.5 TABLET, FILM COATED ORAL at 20:56

## 2025-03-16 RX ADMIN — PREDNISOLONE ACETATE 1 DROP: 10 SUSPENSION/ DROPS OPHTHALMIC at 20:55

## 2025-03-16 RX ADMIN — Medication 3 MG: at 20:56

## 2025-03-16 RX ADMIN — NEOMYCIN SULFATE, POLYMYXIN B SULFATE, AND DEXAMETHASONE 1 DOSE: 3.5; 10000; 1 OINTMENT OPHTHALMIC at 21:09

## 2025-03-16 RX ADMIN — NITROFURANTOIN MONOHYDRATE/MACROCRYSTALS 100 MG: 75; 25 CAPSULE ORAL at 20:56

## 2025-03-16 RX ADMIN — WATER 5 MG: 1 INJECTION INTRAMUSCULAR; INTRAVENOUS; SUBCUTANEOUS at 16:27

## 2025-03-16 RX ADMIN — SODIUM CHLORIDE, PRESERVATIVE FREE 10 ML: 5 INJECTION INTRAVENOUS at 21:12

## 2025-03-16 RX ADMIN — CIPROFLOXACIN 1 DROP: 3 SOLUTION OPHTHALMIC at 21:03

## 2025-03-16 ASSESSMENT — LIFESTYLE VARIABLES
HOW OFTEN DO YOU HAVE A DRINK CONTAINING ALCOHOL: NEVER
HOW MANY STANDARD DRINKS CONTAINING ALCOHOL DO YOU HAVE ON A TYPICAL DAY: PATIENT DOES NOT DRINK

## 2025-03-16 ASSESSMENT — PAIN - FUNCTIONAL ASSESSMENT: PAIN_FUNCTIONAL_ASSESSMENT: NONE - DENIES PAIN

## 2025-03-16 NOTE — ED PROVIDER NOTES
Allergies:   Allergies   Allergen Reactions    Aspirin Nausea Only     Pt can take aspirin 81mg Pt c/o upset stomach with higher dose    Atorvastatin Other (See Comments)     Leg weakness    Codeine Nausea Only    Fluticasone-Salmeterol Other (See Comments)     shakes       Physical Exam   Vitals signs reviewed.   Patient Vitals for the past 4 hrs:   Temp Pulse Resp BP SpO2   03/16/25 1026 98.5 °F (36.9 °C) 72 16 (!) 171/90 94 %     General: Alert and oriented ×4, no acute distress   Eyes: Anicteric, conjunctiva pink, PERRLA, EOMI  ENT: No nasal discharge, no gross nasal congestion present  Pulmonary: Clear to auscultation bilaterally with symmetric chest rise, no increased work of breathing, no accessory muscle use  Cardiovascular: Regular rate and rhythm, no rub or gallop appreciated on my exam  GI: Abdomen is soft, nontender, nondistended  Musculoskeletal: No obvious joint deformity or joint effusion, normal joint range of motion  Neuro: Cranial nerves II through VII grossly intact, strength and sensation is grossly intact in the upper and lower extremities bilaterally  Skin: Skin is warm and dry    Procedures  No results found for any visits on 03/16/25.   No orders to display                   Voice dictation software was used during the making of this note.  This software is not perfect and grammatical and other typographical errors may be present.  This note has not been completely proofread for errors.

## 2025-03-16 NOTE — ED TRIAGE NOTES
Patient to triage via walker and daughter with concerns for visual hallucinations, confusion and wandering since 5 PM yesterday.    Daughter worried about UTI, denies any urinary symptoms     Patient has a hx of Dementia but daughter stated these symptoms are unusual for her     Patient had Vitrectomy to right eye 03/10/2025 and was placed under general anesthesia

## 2025-03-16 NOTE — ED NOTES
TRANSFER - OUT REPORT:    Verbal report given to phoenix on Erica Jose  being transferred to [facility] for routine progression of patient care       Report consisted of patient's Situation, Background, Assessment and   Recommendations(SBAR).     Information from the following report(s) Nurse Handoff Report was reviewed with the receiving nurse.    Lines:   Peripheral IV 03/16/25 Left Antecubital (Active)        Opportunity for questions and clarification was provided.      Patient transported with:  Registered Nurse

## 2025-03-16 NOTE — DISCHARGE INSTR - COC
Continuity of Care Form    Patient Name: Erica Jose   :  1940  MRN:  [id number]    Admit date:  3/16/2025  Discharge date:  ***    Code Status Order: DNR   Advance Directives:     Admitting Physician:  Ankur Jacques MD  PCP: Nichole Bales MD    Discharging Nurse: ***  Discharging Hospital Unit/Room#: ER07/07  Discharging Unit Phone Number: ***    Emergency Contact:   Extended Emergency Contact Information  Primary Emergency Contact: Letty Cote  Address: [address] [place] [address]  Home Phone: [phone]  Relation: Child  Secondary Emergency Contact: Pablo Jose   [facility]  Home Phone: [phone]  Relation: Child    Past Surgical History:  Past Surgical History:   Procedure Laterality Date    BREAST SURGERY  2019    right breast cancer-lumpectomy    CHEST SURGERY      EYE SURGERY      HYSTERECTOMY (CERVIX STATUS UNKNOWN)      HYSTERECTOMY, TOTAL ABDOMINAL (CERVIX REMOVED)      LAP,CHOLECYSTECTOMY      PACEMAKER      [place] UNLISTED PROCEDURE CARDIAC SURGERY      pacemaker    VITRECTOMY Right 3/10/2025    Vitrecotmy with removal of internal limiting membrane of retina with macular hole repair of right eye with retrobulbar block, gas, 25G PT HAS MEDTRONIC PACEMAKER performed by Rolanda Combs MD at [facility]       Immunization History:   Immunization History   Administered Date(s) Administered    COVID-19, MODERNA BLUE border, Primary or Immunocompromised, (age 12y+), IM, 100 mcg/0.5mL 2021, 2021    COVID-19, PFIZER PURPLE top, DILUTE for use, (age 12 y+), 30mcg/0.3mL 2021, 2021    Influenza Virus Vaccine 10/01/2012, 2016, 10/18/2023    Influenza, FLUAD, (age 65 y+), IM, Quadv, 0.5mL 09/10/2020, 10/12/2021, 10/15/2022, 2023, 2024    Influenza, FLUAD, (age 65 y+), IM, Trivalent PF, 0.5mL 10/28/2019    Influenza, FLUARIX, FLULAVAL, FLUZONE (age 6 mo+) and AFLURIA, (age 3 y+),

## 2025-03-16 NOTE — H&P
Negative NEG      WBC, UA 0-4 U4 /hpf    RBC, UA 0-5 U5 /hpf    Epithelial Cells, UA 0-5 U5 /hpf    BACTERIA, URINE Negative NEG /hpf    Hyaline Casts, UA 0-2 /lpf   COVID-19, Rapid    Collection Time: 03/16/25 12:44 PM    Specimen: Nasopharyngeal   Result Value Ref Range    Source NASAL SWAB      SARS-CoV-2, Rapid Not detected NOTD         Recent Labs     03/16/25  1244   COVID19 Not detected       XR CHEST (2 VW)  Result Date: 3/16/2025  Chest X-ray INDICATION: Encephalopathy COMPARISON:  None TECHNIQUE: PA and lateral views of the chest were obtained. FINDINGS: Cardiomegaly. No consolidation, pleural effusion, or pneumothorax. Pacer present.     No acute findings in the chest Electronically signed by Andi Gorman    CT Head W/O Contrast  Result Date: 3/16/2025  Head CT INDICATION: Encephalopathy type symptoms, on chronic anticoagulation TECHNIQUE: Multiple 2D axial images obtained through the brain without intravenous contrast.  Radiation dose reduction techniques were used for this study:  All CT scans performed at this facility use one or all of the following: Automated exposure control, adjustment of the mA and/or kVp according to patient's size, iterative reconstruction. COMPARISON: None FINDINGS: Chronic microvascular ischemic disease. Air-fluid level within the right globe. There is no CT evidence of acute hemorrhage or infarction. The ventricles are normal in size. There are no extra-axial fluid collections. No masses are seen. The sinuses are clear. There are no bony lesions.     No acute intracranial abnormality. Air-fluid level within the right globe. Correlate with any recent instrumentation and physical exam. Consider ophthalmology consultation if there is no recent intervention to account for this abnormality. Electronically signed by Andi Gorman        Signed:  JJ Woo

## 2025-03-17 LAB
ANION GAP SERPL CALC-SCNC: 10 MMOL/L (ref 7–16)
BUN SERPL-MCNC: 9 MG/DL (ref 8–23)
CALCIUM SERPL-MCNC: 9.7 MG/DL (ref 8.8–10.2)
CHLORIDE SERPL-SCNC: 103 MMOL/L (ref 98–107)
CO2 SERPL-SCNC: 27 MMOL/L (ref 20–29)
CREAT SERPL-MCNC: 0.94 MG/DL (ref 0.6–1.1)
ERYTHROCYTE [DISTWIDTH] IN BLOOD BY AUTOMATED COUNT: 17.7 % (ref 11.9–14.6)
GLUCOSE SERPL-MCNC: 92 MG/DL (ref 70–99)
HCT VFR BLD AUTO: 34.6 % (ref 35.8–46.3)
HGB BLD-MCNC: 10.8 G/DL (ref 11.7–15.4)
MAGNESIUM SERPL-MCNC: 1.9 MG/DL (ref 1.8–2.4)
MCH RBC QN AUTO: 24.3 PG (ref 26.1–32.9)
MCHC RBC AUTO-ENTMCNC: 31.2 G/DL (ref 31.4–35)
MCV RBC AUTO: 77.9 FL (ref 82–102)
NRBC # BLD: 0 K/UL (ref 0–0.2)
PLATELET # BLD AUTO: 263 K/UL (ref 150–450)
PMV BLD AUTO: 9.9 FL (ref 9.4–12.3)
POTASSIUM SERPL-SCNC: 3.7 MMOL/L (ref 3.5–5.1)
RBC # BLD AUTO: 4.44 M/UL (ref 4.05–5.2)
SODIUM SERPL-SCNC: 140 MMOL/L (ref 136–145)
WBC # BLD AUTO: 6.7 K/UL (ref 4.3–11.1)

## 2025-03-17 PROCEDURE — 97535 SELF CARE MNGMENT TRAINING: CPT

## 2025-03-17 PROCEDURE — 6360000002 HC RX W HCPCS: Performed by: INTERNAL MEDICINE

## 2025-03-17 PROCEDURE — 6370000000 HC RX 637 (ALT 250 FOR IP): Performed by: PHYSICIAN ASSISTANT

## 2025-03-17 PROCEDURE — 97165 OT EVAL LOW COMPLEX 30 MIN: CPT

## 2025-03-17 PROCEDURE — 83735 ASSAY OF MAGNESIUM: CPT

## 2025-03-17 PROCEDURE — 36415 COLL VENOUS BLD VENIPUNCTURE: CPT

## 2025-03-17 PROCEDURE — 80048 BASIC METABOLIC PNL TOTAL CA: CPT

## 2025-03-17 PROCEDURE — 85027 COMPLETE CBC AUTOMATED: CPT

## 2025-03-17 PROCEDURE — 97161 PT EVAL LOW COMPLEX 20 MIN: CPT

## 2025-03-17 PROCEDURE — 6370000000 HC RX 637 (ALT 250 FOR IP): Performed by: INTERNAL MEDICINE

## 2025-03-17 PROCEDURE — 2500000003 HC RX 250 WO HCPCS: Performed by: PHYSICIAN ASSISTANT

## 2025-03-17 PROCEDURE — 2580000003 HC RX 258: Performed by: PHYSICIAN ASSISTANT

## 2025-03-17 PROCEDURE — 97530 THERAPEUTIC ACTIVITIES: CPT

## 2025-03-17 PROCEDURE — G0378 HOSPITAL OBSERVATION PER HR: HCPCS

## 2025-03-17 RX ORDER — ATROPINE SULFATE 10 MG/ML
1 SOLUTION/ DROPS OPHTHALMIC 2 TIMES DAILY
Status: DISCONTINUED | OUTPATIENT
Start: 2025-03-17 | End: 2025-03-18 | Stop reason: HOSPADM

## 2025-03-17 RX ORDER — SODIUM CHLORIDE 9 MG/ML
INJECTION, SOLUTION INTRAVENOUS CONTINUOUS
Status: ACTIVE | OUTPATIENT
Start: 2025-03-17 | End: 2025-03-18

## 2025-03-17 RX ORDER — METOPROLOL TARTRATE 50 MG
50 TABLET ORAL 2 TIMES DAILY
Status: DISCONTINUED | OUTPATIENT
Start: 2025-03-17 | End: 2025-03-18 | Stop reason: HOSPADM

## 2025-03-17 RX ADMIN — METOPROLOL TARTRATE 25 MG: 25 TABLET, FILM COATED ORAL at 09:03

## 2025-03-17 RX ADMIN — CIPROFLOXACIN 1 DROP: 3 SOLUTION OPHTHALMIC at 21:36

## 2025-03-17 RX ADMIN — ATROPINE SULFATE 1 DROP: 10 SOLUTION/ DROPS OPHTHALMIC at 09:04

## 2025-03-17 RX ADMIN — ATROPINE SULFATE 1 DROP: 10 SOLUTION/ DROPS OPHTHALMIC at 21:35

## 2025-03-17 RX ADMIN — SERTRALINE HYDROCHLORIDE 50 MG: 50 TABLET ORAL at 09:03

## 2025-03-17 RX ADMIN — HALOPERIDOL LACTATE 5 MG: 5 INJECTION, SOLUTION INTRAMUSCULAR at 01:23

## 2025-03-17 RX ADMIN — LOSARTAN POTASSIUM 25 MG: 25 TABLET, FILM COATED ORAL at 09:03

## 2025-03-17 RX ADMIN — SODIUM CHLORIDE, PRESERVATIVE FREE 10 ML: 5 INJECTION INTRAVENOUS at 09:19

## 2025-03-17 RX ADMIN — ALLOPURINOL 100 MG: 100 TABLET ORAL at 09:03

## 2025-03-17 RX ADMIN — AMLODIPINE BESYLATE 10 MG: 10 TABLET ORAL at 09:03

## 2025-03-17 RX ADMIN — CIPROFLOXACIN 1 DROP: 3 SOLUTION OPHTHALMIC at 09:04

## 2025-03-17 RX ADMIN — PREDNISOLONE ACETATE 1 DROP: 10 SUSPENSION/ DROPS OPHTHALMIC at 21:37

## 2025-03-17 RX ADMIN — MEMANTINE 10 MG: 5 TABLET ORAL at 21:38

## 2025-03-17 RX ADMIN — NEOMYCIN SULFATE, POLYMYXIN B SULFATE, AND DEXAMETHASONE 1 DOSE: 3.5; 10000; 1 OINTMENT OPHTHALMIC at 21:37

## 2025-03-17 RX ADMIN — SODIUM CHLORIDE: 0.9 INJECTION, SOLUTION INTRAVENOUS at 12:04

## 2025-03-17 RX ADMIN — APIXABAN 2.5 MG: 2.5 TABLET, FILM COATED ORAL at 09:03

## 2025-03-17 RX ADMIN — PREDNISOLONE ACETATE 1 DROP: 10 SUSPENSION/ DROPS OPHTHALMIC at 17:22

## 2025-03-17 RX ADMIN — APIXABAN 2.5 MG: 2.5 TABLET, FILM COATED ORAL at 21:38

## 2025-03-17 RX ADMIN — Medication 3 MG: at 21:38

## 2025-03-17 RX ADMIN — MONTELUKAST 10 MG: 10 TABLET, FILM COATED ORAL at 21:38

## 2025-03-17 RX ADMIN — MEMANTINE 10 MG: 5 TABLET ORAL at 09:03

## 2025-03-17 RX ADMIN — METOPROLOL TARTRATE 50 MG: 50 TABLET, FILM COATED ORAL at 21:39

## 2025-03-17 RX ADMIN — PREDNISOLONE ACETATE 1 DROP: 10 SUSPENSION/ DROPS OPHTHALMIC at 09:03

## 2025-03-17 RX ADMIN — NITROFURANTOIN MONOHYDRATE/MACROCRYSTALS 100 MG: 75; 25 CAPSULE ORAL at 21:38

## 2025-03-17 RX ADMIN — PREDNISOLONE ACETATE 1 DROP: 10 SUSPENSION/ DROPS OPHTHALMIC at 12:10

## 2025-03-17 RX ADMIN — CIPROFLOXACIN 1 DROP: 3 SOLUTION OPHTHALMIC at 12:10

## 2025-03-17 RX ADMIN — CIPROFLOXACIN 1 DROP: 3 SOLUTION OPHTHALMIC at 17:23

## 2025-03-17 RX ADMIN — ACETAMINOPHEN 650 MG: 325 TABLET, FILM COATED ORAL at 23:47

## 2025-03-17 NOTE — ACP (ADVANCE CARE PLANNING)
Advance Care Planning   General Advance Care Planning (ACP) Conversation    Date of Conversation: 3/16/2025  Conducted with: Patient with Decision Making Capacity  Other persons present: None    Healthcare Decision Maker:   Letty Cote - Child - [phone] - Next of Kin      Length of Voluntary ACP Conversation in minutes:  <16 minutes (Non-Billable)    Elsa Call

## 2025-03-17 NOTE — CARE COORDINATION
CM note:    Chart reviewed for updates. CM met with pt and daughter at bedside, introduced role, confirmed demographics and discussed d/c planning. Daughter was the one who help complete assessment. Pt lives alone in an apartment which is one level. Prior to admission pt was independent with ADL's at home and uses a cane or walker. She mostly uses a walker for long distances. She receives aide services Monday to Friday 8am to 11pm. They assist pt with some ADL's and cleaning the house. Pt is insured and has a PCP that she sees every 4 months. She is no longer an active  in the community. Pt has been to STR twice in the past. Her daughter is her main support system. Therapy is recommending STR. Daughter is agreeable with STR. A STR list has been provided to daughter. CM has requested for at least 3 options. CM to meet with pt and daughter later to get SNF options. CM will continue to follow up with d/c planning.     03/17/25 7482   Service Assessment   Patient Orientation Alert and Oriented   Cognition Alert   History Provided By Patient   Primary Caregiver Self   Accompanied By/Relationship Daughter at bedside   Support Systems Children   Patient's Healthcare Decision Maker is: Legal Next of Kin  (Daughter)   PCP Verified by CM Yes   Last Visit to PCP Within last 6 months   Prior Functional Level Independent in ADLs/IADLs   Current Functional Level Assistance with the following:;Bathing;Dressing;Toileting;Mobility   Can patient return to prior living arrangement No   Ability to make needs known: Poor   Family able to assist with home care needs: Yes   Would you like for me to discuss the discharge plan with any other family members/significant others, and if so, who? Yes  (Daughter)   Financial Resources Medicare;Medicaid   Social/Functional History   Lives With Alone   Type of Home Apartment   Home Layout One level   Bathroom Shower/Tub Tub/Shower unit   Bathroom Equipment Shower chair;Grab bars in shower

## 2025-03-17 NOTE — CARE COORDINATION
CM note:    Chart reviewed for updates. Pt was admitted for Metabolic Encephalopathy. Pt was also presenting with AMS. It was reported that pt is still not medically stable for discharge. She recently had eye surgery. Therapy is recommending STR. STR list was provided to the patient. [facility]    Referral sent via epic pending review. Pt will need insurance auth. CM will continue to follow up with d/c planning.    ADWOA Pryor

## 2025-03-17 NOTE — PLAN OF CARE
Problem: Skin/Tissue Integrity  Goal: Skin integrity remains intact  Description: 1.  Monitor for areas of redness and/or skin breakdown  2.  Assess vascular access sites hourly  3.  Every 4-6 hours minimum:  Change oxygen saturation probe site  4.  Every 4-6 hours:  If on nasal continuous positive airway pressure, respiratory therapy assess nares and determine need for appliance change or resting period  3/17/2025 0034 by Jannette Trimble RN  Outcome: Progressing  3/16/2025 1714 by Chimato, Phoenix M, RN  Outcome: Progressing     Problem: Confusion  Goal: Confusion, delirium, dementia, or psychosis is improved or at baseline  Description: INTERVENTIONS:  1. Assess for possible contributors to thought disturbance, including medications, impaired vision or hearing, underlying metabolic abnormalities, dehydration, psychiatric diagnoses, and notify attending LIP  2. [place] high risk fall precautions, as indicated  3. Provide frequent short contacts to provide reality reorientation, refocusing and direction  4. Decrease environmental stimuli, including noise as appropriate  5. Monitor and intervene to maintain adequate nutrition, hydration, elimination, sleep and activity  6. If unable to ensure safety without constant attention obtain sitter and review sitter guidelines with assigned personnel  7. Initiate Psychosocial CNS and Spiritual Care consult, as indicated  3/17/2025 0034 by Jannette Trimble RN  Outcome: Progressing  3/16/2025 1714 by Chimato, Phoenix M, RN  Outcome: Progressing     Problem: Neurosensory - Adult  Goal: Achieves stable or improved neurological status  Outcome: Progressing     Problem: Neurosensory - Adult  Goal: Achieves maximal functionality and self care  Outcome: Progressing     Problem: Musculoskeletal - Adult  Goal: Return mobility to safest level of function  Outcome: Progressing

## 2025-03-18 VITALS
DIASTOLIC BLOOD PRESSURE: 76 MMHG | SYSTOLIC BLOOD PRESSURE: 161 MMHG | RESPIRATION RATE: 18 BRPM | HEIGHT: 61 IN | BODY MASS INDEX: 23.6 KG/M2 | OXYGEN SATURATION: 89 % | HEART RATE: 69 BPM | WEIGHT: 125 LBS | TEMPERATURE: 98.2 F

## 2025-03-18 PROCEDURE — 1100000000 HC RM PRIVATE

## 2025-03-18 PROCEDURE — 6370000000 HC RX 637 (ALT 250 FOR IP): Performed by: PHYSICIAN ASSISTANT

## 2025-03-18 PROCEDURE — G0378 HOSPITAL OBSERVATION PER HR: HCPCS

## 2025-03-18 RX ORDER — METOPROLOL TARTRATE 50 MG
50 TABLET ORAL 2 TIMES DAILY
Qty: 60 TABLET | Refills: 0 | Status: SHIPPED | OUTPATIENT
Start: 2025-03-18

## 2025-03-18 RX ADMIN — ALLOPURINOL 100 MG: 100 TABLET ORAL at 07:59

## 2025-03-18 RX ADMIN — PREDNISOLONE ACETATE 1 DROP: 10 SUSPENSION/ DROPS OPHTHALMIC at 07:58

## 2025-03-18 RX ADMIN — MEMANTINE 10 MG: 5 TABLET ORAL at 07:59

## 2025-03-18 RX ADMIN — SERTRALINE HYDROCHLORIDE 50 MG: 50 TABLET ORAL at 07:59

## 2025-03-18 RX ADMIN — METOPROLOL TARTRATE 50 MG: 50 TABLET, FILM COATED ORAL at 07:59

## 2025-03-18 RX ADMIN — APIXABAN 2.5 MG: 2.5 TABLET, FILM COATED ORAL at 07:59

## 2025-03-18 RX ADMIN — AMLODIPINE BESYLATE 10 MG: 10 TABLET ORAL at 07:59

## 2025-03-18 RX ADMIN — LOSARTAN POTASSIUM 25 MG: 25 TABLET, FILM COATED ORAL at 07:59

## 2025-03-18 RX ADMIN — ATROPINE SULFATE 1 DROP: 10 SOLUTION/ DROPS OPHTHALMIC at 07:58

## 2025-03-18 RX ADMIN — CIPROFLOXACIN 1 DROP: 3 SOLUTION OPHTHALMIC at 08:03

## 2025-03-18 RX ADMIN — ACETAMINOPHEN 650 MG: 325 TABLET, FILM COATED ORAL at 07:48

## 2025-03-18 ASSESSMENT — PAIN SCALES - GENERAL
PAINLEVEL_OUTOF10: 3
PAINLEVEL_OUTOF10: 1

## 2025-03-18 ASSESSMENT — PAIN DESCRIPTION - LOCATION: LOCATION: ARM

## 2025-03-18 ASSESSMENT — PAIN DESCRIPTION - ORIENTATION: ORIENTATION: LEFT

## 2025-03-18 ASSESSMENT — PAIN SCALES - WONG BAKER: WONGBAKER_NUMERICALRESPONSE: HURTS A LITTLE BIT

## 2025-03-18 ASSESSMENT — PAIN DESCRIPTION - DESCRIPTORS: DESCRIPTORS: ACHING

## 2025-03-18 NOTE — DISCHARGE INSTRUCTIONS
Helping A Person With Dementia: Care Instructions    How can you care for someone who has dementia?  Dementia is a loss of mental skills that affects daily life. It is different from mild memory loss that occurs with aging. Dementia can cause problems with memory, thinking clearly, and planning. It is different for everyone. But it usually gets worse slowly. Some people who have dementia can function well for a long time. But at some point it may become hard for the person to care for themself.  It can be upsetting to learn that someone you care about has this condition. You may be afraid and worried about what will happen. You may wonder how you will care for the person. There is no cure for dementia. But medicine may be able to slow memory loss and improve thinking for a while. Other medicines may help with sleep, depression, and behavior changes.  Dementia is different for everyone. In some cases, people can function well for a long time. You can help this person by making their home life easier and safer. You also need to take care of yourself. Caregiving can be stressful. But support is available to help you and give you a break when you need it.  The Alzheimer's Association offers good information and support. If you are caring for someone with dementia, you can help make life safer and more comfortable. You can also help this person make decisions about future care. You may also want to bring up legal and financial issues. These are hard but important conversations to have.  Follow-up care is a key part of your loved one's treatment and safety. Be sure to make and go to all appointments, and call your doctor if your loved one is having problems. It's also a good idea to know your loved one's test results and keep a list of the medicines he or she takes.  Taking care of the person  If the person takes medicine for dementia, help them take it exactly as prescribed. Call the doctor if you notice any

## 2025-03-18 NOTE — DISCHARGE SUMMARY
Hospitalist Discharge Summary   Admit Date:  3/16/2025 10:39 AM   DC Note date: 3/18/2025  Name:  Erica Jose   Age:  84 y.o.  Sex:  female  :  1940   MRN:  [id number]   Room:  [facility]  PCP:  Nichole Bales MD    Presenting Complaint: Altered Mental Status and Hallucinations     Initial Admission Diagnosis: Metabolic encephalopathy [G93.41]  Encephalopathy acute [G93.40]     Problem List for this Hospitalization (present on admission):    Principal Problem:    Metabolic encephalopathy  Active Problems:    CAD in native artery    History of recurrent UTIs    PAF (paroxysmal atrial fibrillation) ([facility])    Chronic heart failure with preserved ejection fraction (HFpEF) ([facility])    Dehydration  Resolved Problems:    * No resolved hospital problems. *      Hospital Course:  Erica Jose is a 84 y.o. female with medical history of  paroxysmal atrial fibrillation on OAC, hx of breast cancer, HFpEF, hx of bradycardia s/p PPM, COPD (former cigarette smoker), CAD, hypertension, dementia, right eye macular hole s/p repair by ophthalmology 3/10/2025  who presented with increased confusion.  Patient's daughter gives most of her history.  Patient resides at home alone but has an aid that comes out every M-F.  Ms. Jose was in her usual state of health Friday but on Saturday morning when patient's daughter came to patient's house, patient was much more confused and kept trying to leave her house.  Towards the evening, pt was unable to rest / sleep despite trazodone use and was attempting to leave her home insisting that it was not her home. Of note, ever since pt's eye surgery, patient was instructed she needed to sleep face down and daughter reports that over the past 3-4 nights, patient's sleep has been poor due to having to sleep face down.  As pt's confusion worsened, family decided to bring patient to the ER for further evaluation.  Per patient's daughter, whenever patient becomes more confused, it's usually

## 2025-03-18 NOTE — PROGRESS NOTES
offered spiritual care visit with patient. Patient declined a visit at this time.  
ACUTE PHYSICAL THERAPY GOALS:   (Developed with and agreed upon by patient and/or caregiver.)    (1.) Erica Jose  will move from supine to sit and sit to supine , scoot up and down, and roll side to side with MODIFIED INDEPENDENCE within 7 treatment day(s).    (2.) Erica Jose will transfer from bed to chair and chair to bed with SUPERVISION using the least restrictive device within 7 treatment day(s).    (3.) Erica Jose will ambulate with SUPERVISION for 200 feet with the least restrictive device within 7 treatment day(s).   (4.) Erica Jose will perform standing static and dynamic balance activities x 8 minutes with SUPERVISION to improve safety within 7 treatment day(s).  (5.) Erica Jose will perform therapeutic exercises x 15 min for HEP with SUPERVISION to improve strength, endurance, and functional mobility within 7 treatment day(s).      PHYSICAL THERAPY Initial Assessment, Daily Note, and AM  (Link to Caseload Tracking: PT Visit Days : 1  Acknowledge Orders  Time In/Out  PT Charge Capture  Rehab Caseload Tracker    Erica Jose is a 84 y.o. female   PRIMARY DIAGNOSIS: Metabolic encephalopathy  Metabolic encephalopathy [G93.41]  Encephalopathy acute [G93.40]       Reason for Referral: Generalized Muscle Weakness (M62.81)  Difficulty in walking, Not elsewhere classified (R26.2)  Observation: Payor: [employer] MEDICARE / Plan: [employer] CHOICE-O MEDICARE / Product Type: *No Product type* /     ASSESSMENT:     REHAB RECOMMENDATIONS:   Recommendation to date pending progress:  Setting:  Short-term Rehab    Equipment:    To Be Determined     ASSESSMENT:  Ms. Jose is a 84 year old female who presents with increased confusion--hallucinations, wandering, and agitation. She has history of dementia, visual deficits (s/p eye surgery 3/10), breast cancer, a-giv, and COPD (2L at night). At baseline she lives alone in an apartment and performs mobility MI--mostly furniture walking but also uses rollator 
Discharge instructions reviewed and copy provided for pt. Opportunity provided for questions. Pt. verbalized understanding. IV was removed without difficulty. Pt taken to car via wc.  
Pt became very confused, combative and fixated on getting out of bed. Tried reorienting patient and calming her down with no success. Nurses were finally able to get patient to lay back in bed and IM Haldol given. Care ongoing.  
inpatient admission.    Subjective & 24hr Events:   \"I'm in my bedroom at [facility].\"  Less confused 84y.o. WF laying in bed, daughter at bedside    Mentation with improved clarity; baseline dementia and unable to review systems with patient.       Assessment & Plan:     Principal Problem:    Metabolic encephalopathy  - suspect from recent eye surgery resulting in poor sleep and dehydration; CT head on admission no acute findings of CVA  - UDS negative, ammonia level wnl  - IVF continued for next 24hrs   - fall / aspiration precautions     Active Problems:    Uncontrolled hypertension  - increase dose of metoprolol from 25mg bid to 50mg bid for better control  - cont norvasc 10mg daily and cozaar 25mg daily  - prn hydralazine with parameters in place       Remote right eye macular hole repair  - POD#6; cont eye drops as outlined per Dr. Combs (ophthalmologist)        Paroxysmal atrial fibrillation / secondary hypercoagulable state  - HR ~controlled  - cont metoprolol / apixaban as dosed       HFpEF  - appears euvolemic with cxray negative for acute pulmonary edema  - holding bumex as pt appears dehydrated  - cont ARB / BB as dosed       CAD, hx of bradycardia s/p PPM  - cont apixaban / metoprolol as dosed  - troponin flat in ER       COPD  - not in acute exacerbation  - prn ELISE MDI       Recurrent UTI  - UA negative in ER  - patient afebrile without leukocytosis  - cont macrodantin per home dose (preventative abx)       Dementia  - namenda resumed      Debility   - await PT/OT eval    Anticipated Discharge Arrangements:   Home Health vs STR    PT/OT evals ordered?  Therapy evals ordered  Diet:  ADULT DIET; Regular  VTE prophylaxis: Already on anticoagulation  Code status: DNR      Non-peripheral Lines and Tubes (if present):          Telemetry (if present):           Hospital Problems:  Principal Problem:    Metabolic encephalopathy  Active Problems:    CAD in native artery    History of recurrent 
order to develop a plan of care.     TREATMENT:   Self Care (35 minutes): Patient participated in toileting, lower body dressing, grooming, functional mobility, bed mobility, functional transfer, bathroom mobility, and assistive device in unsupported sitting and standing with minimal verbal, manual, and tactile cueing to increase independence, increase activity tolerance, and increase safety awareness. The patient was educated on role of occupational therapy, compensatory technique during ADL , strategies to improve safety, proper use of assistive device, recommended equipment, transfer training and safety, and energy conservation techniques during ADL/IADLS and patient verbalized understanding and demonstrated understanding.     TREATMENT GRID:  N/A    AFTER TREATMENT PRECAUTIONS: Alarm Activated, Bed, Bed/Chair Locked, Call light within reach, Needs within reach, RN notified, and Visitors at bedside    INTERDISCIPLINARY COLLABORATION:  RN/ PCT and PT/ PTA    EDUCATION:  Education Given To: Patient;Family  Education Provided: Role of Therapy;Plan of Care;Transfer Training;ADL Adaptive Strategies;Energy Conservation;Equipment;Fall Prevention Strategies  Education Method: Demonstration;Verbal  Barriers to Learning: Cognition;None  Education Outcome: Verbalized understanding;Demonstrated understanding;Continued education needed    TOTAL TREATMENT DURATION AND TIME:  Time In: 0933  Time Out: 1013  Minutes: 40    Muriel Xiao, OT

## 2025-03-18 NOTE — CARE COORDINATION
Initial note:    Update note: [facility] has accepted. They are aware that pt is discharging today. No further CM needs.    Initial note: Chart reviewed for updates. Pt has a discharge order. CM met with pt and daughter at bedside to discuss d/c planning. Daughter reported that she would like to take patient home with home health. She states that she spoke with the  for Medicaid about adding more aide hours and they agreed to add hours for aide services. She reports that she feels it would be better to take pt home as she will do well in an environment that she know. Daughter was given the home health choice list. She chose [facility]. Referral has been sent to [facility] for PT/OT/RN pending review. IMM given and explained; signed copy scanned to medical records. Daughter will provide transport at discharge. No further CM needs.      03/18/25 1133   Services At/After Discharge   Transition of Care Consult (CM Consult) Home Health   Internal Home Health No   Reason Outside Agency Chosen Script used patient chose alternate agency   Services At/After Discharge Home Health;PT;OT;Nursing services    Resource Information Provided? No   Mode of Transport at Discharge Other (see comment)  (Daughter)   Confirm Follow Up Transport Family   Condition of Participation: Discharge Planning   The Plan for Transition of Care is related to the following treatment goals: Pt is discharging home with [facility] PT/OT/RN   The Patient and/or Patient Representative was provided with a Choice of Provider? Patient   The Patient and/Or Patient Representative agree with the Discharge Plan? Yes   Freedom of Choice list was provided with basic dialogue that supports the patient's individualized plan of care/goals, treatment preferences, and shares the quality data associated with the providers?  Yes     ADWOA Pryor

## 2025-03-19 ENCOUNTER — CARE COORDINATION (OUTPATIENT)
Dept: CARE COORDINATION | Facility: CLINIC | Age: 85
End: 2025-03-19

## 2025-03-19 NOTE — CARE COORDINATION
Care Transitions Note    Initial Call - Call within 2 business days of discharge: Yes    Patient Current Location:  Home: [address] [place] [address] [address]  [facility] [address]    Care Transition Nurse contacted the family, patient daughter  by telephone to perform post hospital discharge assessment, verified name and  as identifiers.  Provided introduction to self, and explanation of the Care Transition Nurse role.    Patient: Erica Jose    Patient : 1940   MRN: [id number]    Reason for Admission: metabolic encephalopathy  Discharge Date: 3/18/25  RURS: Readmission Risk Score: 14.1      Last Discharge Facility       Date Complaint Diagnosis Description Type Department Provider    3/16/25 Altered Mental Status; Hallucinations Encephalopathy acute ED to Hosp-Admission (Discharged) (ADMITTED) [id number] Ruelas MD; Colby, ...            Was this an external facility discharge? No    Additional needs identified to be addressed with provider   No needs identified             Method of communication with provider: none.    Patients top risk factors for readmission: medical condition-reviewed recent IP admission, medication changes and follow up plan.     Interventions to address risk factors:   Review of patient management of conditions/medications: HH, home aide, follow up appt, reviewed IP admission and left chance for any questions concerns to be voiced    Care Summary Note: CTN had return call from patient family and discussed recent IP admission along with plan to go home with HH. Patient daughter indicated she feels this was best plann since patient is acclimated to her environment and slept well last night. Patient/daughter deny any new or worsening s/s and able to verbalize medication changes.  has contacted for SOC and CTN to assist with PCP follow up visit.    Care Transition Nurse reviewed discharge instructions, medical action plan, and red flags with family. The family was given an

## 2025-03-21 ENCOUNTER — TELEPHONE (OUTPATIENT)
Dept: INTERNAL MEDICINE CLINIC | Facility: CLINIC | Age: 85
End: 2025-03-21

## 2025-03-21 NOTE — TELEPHONE ENCOUNTER
Clif Suarez nurse with [facility] is needing you to call her back regarding orders needed for pt. Pt just got discharged from the hospital and was sent home with PT, OT, and nursing.

## 2025-03-24 NOTE — TELEPHONE ENCOUNTER
SEVEN for Dr Reyna;  Clif called back to say that pt was in the hospital for dehydration and encephalopathy. Clif did start of care with pt on Friday and will be sending the orders over for provider to sign.

## 2025-03-25 ENCOUNTER — TELEPHONE (OUTPATIENT)
Dept: INTERNAL MEDICINE CLINIC | Facility: CLINIC | Age: 85
End: 2025-03-25

## 2025-03-25 NOTE — TELEPHONE ENCOUNTER
Care Transitions Initial Follow Up Call    Outreach made within 2 business days of discharge: No    Patient: Erica Jose Patient : 1940   MRN: [id number]  Reason for Admission: Metabolic encephalopathy   Discharge Date: 3/18/25       Spoke with: JESUS    Discharge department/facility: [facility] Interactive Patient Contact:  Was patient able to fill all prescriptions: Yes  Was patient instructed to bring all medications to the follow-up visit: Yes  Is patient taking all medications as directed in the discharge summary? Yes  Does patient understand their discharge instructions: Yes  Does patient have questions or concerns that need addressed prior to 7-14 day follow up office visit: no    Additional needs identified to be addressed with provider  No needs identified             Scheduled appointment with PCP within 7-14 days    Follow Up  Future Appointments   Date Time Provider Department Center   3/31/2025  3:00 PM Mayela Mcclure FNP [facility] DEP   2025  3:20 PM Nichole Bales MD [facility] DEP   2025  3:40 PM Beatrice De Santiago, APRN - CNP [facility] AMB       Shwetha Jasso

## 2025-03-26 ENCOUNTER — CARE COORDINATION (OUTPATIENT)
Dept: CARE COORDINATION | Facility: CLINIC | Age: 85
End: 2025-03-26

## 2025-03-26 NOTE — CARE COORDINATION
Care Transitions Note    Initial Call - Call within 2 business days of discharge: Yes    Patient Current Location:  Home: [address] [place] [address] [address]  [facility] [address]    LPN Care Coordinator contacted the family by telephone to perform post hospital discharge assessment, verified name and  as identifiers.  Provided introduction to self, and explanation of the LPN Care Coordinator role.    Patient: Erica Jose    Patient : 1940   MRN: [id number]    Reason for Admission: Metabolic Encephalopathy  Discharge Date: 3/18/25  RURS: Readmission Risk Score: 14.1      Last Discharge Facility       Date Complaint Diagnosis Description Type Department Provider    3/16/25 Altered Mental Status; Hallucinations Encephalopathy acute ED to Hosp-Admission (Discharged) (ADMITTED) [id number] Ruelas MD; Colby ...            Was this an external facility discharge? No    Additional needs identified to be addressed with provider   No needs identified             Method of communication with provider: none.    Patients top risk factors for readmission: medical condition-AMS and Hallucination.    Interventions to address risk factors:   Review of patient management of conditions/medications: diet, hydration, medication compliance and follow up appointment.    Care Summary Note: Spoke with patient's daughter, states patient is doing much better, less confusion. Daughter denies any acute distress during this phone call. Daughter states patient lives alone, has a private aid that come to the home M-F. [facility] and physical therapy admitted patient on 3/25/2025. Daughter [facility] patient is scheduled to see PCP on 3/31/2025 and has transportation. Will follow up with Daughter next week for any acute needs or concerns.    LPN Care Coordinator reviewed discharge instructions with family. The family was given an opportunity to ask questions; all questions answered at this time.. The

## 2025-03-31 ENCOUNTER — OFFICE VISIT (OUTPATIENT)
Dept: INTERNAL MEDICINE CLINIC | Facility: CLINIC | Age: 85
End: 2025-03-31

## 2025-03-31 VITALS
BODY MASS INDEX: 22.84 KG/M2 | OXYGEN SATURATION: 97 % | WEIGHT: 121 LBS | HEART RATE: 71 BPM | HEIGHT: 61 IN | SYSTOLIC BLOOD PRESSURE: 128 MMHG | DIASTOLIC BLOOD PRESSURE: 74 MMHG

## 2025-03-31 DIAGNOSIS — G93.41 METABOLIC ENCEPHALOPATHY: ICD-10-CM

## 2025-03-31 DIAGNOSIS — Z09 HOSPITAL DISCHARGE FOLLOW-UP: Primary | ICD-10-CM

## 2025-03-31 ASSESSMENT — PATIENT HEALTH QUESTIONNAIRE - PHQ9
3. TROUBLE FALLING OR STAYING ASLEEP: SEVERAL DAYS
4. FEELING TIRED OR HAVING LITTLE ENERGY: SEVERAL DAYS
10. IF YOU CHECKED OFF ANY PROBLEMS, HOW DIFFICULT HAVE THESE PROBLEMS MADE IT FOR YOU TO DO YOUR WORK, TAKE CARE OF THINGS AT HOME, OR GET ALONG WITH OTHER PEOPLE: NOT DIFFICULT AT ALL
7. TROUBLE CONCENTRATING ON THINGS, SUCH AS READING THE NEWSPAPER OR WATCHING TELEVISION: NOT AT ALL
1. LITTLE INTEREST OR PLEASURE IN DOING THINGS: NOT AT ALL
6. FEELING BAD ABOUT YOURSELF - OR THAT YOU ARE A FAILURE OR HAVE LET YOURSELF OR YOUR FAMILY DOWN: NOT AT ALL
SUM OF ALL RESPONSES TO PHQ QUESTIONS 1-9: 2
8. MOVING OR SPEAKING SO SLOWLY THAT OTHER PEOPLE COULD HAVE NOTICED. OR THE OPPOSITE - BEING SO FIDGETY OR RESTLESS THAT YOU HAVE BEEN MOVING AROUND A LOT MORE THAN USUAL: NOT AT ALL
2. FEELING DOWN, DEPRESSED OR HOPELESS: NOT AT ALL
SUM OF ALL RESPONSES TO PHQ QUESTIONS 1-9: 2
SUM OF ALL RESPONSES TO PHQ QUESTIONS 1-9: 2
3. TROUBLE FALLING OR STAYING ASLEEP: SEVERAL DAYS
5. POOR APPETITE OR OVEREATING: NOT AT ALL
10. IF YOU CHECKED OFF ANY PROBLEMS, HOW DIFFICULT HAVE THESE PROBLEMS MADE IT FOR YOU TO DO YOUR WORK, TAKE CARE OF THINGS AT HOME, OR GET ALONG WITH OTHER PEOPLE: NOT DIFFICULT AT ALL
7. TROUBLE CONCENTRATING ON THINGS, SUCH AS READING THE NEWSPAPER OR WATCHING TELEVISION: NOT AT ALL
9. THOUGHTS THAT YOU WOULD BE BETTER OFF DEAD, OR OF HURTING YOURSELF: NOT AT ALL
8. MOVING OR SPEAKING SO SLOWLY THAT OTHER PEOPLE COULD HAVE NOTICED. OR THE OPPOSITE, BEING SO FIGETY OR RESTLESS THAT YOU HAVE BEEN MOVING AROUND A LOT MORE THAN USUAL: NOT AT ALL
1. LITTLE INTEREST OR PLEASURE IN DOING THINGS: NOT AT ALL
6. FEELING BAD ABOUT YOURSELF - OR THAT YOU ARE A FAILURE OR HAVE LET YOURSELF OR YOUR FAMILY DOWN: NOT AT ALL
9. THOUGHTS THAT YOU WOULD BE BETTER OFF DEAD, OR OF HURTING YOURSELF: NOT AT ALL
2. FEELING DOWN, DEPRESSED OR HOPELESS: NOT AT ALL
SUM OF ALL RESPONSES TO PHQ QUESTIONS 1-9: 2
5. POOR APPETITE OR OVEREATING: NOT AT ALL
SUM OF ALL RESPONSES TO PHQ QUESTIONS 1-9: 2
4. FEELING TIRED OR HAVING LITTLE ENERGY: SEVERAL DAYS

## 2025-03-31 NOTE — PROGRESS NOTES
tablet  Commonly known as: LOPRESSOR  Take 1 tablet by mouth 2 times daily     montelukast 10 MG tablet  Commonly known as: SINGULAIR  Take 1 tablet by mouth nightly     nitrofurantoin 100 MG capsule  Commonly known as: MACRODANTIN  TAKE 1 CAPSULE BY MOUTH EVERY DAY AT NIGHT     OXYGEN     sertraline 50 MG tablet  Commonly known as: ZOLOFT  Take 1 tablet by mouth daily            STOP taking these medications      neomycin-polymyxin-dexameth [id number] Oint     ofloxacin 0.3 % solution  Commonly known as: OCUFLOX     prednisoLONE acetate 1 % ophthalmic suspension  Commonly known as: PRED FORTE                Medications marked \"taking\" at this time  Outpatient Medications Marked as Taking for the 3/31/25 encounter (Office Visit) with Mayela Mcclure FNP   Medication Sig Dispense Refill    metoprolol tartrate (LOPRESSOR) 50 MG tablet Take 1 tablet by mouth 2 times daily 60 tablet 0    atropine 1 % ophthalmic solution Place 1 drop into the right eye in the morning and 1 drop in the evening. Each eye drop solution should be administered 5 min apart.      memantine (NAMENDA) 10 MG tablet Take 1 tablet by mouth 2 times daily 180 tablet 3    albuterol sulfate HFA (PROVENTIL;VENTOLIN;PROAIR) 108 (90 Base) MCG/ACT inhaler 2 puffs 4 times daily if needed for shortness of breath or wheezing.  May use generic or brand name as preferred by insurance. 1 each 11    gabapentin (NEURONTIN) 400 MG capsule Take 1 capsule by mouth 3 times daily. 270 capsule 3    losartan (COZAAR) 25 MG tablet Take 1 tablet by mouth daily 90 tablet 3    amLODIPine (NORVASC) 10 MG tablet Take 1 tablet by mouth daily 90 tablet 3    montelukast (SINGULAIR) 10 MG tablet Take 1 tablet by mouth nightly 90 tablet 3    sertraline (ZOLOFT) 50 MG tablet Take 1 tablet by mouth daily 90 tablet 3    allopurinol (ZYLOPRIM) 100 MG tablet Take 1 tablet by mouth daily 90 tablet 3    lansoprazole (PREVACID) 30 MG delayed release capsule Take 1 capsule by mouth 2 times

## 2025-04-02 ENCOUNTER — CARE COORDINATION (OUTPATIENT)
Dept: CARE COORDINATION | Facility: CLINIC | Age: 85
End: 2025-04-02

## 2025-04-02 NOTE — CARE COORDINATION
Care Transitions Note    Follow Up Call     Patient Current Location:  Home: [address]  [place] [address]    N Care Coordinator contacted the family, patient 's daughter  by telephone. Verified name and  as identifiers.    Additional needs identified to be addressed with provider   No needs identified                 Method of communication with provider: none.    Care Summary Note: Spoke with patient's daughter , states patient is doing fine. Daughter denies any acute distress during this phone call. Daughter states patient attended hospital follow up in 3/31/2025 and no medication was changed or added. Daughter states home health physical therapist is still working with patient for strengthening. Will continue to follow up with patient's daughter for any acute needs or concerns in the next couple of weeks. If no concerns will graduate from ANDREW program.    Plan of care updates since last contact:  Review of patient management of conditions/medications: diet, hydration, medication compliance and follow up appointment.       Advance Care Planning:   Does patient have an Advance Directive: reviewed and current.    Medication Review:  No changes since last call.     Remote Patient Monitoring:  Offered patient enrollment in the Remote Patient Monitoring (RPM) program for in-home monitoring: Patient is not eligible for RPM program because: patient does not have qualifying diagnosis.    Assessments:   Goals Addressed                   This Visit's Progress     Attends follow up appointments on schedule   On track     Follow plan of care recommended by MD.               Follow Up Appointment:   ANDREW appointment attended as scheduled   Future Appointments         Provider Specialty Dept Phone    2025 3:20 PM Nichole Bales MD Internal Medicine [phone]    2025 3:40 PM (Arrive by 3:25 PM) Beatrice De Santiago, APRN - CNP Pulmonology [phone]            LPN Care Coordinator provided

## 2025-04-15 PROBLEM — E86.0 DEHYDRATION: Status: RESOLVED | Noted: 2025-03-16 | Resolved: 2025-04-15

## 2025-04-16 ENCOUNTER — CARE COORDINATION (OUTPATIENT)
Dept: CARE COORDINATION | Facility: CLINIC | Age: 85
End: 2025-04-16

## 2025-04-16 NOTE — CARE COORDINATION
Care Transitions Note    Final Call     Patient Current Location:  Home: [address] [place] [address] [address]  [facility] [address]    LPN Care Coordinator contacted the  patient's daughter  by telephone. Verified name and  as identifiers.    Patient graduated from the Care Transitions program on 2025.  Patient/family verbalizes confidence in the ability to self-manage at this time..      Advance Care Planning:   Does patient have an Advance Directive: reviewed and current.    Handoff:   Patient was not referred to the ACM team due to no additional needs identified.       Care Summary Note: Spoke with patient's daughter , states patient is doing fine. Daughter denies any acute distress during this phone call. Daughter states patient is staying hydrated and continue to work with home health physical therapy. Daughter states appreciative for the follow up call.     Assessments:  Care Transitions Subsequent and Final Call    Subsequent and Final Calls  Do you have any ongoing symptoms?: No  Have your medications changed?: No  Do you have any questions related to your medications?: No  Do you currently have any active services?: Yes  Are you currently active with any services?: Home Health  Do you have any needs or concerns that I can assist you with?: No  Identified Barriers: None  Care Transitions Interventions  No Identified Needs  Other Interventions:              Upcoming Appointments:    Future Appointments         Provider Specialty Dept Phone    2025 4:30 PM Thee Thurston MD Cardiology [phone]    2025 3:40 PM Nichole Bales MD Internal Medicine [phone]    2025 3:40 PM (Arrive by 3:25 PM) Beatrice De Santiago, APRN - [facility] Pulmonology [phone]            Patient has agreed to contact primary care provider and/or specialist for any further questions, concerns, or needs.    Roseanna Nagel LPN

## 2025-04-19 NOTE — PROGRESS NOTES
Aortic valve stenosis, etiology of cardiac valve disease unspecified  - VTI ratio noted to be 0.46 on TTE in January 2025  - Obtain a surveillance TTE in 1 to 2 years    2. Chronic heart failure with preserved ejection fraction (HFpEF) (HCC)  - H2FPEF score = 7 diagnostic of HFpEF (hypertension, atrial fibrillation, pulmonary hypertension, elder, filling pressure)   - Continue with p.o. Bumex as needed    3. Cardiac pacemaker in situ  - Continue to follow in device clinic    4. CAD in native artery  - In patients with AF and CCD (beyond 1 year after revascularization or CAD not requiring coronary revascularization) without history of stent thrombosis, oral anticoagulation monotherapy is recommended over the combination therapy of OAC and single APT (aspirin or P2Y12 inhibitor) to decrease the risk of major bleeding (class I recommendation; 2023 ACC guidelines for AF)  - Patient and family declined statin therapy in the past     5. History of atrial fibrillation  - [id number] equals 5  - Continue with Eliquis and Lopressor    6. Hypertension, unspecified type  - Continue with amlodipine and Lopressor  - Currently on losartan    7. Hyperlipidemia, unspecified hyperlipidemia type  - Patient and family declined statin therapy in the past     8. Chronic respiratory failure, unspecified whether with hypoxia or hypercapnia  - Pertinent negatives include angina: No robust indication for an ischemic evaluation at this time especially considering advanced age and dementia  - Pulmonology note reviewed  - Continue to follow with pulmonology  - Management of chronic HFpEF, as above    9. Dementia, unspecified dementia severity, unspecified dementia type, unspecified whether behavioral, psychotic, or mood disturbance or anxiety (HCC)   - PCP note reviewed  - Continue to follow with PCP    F/U: 6 months    Thee Thurston MD

## 2025-04-21 ENCOUNTER — OFFICE VISIT (OUTPATIENT)
Age: 85
End: 2025-04-21
Payer: MEDICARE

## 2025-04-21 VITALS
HEART RATE: 76 BPM | BODY MASS INDEX: 23.27 KG/M2 | HEIGHT: 61 IN | WEIGHT: 123.24 LBS | DIASTOLIC BLOOD PRESSURE: 74 MMHG | SYSTOLIC BLOOD PRESSURE: 122 MMHG

## 2025-04-21 DIAGNOSIS — E78.5 HYPERLIPIDEMIA, UNSPECIFIED HYPERLIPIDEMIA TYPE: ICD-10-CM

## 2025-04-21 DIAGNOSIS — I25.10 CAD IN NATIVE ARTERY: ICD-10-CM

## 2025-04-21 DIAGNOSIS — Z86.79 HISTORY OF ATRIAL FIBRILLATION: ICD-10-CM

## 2025-04-21 DIAGNOSIS — F03.90 DEMENTIA, UNSPECIFIED DEMENTIA SEVERITY, UNSPECIFIED DEMENTIA TYPE, UNSPECIFIED WHETHER BEHAVIORAL, PSYCHOTIC, OR MOOD DISTURBANCE OR ANXIETY (HCC): ICD-10-CM

## 2025-04-21 DIAGNOSIS — I50.32 CHRONIC HEART FAILURE WITH PRESERVED EJECTION FRACTION (HFPEF) (HCC): Primary | ICD-10-CM

## 2025-04-21 DIAGNOSIS — I10 HYPERTENSION, UNSPECIFIED TYPE: ICD-10-CM

## 2025-04-21 DIAGNOSIS — I35.0 AORTIC VALVE STENOSIS, ETIOLOGY OF CARDIAC VALVE DISEASE UNSPECIFIED: ICD-10-CM

## 2025-04-21 DIAGNOSIS — J96.10 CHRONIC RESPIRATORY FAILURE, UNSPECIFIED WHETHER WITH HYPOXIA OR HYPERCAPNIA (HCC): ICD-10-CM

## 2025-04-21 DIAGNOSIS — Z95.0 CARDIAC PACEMAKER IN SITU: ICD-10-CM

## 2025-04-21 PROCEDURE — G8428 CUR MEDS NOT DOCUMENT: HCPCS | Performed by: INTERNAL MEDICINE

## 2025-04-21 PROCEDURE — 1090F PRES/ABSN URINE INCON ASSESS: CPT | Performed by: INTERNAL MEDICINE

## 2025-04-21 PROCEDURE — G8399 PT W/DXA RESULTS DOCUMENT: HCPCS | Performed by: INTERNAL MEDICINE

## 2025-04-21 PROCEDURE — 99214 OFFICE O/P EST MOD 30 MIN: CPT | Performed by: INTERNAL MEDICINE

## 2025-04-21 PROCEDURE — 1126F AMNT PAIN NOTED NONE PRSNT: CPT | Performed by: INTERNAL MEDICINE

## 2025-04-21 PROCEDURE — G8420 CALC BMI NORM PARAMETERS: HCPCS | Performed by: INTERNAL MEDICINE

## 2025-04-21 PROCEDURE — 1123F ACP DISCUSS/DSCN MKR DOCD: CPT | Performed by: INTERNAL MEDICINE

## 2025-04-21 PROCEDURE — 1036F TOBACCO NON-USER: CPT | Performed by: INTERNAL MEDICINE

## 2025-04-21 PROCEDURE — 3078F DIAST BP <80 MM HG: CPT | Performed by: INTERNAL MEDICINE

## 2025-04-21 PROCEDURE — 3074F SYST BP LT 130 MM HG: CPT | Performed by: INTERNAL MEDICINE

## 2025-04-21 RX ORDER — LOSARTAN POTASSIUM 25 MG/1
25 TABLET ORAL DAILY
Qty: 90 TABLET | Refills: 3 | Status: SHIPPED | OUTPATIENT
Start: 2025-04-21

## 2025-04-21 RX ORDER — AMLODIPINE BESYLATE 10 MG/1
10 TABLET ORAL DAILY
Qty: 90 TABLET | Refills: 3 | Status: SHIPPED | OUTPATIENT
Start: 2025-04-21

## 2025-04-21 RX ORDER — METOPROLOL TARTRATE 50 MG
50 TABLET ORAL 2 TIMES DAILY
Qty: 180 TABLET | Refills: 3 | Status: SHIPPED | OUTPATIENT
Start: 2025-04-21

## 2025-05-21 ENCOUNTER — TELEPHONE (OUTPATIENT)
Age: 85
End: 2025-05-21

## 2025-05-21 NOTE — TELEPHONE ENCOUNTER
[employer] alert indicating dcppm at [facility]RT as of 5/21/25. Original placed by Dr. Rankin.     OAC: Eliquis 2.5mg BID.   Last ECHO: 1/14/25: EF 55-60%  Last OV Dr. Thurston: 4/21/25    Spoke with daughter: Appt scheduled with JJ Castorena to discuss gen change with EP.

## 2025-05-29 ENCOUNTER — OFFICE VISIT (OUTPATIENT)
Age: 85
End: 2025-05-29
Payer: MEDICARE

## 2025-05-29 ENCOUNTER — TELEPHONE (OUTPATIENT)
Age: 85
End: 2025-05-29

## 2025-05-29 VITALS
DIASTOLIC BLOOD PRESSURE: 88 MMHG | HEART RATE: 71 BPM | BODY MASS INDEX: 23.29 KG/M2 | HEIGHT: 61 IN | SYSTOLIC BLOOD PRESSURE: 152 MMHG

## 2025-05-29 DIAGNOSIS — Z45.010 PACEMAKER AT END OF BATTERY LIFE: ICD-10-CM

## 2025-05-29 DIAGNOSIS — I50.32 CHRONIC HEART FAILURE WITH PRESERVED EJECTION FRACTION (HFPEF) (HCC): Primary | ICD-10-CM

## 2025-05-29 DIAGNOSIS — Z45.010 PACEMAKER BATTERY DEPLETION: Primary | ICD-10-CM

## 2025-05-29 PROCEDURE — 1090F PRES/ABSN URINE INCON ASSESS: CPT | Performed by: PHYSICIAN ASSISTANT

## 2025-05-29 PROCEDURE — 1126F AMNT PAIN NOTED NONE PRSNT: CPT | Performed by: PHYSICIAN ASSISTANT

## 2025-05-29 PROCEDURE — 1036F TOBACCO NON-USER: CPT | Performed by: PHYSICIAN ASSISTANT

## 2025-05-29 PROCEDURE — 3079F DIAST BP 80-89 MM HG: CPT | Performed by: PHYSICIAN ASSISTANT

## 2025-05-29 PROCEDURE — 93000 ELECTROCARDIOGRAM COMPLETE: CPT | Performed by: PHYSICIAN ASSISTANT

## 2025-05-29 PROCEDURE — 1123F ACP DISCUSS/DSCN MKR DOCD: CPT | Performed by: PHYSICIAN ASSISTANT

## 2025-05-29 PROCEDURE — 3077F SYST BP >= 140 MM HG: CPT | Performed by: PHYSICIAN ASSISTANT

## 2025-05-29 PROCEDURE — 99214 OFFICE O/P EST MOD 30 MIN: CPT | Performed by: PHYSICIAN ASSISTANT

## 2025-05-29 PROCEDURE — 1159F MED LIST DOCD IN RCRD: CPT | Performed by: PHYSICIAN ASSISTANT

## 2025-05-29 PROCEDURE — G8420 CALC BMI NORM PARAMETERS: HCPCS | Performed by: PHYSICIAN ASSISTANT

## 2025-05-29 PROCEDURE — G8399 PT W/DXA RESULTS DOCUMENT: HCPCS | Performed by: PHYSICIAN ASSISTANT

## 2025-05-29 PROCEDURE — G8427 DOCREV CUR MEDS BY ELIG CLIN: HCPCS | Performed by: PHYSICIAN ASSISTANT

## 2025-05-29 PROCEDURE — 1160F RVW MEDS BY RX/DR IN RCRD: CPT | Performed by: PHYSICIAN ASSISTANT

## 2025-05-29 NOTE — TELEPHONE ENCOUNTER
Orders placed per verbal from JJ Castorena.     Provided and reviewed pre-procedure packet. Understanding verbalized.     No questions at this time.

## 2025-05-29 NOTE — PROGRESS NOTES
[facility] CARDIOLOGY, [address], [address]  [place] [address]  PHONE: [phone]  1940    SUBJECTIVE:   Erica Jose is a 84 y.o. female seen for a follow up visit regarding the following:     Chief Complaint   Patient presents with    Other     Device at [facility]       HPI:    Erica Jose is a very pleasant 84 y.o. 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  Device is [employer] located on left.  Leads are functioning properly.  Patient RV pacing 2.3%.   Patient is not pacer dependent. She is feeling well. No complaints.        Cardiac PMH: (Old records have been reviewed and summarized below)  TTE (1/14/25): EF 55-60%, mild stenosis AS, moderate TR, RVSP 76 mmHg    Reviewed office note Dr Thurston 4/21/25    Past Medical History, Past Surgical History, Family history, Social History, and Medications were all reviewed with the patient today and updated as necessary.     Current Outpatient Medications   Medication Sig Dispense Refill    metoprolol tartrate (LOPRESSOR) 50 MG tablet Take 1 tablet by mouth 2 times daily 180 tablet 3    amLODIPine (NORVASC) 10 MG tablet Take 1 tablet by mouth daily 90 tablet 3    apixaban (ELIQUIS) 2.5 MG TABS tablet Take 1 tablet by mouth 2 times daily 180 tablet 3    losartan (COZAAR) 25 MG tablet Take 1 tablet by mouth daily 90 tablet 3    Bumetanide (BUMEX PO) Take by mouth as needed      memantine (NAMENDA) 10 MG tablet Take 1 tablet by mouth 2 times daily 180 tablet 3    albuterol sulfate HFA (PROVENTIL;VENTOLIN;PROAIR) 108 (90 Base) MCG/ACT inhaler 2 puffs 4 times daily if needed

## 2025-06-05 ENCOUNTER — OFFICE VISIT (OUTPATIENT)
Dept: PULMONOLOGY | Age: 85
End: 2025-06-05
Payer: MEDICARE

## 2025-06-05 VITALS
WEIGHT: 123.2 LBS | DIASTOLIC BLOOD PRESSURE: 69 MMHG | TEMPERATURE: 97.3 F | OXYGEN SATURATION: 99 % | HEIGHT: 62 IN | SYSTOLIC BLOOD PRESSURE: 122 MMHG | RESPIRATION RATE: 18 BRPM | HEART RATE: 70 BPM | BODY MASS INDEX: 22.67 KG/M2

## 2025-06-05 DIAGNOSIS — R09.02 HYPOXEMIA: ICD-10-CM

## 2025-06-05 DIAGNOSIS — I27.20 PULMONARY HYPERTENSION (HCC): ICD-10-CM

## 2025-06-05 DIAGNOSIS — I35.0 AORTIC VALVE STENOSIS, ETIOLOGY OF CARDIAC VALVE DISEASE UNSPECIFIED: ICD-10-CM

## 2025-06-05 DIAGNOSIS — I51.89 DIASTOLIC DYSFUNCTION: Primary | ICD-10-CM

## 2025-06-05 DIAGNOSIS — I50.32 CHRONIC HEART FAILURE WITH PRESERVED EJECTION FRACTION (HFPEF) (HCC): ICD-10-CM

## 2025-06-05 PROCEDURE — 1126F AMNT PAIN NOTED NONE PRSNT: CPT | Performed by: NURSE PRACTITIONER

## 2025-06-05 PROCEDURE — G8427 DOCREV CUR MEDS BY ELIG CLIN: HCPCS | Performed by: NURSE PRACTITIONER

## 2025-06-05 PROCEDURE — 1123F ACP DISCUSS/DSCN MKR DOCD: CPT | Performed by: NURSE PRACTITIONER

## 2025-06-05 PROCEDURE — G8420 CALC BMI NORM PARAMETERS: HCPCS | Performed by: NURSE PRACTITIONER

## 2025-06-05 PROCEDURE — 3074F SYST BP LT 130 MM HG: CPT | Performed by: NURSE PRACTITIONER

## 2025-06-05 PROCEDURE — 3078F DIAST BP <80 MM HG: CPT | Performed by: NURSE PRACTITIONER

## 2025-06-05 PROCEDURE — G8399 PT W/DXA RESULTS DOCUMENT: HCPCS | Performed by: NURSE PRACTITIONER

## 2025-06-05 PROCEDURE — 1160F RVW MEDS BY RX/DR IN RCRD: CPT | Performed by: NURSE PRACTITIONER

## 2025-06-05 PROCEDURE — 1036F TOBACCO NON-USER: CPT | Performed by: NURSE PRACTITIONER

## 2025-06-05 PROCEDURE — 99214 OFFICE O/P EST MOD 30 MIN: CPT | Performed by: NURSE PRACTITIONER

## 2025-06-05 PROCEDURE — 1159F MED LIST DOCD IN RCRD: CPT | Performed by: NURSE PRACTITIONER

## 2025-06-05 PROCEDURE — 1090F PRES/ABSN URINE INCON ASSESS: CPT | Performed by: NURSE PRACTITIONER

## 2025-06-05 ASSESSMENT — ENCOUNTER SYMPTOMS
SPUTUM PRODUCTION: 0
SHORTNESS OF BREATH: 1
HEMOPTYSIS: 0
WHEEZING: 0
COUGH: 0

## 2025-06-05 NOTE — PROGRESS NOTES
Unknown (12/2/2024)    Exercise Vital Sign     Days of Exercise per Week: 0 days   Social Connections: Feeling Socially Integrated (3/13/2023)    OASIS : Social Isolation     Frequency of experiencing loneliness or isolation: Rarely   Intimate Partner Violence: Unknown (3/19/2021)    Received from [facility], [facility]    Intimate Partner Violence     Fear of Current or Ex-Partner: Not asked     Emotionally Abused: Not asked     Physically Abused: Not asked     Sexually Abused: Not asked   Housing Stability: Low Risk  (3/16/2025)    Housing Stability Vital Sign     Unable to Pay for Housing in the Last Year: No     Number of Times Moved in the Last Year: 1     Homeless in the Last Year: No       Family History   Problem Relation Age of Onset    Heart Disease Mother     Hypertension Mother     Hypertension Father     Heart Disease Father     Arrhythmia Father     Cancer Sister        Allergies   Allergen Reactions    Aspirin Nausea Only     Pt can take aspirin 81mg Pt c/o upset stomach with higher dose    Atorvastatin Other (See Comments)     Leg weakness    Codeine Nausea Only    Fluticasone-Salmeterol Other (See Comments)     shakes       Current Outpatient Medications   Medication Sig    metoprolol tartrate (LOPRESSOR) 50 MG tablet Take 1 tablet by mouth 2 times daily    amLODIPine (NORVASC) 10 MG tablet Take 1 tablet by mouth daily    apixaban (ELIQUIS) 2.5 MG TABS tablet Take 1 tablet by mouth 2 times daily    losartan (COZAAR) 25 MG tablet Take 1 tablet by mouth daily    Bumetanide (BUMEX PO) Take by mouth as needed    memantine (NAMENDA) 10 MG tablet Take 1 tablet by mouth 2 times daily    albuterol sulfate HFA (PROVENTIL;VENTOLIN;PROAIR) 108 (90 Base) MCG/ACT inhaler 2 puffs 4 times daily if needed for shortness of breath or wheezing.  May use generic or brand name as preferred by insurance.    gabapentin (NEURONTIN) 400 MG capsule Take 1 capsule by mouth 3 times daily.    montelukast (SINGULAIR)

## 2025-06-05 NOTE — PATIENT INSTRUCTIONS
Continue oxygen at 2 L/min with sleep.    Diuretic as prescribed per cardiology (Bumex).    She is to have pacemaker generator change July 1.    After period of recovery from pacemaker generator change, recommend that we evaluate overnight oximetry on 2 L/min to ensure adequate saturation and consider repeating echocardiogram later.    Advised to contact us sooner if she has worsening shortness of breath prior to scheduled appointment.    Continue albuterol inhaler 2 puffs up to 4 times daily as needed for shortness of breath.  She has found this beneficial.

## 2025-06-13 ENCOUNTER — HOSPITAL ENCOUNTER (INPATIENT)
Age: 85
LOS: 6 days | Discharge: HOME HEALTH | DRG: 291 | End: 2025-06-19
Attending: FAMILY MEDICINE | Admitting: FAMILY MEDICINE
Payer: MEDICARE

## 2025-06-13 ENCOUNTER — APPOINTMENT (OUTPATIENT)
Dept: CT IMAGING | Age: 85
End: 2025-06-13
Payer: MEDICARE

## 2025-06-13 ENCOUNTER — APPOINTMENT (OUTPATIENT)
Dept: GENERAL RADIOLOGY | Age: 85
End: 2025-06-13
Payer: MEDICARE

## 2025-06-13 ENCOUNTER — HOSPITAL ENCOUNTER (EMERGENCY)
Age: 85
Discharge: ANOTHER HOSPITAL | End: 2025-06-13
Attending: STUDENT IN AN ORGANIZED HEALTH CARE EDUCATION/TRAINING PROGRAM
Payer: MEDICARE

## 2025-06-13 VITALS
SYSTOLIC BLOOD PRESSURE: 113 MMHG | RESPIRATION RATE: 12 BRPM | TEMPERATURE: 98 F | DIASTOLIC BLOOD PRESSURE: 57 MMHG | OXYGEN SATURATION: 94 % | HEIGHT: 62 IN | HEART RATE: 71 BPM | WEIGHT: 122 LBS | BODY MASS INDEX: 22.45 KG/M2

## 2025-06-13 DIAGNOSIS — I50.9 CONGESTIVE HEART FAILURE, UNSPECIFIED HF CHRONICITY, UNSPECIFIED HEART FAILURE TYPE (HCC): Primary | ICD-10-CM

## 2025-06-13 DIAGNOSIS — R09.02 HYPOXIA: ICD-10-CM

## 2025-06-13 DIAGNOSIS — J18.9 PNEUMONIA DUE TO INFECTIOUS ORGANISM, UNSPECIFIED LATERALITY, UNSPECIFIED PART OF LUNG: Primary | ICD-10-CM

## 2025-06-13 PROBLEM — J96.01 ACUTE RESPIRATORY FAILURE WITH HYPOXIA (HCC): Status: ACTIVE | Noted: 2025-06-13

## 2025-06-13 PROBLEM — J81.1 PULMONARY EDEMA: Status: ACTIVE | Noted: 2025-06-13

## 2025-06-13 PROBLEM — R79.89 ELEVATED TROPONIN: Status: ACTIVE | Noted: 2025-06-13

## 2025-06-13 LAB
ALBUMIN SERPL-MCNC: 3.8 G/DL (ref 3.2–4.6)
ALBUMIN/GLOB SERPL: 1 (ref 1–1.9)
ALP SERPL-CCNC: 117 U/L (ref 35–104)
ALT SERPL-CCNC: 6 U/L (ref 12–65)
ANION GAP BLD CALC-SCNC: 10.5 MMOL/L
AST SERPL-CCNC: 26 U/L (ref 15–37)
BASOPHILS # BLD: 0.05 K/UL (ref 0–0.2)
BASOPHILS NFR BLD: 0.7 % (ref 0–2)
BILIRUB DIRECT SERPL-MCNC: 0.2 MG/DL (ref 0–0.3)
BILIRUB SERPL-MCNC: 0.6 MG/DL (ref 0–1.2)
BUN BLD-MCNC: 15 MG/DL (ref 8–26)
CHLORIDE BLD-SCNC: 103 MMOL/L (ref 98–107)
CO2 BLD-SCNC: 24.5 MMOL/L (ref 21–32)
CREAT BLD-MCNC: 1.05 MG/DL (ref 0.8–1.5)
DIFFERENTIAL METHOD BLD: ABNORMAL
EKG ATRIAL RATE: 70 BPM
EKG DIAGNOSIS: NORMAL
EKG P-R INTERVAL: 201 MS
EKG Q-T INTERVAL: 396 MS
EKG QRS DURATION: 93 MS
EKG QTC CALCULATION (BAZETT): 428 MS
EKG R AXIS: 52 DEGREES
EKG T AXIS: 40 DEGREES
EKG VENTRICULAR RATE: 70 BPM
EOSINOPHIL # BLD: 0.01 K/UL (ref 0–0.8)
EOSINOPHIL NFR BLD: 0.1 % (ref 0.5–7.8)
ERYTHROCYTE [DISTWIDTH] IN BLOOD BY AUTOMATED COUNT: 17.3 % (ref 11.9–14.6)
FLUAV RNA SPEC QL NAA+PROBE: NOT DETECTED
FLUBV RNA SPEC QL NAA+PROBE: NOT DETECTED
GLOBULIN SER CALC-MCNC: 3.9 G/DL (ref 2.3–3.5)
GLUCOSE BLD-MCNC: 105 MG/DL (ref 65–100)
HCT VFR BLD AUTO: 32.7 % (ref 35.8–46.3)
HGB BLD-MCNC: 10.1 G/DL (ref 11.7–15.4)
IMM GRANULOCYTES # BLD AUTO: 0.05 K/UL (ref 0–0.5)
IMM GRANULOCYTES NFR BLD AUTO: 0.7 % (ref 0–5)
LACTATE BLD-SCNC: 1.99 MMOL/L (ref 0.5–1.9)
LYMPHOCYTES # BLD: 1.35 K/UL (ref 0.5–4.6)
LYMPHOCYTES NFR BLD: 18.9 % (ref 13–44)
MAGNESIUM SERPL-MCNC: 1.7 MG/DL (ref 1.8–2.4)
MCH RBC QN AUTO: 24.9 PG (ref 26.1–32.9)
MCHC RBC AUTO-ENTMCNC: 30.9 G/DL (ref 31.4–35)
MCV RBC AUTO: 80.5 FL (ref 82–102)
MONOCYTES # BLD: 0.81 K/UL (ref 0.1–1.3)
MONOCYTES NFR BLD: 11.3 % (ref 4–12)
NEUTS SEG # BLD: 4.89 K/UL (ref 1.7–8.2)
NEUTS SEG NFR BLD: 68.3 % (ref 43–78)
NRBC # BLD: 0 K/UL (ref 0–0.2)
NT PRO BNP: ABNORMAL PG/ML (ref 0–450)
PLATELET # BLD AUTO: 232 K/UL (ref 150–450)
PMV BLD AUTO: 9.7 FL (ref 9.4–12.3)
POTASSIUM BLD-SCNC: 4.3 MMOL/L (ref 3.5–5.1)
PROCALCITONIN SERPL-MCNC: 0.08 NG/ML (ref 0–0.49)
PROT SERPL-MCNC: 7.7 G/DL (ref 6.3–8.2)
RBC # BLD AUTO: 4.06 M/UL (ref 4.05–5.2)
SARS-COV-2 RDRP RESP QL NAA+PROBE: NOT DETECTED
SERVICE CMNT-IMP: ABNORMAL
SODIUM BLD-SCNC: 138 MMOL/L (ref 136–145)
SOURCE: NORMAL
TROPONIN T SERPL HS-MCNC: 20.7 NG/L (ref 0–14)
TROPONIN T SERPL HS-MCNC: 23.6 NG/L (ref 0–14)
WBC # BLD AUTO: 7.2 K/UL (ref 4.3–11.1)

## 2025-06-13 PROCEDURE — 93010 ELECTROCARDIOGRAM REPORT: CPT | Performed by: INTERNAL MEDICINE

## 2025-06-13 PROCEDURE — 71260 CT THORAX DX C+: CPT

## 2025-06-13 PROCEDURE — 96375 TX/PRO/DX INJ NEW DRUG ADDON: CPT

## 2025-06-13 PROCEDURE — 80076 HEPATIC FUNCTION PANEL: CPT

## 2025-06-13 PROCEDURE — 84484 ASSAY OF TROPONIN QUANT: CPT

## 2025-06-13 PROCEDURE — 93005 ELECTROCARDIOGRAM TRACING: CPT | Performed by: EMERGENCY MEDICINE

## 2025-06-13 PROCEDURE — 96366 THER/PROPH/DIAG IV INF ADDON: CPT

## 2025-06-13 PROCEDURE — 84145 PROCALCITONIN (PCT): CPT

## 2025-06-13 PROCEDURE — 83735 ASSAY OF MAGNESIUM: CPT

## 2025-06-13 PROCEDURE — 87040 BLOOD CULTURE FOR BACTERIA: CPT

## 2025-06-13 PROCEDURE — 2580000003 HC RX 258: Performed by: STUDENT IN AN ORGANIZED HEALTH CARE EDUCATION/TRAINING PROGRAM

## 2025-06-13 PROCEDURE — 83880 ASSAY OF NATRIURETIC PEPTIDE: CPT

## 2025-06-13 PROCEDURE — 6360000004 HC RX CONTRAST MEDICATION: Performed by: STUDENT IN AN ORGANIZED HEALTH CARE EDUCATION/TRAINING PROGRAM

## 2025-06-13 PROCEDURE — 2500000003 HC RX 250 WO HCPCS: Performed by: STUDENT IN AN ORGANIZED HEALTH CARE EDUCATION/TRAINING PROGRAM

## 2025-06-13 PROCEDURE — 83605 ASSAY OF LACTIC ACID: CPT

## 2025-06-13 PROCEDURE — 80047 BASIC METABLC PNL IONIZED CA: CPT

## 2025-06-13 PROCEDURE — 87636 SARSCOV2 & INF A&B AMP PRB: CPT

## 2025-06-13 PROCEDURE — 6370000000 HC RX 637 (ALT 250 FOR IP): Performed by: STUDENT IN AN ORGANIZED HEALTH CARE EDUCATION/TRAINING PROGRAM

## 2025-06-13 PROCEDURE — 85025 COMPLETE CBC W/AUTO DIFF WBC: CPT

## 2025-06-13 PROCEDURE — 96368 THER/DIAG CONCURRENT INF: CPT

## 2025-06-13 PROCEDURE — 99285 EMERGENCY DEPT VISIT HI MDM: CPT

## 2025-06-13 PROCEDURE — 71045 X-RAY EXAM CHEST 1 VIEW: CPT

## 2025-06-13 PROCEDURE — 96365 THER/PROPH/DIAG IV INF INIT: CPT

## 2025-06-13 PROCEDURE — 1100000000 HC RM PRIVATE

## 2025-06-13 PROCEDURE — 6360000002 HC RX W HCPCS: Performed by: STUDENT IN AN ORGANIZED HEALTH CARE EDUCATION/TRAINING PROGRAM

## 2025-06-13 RX ORDER — POTASSIUM CHLORIDE 1500 MG/1
40 TABLET, EXTENDED RELEASE ORAL PRN
Status: DISCONTINUED | OUTPATIENT
Start: 2025-06-13 | End: 2025-06-16

## 2025-06-13 RX ORDER — ACETAMINOPHEN 500 MG
1000 TABLET ORAL
Status: COMPLETED | OUTPATIENT
Start: 2025-06-13 | End: 2025-06-13

## 2025-06-13 RX ORDER — SODIUM CHLORIDE 0.9 % (FLUSH) 0.9 %
5-40 SYRINGE (ML) INJECTION PRN
Status: DISCONTINUED | OUTPATIENT
Start: 2025-06-13 | End: 2025-06-19 | Stop reason: HOSPADM

## 2025-06-13 RX ORDER — ACETAMINOPHEN 650 MG/1
650 SUPPOSITORY RECTAL EVERY 6 HOURS PRN
Status: DISCONTINUED | OUTPATIENT
Start: 2025-06-13 | End: 2025-06-19 | Stop reason: HOSPADM

## 2025-06-13 RX ORDER — POLYETHYLENE GLYCOL 3350 17 G/17G
17 POWDER, FOR SOLUTION ORAL DAILY PRN
Status: DISCONTINUED | OUTPATIENT
Start: 2025-06-13 | End: 2025-06-19 | Stop reason: HOSPADM

## 2025-06-13 RX ORDER — SODIUM CHLORIDE 0.9 % (FLUSH) 0.9 %
5-40 SYRINGE (ML) INJECTION EVERY 12 HOURS SCHEDULED
Status: DISCONTINUED | OUTPATIENT
Start: 2025-06-14 | End: 2025-06-19 | Stop reason: HOSPADM

## 2025-06-13 RX ORDER — PANTOPRAZOLE SODIUM 40 MG/1
40 TABLET, DELAYED RELEASE ORAL
Status: DISCONTINUED | OUTPATIENT
Start: 2025-06-14 | End: 2025-06-19 | Stop reason: HOSPADM

## 2025-06-13 RX ORDER — MAGNESIUM SULFATE IN WATER 40 MG/ML
2000 INJECTION, SOLUTION INTRAVENOUS ONCE
Status: COMPLETED | OUTPATIENT
Start: 2025-06-13 | End: 2025-06-13

## 2025-06-13 RX ORDER — GABAPENTIN 400 MG/1
400 CAPSULE ORAL 3 TIMES DAILY
Status: DISCONTINUED | OUTPATIENT
Start: 2025-06-14 | End: 2025-06-19 | Stop reason: HOSPADM

## 2025-06-13 RX ORDER — METOPROLOL TARTRATE 50 MG
50 TABLET ORAL 2 TIMES DAILY
Status: DISCONTINUED | OUTPATIENT
Start: 2025-06-14 | End: 2025-06-19 | Stop reason: HOSPADM

## 2025-06-13 RX ORDER — MAGNESIUM SULFATE IN WATER 40 MG/ML
2000 INJECTION, SOLUTION INTRAVENOUS PRN
Status: DISCONTINUED | OUTPATIENT
Start: 2025-06-13 | End: 2025-06-19 | Stop reason: HOSPADM

## 2025-06-13 RX ORDER — FUROSEMIDE 10 MG/ML
40 INJECTION INTRAMUSCULAR; INTRAVENOUS 2 TIMES DAILY
Status: DISCONTINUED | OUTPATIENT
Start: 2025-06-14 | End: 2025-06-17

## 2025-06-13 RX ORDER — IOPAMIDOL 755 MG/ML
100 INJECTION, SOLUTION INTRAVASCULAR
Status: COMPLETED | OUTPATIENT
Start: 2025-06-13 | End: 2025-06-13

## 2025-06-13 RX ORDER — IPRATROPIUM BROMIDE AND ALBUTEROL SULFATE 2.5; .5 MG/3ML; MG/3ML
1 SOLUTION RESPIRATORY (INHALATION)
Status: COMPLETED | OUTPATIENT
Start: 2025-06-13 | End: 2025-06-13

## 2025-06-13 RX ORDER — MEMANTINE HYDROCHLORIDE 5 MG/1
10 TABLET ORAL 2 TIMES DAILY
Status: DISCONTINUED | OUTPATIENT
Start: 2025-06-14 | End: 2025-06-19 | Stop reason: HOSPADM

## 2025-06-13 RX ORDER — SODIUM CHLORIDE 9 MG/ML
INJECTION, SOLUTION INTRAVENOUS PRN
Status: DISCONTINUED | OUTPATIENT
Start: 2025-06-13 | End: 2025-06-19 | Stop reason: HOSPADM

## 2025-06-13 RX ORDER — POTASSIUM CHLORIDE 7.45 MG/ML
10 INJECTION INTRAVENOUS PRN
Status: DISCONTINUED | OUTPATIENT
Start: 2025-06-13 | End: 2025-06-16

## 2025-06-13 RX ORDER — ALLOPURINOL 100 MG/1
100 TABLET ORAL DAILY
Status: DISCONTINUED | OUTPATIENT
Start: 2025-06-14 | End: 2025-06-19 | Stop reason: HOSPADM

## 2025-06-13 RX ORDER — NITROFURANTOIN MACROCRYSTALS 100 MG/1
100 CAPSULE ORAL NIGHTLY
Status: DISCONTINUED | OUTPATIENT
Start: 2025-06-14 | End: 2025-06-14 | Stop reason: DRUGHIGH

## 2025-06-13 RX ORDER — ONDANSETRON 4 MG/1
4 TABLET, ORALLY DISINTEGRATING ORAL EVERY 8 HOURS PRN
Status: DISCONTINUED | OUTPATIENT
Start: 2025-06-13 | End: 2025-06-19 | Stop reason: HOSPADM

## 2025-06-13 RX ORDER — MONTELUKAST SODIUM 10 MG/1
10 TABLET ORAL NIGHTLY
Status: DISCONTINUED | OUTPATIENT
Start: 2025-06-14 | End: 2025-06-19 | Stop reason: HOSPADM

## 2025-06-13 RX ORDER — LOSARTAN POTASSIUM 25 MG/1
25 TABLET ORAL DAILY
Status: DISCONTINUED | OUTPATIENT
Start: 2025-06-14 | End: 2025-06-19 | Stop reason: HOSPADM

## 2025-06-13 RX ORDER — AMLODIPINE BESYLATE 10 MG/1
10 TABLET ORAL DAILY
Status: DISCONTINUED | OUTPATIENT
Start: 2025-06-14 | End: 2025-06-19 | Stop reason: HOSPADM

## 2025-06-13 RX ORDER — ACETAMINOPHEN 325 MG/1
650 TABLET ORAL EVERY 6 HOURS PRN
Status: DISCONTINUED | OUTPATIENT
Start: 2025-06-13 | End: 2025-06-19 | Stop reason: HOSPADM

## 2025-06-13 RX ORDER — ONDANSETRON 2 MG/ML
4 INJECTION INTRAMUSCULAR; INTRAVENOUS EVERY 6 HOURS PRN
Status: DISCONTINUED | OUTPATIENT
Start: 2025-06-13 | End: 2025-06-19 | Stop reason: HOSPADM

## 2025-06-13 RX ADMIN — ACETAMINOPHEN 1000 MG: 500 TABLET ORAL at 15:33

## 2025-06-13 RX ADMIN — IPRATROPIUM BROMIDE AND ALBUTEROL SULFATE 1 DOSE: 2.5; .5 SOLUTION RESPIRATORY (INHALATION) at 15:52

## 2025-06-13 RX ADMIN — CEFTRIAXONE 1000 MG: 1 INJECTION, POWDER, FOR SOLUTION INTRAMUSCULAR; INTRAVENOUS at 17:07

## 2025-06-13 RX ADMIN — IOPAMIDOL 100 ML: 755 INJECTION, SOLUTION INTRAVENOUS at 18:10

## 2025-06-13 RX ADMIN — AZITHROMYCIN MONOHYDRATE 500 MG: 500 INJECTION, POWDER, LYOPHILIZED, FOR SOLUTION INTRAVENOUS at 17:10

## 2025-06-13 RX ADMIN — METHYLPREDNISOLONE SODIUM SUCCINATE 125 MG: 125 INJECTION INTRAMUSCULAR; INTRAVENOUS at 15:46

## 2025-06-13 RX ADMIN — MAGNESIUM SULFATE HEPTAHYDRATE 2000 MG: 40 INJECTION, SOLUTION INTRAVENOUS at 15:50

## 2025-06-13 ASSESSMENT — PAIN - FUNCTIONAL ASSESSMENT: PAIN_FUNCTIONAL_ASSESSMENT: NONE - DENIES PAIN

## 2025-06-13 NOTE — ED TRIAGE NOTES
Pt ambulatory to triage with daughter. Daughter states pt started coughing and wheezing that started yesterday, states pt has COPD and wears 2L NC at night. Pt also reports shortness of breath, denies chest pain

## 2025-06-13 NOTE — ED PROVIDER NOTES
Emergency Department Provider Note       [facility]   PCP: Nichole Bales MD   Age: 84 y.o.   Sex: female     DISPOSITION Decision To Transfer 06/13/2025 07:20:33 PM    ICD-10-CM    1. Pneumonia due to infectious organism, unspecified laterality, unspecified part of lung  J18.9       2. Hypoxia  R09.02           Medical Decision Making     84-year-old female patient with history of pulmonary hypertension, aortic stenosis, previous pacemaker placement, COPD/emphysema presents to this department with reports of progressive shortness of breath, cough and fever.  Febrile 0.7 on arrival.  She is hypoxic with movement, resolved with 4 L nasal cannula.  Lung sounds are diminished with faint end expiratory wheezing present.  Will give DuoNeb treatment, steroids and magnesium while we obtain full lab workup including sepsis markers, COVID and flu swabs.  Orders placed for single view chest    CT imaging of the chest shows no focal clot, there is mention of bibasilar atelectasis which I suspect may be developing infectious process given patient's presentation, fever here.  She has been treated with antibiotics and will require admission secondary to oxygen demand.    ED Course as of 06/13/25 2213   Fri Jun 13, 2025   1551 EKG interpretation: Paced rhythm, rate of 70, normal axis, no obvious ischemia [BR]   1656 Patient reassessed, temperature improved to 99.7.  Resting comfortably.  Stable saturations, blood pressure and heart rate.  Son is now at bedside, discussed plan to obtain CT imaging of the chest, start antibiotics and likely admit given patient's new oxygen requirement [BR]   1703 COVID and flu are negative.  Will move forward with antibiotics.  Patient lactic is just under 2.  Procalcitonin is normal [BR]   1722 Significant elevation of patient's BNP, slight elevation in troponin, no ischemic findings on EKG.  Will trend troponin. [BR]   1722 Patient does not appear overtly fluid overloaded on my exam,

## 2025-06-13 NOTE — ED NOTES
Blood Culture drawn and 2nd staff member assisted (audited) Kelly.  Clinical collect stickers were printed prior to scanning and administering antibiotics.

## 2025-06-14 PROBLEM — I50.9 CONGESTIVE HEART FAILURE (HCC): Status: ACTIVE | Noted: 2025-06-14

## 2025-06-14 LAB
ANION GAP SERPL CALC-SCNC: 9 MMOL/L (ref 7–16)
BUN SERPL-MCNC: 18 MG/DL (ref 8–23)
CALCIUM SERPL-MCNC: 9.4 MG/DL (ref 8.8–10.2)
CHLORIDE SERPL-SCNC: 103 MMOL/L (ref 98–107)
CHOLEST SERPL-MCNC: 148 MG/DL (ref 0–200)
CO2 SERPL-SCNC: 23 MMOL/L (ref 20–29)
CREAT SERPL-MCNC: 1.07 MG/DL (ref 0.6–1.1)
FERRITIN SERPL-MCNC: 45 NG/ML (ref 8–388)
GLUCOSE BLD STRIP.AUTO-MCNC: 109 MG/DL (ref 65–100)
GLUCOSE SERPL-MCNC: 117 MG/DL (ref 70–99)
HDLC SERPL-MCNC: 42 MG/DL (ref 40–60)
HDLC SERPL: 3.5 (ref 0–5)
IRON SATN MFR SERPL: 6 % (ref 20–50)
IRON SERPL-MCNC: 21 UG/DL (ref 35–100)
LDLC SERPL CALC-MCNC: 89 MG/DL (ref 0–100)
MAGNESIUM SERPL-MCNC: 2.5 MG/DL (ref 1.8–2.4)
POTASSIUM SERPL-SCNC: 4.9 MMOL/L (ref 3.5–5.1)
SERVICE CMNT-IMP: ABNORMAL
SODIUM SERPL-SCNC: 135 MMOL/L (ref 136–145)
TIBC SERPL-MCNC: 364 UG/DL (ref 240–450)
TRIGL SERPL-MCNC: 85 MG/DL (ref 0–150)
TSH W FREE THYROID IF ABNORMAL: 0.59 UIU/ML (ref 0.27–4.2)
UIBC SERPL-MCNC: 343 UG/DL (ref 112–347)
VLDLC SERPL CALC-MCNC: 17 MG/DL (ref 6–23)

## 2025-06-14 PROCEDURE — 83550 IRON BINDING TEST: CPT

## 2025-06-14 PROCEDURE — 36415 COLL VENOUS BLD VENIPUNCTURE: CPT

## 2025-06-14 PROCEDURE — 6360000002 HC RX W HCPCS: Performed by: FAMILY MEDICINE

## 2025-06-14 PROCEDURE — 6370000000 HC RX 637 (ALT 250 FOR IP): Performed by: INTERNAL MEDICINE

## 2025-06-14 PROCEDURE — 82962 GLUCOSE BLOOD TEST: CPT

## 2025-06-14 PROCEDURE — 2500000003 HC RX 250 WO HCPCS: Performed by: INTERNAL MEDICINE

## 2025-06-14 PROCEDURE — 80048 BASIC METABOLIC PNL TOTAL CA: CPT

## 2025-06-14 PROCEDURE — 83540 ASSAY OF IRON: CPT

## 2025-06-14 PROCEDURE — 80061 LIPID PANEL: CPT

## 2025-06-14 PROCEDURE — 82728 ASSAY OF FERRITIN: CPT

## 2025-06-14 PROCEDURE — 83735 ASSAY OF MAGNESIUM: CPT

## 2025-06-14 PROCEDURE — 2500000003 HC RX 250 WO HCPCS: Performed by: FAMILY MEDICINE

## 2025-06-14 PROCEDURE — 84443 ASSAY THYROID STIM HORMONE: CPT

## 2025-06-14 PROCEDURE — 6370000000 HC RX 637 (ALT 250 FOR IP): Performed by: FAMILY MEDICINE

## 2025-06-14 PROCEDURE — 1100000000 HC RM PRIVATE

## 2025-06-14 RX ORDER — IBUPROFEN 200 MG
400 TABLET ORAL EVERY 6 HOURS PRN
Status: DISCONTINUED | OUTPATIENT
Start: 2025-06-14 | End: 2025-06-14

## 2025-06-14 RX ORDER — IBUPROFEN 600 MG/1
600 TABLET, FILM COATED ORAL ONCE
Status: COMPLETED | OUTPATIENT
Start: 2025-06-14 | End: 2025-06-14

## 2025-06-14 RX ORDER — GUAIFENESIN 200 MG/10ML
200 LIQUID ORAL EVERY 4 HOURS PRN
Status: DISCONTINUED | OUTPATIENT
Start: 2025-06-14 | End: 2025-06-19 | Stop reason: HOSPADM

## 2025-06-14 RX ORDER — IBUPROFEN 600 MG/1
600 TABLET, FILM COATED ORAL EVERY 6 HOURS PRN
Status: DISCONTINUED | OUTPATIENT
Start: 2025-06-14 | End: 2025-06-14

## 2025-06-14 RX ADMIN — ACETAMINOPHEN 650 MG: 325 TABLET, FILM COATED ORAL at 15:00

## 2025-06-14 RX ADMIN — AMLODIPINE BESYLATE 10 MG: 10 TABLET ORAL at 09:26

## 2025-06-14 RX ADMIN — GABAPENTIN 400 MG: 400 CAPSULE ORAL at 21:47

## 2025-06-14 RX ADMIN — GABAPENTIN 400 MG: 400 CAPSULE ORAL at 09:27

## 2025-06-14 RX ADMIN — PANTOPRAZOLE SODIUM 40 MG: 40 TABLET, DELAYED RELEASE ORAL at 05:38

## 2025-06-14 RX ADMIN — METOPROLOL TARTRATE 50 MG: 50 TABLET, FILM COATED ORAL at 21:47

## 2025-06-14 RX ADMIN — MEMANTINE 10 MG: 5 TABLET ORAL at 09:27

## 2025-06-14 RX ADMIN — IBUPROFEN 600 MG: 600 TABLET, FILM COATED ORAL at 16:32

## 2025-06-14 RX ADMIN — FUROSEMIDE 40 MG: 10 INJECTION, SOLUTION INTRAMUSCULAR; INTRAVENOUS at 16:52

## 2025-06-14 RX ADMIN — LOSARTAN POTASSIUM 25 MG: 25 TABLET, FILM COATED ORAL at 09:27

## 2025-06-14 RX ADMIN — METOPROLOL TARTRATE 50 MG: 50 TABLET, FILM COATED ORAL at 09:26

## 2025-06-14 RX ADMIN — SERTRALINE 50 MG: 50 TABLET, FILM COATED ORAL at 09:27

## 2025-06-14 RX ADMIN — ACETAMINOPHEN 650 MG: 325 TABLET, FILM COATED ORAL at 00:05

## 2025-06-14 RX ADMIN — GABAPENTIN 400 MG: 400 CAPSULE ORAL at 14:35

## 2025-06-14 RX ADMIN — SODIUM CHLORIDE, PRESERVATIVE FREE 10 ML: 5 INJECTION INTRAVENOUS at 09:30

## 2025-06-14 RX ADMIN — FUROSEMIDE 40 MG: 10 INJECTION, SOLUTION INTRAMUSCULAR; INTRAVENOUS at 09:27

## 2025-06-14 RX ADMIN — ALLOPURINOL 100 MG: 100 TABLET ORAL at 09:27

## 2025-06-14 RX ADMIN — SODIUM CHLORIDE, PRESERVATIVE FREE 10 ML: 5 INJECTION INTRAVENOUS at 16:55

## 2025-06-14 RX ADMIN — MONTELUKAST 10 MG: 10 TABLET, FILM COATED ORAL at 21:47

## 2025-06-14 RX ADMIN — GUAIFENESIN 200 MG: 100 LIQUID ORAL at 17:39

## 2025-06-14 RX ADMIN — APIXABAN 2.5 MG: 2.5 TABLET, FILM COATED ORAL at 09:27

## 2025-06-14 RX ADMIN — SODIUM CHLORIDE, PRESERVATIVE FREE 10 ML: 5 INJECTION INTRAVENOUS at 21:47

## 2025-06-14 RX ADMIN — MEMANTINE 10 MG: 5 TABLET ORAL at 21:47

## 2025-06-14 RX ADMIN — APIXABAN 2.5 MG: 2.5 TABLET, FILM COATED ORAL at 21:47

## 2025-06-14 ASSESSMENT — PAIN SCALES - GENERAL: PAINLEVEL_OUTOF10: 5

## 2025-06-14 ASSESSMENT — PAIN DESCRIPTION - DESCRIPTORS: DESCRIPTORS: ACHING

## 2025-06-14 ASSESSMENT — PAIN DESCRIPTION - LOCATION: LOCATION: HEAD

## 2025-06-14 NOTE — H&P
[facility] Hospitalist Initial History and Physical Note    Patient: Erica Jose Date: 6/13/2025  female, 84 y.o.  Admit Date: 6/13/2025  Attending: Walker Rodriguez MD     ASSESSMENT AND PLAN:     Principal Problem:    Acute respiratory failure with hypoxia (HCC)  Plan: Wean O2 as tolerated. Treat per below.  Active Problems:    Pulmonary edema  Plan: Evident on CXR. IV Lasix, follow output. Continue ARB/beta blocker. TTE in AM. Consider cardiology consult.     Normocytic anemia  Plan: Follow CBC    Pulmonary hypertension (HCC)  Plan: Stable    Stage 3a chronic kidney disease (HCC)  Plan: Follow BMP    Chronic respiratory failure (HCC)  Plan: Per above    Elevated troponin  Plan: Mildly elevated, trend troponin    Hyperlipidemia  Plan: Stable. Continue home meds.    CAD in native artery  Plan: Stable. Continue home meds.    Hypertension  Plan: Stable. Continue home meds.    Obesity (BMI 30.0-34.9)  Plan: Stable    PAF (paroxysmal atrial fibrillation) ([facility])  Plan: Stable. Continue home meds.    Malignant neoplasm of upper-outer quadrant of right breast in female, estrogen receptor positive ([facility])  Plan: Stable    Chronic heart failure with preserved ejection fraction (HFpEF) ([facility])  Plan: Per above    Dementia ([facility])  Plan: Stable. Continue home meds.       DVT Prophylaxis: Home       Code Status: FULL CODE      Disposition: Admit to remote [facility] for evaluation and treatment as per above.      Anticipated discharge: 2-3 days     CHIEF COMPLAINT:  shortness of breath    HISTORY OF PRESENT ILLNESS:      Patient Active Problem List   Diagnosis    Cataract, bilateral    Recurrent major depressive disorder, in full remission    Hyperlipidemia    CAD in native artery    Normocytic anemia    Bradycardia    Impaired functional mobility, balance, gait, and endurance    Allergic rhinitis    Current use of long term anticoagulation    Debility    Shortness of breath    Pacemaker generator end of life    History of tobacco use

## 2025-06-14 NOTE — ED NOTES
TRANSFER - OUT REPORT:    Verbal report given to Yaquelin DOTSON on Erica Jose  being transferred to [facility] for routine progression of patient care       Report consisted of patient's Situation, Background, Assessment and   Recommendations(SBAR).     Information from the following report(s) Nurse Handoff Report was reviewed with the receiving nurse.    Lines:   Peripheral IV 06/13/25 Left Antecubital (Active)   Site Assessment Clean, dry & intact 06/13/25 1541   Line Status Brisk blood return;Flushed;Normal saline locked;Specimen collected 06/13/25 1541   Line Care Connections checked and tightened 06/13/25 1541   Phlebitis Assessment No symptoms 06/13/25 1541   Infiltration Assessment 0 06/13/25 1541   Dressing Status Clean, dry & intact 06/13/25 1541   Dressing Type Transparent 06/13/25 1541   Dressing Intervention New 06/13/25 1541       Peripheral IV 06/13/25 Left;Posterior Forearm (Active)   Site Assessment Clean, dry & intact 06/13/25 1606   Line Status Brisk blood return;Flushed;Normal saline locked;Specimen collected 06/13/25 1606   Line Care Connections checked and tightened 06/13/25 1606   Phlebitis Assessment No symptoms 06/13/25 1606   Infiltration Assessment 0 06/13/25 1606   Dressing Status Clean, dry & intact 06/13/25 1606   Dressing Type Transparent 06/13/25 1606   Dressing Intervention New 06/13/25 1606        Opportunity for questions and clarification was provided.      Patient transported with:  Monitor   VENTURA HODGE

## 2025-06-14 NOTE — CARE COORDINATION
RNCM met with patient in room 353 to discuss discharge planning.   Patient is an 84 year old female admitted with acute respiratory failure, per MD notes.    Patient presents to assessment alert and oriented, and answers questions appropriately.  Patient typically lives at home alone with a CLTC aide available 3 hours a day, 5 days a week. The patient's son and daughter visit mutiple times a day and provide care when aide is not present.      Demographics verified. Patient has [employer] Medicare and Medicaid.  PCP is Dr. Nichole Bales.    At time of assessment, patient is on 2L/min O2. Patient is typically only on oxygen therapy at night. PT/OT evaluations pending. Case management will continue to follow.  Please notify if there are any changes.           06/14/25 2433   Service Assessment   Patient Orientation Alert and Oriented;Person;Situation;Place;Self   Cognition Alert   History Provided By Patient;Child/Family   Primary Caregiver Self   Accompanied By/Relationship daughter   Support Systems Children   Patient's Healthcare Decision Maker is: Legal Next of Kin  (Letty Cote, daughter [phone])   PCP Verified by CM Yes   Last Visit to PCP Within last 6 months   Prior Functional Level Assistance with the following:;Shopping;Housework;Cooking;Bathing   Current Functional Level Assistance with the following:;Housework;Cooking;Shopping;Bathing   Can patient return to prior living arrangement Yes   Ability to make needs known: Good   Family able to assist with home care needs: Yes   Would you like for me to discuss the discharge plan with any other family members/significant others, and if so, who? Yes  (daughter)   Financial Resources Medicare;Medicaid   Community Resources Other (Comment)  (CLTC aides)   CM/SW Referral Other (see comment)  (standard patient assessment)   Social/Functional History   Lives With Alone;Home care staff   Type of Home Apartment   Home Layout One level   Home Equipment Walker -

## 2025-06-14 NOTE — CONSULTS
Referring/consulting physician:Dr. Rodriguez     History of Present Illness:   84 y.o. female presented to [facility] emergency department 6/13/2025 with complaints of shortness of breath for 24 hours she described cough with sputum production.      Left Ventricle: Normal left ventricular systolic function with a visually estimated EF of 55 - 60%. Left ventricle size is normal. Mildly increased wall thickness. Findings consistent with mild concentric hypertrophy. Normal wall motion.    Right Ventricle: Right ventricle is mildly dilated. Normal systolic function.    Aortic Valve: Trileaflet valve. Mildly calcified cusps. Mild stenosis of the aortic valve. AV mean gradient is 14 mmHg. AV area by continuity VTI is 1.2 cm2. AV Mean Gradient is 14 mmHg. AV Peak Velocity is 2.4 m/s. AV Velocity Ratio is 0.42. LVOT:AV VTI Index is 0.42.    Mitral Valve: Mild to moderate regurgitation.    Tricuspid Valve: Moderate regurgitation. RVSP is 73 mmHg.    Left Atrium: Left atrium is severely dilated. Left atrial volume index is severely increased (>48 mL/m2) mL/m2.    Right Atrium: Right atrium is dilated.    Assessment & Plan:           84 y.o. female     Aortic valve stenosis  -Moderate sclerosis with mild stenosis    Pacemaker in situ  -Low burden of right ventricular pacing  - EF within normal limits    HFpEF  - RVSP noted to be 73 mmHg making hemodynamics difficult.  - Due to the patient's age as well as underlying dementia current treatment for combined group 2 group 3 pulmonary hypertension is appropriate.  - Agree with current plan for IV diuresis afterload reduction with treatment of her underlying hypertension  - There appears to be a complicated COPD as well.  - Aldactone will be added.  SGL 2 therapy may be considered in the future      ASCVD  - Eliquis monotherapy per primary cardiologist    Atrial fibrillation  -Anticoagulation with Eliquis      Dementia    Please call with questions    Review of

## 2025-06-14 NOTE — PLAN OF CARE
Problem: Respiratory - Adult  Goal: Achieves optimal ventilation and oxygenation  Outcome: Progressing     Problem: Metabolic/Fluid and Electrolytes - Adult  Goal: Electrolytes maintained within normal limits  Outcome: Progressing     Problem: Discharge Planning  Goal: Discharge to home or other facility with appropriate resources  Outcome: Adequate for Discharge     Problem: Safety - Adult  Goal: Free from fall injury  Outcome: Adequate for Discharge     Problem: Pain  Goal: Verbalizes/displays adequate comfort level or baseline comfort level  Outcome: Adequate for Discharge     Problem: Cardiovascular - Adult  Goal: Maintains optimal cardiac output and hemodynamic stability  Outcome: Adequate for Discharge     Problem: Skin/Tissue Integrity - Adult  Goal: Skin integrity remains intact  Outcome: Adequate for Discharge     Problem: Genitourinary - Adult  Goal: Absence of urinary retention  Outcome: Adequate for Discharge

## 2025-06-15 ENCOUNTER — APPOINTMENT (OUTPATIENT)
Dept: NON INVASIVE DIAGNOSTICS | Age: 85
DRG: 291 | End: 2025-06-15
Attending: FAMILY MEDICINE
Payer: MEDICARE

## 2025-06-15 ENCOUNTER — APPOINTMENT (OUTPATIENT)
Dept: GENERAL RADIOLOGY | Age: 85
DRG: 291 | End: 2025-06-15
Attending: FAMILY MEDICINE
Payer: MEDICARE

## 2025-06-15 LAB
ANION GAP SERPL CALC-SCNC: 14 MMOL/L (ref 7–16)
APPEARANCE UR: CLEAR
BACTERIA SPEC CULT: NORMAL
BACTERIA SPEC CULT: NORMAL
BACTERIA URNS QL MICRO: NEGATIVE /HPF
BASOPHILS # BLD: 0.02 K/UL (ref 0–0.2)
BASOPHILS NFR BLD: 0.2 % (ref 0–2)
BILIRUB UR QL: NEGATIVE
BUN SERPL-MCNC: 20 MG/DL (ref 8–23)
CALCIUM SERPL-MCNC: 9.2 MG/DL (ref 8.8–10.2)
CASTS URNS QL MICRO: 0 /LPF
CHLORIDE SERPL-SCNC: 95 MMOL/L (ref 98–107)
CO2 SERPL-SCNC: 25 MMOL/L (ref 20–29)
COLOR UR: ABNORMAL
CREAT SERPL-MCNC: 1.19 MG/DL (ref 0.6–1.1)
CRYSTALS URNS QL MICRO: 0 /LPF
DIFFERENTIAL METHOD BLD: ABNORMAL
ECHO AV AREA PEAK VELOCITY: 1.2 CM2
ECHO AV AREA VTI: 1.2 CM2
ECHO AV AREA/BSA PEAK VELOCITY: 0.8 CM2/M2
ECHO AV AREA/BSA VTI: 0.8 CM2/M2
ECHO AV CUSP MM: 1.6 CM
ECHO AV MEAN GRADIENT: 14 MMHG
ECHO AV MEAN VELOCITY: 1.8 M/S
ECHO AV PEAK GRADIENT: 23 MMHG
ECHO AV PEAK VELOCITY: 2.4 M/S
ECHO AV VELOCITY RATIO: 0.42
ECHO AV VTI: 50.4 CM
ECHO BSA: 1.59 M2
ECHO EST RA PRESSURE: 3 MMHG
ECHO IVC EXP: 1.8 CM
ECHO LA AREA 2C: 26.7 CM2
ECHO LA AREA 4C: 23 CM2
ECHO LA DIAMETER INDEX: 2.41 CM/M2
ECHO LA DIAMETER: 3.8 CM
ECHO LA MAJOR AXIS: 6.2 CM
ECHO LA MINOR AXIS: 6.7 CM
ECHO LA VOL BP: 81 ML (ref 22–52)
ECHO LA VOL MOD A2C: 89 ML (ref 22–52)
ECHO LA VOL MOD A4C: 69 ML (ref 22–52)
ECHO LA VOL/BSA BIPLANE: 51 ML/M2 (ref 16–34)
ECHO LA VOLUME INDEX MOD A2C: 56 ML/M2 (ref 16–34)
ECHO LA VOLUME INDEX MOD A4C: 44 ML/M2 (ref 16–34)
ECHO LV EDV A2C: 70 ML
ECHO LV EDV A4C: 68 ML
ECHO LV EDV INDEX A4C: 43 ML/M2
ECHO LV EDV NDEX A2C: 44 ML/M2
ECHO LV EF PHYSICIAN: 56 %
ECHO LV EJECTION FRACTION A2C: 57 %
ECHO LV EJECTION FRACTION A4C: 57 %
ECHO LV EJECTION FRACTION BIPLANE: 56 % (ref 55–100)
ECHO LV ESV A2C: 31 ML
ECHO LV ESV A4C: 29 ML
ECHO LV ESV INDEX A2C: 20 ML/M2
ECHO LV ESV INDEX A4C: 18 ML/M2
ECHO LV FRACTIONAL SHORTENING: 35 % (ref 28–44)
ECHO LV INTERNAL DIMENSION DIASTOLE INDEX: 2.72 CM/M2
ECHO LV INTERNAL DIMENSION DIASTOLIC: 4.3 CM (ref 3.9–5.3)
ECHO LV INTERNAL DIMENSION SYSTOLIC INDEX: 1.77 CM/M2
ECHO LV INTERNAL DIMENSION SYSTOLIC: 2.8 CM
ECHO LV IVSD: 1.2 CM (ref 0.6–0.9)
ECHO LV MASS 2D: 196.1 G (ref 67–162)
ECHO LV MASS INDEX 2D: 124.1 G/M2 (ref 43–95)
ECHO LV POSTERIOR WALL DIASTOLIC: 1.3 CM (ref 0.6–0.9)
ECHO LV RELATIVE WALL THICKNESS RATIO: 0.6
ECHO LVOT AREA: 2.8 CM2
ECHO LVOT AV VTI INDEX: 0.42
ECHO LVOT DIAM: 1.9 CM
ECHO LVOT MEAN GRADIENT: 3 MMHG
ECHO LVOT PEAK GRADIENT: 4 MMHG
ECHO LVOT PEAK VELOCITY: 1 M/S
ECHO LVOT STROKE VOLUME INDEX: 38.2 ML/M2
ECHO LVOT SV: 60.4 ML
ECHO LVOT VTI: 21.3 CM
ECHO MV AREA VTI: 1.9 CM2
ECHO MV LVOT VTI INDEX: 1.48
ECHO MV MAX VELOCITY: 1 M/S
ECHO MV MEAN GRADIENT: 2 MMHG
ECHO MV MEAN VELOCITY: 0.6 M/S
ECHO MV PEAK GRADIENT: 4 MMHG
ECHO MV REGURGITANT PEAK GRADIENT: 159 MMHG
ECHO MV REGURGITANT PEAK VELOCITY: 6.3 M/S
ECHO MV REGURGITANT VTIA: 196 CM
ECHO MV VTI: 31.5 CM
ECHO PULMONARY ARTERY END DIASTOLIC PRESSURE: 8 MMHG
ECHO PV MAX VELOCITY: 1 M/S
ECHO PV PEAK GRADIENT: 4 MMHG
ECHO PV REGURGITANT MAX VELOCITY: 1.4 M/S
ECHO RIGHT VENTRICULAR SYSTOLIC PRESSURE (RVSP): 73 MMHG
ECHO RV BASAL DIMENSION: 4.5 CM
ECHO RV TAPSE: 2.2 CM (ref 1.7–?)
ECHO TV REGURGITANT MAX VELOCITY: 4.19 M/S
ECHO TV REGURGITANT PEAK GRADIENT: 70 MMHG
EOSINOPHIL # BLD: 0.09 K/UL (ref 0–0.8)
EOSINOPHIL NFR BLD: 0.9 % (ref 0.5–7.8)
EPI CELLS #/AREA URNS HPF: ABNORMAL /HPF
ERYTHROCYTE [DISTWIDTH] IN BLOOD BY AUTOMATED COUNT: 17.3 % (ref 11.9–14.6)
GLUCOSE SERPL-MCNC: 88 MG/DL (ref 70–99)
GLUCOSE UR STRIP.AUTO-MCNC: NEGATIVE MG/DL
HCT VFR BLD AUTO: 34 % (ref 35.8–46.3)
HGB BLD-MCNC: 10.4 G/DL (ref 11.7–15.4)
HGB UR QL STRIP: NEGATIVE
HYALINE CASTS URNS QL MICRO: ABNORMAL /LPF
IMM GRANULOCYTES # BLD AUTO: 0.06 K/UL (ref 0–0.5)
IMM GRANULOCYTES NFR BLD AUTO: 0.6 % (ref 0–5)
KETONES UR QL STRIP.AUTO: NEGATIVE MG/DL
LEUKOCYTE ESTERASE UR QL STRIP.AUTO: NEGATIVE
LYMPHOCYTES # BLD: 1.42 K/UL (ref 0.5–4.6)
LYMPHOCYTES NFR BLD: 14.9 % (ref 13–44)
MAGNESIUM SERPL-MCNC: 2 MG/DL (ref 1.8–2.4)
MCH RBC QN AUTO: 24.1 PG (ref 26.1–32.9)
MCHC RBC AUTO-ENTMCNC: 30.6 G/DL (ref 31.4–35)
MCV RBC AUTO: 78.9 FL (ref 82–102)
MONOCYTES # BLD: 0.9 K/UL (ref 0.1–1.3)
MONOCYTES NFR BLD: 9.5 % (ref 4–12)
MUCOUS THREADS URNS QL MICRO: 0 /LPF
NEUTS SEG # BLD: 7.01 K/UL (ref 1.7–8.2)
NEUTS SEG NFR BLD: 73.9 % (ref 43–78)
NITRITE UR QL STRIP.AUTO: NEGATIVE
NRBC # BLD: 0 K/UL (ref 0–0.2)
PH UR STRIP: 6 (ref 5–9)
PLATELET # BLD AUTO: 245 K/UL (ref 150–450)
PMV BLD AUTO: 10.1 FL (ref 9.4–12.3)
POTASSIUM SERPL-SCNC: 3.9 MMOL/L (ref 3.5–5.1)
PROT UR STRIP-MCNC: ABNORMAL MG/DL
RBC # BLD AUTO: 4.31 M/UL (ref 4.05–5.2)
RBC #/AREA URNS HPF: ABNORMAL /HPF
SERVICE CMNT-IMP: NORMAL
SERVICE CMNT-IMP: NORMAL
SODIUM SERPL-SCNC: 134 MMOL/L (ref 136–145)
SP GR UR REFRACTOMETRY: 1.01 (ref 1–1.02)
URINE CULTURE IF INDICATED: ABNORMAL
UROBILINOGEN UR QL STRIP.AUTO: 0.2 EU/DL (ref 0.2–1)
WBC # BLD AUTO: 9.5 K/UL (ref 4.3–11.1)
WBC URNS QL MICRO: ABNORMAL /HPF

## 2025-06-15 PROCEDURE — 1100000000 HC RM PRIVATE

## 2025-06-15 PROCEDURE — 2500000003 HC RX 250 WO HCPCS: Performed by: INTERNAL MEDICINE

## 2025-06-15 PROCEDURE — 93321 DOPPLER ECHO F-UP/LMTD STD: CPT | Performed by: INTERNAL MEDICINE

## 2025-06-15 PROCEDURE — 85025 COMPLETE CBC W/AUTO DIFF WBC: CPT

## 2025-06-15 PROCEDURE — 6360000002 HC RX W HCPCS: Performed by: FAMILY MEDICINE

## 2025-06-15 PROCEDURE — 36415 COLL VENOUS BLD VENIPUNCTURE: CPT

## 2025-06-15 PROCEDURE — 83735 ASSAY OF MAGNESIUM: CPT

## 2025-06-15 PROCEDURE — 81001 URINALYSIS AUTO W/SCOPE: CPT

## 2025-06-15 PROCEDURE — 71045 X-RAY EXAM CHEST 1 VIEW: CPT

## 2025-06-15 PROCEDURE — 80048 BASIC METABOLIC PNL TOTAL CA: CPT

## 2025-06-15 PROCEDURE — 2500000003 HC RX 250 WO HCPCS: Performed by: FAMILY MEDICINE

## 2025-06-15 PROCEDURE — 6370000000 HC RX 637 (ALT 250 FOR IP): Performed by: FAMILY MEDICINE

## 2025-06-15 PROCEDURE — 97530 THERAPEUTIC ACTIVITIES: CPT

## 2025-06-15 PROCEDURE — 93308 TTE F-UP OR LMTD: CPT

## 2025-06-15 PROCEDURE — 97161 PT EVAL LOW COMPLEX 20 MIN: CPT

## 2025-06-15 PROCEDURE — 97165 OT EVAL LOW COMPLEX 30 MIN: CPT

## 2025-06-15 PROCEDURE — 93325 DOPPLER ECHO COLOR FLOW MAPG: CPT | Performed by: INTERNAL MEDICINE

## 2025-06-15 PROCEDURE — 99222 1ST HOSP IP/OBS MODERATE 55: CPT | Performed by: INTERNAL MEDICINE

## 2025-06-15 PROCEDURE — 93308 TTE F-UP OR LMTD: CPT | Performed by: INTERNAL MEDICINE

## 2025-06-15 RX ADMIN — ALLOPURINOL 100 MG: 100 TABLET ORAL at 08:05

## 2025-06-15 RX ADMIN — AMLODIPINE BESYLATE 10 MG: 10 TABLET ORAL at 08:07

## 2025-06-15 RX ADMIN — APIXABAN 2.5 MG: 2.5 TABLET, FILM COATED ORAL at 23:18

## 2025-06-15 RX ADMIN — LOSARTAN POTASSIUM 25 MG: 25 TABLET, FILM COATED ORAL at 08:05

## 2025-06-15 RX ADMIN — SODIUM CHLORIDE, PRESERVATIVE FREE 10 ML: 5 INJECTION INTRAVENOUS at 08:08

## 2025-06-15 RX ADMIN — APIXABAN 2.5 MG: 2.5 TABLET, FILM COATED ORAL at 08:05

## 2025-06-15 RX ADMIN — SODIUM CHLORIDE, PRESERVATIVE FREE 10 ML: 5 INJECTION INTRAVENOUS at 16:29

## 2025-06-15 RX ADMIN — GABAPENTIN 400 MG: 400 CAPSULE ORAL at 23:18

## 2025-06-15 RX ADMIN — SERTRALINE 50 MG: 50 TABLET, FILM COATED ORAL at 08:05

## 2025-06-15 RX ADMIN — PANTOPRAZOLE SODIUM 40 MG: 40 TABLET, DELAYED RELEASE ORAL at 08:05

## 2025-06-15 RX ADMIN — MONTELUKAST 10 MG: 10 TABLET, FILM COATED ORAL at 23:18

## 2025-06-15 RX ADMIN — GABAPENTIN 400 MG: 400 CAPSULE ORAL at 13:42

## 2025-06-15 RX ADMIN — GUAIFENESIN 200 MG: 100 LIQUID ORAL at 09:59

## 2025-06-15 RX ADMIN — METOPROLOL TARTRATE 50 MG: 50 TABLET, FILM COATED ORAL at 23:17

## 2025-06-15 RX ADMIN — METOPROLOL TARTRATE 50 MG: 50 TABLET, FILM COATED ORAL at 08:07

## 2025-06-15 RX ADMIN — MEMANTINE 10 MG: 5 TABLET ORAL at 08:07

## 2025-06-15 RX ADMIN — ACETAMINOPHEN 650 MG: 325 TABLET, FILM COATED ORAL at 08:03

## 2025-06-15 RX ADMIN — MEMANTINE 10 MG: 5 TABLET ORAL at 23:18

## 2025-06-15 RX ADMIN — GABAPENTIN 400 MG: 400 CAPSULE ORAL at 08:06

## 2025-06-15 RX ADMIN — FUROSEMIDE 40 MG: 10 INJECTION, SOLUTION INTRAMUSCULAR; INTRAVENOUS at 08:08

## 2025-06-15 RX ADMIN — FUROSEMIDE 40 MG: 10 INJECTION, SOLUTION INTRAMUSCULAR; INTRAVENOUS at 16:28

## 2025-06-15 NOTE — PLAN OF CARE
Problem: Respiratory - Adult  Goal: Achieves optimal ventilation and oxygenation  6/15/2025 0422 by Jacey Gil RN  Outcome: Progressing  6/14/2025 1733 by Mick Ryder RN  Outcome: Progressing     Problem: Pain  Goal: Verbalizes/displays adequate comfort level or baseline comfort level  6/15/2025 0422 by Jacey Gil RN  Outcome: Progressing  6/14/2025 1733 by Mick Ryder RN  Outcome: Adequate for Discharge     Problem: Safety - Adult  Goal: Free from fall injury  6/15/2025 0422 by Jacey Gil RN  Outcome: Progressing  6/14/2025 1733 by Mick Ryder RN  Outcome: Adequate for Discharge     Problem: Discharge Planning  Goal: Discharge to home or other facility with appropriate resources  6/15/2025 0422 by Jacey Gil RN  Outcome: Progressing  6/14/2025 1733 by Mick Ryder RN  Outcome: Adequate for Discharge

## 2025-06-15 NOTE — PLAN OF CARE
Problem: Discharge Planning  Goal: Discharge to home or other facility with appropriate resources  6/15/2025 1241 by Mick Ryder RN  Outcome: Progressing  6/15/2025 0422 by Jacey Gil RN  Outcome: Progressing     Problem: Respiratory - Adult  Goal: Achieves optimal ventilation and oxygenation  6/15/2025 1241 by Mick Ryder RN  Outcome: Progressing  6/15/2025 0422 by Jacey Gil RN  Outcome: Progressing     Problem: Cardiovascular - Adult  Goal: Maintains optimal cardiac output and hemodynamic stability  6/15/2025 1241 by Mick Ryder RN  Outcome: Progressing  6/15/2025 0422 by Jacey Gil RN  Outcome: Progressing     Problem: Skin/Tissue Integrity - Adult  Goal: Skin integrity remains intact  6/15/2025 1241 by Mick Ryder RN  Outcome: Progressing  6/15/2025 0422 by Jacey Gil RN  Outcome: Progressing     Problem: Genitourinary - Adult  Goal: Absence of urinary retention  6/15/2025 1241 by Mick Ryder RN  Outcome: Progressing  6/15/2025 0422 by Jacey Gil RN  Outcome: Progressing     Problem: Metabolic/Fluid and Electrolytes - Adult  Goal: Electrolytes maintained within normal limits  6/15/2025 1241 by Mick Ryder RN  Outcome: Progressing  6/15/2025 0422 by Jacey Gil RN  Outcome: Progressing     Problem: Chronic Conditions and Co-morbidities  Goal: Patient's chronic conditions and co-morbidity symptoms are monitored and maintained or improved  6/15/2025 1241 by Mick Ryder RN  Outcome: Progressing  6/15/2025 0422 by Jacey Gil RN  Outcome: Progressing     Problem: Safety - Adult  Goal: Free from fall injury  6/15/2025 1241 by Mick Ryder RN  Outcome: Adequate for Discharge  6/15/2025 0422 by Jacey Gil RN  Outcome: Progressing     Problem: Pain  Goal: Verbalizes/displays adequate comfort level or baseline comfort level  6/15/2025 1241 by Mick Ryder

## 2025-06-16 LAB
ANION GAP SERPL CALC-SCNC: 10 MMOL/L (ref 7–16)
BASOPHILS # BLD: 0.03 K/UL (ref 0–0.2)
BASOPHILS NFR BLD: 0.4 % (ref 0–2)
BUN SERPL-MCNC: 24 MG/DL (ref 8–23)
CALCIUM SERPL-MCNC: 9 MG/DL (ref 8.8–10.2)
CHLORIDE SERPL-SCNC: 97 MMOL/L (ref 98–107)
CO2 SERPL-SCNC: 28 MMOL/L (ref 20–29)
CREAT SERPL-MCNC: 1.12 MG/DL (ref 0.6–1.1)
DIFFERENTIAL METHOD BLD: ABNORMAL
EOSINOPHIL # BLD: 0.22 K/UL (ref 0–0.8)
EOSINOPHIL NFR BLD: 2.6 % (ref 0.5–7.8)
ERYTHROCYTE [DISTWIDTH] IN BLOOD BY AUTOMATED COUNT: 16.9 % (ref 11.9–14.6)
GLUCOSE SERPL-MCNC: 91 MG/DL (ref 70–99)
HCT VFR BLD AUTO: 32.6 % (ref 35.8–46.3)
HGB BLD-MCNC: 10.2 G/DL (ref 11.7–15.4)
IMM GRANULOCYTES # BLD AUTO: 0.06 K/UL (ref 0–0.5)
IMM GRANULOCYTES NFR BLD AUTO: 0.7 % (ref 0–5)
LYMPHOCYTES # BLD: 2.36 K/UL (ref 0.5–4.6)
LYMPHOCYTES NFR BLD: 28.2 % (ref 13–44)
MAGNESIUM SERPL-MCNC: 1.9 MG/DL (ref 1.8–2.4)
MCH RBC QN AUTO: 25 PG (ref 26.1–32.9)
MCHC RBC AUTO-ENTMCNC: 31.3 G/DL (ref 31.4–35)
MCV RBC AUTO: 79.9 FL (ref 82–102)
MONOCYTES # BLD: 1.36 K/UL (ref 0.1–1.3)
MONOCYTES NFR BLD: 16.2 % (ref 4–12)
NEUTS SEG # BLD: 4.34 K/UL (ref 1.7–8.2)
NEUTS SEG NFR BLD: 51.9 % (ref 43–78)
NRBC # BLD: 0 K/UL (ref 0–0.2)
NT PRO BNP: 5955 PG/ML (ref 0–450)
PLATELET # BLD AUTO: 218 K/UL (ref 150–450)
PMV BLD AUTO: 9.9 FL (ref 9.4–12.3)
POTASSIUM SERPL-SCNC: 4.1 MMOL/L (ref 3.5–5.1)
RBC # BLD AUTO: 4.08 M/UL (ref 4.05–5.2)
SODIUM SERPL-SCNC: 135 MMOL/L (ref 136–145)
WBC # BLD AUTO: 8.4 K/UL (ref 4.3–11.1)

## 2025-06-16 PROCEDURE — 83735 ASSAY OF MAGNESIUM: CPT

## 2025-06-16 PROCEDURE — 36415 COLL VENOUS BLD VENIPUNCTURE: CPT

## 2025-06-16 PROCEDURE — 97530 THERAPEUTIC ACTIVITIES: CPT

## 2025-06-16 PROCEDURE — 97535 SELF CARE MNGMENT TRAINING: CPT

## 2025-06-16 PROCEDURE — 6360000002 HC RX W HCPCS: Performed by: FAMILY MEDICINE

## 2025-06-16 PROCEDURE — 80048 BASIC METABOLIC PNL TOTAL CA: CPT

## 2025-06-16 PROCEDURE — 2700000000 HC OXYGEN THERAPY PER DAY

## 2025-06-16 PROCEDURE — 85025 COMPLETE CBC W/AUTO DIFF WBC: CPT

## 2025-06-16 PROCEDURE — 6370000000 HC RX 637 (ALT 250 FOR IP): Performed by: INTERNAL MEDICINE

## 2025-06-16 PROCEDURE — 6370000000 HC RX 637 (ALT 250 FOR IP): Performed by: FAMILY MEDICINE

## 2025-06-16 PROCEDURE — 1100000000 HC RM PRIVATE

## 2025-06-16 PROCEDURE — 83880 ASSAY OF NATRIURETIC PEPTIDE: CPT

## 2025-06-16 PROCEDURE — 2500000003 HC RX 250 WO HCPCS: Performed by: INTERNAL MEDICINE

## 2025-06-16 PROCEDURE — 2500000003 HC RX 250 WO HCPCS: Performed by: FAMILY MEDICINE

## 2025-06-16 PROCEDURE — 97110 THERAPEUTIC EXERCISES: CPT

## 2025-06-16 RX ORDER — BENZONATATE 100 MG/1
100 CAPSULE ORAL 3 TIMES DAILY PRN
Status: DISCONTINUED | OUTPATIENT
Start: 2025-06-16 | End: 2025-06-19 | Stop reason: HOSPADM

## 2025-06-16 RX ORDER — SPIRONOLACTONE 25 MG/1
25 TABLET ORAL DAILY
Status: DISCONTINUED | OUTPATIENT
Start: 2025-06-16 | End: 2025-06-19 | Stop reason: HOSPADM

## 2025-06-16 RX ADMIN — MEMANTINE 10 MG: 5 TABLET ORAL at 21:25

## 2025-06-16 RX ADMIN — ALLOPURINOL 100 MG: 100 TABLET ORAL at 09:57

## 2025-06-16 RX ADMIN — SPIRONOLACTONE 25 MG: 25 TABLET ORAL at 09:57

## 2025-06-16 RX ADMIN — LOSARTAN POTASSIUM 25 MG: 25 TABLET, FILM COATED ORAL at 09:57

## 2025-06-16 RX ADMIN — GUAIFENESIN 200 MG: 100 LIQUID ORAL at 17:08

## 2025-06-16 RX ADMIN — GUAIFENESIN 200 MG: 100 LIQUID ORAL at 21:25

## 2025-06-16 RX ADMIN — MEMANTINE 10 MG: 5 TABLET ORAL at 09:57

## 2025-06-16 RX ADMIN — GABAPENTIN 400 MG: 400 CAPSULE ORAL at 09:57

## 2025-06-16 RX ADMIN — METOPROLOL TARTRATE 50 MG: 50 TABLET, FILM COATED ORAL at 21:25

## 2025-06-16 RX ADMIN — PANTOPRAZOLE SODIUM 40 MG: 40 TABLET, DELAYED RELEASE ORAL at 06:29

## 2025-06-16 RX ADMIN — APIXABAN 2.5 MG: 2.5 TABLET, FILM COATED ORAL at 09:57

## 2025-06-16 RX ADMIN — APIXABAN 2.5 MG: 2.5 TABLET, FILM COATED ORAL at 21:25

## 2025-06-16 RX ADMIN — SODIUM CHLORIDE, PRESERVATIVE FREE 10 ML: 5 INJECTION INTRAVENOUS at 11:56

## 2025-06-16 RX ADMIN — GUAIFENESIN 200 MG: 100 LIQUID ORAL at 09:58

## 2025-06-16 RX ADMIN — FUROSEMIDE 40 MG: 10 INJECTION, SOLUTION INTRAMUSCULAR; INTRAVENOUS at 11:55

## 2025-06-16 RX ADMIN — GABAPENTIN 400 MG: 400 CAPSULE ORAL at 21:25

## 2025-06-16 RX ADMIN — SERTRALINE 50 MG: 50 TABLET, FILM COATED ORAL at 09:57

## 2025-06-16 RX ADMIN — AMLODIPINE BESYLATE 10 MG: 10 TABLET ORAL at 09:57

## 2025-06-16 RX ADMIN — MONTELUKAST 10 MG: 10 TABLET, FILM COATED ORAL at 21:25

## 2025-06-16 RX ADMIN — FUROSEMIDE 40 MG: 10 INJECTION, SOLUTION INTRAMUSCULAR; INTRAVENOUS at 17:08

## 2025-06-16 RX ADMIN — SODIUM CHLORIDE, PRESERVATIVE FREE 10 ML: 5 INJECTION INTRAVENOUS at 21:29

## 2025-06-16 RX ADMIN — METOPROLOL TARTRATE 50 MG: 50 TABLET, FILM COATED ORAL at 09:56

## 2025-06-16 RX ADMIN — GABAPENTIN 400 MG: 400 CAPSULE ORAL at 15:19

## 2025-06-16 ASSESSMENT — PAIN SCALES - GENERAL: PAINLEVEL_OUTOF10: 0

## 2025-06-16 NOTE — PLAN OF CARE
Problem: Discharge Planning  Goal: Discharge to home or other facility with appropriate resources  6/16/2025 1253 by Zeny Pope RN  Outcome: Progressing  Flowsheets (Taken 6/16/2025 0900)  Discharge to home or other facility with appropriate resources: Identify barriers to discharge with patient and caregiver  6/16/2025 0418 by Jacey Gil RN  Outcome: Progressing     Problem: Safety - Adult  Goal: Free from fall injury  6/16/2025 1253 by Zeny Pope RN  Outcome: Progressing  6/16/2025 0418 by Jacey Gil RN  Outcome: Progressing     Problem: Pain  Goal: Verbalizes/displays adequate comfort level or baseline comfort level  6/16/2025 1253 by Zeny Pope RN  Outcome: Progressing  6/16/2025 0418 by Jacey Gil RN  Outcome: Progressing     Problem: Respiratory - Adult  Goal: Achieves optimal ventilation and oxygenation  6/16/2025 1253 by Zeny Pope RN  Outcome: Progressing  Flowsheets (Taken 6/16/2025 0900)  Achieves optimal ventilation and oxygenation: Assess for changes in respiratory status  6/16/2025 0418 by Jacey Gil RN  Outcome: Progressing     Problem: Cardiovascular - Adult  Goal: Maintains optimal cardiac output and hemodynamic stability  6/16/2025 1253 by Zeny Pope RN  Outcome: Progressing  Flowsheets (Taken 6/16/2025 0900)  Maintains optimal cardiac output and hemodynamic stability: Monitor blood pressure and heart rate  6/16/2025 0418 by Jacey Gil RN  Outcome: Progressing     Problem: Skin/Tissue Integrity - Adult  Goal: Skin integrity remains intact  6/16/2025 1253 by Zeny Pope RN  Outcome: Progressing  Flowsheets (Taken 6/16/2025 0900)  Skin Integrity Remains Intact: Monitor for areas of redness and/or skin breakdown  6/16/2025 0418 by Jacey Gil RN  Outcome: Progressing     Problem: Genitourinary - Adult  Goal: Absence of urinary retention  6/16/2025 1253 by Zeny Pope RN  Outcome: Progressing  Flowsheets

## 2025-06-16 NOTE — CONSULTS
Nutrition Note:   Acknowledge Consult for Education: CHF diet education    Pt also has active orders for consults ofr cardiac rehab and heary failure nurse/coordinator.    CHF is not a new dx for her. Presented with coughing and wheezing Admission r/t to acute on chronic hypoxic respiratory failure, pulmonary edema, ?CHF exacerbation  Nutrition Education  Educated on Heart failure nutrition therapy  Learners: Patient and Family, son present  Readiness: Acceptance  Method: Handout  Response: No questions voiced    DANIEL VILLA, RD

## 2025-06-16 NOTE — PLAN OF CARE
Problem: Respiratory - Adult  Goal: Achieves optimal ventilation and oxygenation  Outcome: Progressing     Problem: Pain  Goal: Verbalizes/displays adequate comfort level or baseline comfort level  Outcome: Progressing     Problem: Safety - Adult  Goal: Free from fall injury  Outcome: Progressing     Problem: Discharge Planning  Goal: Discharge to home or other facility with appropriate resources  Outcome: Progressing

## 2025-06-16 NOTE — CARE COORDINATION
PT/OT recommending home health at discharge. Family wants to go with [facility]. Referral sent. Daughter stated that the patient has care aides that come out daily to help with her mom during the mornings, and family is available to check on her every evening, in addition to home health.    Twila Delaney RN, BSN  [place] Care Manager

## 2025-06-17 LAB
ANION GAP SERPL CALC-SCNC: 10 MMOL/L (ref 7–16)
BUN SERPL-MCNC: 26 MG/DL (ref 8–23)
CALCIUM SERPL-MCNC: 9.1 MG/DL (ref 8.8–10.2)
CHLORIDE SERPL-SCNC: 96 MMOL/L (ref 98–107)
CO2 SERPL-SCNC: 27 MMOL/L (ref 20–29)
CREAT SERPL-MCNC: 1.34 MG/DL (ref 0.6–1.1)
GLUCOSE SERPL-MCNC: 95 MG/DL (ref 70–99)
MAGNESIUM SERPL-MCNC: 1.9 MG/DL (ref 1.8–2.4)
POTASSIUM SERPL-SCNC: 3.9 MMOL/L (ref 3.5–5.1)
SODIUM SERPL-SCNC: 133 MMOL/L (ref 136–145)

## 2025-06-17 PROCEDURE — 6370000000 HC RX 637 (ALT 250 FOR IP): Performed by: INTERNAL MEDICINE

## 2025-06-17 PROCEDURE — 2500000003 HC RX 250 WO HCPCS: Performed by: INTERNAL MEDICINE

## 2025-06-17 PROCEDURE — 2500000003 HC RX 250 WO HCPCS: Performed by: FAMILY MEDICINE

## 2025-06-17 PROCEDURE — 83735 ASSAY OF MAGNESIUM: CPT

## 2025-06-17 PROCEDURE — 80048 BASIC METABOLIC PNL TOTAL CA: CPT

## 2025-06-17 PROCEDURE — 97530 THERAPEUTIC ACTIVITIES: CPT

## 2025-06-17 PROCEDURE — 94761 N-INVAS EAR/PLS OXIMETRY MLT: CPT

## 2025-06-17 PROCEDURE — 1100000000 HC RM PRIVATE

## 2025-06-17 PROCEDURE — 2700000000 HC OXYGEN THERAPY PER DAY

## 2025-06-17 PROCEDURE — 97535 SELF CARE MNGMENT TRAINING: CPT

## 2025-06-17 PROCEDURE — 6370000000 HC RX 637 (ALT 250 FOR IP): Performed by: FAMILY MEDICINE

## 2025-06-17 PROCEDURE — 36415 COLL VENOUS BLD VENIPUNCTURE: CPT

## 2025-06-17 RX ORDER — FUROSEMIDE 40 MG/1
40 TABLET ORAL DAILY
Status: DISCONTINUED | OUTPATIENT
Start: 2025-06-17 | End: 2025-06-18

## 2025-06-17 RX ADMIN — GUAIFENESIN 200 MG: 100 LIQUID ORAL at 21:13

## 2025-06-17 RX ADMIN — MEMANTINE 10 MG: 5 TABLET ORAL at 08:22

## 2025-06-17 RX ADMIN — MONTELUKAST 10 MG: 10 TABLET, FILM COATED ORAL at 21:13

## 2025-06-17 RX ADMIN — MEMANTINE 10 MG: 5 TABLET ORAL at 21:13

## 2025-06-17 RX ADMIN — SPIRONOLACTONE 25 MG: 25 TABLET ORAL at 08:21

## 2025-06-17 RX ADMIN — METOPROLOL TARTRATE 50 MG: 50 TABLET, FILM COATED ORAL at 08:22

## 2025-06-17 RX ADMIN — APIXABAN 2.5 MG: 2.5 TABLET, FILM COATED ORAL at 21:13

## 2025-06-17 RX ADMIN — GABAPENTIN 400 MG: 400 CAPSULE ORAL at 15:14

## 2025-06-17 RX ADMIN — SERTRALINE 50 MG: 50 TABLET, FILM COATED ORAL at 08:22

## 2025-06-17 RX ADMIN — GABAPENTIN 400 MG: 400 CAPSULE ORAL at 21:13

## 2025-06-17 RX ADMIN — PANTOPRAZOLE SODIUM 40 MG: 40 TABLET, DELAYED RELEASE ORAL at 05:50

## 2025-06-17 RX ADMIN — SODIUM CHLORIDE, PRESERVATIVE FREE 10 ML: 5 INJECTION INTRAVENOUS at 21:14

## 2025-06-17 RX ADMIN — SODIUM CHLORIDE, PRESERVATIVE FREE 10 ML: 5 INJECTION INTRAVENOUS at 08:23

## 2025-06-17 RX ADMIN — FUROSEMIDE 40 MG: 40 TABLET ORAL at 08:22

## 2025-06-17 RX ADMIN — METOPROLOL TARTRATE 50 MG: 50 TABLET, FILM COATED ORAL at 21:13

## 2025-06-17 RX ADMIN — GABAPENTIN 400 MG: 400 CAPSULE ORAL at 08:23

## 2025-06-17 RX ADMIN — AMLODIPINE BESYLATE 10 MG: 10 TABLET ORAL at 08:22

## 2025-06-17 RX ADMIN — ALLOPURINOL 100 MG: 100 TABLET ORAL at 08:21

## 2025-06-17 RX ADMIN — LOSARTAN POTASSIUM 25 MG: 25 TABLET, FILM COATED ORAL at 08:22

## 2025-06-17 RX ADMIN — APIXABAN 2.5 MG: 2.5 TABLET, FILM COATED ORAL at 08:22

## 2025-06-17 ASSESSMENT — PAIN SCALES - GENERAL
PAINLEVEL_OUTOF10: 0
PAINLEVEL_OUTOF10: 0

## 2025-06-17 NOTE — PLAN OF CARE
Problem: Discharge Planning  Goal: Discharge to home or other facility with appropriate resources  6/17/2025 1330 by Mathew Garcia RN  Outcome: Progressing  6/17/2025 0358 by Ottoniel Hall RN  Outcome: Progressing     Problem: Safety - Adult  Goal: Free from fall injury  6/17/2025 1330 by Mathew Garcia RN  Outcome: Progressing  6/17/2025 0358 by Ottoniel Hall RN  Outcome: Progressing     Problem: Pain  Goal: Verbalizes/displays adequate comfort level or baseline comfort level  6/17/2025 1330 by Mathew Garcia RN  Outcome: Progressing  6/17/2025 0358 by Ottoniel Hall RN  Outcome: Progressing     Problem: Respiratory - Adult  Goal: Achieves optimal ventilation and oxygenation  6/17/2025 1330 by Mathew Garcia RN  Outcome: Progressing  6/17/2025 0358 by Ottoniel Hall RN  Outcome: Progressing     Problem: Cardiovascular - Adult  Goal: Maintains optimal cardiac output and hemodynamic stability  6/17/2025 1330 by Mathew Garcia RN  Outcome: Progressing  6/17/2025 0358 by Ottoniel Hall RN  Outcome: Progressing     Problem: Skin/Tissue Integrity - Adult  Goal: Skin integrity remains intact  6/17/2025 1330 by Mathew Garcia RN  Outcome: Progressing  6/17/2025 0358 by Ottoniel Hall RN  Outcome: Progressing     Problem: Genitourinary - Adult  Goal: Absence of urinary retention  6/17/2025 1330 by Mathew Garcia RN  Outcome: Progressing  6/17/2025 0358 by Ottoniel Hall RN  Outcome: Progressing     Problem: Metabolic/Fluid and Electrolytes - Adult  Goal: Electrolytes maintained within normal limits  6/17/2025 1330 by Mathew Garcia RN  Outcome: Progressing  6/17/2025 0358 by Ottoniel Hall RN  Outcome: Progressing     Problem: Chronic Conditions and Co-morbidities  Goal: Patient's chronic conditions and co-morbidity symptoms are monitored and maintained or improved  6/17/2025 1330 by Mathew Garcia RN  Outcome: Progressing  6/17/2025 0358 by Ottoniel Hall

## 2025-06-18 LAB
ANION GAP SERPL CALC-SCNC: 11 MMOL/L (ref 7–16)
ANION GAP SERPL CALC-SCNC: 12 MMOL/L (ref 7–16)
BACTERIA SPEC CULT: NORMAL
BACTERIA SPEC CULT: NORMAL
BUN SERPL-MCNC: 27 MG/DL (ref 8–23)
BUN SERPL-MCNC: 28 MG/DL (ref 8–23)
CALCIUM SERPL-MCNC: 9.5 MG/DL (ref 8.8–10.2)
CALCIUM SERPL-MCNC: 9.6 MG/DL (ref 8.8–10.2)
CHLORIDE SERPL-SCNC: 95 MMOL/L (ref 98–107)
CHLORIDE SERPL-SCNC: 95 MMOL/L (ref 98–107)
CO2 SERPL-SCNC: 25 MMOL/L (ref 20–29)
CO2 SERPL-SCNC: 28 MMOL/L (ref 20–29)
CREAT SERPL-MCNC: 1.27 MG/DL (ref 0.6–1.1)
CREAT SERPL-MCNC: 1.34 MG/DL (ref 0.6–1.1)
GLUCOSE SERPL-MCNC: 134 MG/DL (ref 70–99)
GLUCOSE SERPL-MCNC: 96 MG/DL (ref 70–99)
MAGNESIUM SERPL-MCNC: 2 MG/DL (ref 1.8–2.4)
MAGNESIUM SERPL-MCNC: 2 MG/DL (ref 1.8–2.4)
POTASSIUM SERPL-SCNC: 3.9 MMOL/L (ref 3.5–5.1)
POTASSIUM SERPL-SCNC: 4.5 MMOL/L (ref 3.5–5.1)
SERVICE CMNT-IMP: NORMAL
SERVICE CMNT-IMP: NORMAL
SODIUM SERPL-SCNC: 132 MMOL/L (ref 136–145)
SODIUM SERPL-SCNC: 134 MMOL/L (ref 136–145)

## 2025-06-18 PROCEDURE — 97530 THERAPEUTIC ACTIVITIES: CPT

## 2025-06-18 PROCEDURE — 83735 ASSAY OF MAGNESIUM: CPT

## 2025-06-18 PROCEDURE — 36415 COLL VENOUS BLD VENIPUNCTURE: CPT

## 2025-06-18 PROCEDURE — 1100000000 HC RM PRIVATE

## 2025-06-18 PROCEDURE — 2500000003 HC RX 250 WO HCPCS: Performed by: FAMILY MEDICINE

## 2025-06-18 PROCEDURE — 6370000000 HC RX 637 (ALT 250 FOR IP): Performed by: INTERNAL MEDICINE

## 2025-06-18 PROCEDURE — 2500000003 HC RX 250 WO HCPCS: Performed by: INTERNAL MEDICINE

## 2025-06-18 PROCEDURE — 80048 BASIC METABOLIC PNL TOTAL CA: CPT

## 2025-06-18 PROCEDURE — 6370000000 HC RX 637 (ALT 250 FOR IP): Performed by: FAMILY MEDICINE

## 2025-06-18 RX ORDER — BUMETANIDE 1 MG/1
1 TABLET ORAL DAILY
Status: DISCONTINUED | OUTPATIENT
Start: 2025-06-19 | End: 2025-06-19 | Stop reason: HOSPADM

## 2025-06-18 RX ADMIN — AMLODIPINE BESYLATE 10 MG: 10 TABLET ORAL at 09:03

## 2025-06-18 RX ADMIN — LOSARTAN POTASSIUM 25 MG: 25 TABLET, FILM COATED ORAL at 09:03

## 2025-06-18 RX ADMIN — SERTRALINE 50 MG: 50 TABLET, FILM COATED ORAL at 09:03

## 2025-06-18 RX ADMIN — MEMANTINE 10 MG: 5 TABLET ORAL at 09:03

## 2025-06-18 RX ADMIN — ALLOPURINOL 100 MG: 100 TABLET ORAL at 09:03

## 2025-06-18 RX ADMIN — GABAPENTIN 400 MG: 400 CAPSULE ORAL at 09:03

## 2025-06-18 RX ADMIN — SODIUM CHLORIDE, PRESERVATIVE FREE 10 ML: 5 INJECTION INTRAVENOUS at 21:48

## 2025-06-18 RX ADMIN — METOPROLOL TARTRATE 50 MG: 50 TABLET, FILM COATED ORAL at 21:48

## 2025-06-18 RX ADMIN — MEMANTINE 10 MG: 5 TABLET ORAL at 21:48

## 2025-06-18 RX ADMIN — GUAIFENESIN 200 MG: 100 LIQUID ORAL at 21:48

## 2025-06-18 RX ADMIN — APIXABAN 2.5 MG: 2.5 TABLET, FILM COATED ORAL at 21:48

## 2025-06-18 RX ADMIN — APIXABAN 2.5 MG: 2.5 TABLET, FILM COATED ORAL at 09:03

## 2025-06-18 RX ADMIN — METOPROLOL TARTRATE 50 MG: 50 TABLET, FILM COATED ORAL at 09:03

## 2025-06-18 RX ADMIN — PANTOPRAZOLE SODIUM 40 MG: 40 TABLET, DELAYED RELEASE ORAL at 06:17

## 2025-06-18 RX ADMIN — GABAPENTIN 400 MG: 400 CAPSULE ORAL at 21:48

## 2025-06-18 RX ADMIN — MONTELUKAST 10 MG: 10 TABLET, FILM COATED ORAL at 21:48

## 2025-06-18 RX ADMIN — FUROSEMIDE 40 MG: 40 TABLET ORAL at 09:09

## 2025-06-18 RX ADMIN — GABAPENTIN 400 MG: 400 CAPSULE ORAL at 14:47

## 2025-06-18 RX ADMIN — SPIRONOLACTONE 25 MG: 25 TABLET ORAL at 09:03

## 2025-06-18 RX ADMIN — SODIUM CHLORIDE, PRESERVATIVE FREE 10 ML: 5 INJECTION INTRAVENOUS at 09:04

## 2025-06-18 ASSESSMENT — PAIN SCALES - GENERAL
PAINLEVEL_OUTOF10: 0
PAINLEVEL_OUTOF10: 0

## 2025-06-18 NOTE — PLAN OF CARE
Problem: Discharge Planning  Goal: Discharge to home or other facility with appropriate resources  6/18/2025 1120 by Mathew Garcia RN  Outcome: Progressing  6/18/2025 0441 by Ottoniel Hall RN  Outcome: Progressing     Problem: Safety - Adult  Goal: Free from fall injury  6/18/2025 1120 by Mathew Garcia RN  Outcome: Progressing  6/18/2025 0441 by Ottoniel Hall RN  Outcome: Progressing     Problem: Pain  Goal: Verbalizes/displays adequate comfort level or baseline comfort level  6/18/2025 1120 by Mathew Garcia RN  Outcome: Progressing  6/18/2025 0441 by Ottoniel Hall RN  Outcome: Progressing     Problem: Respiratory - Adult  Goal: Achieves optimal ventilation and oxygenation  6/18/2025 1120 by Mathew Garcia RN  Outcome: Progressing  6/18/2025 0441 by Ottoniel Hall RN  Outcome: Progressing     Problem: Cardiovascular - Adult  Goal: Maintains optimal cardiac output and hemodynamic stability  6/18/2025 1120 by Mathew Garcia RN  Outcome: Progressing  6/18/2025 0441 by Ottoniel Hall RN  Outcome: Progressing     Problem: Skin/Tissue Integrity - Adult  Goal: Skin integrity remains intact  6/18/2025 1120 by Mathew Garcia RN  Outcome: Progressing  6/18/2025 0441 by Ottoniel Hall RN  Outcome: Progressing     Problem: Genitourinary - Adult  Goal: Absence of urinary retention  6/18/2025 1120 by Mathew Garcia RN  Outcome: Progressing  6/18/2025 0441 by Ottoniel Hall RN  Outcome: Progressing     Problem: Metabolic/Fluid and Electrolytes - Adult  Goal: Electrolytes maintained within normal limits  6/18/2025 1120 by Mathew Garcia RN  Outcome: Progressing  6/18/2025 0441 by Ottoniel Hall RN  Outcome: Progressing     Problem: Chronic Conditions and Co-morbidities  Goal: Patient's chronic conditions and co-morbidity symptoms are monitored and maintained or improved  6/18/2025 1120 by Mathew Garcia RN  Outcome: Progressing  6/18/2025 0441 by Ottoniel Hall

## 2025-06-18 NOTE — CARE COORDINATION
Chart reviewed and patient discussed in IDT rounds this AM. Patient possibly discharging tomorrow. [facility] will follow patient once discharged. Concern for possible home O2 need. Waiting on walking o2 sat study for confirmation.    Twila Delaney RN, BSN  [place] Care Manager     .

## 2025-06-19 VITALS
SYSTOLIC BLOOD PRESSURE: 153 MMHG | OXYGEN SATURATION: 95 % | BODY MASS INDEX: 23.35 KG/M2 | TEMPERATURE: 98.6 F | DIASTOLIC BLOOD PRESSURE: 72 MMHG | HEIGHT: 62 IN | WEIGHT: 126.9 LBS | HEART RATE: 70 BPM | RESPIRATION RATE: 18 BRPM

## 2025-06-19 LAB — NT PRO BNP: 1160 PG/ML (ref 0–450)

## 2025-06-19 PROCEDURE — 6370000000 HC RX 637 (ALT 250 FOR IP): Performed by: INTERNAL MEDICINE

## 2025-06-19 PROCEDURE — 97535 SELF CARE MNGMENT TRAINING: CPT

## 2025-06-19 PROCEDURE — 83880 ASSAY OF NATRIURETIC PEPTIDE: CPT

## 2025-06-19 PROCEDURE — 97530 THERAPEUTIC ACTIVITIES: CPT

## 2025-06-19 PROCEDURE — 36415 COLL VENOUS BLD VENIPUNCTURE: CPT

## 2025-06-19 PROCEDURE — 6370000000 HC RX 637 (ALT 250 FOR IP): Performed by: FAMILY MEDICINE

## 2025-06-19 PROCEDURE — 2500000003 HC RX 250 WO HCPCS: Performed by: INTERNAL MEDICINE

## 2025-06-19 PROCEDURE — 2500000003 HC RX 250 WO HCPCS: Performed by: FAMILY MEDICINE

## 2025-06-19 RX ORDER — SPIRONOLACTONE 25 MG/1
25 TABLET ORAL DAILY
Qty: 30 TABLET | Refills: 1 | Status: SHIPPED | OUTPATIENT
Start: 2025-06-20

## 2025-06-19 RX ORDER — BUMETANIDE 1 MG/1
1 TABLET ORAL DAILY
Qty: 30 TABLET | Refills: 1 | Status: SHIPPED | OUTPATIENT
Start: 2025-06-20

## 2025-06-19 RX ADMIN — METOPROLOL TARTRATE 50 MG: 50 TABLET, FILM COATED ORAL at 08:14

## 2025-06-19 RX ADMIN — MEMANTINE 10 MG: 5 TABLET ORAL at 08:15

## 2025-06-19 RX ADMIN — GUAIFENESIN 200 MG: 100 LIQUID ORAL at 02:08

## 2025-06-19 RX ADMIN — PANTOPRAZOLE SODIUM 40 MG: 40 TABLET, DELAYED RELEASE ORAL at 06:21

## 2025-06-19 RX ADMIN — APIXABAN 2.5 MG: 2.5 TABLET, FILM COATED ORAL at 08:14

## 2025-06-19 RX ADMIN — ALLOPURINOL 100 MG: 100 TABLET ORAL at 08:14

## 2025-06-19 RX ADMIN — SERTRALINE 50 MG: 50 TABLET, FILM COATED ORAL at 08:14

## 2025-06-19 RX ADMIN — SODIUM CHLORIDE, PRESERVATIVE FREE 10 ML: 5 INJECTION INTRAVENOUS at 08:16

## 2025-06-19 RX ADMIN — GABAPENTIN 400 MG: 400 CAPSULE ORAL at 08:14

## 2025-06-19 RX ADMIN — BUMETANIDE 1 MG: 1 TABLET ORAL at 08:15

## 2025-06-19 RX ADMIN — LOSARTAN POTASSIUM 25 MG: 25 TABLET, FILM COATED ORAL at 08:14

## 2025-06-19 RX ADMIN — SPIRONOLACTONE 25 MG: 25 TABLET ORAL at 08:15

## 2025-06-19 RX ADMIN — AMLODIPINE BESYLATE 10 MG: 10 TABLET ORAL at 08:14

## 2025-06-19 ASSESSMENT — PAIN SCALES - GENERAL: PAINLEVEL_OUTOF10: 0

## 2025-06-19 NOTE — PROGRESS NOTES
Hospitalist Progress Note   Admit Date:  2025 10:46 PM   Name:  Erica Jose   Age:  84 y.o.  Sex:  female  :  1940   MRN:  [id number]   Room:  [facility]/    Presenting/Chief Complaint: No chief complaint on file.     Reason(s) for Admission: Acute respiratory failure with hypoxia (HCC) [J96.01]     Hospital Course:   Erica Jose is a 84 y.o. female with medical history of Atrial fibrillation, aortic stenosis, chronic HFpEF--EF  55-60%, tachy-georgina syndrome and symptomatic bradycardia s/p dual-chamber pacemaker placed in  s/p gen change in 2015, nonobstructive CAD, history of Breast cancer, COPD, chronic hypoxia--wears 2L NC oxygen at night, Pulm HTN, Depression, Esophageal reflux, Hearing loss, History of peripheral edema, HTN, Hypercholesterolemia, Neuropathy, who presents to the ER with report of worsening shortness of breath and cough over the past 24 hours or so. Per Pulm note only taking Bumex PRN depending on weight change. Did received IV antibiotics in the ED as thought was she may have PNA, but looks to be all fluid overload.     Hospitalist consulted for admission. Cardiology consulted. IV lasix started.      Subjective & 24hr Events:   Mrs. Mulligan is seen this morning in her room with family. She is still requiring some oxygen clifford, when working with PT. She reports she feels better and would like to go home, but she is still having fevers. CXR and UA have been ordered.      Assessment & Plan:   Principal Problem:    Acute on chronic respiratory failure with hypoxia     Pulm Edema--CHF exacerbation?  --Wean O2 as tolerated, currently on 2L NC--at home usually only wears this at night  --IV Lasix again today--may need to hold if Cr worsens, follow I/Os. Holding home Bumex  --Continue ARB/beta blocker.   --TTE ordered  --Cardiology consult placed by Carter          Normocytic anemia   --At her baseline, no acute s/sx of bleeding, monitor daily         Pulmonary hypertension 
       Hospitalist Progress Note   Admit Date:  2025 10:46 PM   Name:  Erica Jose   Age:  84 y.o.  Sex:  female  :  1940   MRN:  [id number]   Room:  [facility] [place]    Presenting/Chief Complaint: No chief complaint on file.     Reason(s) for Admission: Acute respiratory failure with hypoxia (HCC) [J96.01]     Hospital Course:   Erica Jose is a 84 y.o. female with medical history of Atrial fibrillation, aortic stenosis, chronic HFpEF--EF  55-60%, tachy-georgina syndrome and symptomatic bradycardia s/p dual-chamber pacemaker placed in  s/p gen change in 2015, nonobstructive CAD, history of Breast cancer, COPD, chronic hypoxia--wears 2L NC oxygen at night, Pulm HTN, Depression, Esophageal reflux, Hearing loss, History of peripheral edema, HTN, Hypercholesterolemia, Neuropathy, who presents to the ER with report of worsening shortness of breath and cough over the past 24 hours or so. Per Pulm note only taking Bumex PRN depending on weight change. Did received IV antibiotics in the ED as thought was she may have PNA, but looks to be all fluid overload.     During the hospitalization, patient was having febrile episodes with negative infectious source.    Subjective & 24hr Events:   Patient was seen and examined at bedside.  Has not been febrile for over 24 hours.  Patient is requiring 2 L O2 nasal cannula currently.  6-minute walk test ordered and showed that patient will need 2 L on ambulation which is her baseline.  Renal function slightly increased with creatinine 1.34 while on IV Lasix 40 mg twice daily.  Patient without any acute complaints.    Assessment & Plan:   Acute on chronic respiratory failure with hypoxia   Acute Pulmonary Edema possibly 2/2 CHF exacerbation  Echocardiogram EF of 55 to 60%-Patient is currently on 2 L O2 nasal cannula.  Only wears 2 L at home prior to admission.  6-minute walk test with the patient requiring 2 L of O2 at rest and on ambulation.  -Worsening creatinine.  Will 
       Hospitalist Progress Note   Admit Date:  2025 10:46 PM   Name:  Erica Jose   Age:  84 y.o.  Sex:  female  :  1940   MRN:  [id number]   Room:  [facility]/    Presenting/Chief Complaint: No chief complaint on file.     Reason(s) for Admission: Acute respiratory failure with hypoxia (HCC) [J96.01]     Hospital Course:   Erica Jose is a 84 y.o. female with medical history of Atrial fibrillation, aortic stenosis, chronic HFpEF--EF  55-60%, tachy-georgina syndrome and symptomatic bradycardia s/p dual-chamber pacemaker placed in  s/p gen change in 2015, nonobstructive CAD, history of Breast cancer, COPD, chronic hypoxia--wears 2L NC oxygen at night, Pulm HTN, Depression, Esophageal reflux, Hearing loss, History of peripheral edema, HTN, Hypercholesterolemia, Neuropathy, who presents to the ER with report of worsening shortness of breath and cough over the past 24 hours or so. Per Pulm note only taking Bumex PRN depending on weight change. Did received IV antibiotics in the ED as thought was she may have PNA, but looks to be all fluid overload.     During the hospitalization, patient was having febrile episodes with negative infectious source.    Subjective & 24hr Events:   Patient was seen and examined at bedside.  Sitting up in a recliner.  Daughter is at bedside.  Remains on 2 L O2 nasal cannula with saturation above 96%.  Patient does not have any acute complaints.  Renal function showing improvement and down to creatinine of 1.27.  Still having diffuse crackles on lung examination.    Assessment & Plan:   Acute on chronic respiratory failure with hypoxia   Acute Pulmonary Edema possibly 2/2 CHF exacerbation  Echocardiogram EF of 55 to 60%-Patient is currently on 2 L O2 nasal cannula.  Only wears 2 L at home prior to admission.  6-minute walk test with the patient requiring 2 L of O2 at rest and on ambulation.  -Renal function improved slightly after changing IV Lasix twice daily to p.o. 
       Hospitalist Progress Note   Admit Date:  2025 10:46 PM   Name:  Erica Jose   Age:  84 y.o.  Sex:  female  :  1940   MRN:  [id number]   Room:  [facility]    Presenting/Chief Complaint: No chief complaint on file.     Reason(s) for Admission: Acute respiratory failure with hypoxia (HCC) [J96.01]     Hospital Course:   Erica Jose is a 84 y.o. female with medical history of Atrial fibrillation, aortic stenosis, chronic HFpEF--EF  55-60%, tachy-georgina syndrome and symptomatic bradycardia s/p dual-chamber pacemaker placed in  s/p gen change in 2015, nonobstructive CAD, history of Breast cancer, COPD, chronic hypoxia--wears 2L NC oxygen at night, Pulm HTN, Depression, Esophageal reflux, Hearing loss, History of peripheral edema, HTN, Hypercholesterolemia, Neuropathy, who presents to the ER with report of worsening shortness of breath and cough over the past 24 hours or so. Per Pulm note only taking Bumex PRN depending on weight change. Did received IV antibiotics in the ED as thought was she may have PNA, but looks to be all fluid overload.    Hospitalist consulted for admission. Cardiology consulted. IV lasix started.      Subjective & 24hr Events:   Mrs. Mulligan is seen this morning while eating breakfast. She feels she is doing okay. She does still have a cough. Currently on 2L NC oxygen.      Assessment & Plan:   Principal Problem:    Acute on chronic respiratory failure with hypoxia     Pulm Edema--CHF exacerbation?  --Wean O2 as tolerated, currently on 2L NC--at home usually only wears this at night  --IV Lasix, follow I/Os. Holding home Bumex  --Continue ARB/beta blocker.   --TTE ordered  --Cardiology consult placed by Carter        Normocytic anemia        Pulmonary hypertension (HCC)     Atrial fibrillation    Aortic stenosis    HFpEF--EF 25 55-60%    History of Tachy-georgina syndrome and symptomatic bradycardia s/p dual-chamber pacemaker placed in  s/p gen change in 2015    
  Physician Progress Note      PATIENT:               LIVIA ROBERSON  CSN #:                  [id number]  :                       1940  ADMIT DATE:       2025 10:46 PM  DISCH DATE:        2025 11:54 AM  RESPONDING  PROVIDER #:        Ankur Jacques MD          QUERY TEXT:    Acute Pulmonary Edema possibly 2/2 CHF exacerbation is documented in the   medical record on  per IM note.  Please document the type and acuity:    The clinical indicators include:   IM note  \"Acute Pulmonary Edema possibly 2/2 CHF exacerbation  Echocardiogram EF of 55 to 60%-Patient is currently on 2 L O2 nasal cannula.    Only wears 2 L at home prior to admission.  6-minute walk test with the   patient requiring 2 L of O2 at rest and on ambulation.\"    Cardio Consult  \"HFpEF  - RVSP noted to be 73 mmHg making hemodynamics difficult.  - Due to the patient's age as well as underlying dementia current treatment   for combined group 2 group 3 pulmonary hypertension is appropriate.  - Agree with current plan for IV diuresis afterload reduction with treatment   of her underlying hypertension  - There appears to be a complicated COPD as well\"     BNP 55144   BNP 1160    Discharge summary  \"During the hospitalization, patient was having febrile   episodes with negative infectious source.  Cardiology was consulted and was placed on IV lasix. Her symptoms improved.   Slight bump in Cr but has been stable. IV lasix was transitioned to PO bumex   and aldactone was added.\"  Options provided:  -- Acute on Chronic Diastolic CHF/HFpEF  -- Chronic Diastolic CHF/HFpEF  -- Other - I will add my own diagnosis  -- Disagree - Not applicable / Not valid  -- Disagree - Clinically unable to determine / Unknown  -- Refer to Clinical Documentation Reviewer    PROVIDER RESPONSE TEXT:    This patient is in acute on chronic diastolic CHF/HFpEF.    Query created by: Stephani Lester on 2025 1:08 PM      Electronically signed by:  Ankur Jacques 
  Physician Progress Note      PATIENT:               LIVIA ROBERSON  [facility] #:                  [id number]  :                       1940  ADMIT DATE:       2025 10:46 PM  DISCH DATE:        2025 11:54 AM  RESPONDING  PROVIDER #:        Ankur Jacques MD          QUERY TEXT:    Acute respiratory failure is documented in the medical record H&P and   subsequent documentation.  Please provide additional clinical indicators   supportive of your documentation. Or please document if the diagnosis of acute   respiratory failure has been ruled out after study.    The clinical indicators include:  H&P states  Patient presents with SOB  \"Acute respiratory failure with hypoxia (HCC)  Plan: Wean O2 as tolerated. Treat per below.\"  \"Pulmonary edema  Plan: Evident on CXR. IV Lasix, follow output. Continue ARB/beta blocker. TTE   in AM. Consider cardiology consult.\"  \"Respiratory:   No respiratory distress or accessory muscle use.\"     IM note  \"Pulm Edema--CHF exacerbation?  --Wean O2 as tolerated, currently on 2L NC--at home usually only wears this at   night  --IV Lasix, follow I/Os. Holding home Bumex\"     IM  \"Acute on chronic respiratory failure with hypoxia  Acute Pulmonary Edema possibly 2/2 CHF exacerbation  Echocardiogram EF of 55 to 60%-Patient is currently on 2 L O2 nasal cannula.    Only wears 2 L at home prior to admission.  6-minute walk test with the   patient requiring 2 L of O2 at rest and on ambulation.\"    \"Remains on 2 L O2 nasal cannula with saturation above 96%.  Patient does not have any acute complaints.  Renal function showing improvement and down to creatinine of 1.27.  Still   having diffuse crackles on lung examination.\"    Respiratory therapy Oxygen qualifier   \"Pt needs assistance ambulating, pt refused to walk with PT. Pt did seated   exercises O2 sat maintained at 90% or greater.\"  Options provided:  -- Acute Respiratory Failure as evidenced by, Please document evidence.  -- 
ACUTE OCCUPATIONAL THERAPY GOALS:   (Developed with and agreed upon by patient and/or caregiver.)  1. Patient will perform grooming with SPV and adaptive equipment as needed.  2. Patient will perform upper body dressing with SPV and adaptive equipment as needed.  3. Patient will perform lower body dressing with SPV and adaptive equipment as needed.  4. Patient will perform bathing with SPV and adaptive equipment as needed.  5. Patient will perform toileting and toilet transfer with SPV and adaptive equipment as needed.  6. Patient will perform ADL functional mobility and tranfers in room with SPV and adaptive equipment as needed.  7. Patient/family to demonstrate knowledge of home safety and DME recommendations.    Timeframe: 7 visits     OCCUPATIONAL THERAPY Daily Note and PM       OT Visit Days: 2  Acknowledge Orders  Time  OT Charge Capture  Rehab Caseload Tracker      Erica Jose is a 84 y.o. female   PRIMARY DIAGNOSIS: Acute respiratory failure with hypoxia (HCC)  Acute respiratory failure with hypoxia (HCC) [J96.01]       Reason for Referral: Generalized Muscle Weakness (M62.81)  Other lack of cordination (R27.8)  Difficulty in walking, Not elsewhere classified (R26.2)  Inpatient: Payor: [employer] MEDICARE / Plan: [employer] CHOICE-PPO MEDICARE / Product Type: *No Product type* /     ASSESSMENT:     REHAB RECOMMENDATIONS:   Recommendation to date pending progress:  Setting:  Continued occupational therapy recommended at discharge    Equipment:    To Be Determined     ASSESSMENT:  Ms. Jose is admitted for the above diagnoses and presents with overall deficits in strength, functional mobility and ADL performance.  She lives alone in a 1 level apartment home with a [facility] aide 8-11 M-F and local family support. At baseline, she reports independence with ADLs and uses a rollator outside the home for mobility. She is extremely [place] but reads lips well.     6/16/25: Patient presents sitting up in recliner chair with 
ACUTE OCCUPATIONAL THERAPY GOALS:   (Developed with and agreed upon by patient and/or caregiver.)  1. Patient will perform grooming with SPV and adaptive equipment as needed.  2. Patient will perform upper body dressing with SPV and adaptive equipment as needed.  3. Patient will perform lower body dressing with SPV and adaptive equipment as needed.  4. Patient will perform bathing with SPV and adaptive equipment as needed.  5. Patient will perform toileting and toilet transfer with SPV and adaptive equipment as needed.  6. Patient will perform ADL functional mobility and tranfers in room with SPV and adaptive equipment as needed.  7. Patient/family to demonstrate knowledge of home safety and DME recommendations.    Timeframe: 7 visits     OCCUPATIONAL THERAPY Daily Note and PM       OT Visit Days: 3  Acknowledge Orders  Time  OT Charge Capture  Rehab Caseload Tracker      Erica Jose is a 84 y.o. female   PRIMARY DIAGNOSIS: Acute respiratory failure with hypoxia (HCC)  Acute respiratory failure with hypoxia (HCC) [J96.01]       Reason for Referral: Generalized Muscle Weakness (M62.81)  Other lack of cordination (R27.8)  Difficulty in walking, Not elsewhere classified (R26.2)  Inpatient: Payor: [employer] MEDICARE / Plan: [employer] CHOICE-PPO MEDICARE / Product Type: *No Product type* /     ASSESSMENT:     REHAB RECOMMENDATIONS:   Recommendation to date pending progress:  Setting:  Continued occupational therapy recommended at discharge    Equipment:    To Be Determined     ASSESSMENT:  Ms. Jose is admitted for the above diagnoses and presents with overall deficits in strength, functional mobility and ADL performance.  She lives alone in a 1 level apartment home with a [facility] aide 8-11 M-F and local family support. At baseline, she reports independence with ADLs and uses a rollator outside the home for mobility. She is extremely [place] but reads lips well.     6/17/25.  Pt seated upright in recliner, agreeable to therapy 
ACUTE OCCUPATIONAL THERAPY GOALS:   (Developed with and agreed upon by patient and/or caregiver.)  1. Patient will perform grooming with SPV and adaptive equipment as needed.  2. Patient will perform upper body dressing with SPV and adaptive equipment as needed.  3. Patient will perform lower body dressing with SPV and adaptive equipment as needed.  4. Patient will perform bathing with SPV and adaptive equipment as needed.  5. Patient will perform toileting and toilet transfer with SPV and adaptive equipment as needed.  6. Patient will perform ADL functional mobility and tranfers in room with SPV and adaptive equipment as needed.  7. Patient/family to demonstrate knowledge of home safety and DME recommendations.    Timeframe: 7 visits     OCCUPATIONAL THERAPY Initial Assessment, Daily Note, and AM       OT Visit Days: 1  Acknowledge Orders  Time  OT Charge Capture  Rehab Caseload Tracker      Erica Jose is a 84 y.o. female   PRIMARY DIAGNOSIS: Acute respiratory failure with hypoxia (HCC)  Acute respiratory failure with hypoxia (HCC) [J96.01]       Reason for Referral: Generalized Muscle Weakness (M62.81)  Other lack of cordination (R27.8)  Difficulty in walking, Not elsewhere classified (R26.2)  Inpatient: Payor: [employer] MEDICARE / Plan: [employer] CHOICE-PPO MEDICARE / Product Type: *No Product type* /     ASSESSMENT:     REHAB RECOMMENDATIONS:   Recommendation to date pending progress:  Setting:  Continued occupational therapy recommended at discharge    Equipment:    To Be Determined     ASSESSMENT:  Ms. Jose is admitted for the above diagnoses and presents with overall deficits in strength, functional mobility and ADL performance.  She lives alone in a 1 level apartment home with a [facility] aide 8-11 M-F and local family support. At baseline, she reports independence with ADLs and uses a rollator outside the home for mobility. She is extremely [place] but reads lips well. Today, she was able to perform bed mobility 
ACUTE PHYSICAL THERAPY GOALS:   (Developed with and agreed upon by patient and/or caregiver.)  (1.)Ms. Jose will move from supine to sit and sit to supine  with SUPERVISION.  (2.)Ms. Jose will transfer from bed to chair and chair to bed with SUPERVISION using a walker/Rolator  (3.)Ms. Jose will ambulate with WALLACE    PHYSICAL THERAPY: Daily Note AM   (Link to Caseload Tracking: PT Visit Days : 2  Time In/Out PT Charge Capture  Rehab Caseload Tracker  Orders    Erica Jose is a 84 y.o. female   PRIMARY DIAGNOSIS: Acute respiratory failure with hypoxia (HCC)  Acute respiratory failure with hypoxia (HCC) [J96.01]       Inpatient: Payor: [employer] MEDICARE / Plan: [employer] CHOICE-PPO MEDICARE / Product Type: *No Product type* /     ASSESSMENT:     REHAB RECOMMENDATIONS:   Recommendation to date pending progress:  Setting:  Home Health Therapy  Continued physical therapy recommended at discharge.  Recommending increased sitter hour and 24/7 for now. Patient not safe to be discharge home alone. Will continue to assess progress    Equipment:    To Be Determined     ASSESSMENT:  Ms. Jose was admitted with respiratory failure along with an associated decrease in functional strength & mobility. This pt was living alone with CLTC coming in 3 hrs a day 5 days a week . Pt was functioning without an assistive device in the home but a Rolator out of the home. Currently this pt is on 2 liters of 02, attempted to wean pt off 02 but hre 02 sat levels dropped after gait to mid 80's needing 2 liters to recover back to 90% or >. Due to pt's decreased respiratory status, she is more unsteady during tranfers & gait while using a rolling walker requiring CGA for safety. PT would advise this pt to increase her sitter hrs & not be left home alone for periods of time or work towards transferring to  an [facility].  Pt will still benefit from continued therapy while in house & after DC to maximize her physical potential. MSW made aware of PT's 
ACUTE PHYSICAL THERAPY GOALS:   (Developed with and agreed upon by patient and/or caregiver.)  (1.)Ms. Jose will move from supine to sit and sit to supine  with SUPERVISION.  (2.)Ms. Jose will transfer from bed to chair and chair to bed with SUPERVISION using a walker/Rolator  (3.)Ms. Jose will ambulate with WALLACE    PHYSICAL THERAPY: Daily Note AM   (Link to Caseload Tracking: PT Visit Days : 3  Time In/Out PT Charge Capture  Rehab Caseload Tracker  Orders    Erica Jose is a 84 y.o. female   PRIMARY DIAGNOSIS: Acute respiratory failure with hypoxia (HCC)  Acute respiratory failure with hypoxia (HCC) [J96.01]       Inpatient: Payor: [employer] MEDICARE / Plan: [employer] CHOICE-PPO MEDICARE / Product Type: *No Product type* /     ASSESSMENT:     REHAB RECOMMENDATIONS:   Recommendation to date pending progress:  Setting:  Home Health Therapy  Continued physical therapy recommended at discharge.  Recommending increased sitter hour and 24/7 for now. Patient not safe to be discharge home alone. Will continue to assess progress    Equipment:    To Be Determined     ASSESSMENT:  Ms. Jose was admitted with respiratory failure along with an associated decrease in functional strength & mobility. This pt was living alone with CLTC coming in 3 hrs a day 5 days a week . Pt was functioning without an assistive device in the home but a Rolator out of the home. Currently this pt is on 2 liters of 02, attempted to wean pt off 02 but hre 02 sat levels dropped after gait to mid 80's needing 2 liters to recover back to 90% or >. Due to pt's decreased respiratory status, she is more unsteady during tranfers & gait while using a rolling walker requiring CGA for safety. PT would advise this pt to increase her sitter hrs & not be left home alone for periods of time or work towards transferring to  an halfway.  Pt will still benefit from continued therapy while in house & after DC to maximize her physical potential. MSW made aware of PT's 
ACUTE PHYSICAL THERAPY GOALS:   (Developed with and agreed upon by patient and/or caregiver.)  (1.)Ms. Jose will move from supine to sit and sit to supine  with SUPERVISION.  (2.)Ms. Jose will transfer from bed to chair and chair to bed with SUPERVISION using a walker/Rolator  (3.)Ms. Jose will ambulate with WALLACE    PHYSICAL THERAPY: Daily Note AM   (Link to Caseload Tracking: PT Visit Days : 4  Time In/Out PT Charge Capture  Rehab Caseload Tracker  Orders    Erica Jose is a 84 y.o. female   PRIMARY DIAGNOSIS: Acute respiratory failure with hypoxia (HCC)  Acute respiratory failure with hypoxia (HCC) [J96.01]       Inpatient: Payor: [employer] MEDICARE / Plan: [employer] CHOICE-PPO MEDICARE / Product Type: *No Product type* /     ASSESSMENT:     REHAB RECOMMENDATIONS:   Recommendation to date pending progress:  Setting:  Home Health Therapy  Continued physical therapy recommended at discharge.  Recommending increased sitter hour and 24/7 for now. Patient not safe to be discharge home alone. Will continue to assess progress    Equipment:    To Be Determined     ASSESSMENT:  Ms. Jose was admitted with respiratory failure along with an associated decrease in functional strength & mobility. This pt was living alone with CLTC coming in 3 hrs a day 5 days a week . Pt was functioning without an assistive device in the home but a Rolator out of the home. Currently this pt is on 2 liters of 02, attempted to wean pt off 02 but hre 02 sat levels dropped after gait to mid 80's needing 2 liters to recover back to 90% or >. Due to pt's decreased respiratory status, she is more unsteady during tranfers & gait while using a rolling walker requiring CGA for safety. PT would advise this pt to increase her sitter hrs & not be left home alone for periods of time or work towards transferring to  an [facility].  Pt will still benefit from continued therapy while in house & after DC to maximize her physical potential. MSW made aware of PT's 
ACUTE PHYSICAL THERAPY GOALS:   (Developed with and agreed upon by patient and/or caregiver.)  (1.)Ms. Jose will move from supine to sit and sit to supine  with SUPERVISION.  (2.)Ms. Jose will transfer from bed to chair and chair to bed with SUPERVISION using a walker/Rolator  (3.)Ms. Jose will ambulate with WALLACE    PHYSICAL THERAPY: Daily Note AM   (Link to Caseload Tracking: PT Visit Days : 5  Time In/Out PT Charge Capture  Rehab Caseload Tracker  Orders    Erica Jose is a 84 y.o. female   PRIMARY DIAGNOSIS: Acute respiratory failure with hypoxia (HCC)  Acute respiratory failure with hypoxia (HCC) [J96.01]       Inpatient: Payor: [employer] MEDICARE / Plan: [employer] CHOICE-PPO MEDICARE / Product Type: *No Product type* /     ASSESSMENT:     REHAB RECOMMENDATIONS:   Recommendation to date pending progress:  Setting:  Home Health Therapy  Continued physical therapy recommended at discharge.  Recommending increased sitter hour and 24/7 for now. Patient not safe to be discharge home alone. Will continue to assess progress    Equipment:    To Be Determined     ASSESSMENT:  Ms. Jose was admitted with respiratory failure along with an associated decrease in functional strength & mobility. This pt was living alone with CLTC coming in 3 hrs a day 5 days a week . Pt was functioning without an assistive device in the home but a Rollator out of the home. Currently this pt is on 2 liters of 02, attempted to wean pt off 02 but hre 02 sat levels dropped after gait to mid 80's needing 2 liters to recover back to 90% or >. Due to pt's decreased respiratory status, she is more unsteady during tranfers & gait while using a rolling walker requiring CGA for safety. PT would advise this pt to increase her sitter hrs & not be left home alone for periods of time or work towards transferring to  an care home.  Pt will still benefit from continued therapy while in house & after DC to maximize her physical potential. MSW made aware of PT's 
ACUTE PHYSICAL THERAPY GOALS:   (Developed with and agreed upon by patient and/or caregiver.)  DISCHARGE GOALS :  (1.)Ms. Jose will move from supine to sit and sit to supine  with SUPERVISION.  (2.)Ms. Jose will transfer from bed to chair and chair to bed with SUPERVISION using a walker/Rolator  (3.)Ms. Jose will ambulate with SUPERVISION for 150-200 feet with a rolling walker/Rolator  ________________________________________________________________________________________________     PHYSICAL THERAPY Initial Assessment and PM  (Link to Caseload Tracking: PT Visit Days : 1  Acknowledge Orders  Time In/Out  PT Charge Capture  Rehab Caseload Tracker    Erica Jose is a 84 y.o. female   PRIMARY DIAGNOSIS: Acute respiratory failure with hypoxia (HCC)  Acute respiratory failure with hypoxia (HCC) [J96.01]       Reason for Referral: Generalized Muscle Weakness (M62.81)  Other lack of cordination (R27.8)  Difficulty in walking, Not elsewhere classified (R26.2)  Inpatient: Payor: [employer] MEDICARE / Plan: [employer] CHOICE-PPO MEDICARE / Product Type: *No Product type* /     ASSESSMENT:     REHAB RECOMMENDATIONS:   Recommendation to date pending progress:  Setting:  Continued physical therapy recommended at discharge.  Pt needs to increase her sitter hours or work towards an CRISTOPHER  Pt not safe to home alone for periods of time.    Equipment:    To Be Determined     ASSESSMENT:  Ms. Jose was admitted with respiratory failure along with an associated decrease in functional strength & mobility. This pt was living alone with CLTC coming in 3 hrs a day 5 days a week . Pt was functioning without an assistive device in the home but a Rolator out of the home. Currently this pt is on 2 liters of 02, attempted to wean pt off 02 but hre 02 sat levels dropped after gait to mid 80's needing 2 liters to recover back to 90% or >. Due to pt's decreased respiratory status, she is more unsteady during tranfers & gait while using a 
Discharge instructions discussed with and given to patient and daughter.  Patient leaving with 2L NC.  IV removed.  No questions at this time.    
Occupational Therapy Note:    Attempted to see patient this PM for occupational therapy treatment  session. Patient declined all out of bed activities and/or ADLs stating she hasn't been feeling well today. Will follow and re-attempt as schedule permits/patient available. Thank you,    Shanon Da Silva, OT    Rehab Caseload Tracker     
Oxygen Qualifer     06/17/25 1140   Resting (Room Air)   SpO2 84   Resting (On O2)   SpO2 94   O2 Device Nasal cannula   O2 Flow Rate (l/min) 2 l/min   During Walk (On O2)   SpO2 90  (pt did seated exercises)   O2 Device Nasal cannula   O2 Flow Rate (l/min) 2 l/min   After Walk   SpO2 94   O2 Device Nasal cannula   O2 Flow Rate (l/min) 2 l/min   Does the Patient Qualify for Home O2 Yes   Liter Flow at Rest 2   Liter Flow on Exertion 2   Does the Patient Need Portable Oxygen Tanks Yes     Pt needs assistance ambulating, pt refused to walk with PT. Pt did seated exercises O2 sat maintained at 90% or greater.  
TRANSFER - IN REPORT:    Verbal report received from Carmelita DOTSON on Erica Jose  being received from \[facility]\"" ED for routine progression of patient care      Report consisted of patient's Situation, Background, Assessment and   Recommendations(SBAR).     Information from the following report(s) ED Encounter Summary and ED SBAR was reviewed with the receiving nurse.    Opportunity for questions and clarification was provided.      Assessment will be completed upon patient's arrival to unit and care assumed.     
This RN asked patient to clarify if she is a Full Code or Do Not Resuscitate. Pt is alert and oriented x4. Pt stated she is a Full Code. MD Walker Rodriguez made aware.  
~1500 Patient radiating heat noted when touching arms. Temp obtained rectally 101.1.    Provider notified. Gave tylenol, turned down room temp, and pulled blankets off.    Recheck at 1613 showed 101.8. Provider notified. Ibuprofen 600mg given.    Will continue to follow  
AIRWAY: External Catheter    RESTRICTIONS/PRECAUTIONS:  Restrictions/Precautions: Fall Risk, Bed Alarm    PAIN: VITALS / O2:   Pre Treatment:          Post Treatment: none reported       Vitals          Vcramp2H NC spo2 80% post activity recovered to 90's after a few minutes         GROSS EVALUATION: INTACT IMPAIRED   (See Comments)   UE AROM [x] []   UE PROM [x] []   Strength []  Generalized weakness     Posture / Balance []     Sensation [x]     Coordination [x]       Tone []       Edema []    Activity Tolerance []  Decreased due to malaise, eager to return home   Hand Dominance R [] L []      COGNITION/  PERCEPTION: INTACT IMPAIRED   (See Comments)   Orientation [x]     Vision []     Hearing []  Extremely [place], reads lips well   Cognition  []     Perception []       MOBILITY: I Mod I S SBA CGA Min Mod Max Total  NT x2 Comments:   Bed Mobility    Rolling [] [] [] [] [] [] [] [] [] [] []    Supine to Sit [] [] [] [x] [] [] [] [] [] [] []    Scooting [] [] [] [x] [] [] [] [] [] [] []    Sit to Supine [] [] [] [] [] [] [] [] [] [] []    Transfers    Sit to Stand [] [] [] [] [x] [] [] [] [] [] []    Bed to Chair [] [] [] [] [x] [] [] [] [] [] [] RW   Stand to Sit [] [] [] [] [x] [] [] [] [] [] []    Tub/Shower [] [] [] [] [] [] [] [] [] [] []     Toilet [] [] [] [] [x] [] [] [] [] [] []  BSC, RW    [] [] [] [] [] [] [] [] [] [] []    I=Independent, Mod I=Modified Independent, S=Supervision/Setup, SBA=Standby Assistance, CGA=Contact Guard Assistance, Min=Minimal Assistance, Mod=Moderate Assistance, Max=Maximal Assistance, Total=Total Assistance, NT=Not Tested    ACTIVITIES OF DAILY LIVING: I Mod I S SBA CGA Min Mod Max Total NT Comments   BASIC ADLs:              Upper Body Bathing  [] [] [] [] [] [] [] [] [] []     Lower Body Bathing [] [] [] [] [] [] [] [] [] []     Toileting [] [] [] [] [] [] [] [] [] []    Upper Body Dressing [] [] [] [x] [] [] [] [] [] []    Lower Body Dressing [] [] [] [] [] [x] [] [] [] []  
Monocytes Absolute 1.36 (H) 0.10 - 1.30 K/UL    Eosinophils Absolute 0.22 0.00 - 0.80 K/UL    Basophils Absolute 0.03 0.00 - 0.20 K/UL    Immature Granulocytes Absolute 0.06 0.0 - 0.5 K/UL       Recent Labs     06/13/25  1519   COVID19 Not detected       Current Meds:  Current Facility-Administered Medications   Medication Dose Route Frequency    spironolactone (ALDACTONE) tablet 25 mg  25 mg Oral Daily    guaiFENesin (ROBITUSSIN) 100 MG/5ML liquid 200 mg  200 mg Oral Q4H PRN    allopurinol (ZYLOPRIM) tablet 100 mg  100 mg Oral Daily    amLODIPine (NORVASC) tablet 10 mg  10 mg Oral Daily    apixaban (ELIQUIS) tablet 2.5 mg  2.5 mg Oral BID    gabapentin (NEURONTIN) capsule 400 mg  400 mg Oral TID    pantoprazole (PROTONIX) tablet 40 mg  40 mg Oral QAM AC    losartan (COZAAR) tablet 25 mg  25 mg Oral Daily    memantine (NAMENDA) tablet 10 mg  10 mg Oral BID    metoprolol tartrate (LOPRESSOR) tablet 50 mg  50 mg Oral BID    montelukast (SINGULAIR) tablet 10 mg  10 mg Oral Nightly    sertraline (ZOLOFT) tablet 50 mg  50 mg Oral Daily    sodium chloride flush 0.9 % injection 5-40 mL  5-40 mL IntraVENous 2 times per day    sodium chloride flush 0.9 % injection 5-40 mL  5-40 mL IntraVENous PRN    0.9 % sodium chloride infusion   IntraVENous PRN    ondansetron (ZOFRAN-ODT) disintegrating tablet 4 mg  4 mg Oral Q8H PRN    Or    ondansetron (ZOFRAN) injection 4 mg  4 mg IntraVENous Q6H PRN    polyethylene glycol (GLYCOLAX) packet 17 g  17 g Oral Daily PRN    acetaminophen (TYLENOL) tablet 650 mg  650 mg Oral Q6H PRN    Or    acetaminophen (TYLENOL) suppository 650 mg  650 mg Rectal Q6H PRN    magnesium sulfate 2000 mg in 50 mL IVPB premix  2,000 mg IntraVENous PRN    furosemide (LASIX) injection 40 mg  40 mg IntraVENous BID       Signed:  Maggie Willis DO    Part of this note may have been written by using a voice dictation software.  The note has been proof read but may still contain some grammatical/other

## 2025-06-19 NOTE — DISCHARGE SUMMARY
Hospitalist Discharge Summary   Admit Date:  2025 10:46 PM   DC Note date: 2025  Name:  Erica Jose   Age:  84 y.o.  Sex:  female  :  1940   MRN:  [id number]   Room:  [facility]  PCP:  Nichole Bales MD    Presenting Complaint: No chief complaint on file.     Initial Admission Diagnosis: Acute respiratory failure with hypoxia (HCC) [J96.01]     Problem List for this Hospitalization (present on admission):    Principal Problem:    Acute respiratory failure with hypoxia (HCC)  Active Problems:    Congestive heart failure (HCC)    Hyperlipidemia    CAD in native artery    Normocytic anemia    Hypertension    Pulmonary hypertension (HCC)    Obesity (BMI 30.0-34.9)    PAF (paroxysmal atrial fibrillation) (HCC)    Malignant neoplasm of upper-outer quadrant of right breast in female, estrogen receptor positive (HCC)    History of atrial fibrillation    Stage 3a chronic kidney disease (HCC)    Chronic heart failure with preserved ejection fraction (HFpEF) (HCC)    Dementia (HCC)    Chronic respiratory failure (HCC)    Elevated troponin    Pulmonary edema  Resolved Problems:    * No resolved hospital problems. *      Hospital Course:  Please refer to the admission H&P for details of presentation. In summary, Eriac Jose is a 84 y.o. 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 Per Pulm note only taking Bumex PRN depending on weight change. Did received IV antibiotics in the ED as thought was

## 2025-06-19 NOTE — CARE COORDINATION
Patient discharging home. [facility] has been arranged. O2 tank given to patient through [facility]. Instructed that the order was for 2L NC.    Twila Delaney RN, BSN  [facility] Care Manager

## 2025-06-19 NOTE — PLAN OF CARE
Problem: Discharge Planning  Goal: Discharge to home or other facility with appropriate resources  6/19/2025 0750 by Mathew Garcia RN  Outcome: Progressing  6/19/2025 0540 by Ottoniel Hall RN  Outcome: Progressing     Problem: Safety - Adult  Goal: Free from fall injury  6/19/2025 0750 by Mathew Garcia RN  Outcome: Progressing  6/19/2025 0540 by Ottoniel Hall RN  Outcome: Progressing     Problem: Pain  Goal: Verbalizes/displays adequate comfort level or baseline comfort level  6/19/2025 0750 by Mathew Garcia RN  Outcome: Progressing  6/19/2025 0540 by Ottoniel Hall RN  Outcome: Progressing     Problem: Respiratory - Adult  Goal: Achieves optimal ventilation and oxygenation  6/19/2025 0750 by Mathew Garcia RN  Outcome: Progressing  6/19/2025 0540 by Ottoniel Hall RN  Outcome: Progressing     Problem: Cardiovascular - Adult  Goal: Maintains optimal cardiac output and hemodynamic stability  6/19/2025 0750 by Mathew Garcia RN  Outcome: Progressing  6/19/2025 0540 by Ottoniel Hall RN  Outcome: Progressing     Problem: Skin/Tissue Integrity - Adult  Goal: Skin integrity remains intact  6/19/2025 0750 by Mathew Garcia RN  Outcome: Progressing  6/19/2025 0540 by Ottoniel Hall RN  Outcome: Progressing     Problem: Genitourinary - Adult  Goal: Absence of urinary retention  6/19/2025 0750 by Mathew Garcia RN  Outcome: Progressing  6/19/2025 0540 by Ottoniel Hall RN  Outcome: Progressing     Problem: Metabolic/Fluid and Electrolytes - Adult  Goal: Electrolytes maintained within normal limits  6/19/2025 0750 by Mathew Garcia RN  Outcome: Progressing  6/19/2025 0540 by Ottoniel Hall RN  Outcome: Progressing     Problem: Chronic Conditions and Co-morbidities  Goal: Patient's chronic conditions and co-morbidity symptoms are monitored and maintained or improved  6/19/2025 0750 by Mathew Garcia RN  Outcome: Progressing  6/19/2025 0540 by Ottoniel Hall

## 2025-06-20 ENCOUNTER — CARE COORDINATION (OUTPATIENT)
Dept: CARE COORDINATION | Facility: CLINIC | Age: 85
End: 2025-06-20

## 2025-06-20 ENCOUNTER — TELEPHONE (OUTPATIENT)
Age: 85
End: 2025-06-20

## 2025-06-20 ENCOUNTER — TELEPHONE (OUTPATIENT)
Dept: INTERNAL MEDICINE CLINIC | Facility: CLINIC | Age: 85
End: 2025-06-20

## 2025-06-20 DIAGNOSIS — J96.01 ACUTE RESPIRATORY FAILURE WITH HYPOXIA (HCC): Primary | ICD-10-CM

## 2025-06-20 PROCEDURE — 1111F DSCHRG MED/CURRENT MED MERGE: CPT | Performed by: STUDENT IN AN ORGANIZED HEALTH CARE EDUCATION/TRAINING PROGRAM

## 2025-06-20 NOTE — CARE COORDINATION
Care Transitions Note    Initial Call - Call within 2 business days of discharge: Yes    Patient Current Location:  Home: [address] [place] [address] [address]  [facility] [id number]    Care Transition Nurse contacted the family, patient's daughter by telephone to perform post hospital discharge assessment, verified name and  as identifiers.  Provided introduction to self, and explanation of the Care Transition Nurse role.    Patient: Erica Jose    Patient : 1940   MRN: [id number]    Reason for Admission: acute resp failure with hypoxia  Discharge Date: 25  RURS: Readmission Risk Score: 15.4      Last Discharge Facility       Date Complaint Diagnosis Description Type Department Provider    25  Congestive heart failure, unspecified HF chronicity, unspecified heart failure type (HCC) Admission (Discharged) Ankur Steen MD    25 Cough; Wheezing; Shortness of Breath Pneumonia due to infectious organism, unspecified laterality, unspecified part of lung ... ED (TRANSFER) Jose Jacques, DO            Was this an external facility discharge? No    Additional needs identified to be addressed with provider   No needs identified             Method of communication with provider: none.    Patients top risk factors for readmission: medical condition-acute resp failure, volume overload    Interventions to address risk factors:   Review of patient management of conditions/medications: discussed recent IP admission and home with aide. Patient daughter reviewed medication changes    Care Summary Note: Patient denies any acute s/s of concern. Patient daughter denies any current needs and indicated patient has an aide Monday through Friday. Patient has all medications and obtained new Rx yesterday. Patient daughter denies any new or worsening s/s but does report some confusion that is ongoing. Patient daughter to call cardiology and PCP visit for .     Care Transition Nurse reviewed discharge

## 2025-06-20 NOTE — TELEPHONE ENCOUNTER
Clif from [facility] would like a call back regarding verification of medications and/or changes.     Clif  P: [phone]

## 2025-06-20 NOTE — TELEPHONE ENCOUNTER
Pt daughter called about questions & concerns regarding these medications. Also wanted to see about an sooner appointment     Bumex 1MG & Altadone MG_Unknown

## 2025-06-20 NOTE — TELEPHONE ENCOUNTER
Thee Thurston MD  You11 minutes ago (3:54 PM)       Yes.  It is okay to overbook.   Daughter notified and appt.made Tuesday /MA

## 2025-06-20 NOTE — TELEPHONE ENCOUNTER
I spoke w/pt.daughter she reports pt.was just in hospital and needs one week f/u appt.Has placed on 2 fluid pills and needs an appt.before then.Sees .She has been scheduled for 7/22 but feels that is too far off.Can she be worked in  sooner than 7/22 in a work in spot?

## 2025-06-22 NOTE — PROGRESS NOTES
[facility] CARDIOLOGY Follow Up                 Reason for Visit: Posthospitalization follow-up    Subjective:     Patient is a 84 y.o. female with a PMH of aortic stenosis, chronic HFpEF, status post PPM, nonobstructive CAD, paroxysmal atrial fibrillation, hypertension, hyperlipidemia, chronic respiratory failure, breast cancer, and dementia who presents for follow-up.  The patient was last seen in April 2025.  She was hospitalized in June 2025 with acute on chronic respiratory failure.  It is not entirely clear why the patient had acute on chronic respiratory failure when reviewing the hospitalist note.  It was noted that she was having \"febrile episodes\" during her hospitalization.  It was noted that she had a \"slight bump\" in her serum creatinine on IV Lasix.  She was discharged on scheduled Bumex as well as Aldactone.  She had a limited TTE in June 2025 that noted a normal EF.  Her VTI ratio was noted to be 0.42.  Mild to moderate MR was noted.  Her RVSP was noted to be 73 mmHg.  She is scheduled for generator exchange July 1.  She reports LLE edema.  She is taking Bumex and Aldactone.  The patient also uses 2L of supplemental at all times now.      Past Medical History:   Diagnosis Date    Abdominal pain 9/29/2013    Atrial fibrillation (HCC)     Breast cancer (HCC) 2017    right    COPD (chronic obstructive pulmonary disease) (HCC)     Depression     DJD (degenerative joint disease) 9/29/2013    Esophageal reflux 9/29/2013    H/O echocardiogram 01/13/2025    Nrml L ventricular systolic fxn w/visually est EF 55 - 60%. L ventricle size nrml. Mildly increased wall thickness. Diastolic dysfxn present w/increased LAP w/nrml LV EF. Aortic Valve: Trileaflet valve. Mod sclerosis aortic valve cusps. Mild stenosis aortic valve. Noted by apical view. AV mean gradient 9 mmHg. AV peak velocity 2.0 m/s. LVOT:AV VTI Index 0.46. AV area by continuity VTI 1.5 cm2.Mi    Hearing loss     History of peripheral edema 03/27/2023

## 2025-06-24 ENCOUNTER — HOSPITAL ENCOUNTER (OUTPATIENT)
Dept: ULTRASOUND IMAGING | Age: 85
Discharge: HOME | End: 2025-06-26
Attending: INTERNAL MEDICINE
Payer: MEDICARE

## 2025-06-24 ENCOUNTER — RESULTS FOLLOW-UP (OUTPATIENT)
Age: 85
End: 2025-06-24

## 2025-06-24 ENCOUNTER — TELEPHONE (OUTPATIENT)
Age: 85
End: 2025-06-24

## 2025-06-24 ENCOUNTER — OFFICE VISIT (OUTPATIENT)
Age: 85
End: 2025-06-24
Payer: MEDICARE

## 2025-06-24 VITALS
HEART RATE: 74 BPM | DIASTOLIC BLOOD PRESSURE: 70 MMHG | HEIGHT: 62 IN | WEIGHT: 122 LBS | SYSTOLIC BLOOD PRESSURE: 120 MMHG | BODY MASS INDEX: 22.45 KG/M2

## 2025-06-24 DIAGNOSIS — J96.10 CHRONIC RESPIRATORY FAILURE, UNSPECIFIED WHETHER WITH HYPOXIA OR HYPERCAPNIA (HCC): ICD-10-CM

## 2025-06-24 DIAGNOSIS — R60.0 EDEMA OF LEFT LOWER EXTREMITY: ICD-10-CM

## 2025-06-24 DIAGNOSIS — I34.0 MITRAL VALVE INSUFFICIENCY, UNSPECIFIED ETIOLOGY: ICD-10-CM

## 2025-06-24 DIAGNOSIS — I87.2 CHRONIC VENOUS INSUFFICIENCY: ICD-10-CM

## 2025-06-24 DIAGNOSIS — E83.42 HYPOMAGNESEMIA: Primary | ICD-10-CM

## 2025-06-24 DIAGNOSIS — I35.0 AORTIC VALVE STENOSIS, ETIOLOGY OF CARDIAC VALVE DISEASE UNSPECIFIED: ICD-10-CM

## 2025-06-24 DIAGNOSIS — I50.32 CHRONIC HEART FAILURE WITH PRESERVED EJECTION FRACTION (HFPEF) (HCC): ICD-10-CM

## 2025-06-24 DIAGNOSIS — I10 HYPERTENSION, UNSPECIFIED TYPE: ICD-10-CM

## 2025-06-24 DIAGNOSIS — R60.0 EDEMA OF LEFT LOWER EXTREMITY: Primary | ICD-10-CM

## 2025-06-24 DIAGNOSIS — Z95.0 CARDIAC PACEMAKER IN SITU: ICD-10-CM

## 2025-06-24 DIAGNOSIS — Z86.79 HISTORY OF ATRIAL FIBRILLATION: ICD-10-CM

## 2025-06-24 DIAGNOSIS — I25.10 CAD IN NATIVE ARTERY: ICD-10-CM

## 2025-06-24 DIAGNOSIS — F03.90 DEMENTIA, UNSPECIFIED DEMENTIA SEVERITY, UNSPECIFIED DEMENTIA TYPE, UNSPECIFIED WHETHER BEHAVIORAL, PSYCHOTIC, OR MOOD DISTURBANCE OR ANXIETY (HCC): ICD-10-CM

## 2025-06-24 LAB
ANION GAP SERPL CALC-SCNC: 11 MMOL/L (ref 7–16)
BUN SERPL-MCNC: 16 MG/DL (ref 8–23)
CALCIUM SERPL-MCNC: 10.3 MG/DL (ref 8.8–10.2)
CHLORIDE SERPL-SCNC: 97 MMOL/L (ref 98–107)
CO2 SERPL-SCNC: 29 MMOL/L (ref 20–29)
CREAT SERPL-MCNC: 1.15 MG/DL (ref 0.6–1.1)
GLUCOSE SERPL-MCNC: 93 MG/DL (ref 70–99)
MAGNESIUM SERPL-MCNC: 1.7 MG/DL (ref 1.8–2.4)
POTASSIUM SERPL-SCNC: 4.6 MMOL/L (ref 3.5–5.1)
SODIUM SERPL-SCNC: 138 MMOL/L (ref 136–145)

## 2025-06-24 PROCEDURE — 1159F MED LIST DOCD IN RCRD: CPT | Performed by: INTERNAL MEDICINE

## 2025-06-24 PROCEDURE — 1090F PRES/ABSN URINE INCON ASSESS: CPT | Performed by: INTERNAL MEDICINE

## 2025-06-24 PROCEDURE — 93971 EXTREMITY STUDY: CPT

## 2025-06-24 PROCEDURE — 3078F DIAST BP <80 MM HG: CPT | Performed by: INTERNAL MEDICINE

## 2025-06-24 PROCEDURE — G8427 DOCREV CUR MEDS BY ELIG CLIN: HCPCS | Performed by: INTERNAL MEDICINE

## 2025-06-24 PROCEDURE — 1036F TOBACCO NON-USER: CPT | Performed by: INTERNAL MEDICINE

## 2025-06-24 PROCEDURE — 93971 EXTREMITY STUDY: CPT | Performed by: RADIOLOGY

## 2025-06-24 PROCEDURE — 1123F ACP DISCUSS/DSCN MKR DOCD: CPT | Performed by: INTERNAL MEDICINE

## 2025-06-24 PROCEDURE — 1111F DSCHRG MED/CURRENT MED MERGE: CPT | Performed by: INTERNAL MEDICINE

## 2025-06-24 PROCEDURE — 99215 OFFICE O/P EST HI 40 MIN: CPT | Performed by: INTERNAL MEDICINE

## 2025-06-24 PROCEDURE — G8399 PT W/DXA RESULTS DOCUMENT: HCPCS | Performed by: INTERNAL MEDICINE

## 2025-06-24 PROCEDURE — 3074F SYST BP LT 130 MM HG: CPT | Performed by: INTERNAL MEDICINE

## 2025-06-24 PROCEDURE — G8420 CALC BMI NORM PARAMETERS: HCPCS | Performed by: INTERNAL MEDICINE

## 2025-06-24 RX ORDER — MAGNESIUM OXIDE 400 MG/1
400 TABLET ORAL DAILY
Qty: 180 TABLET | Refills: 3 | Status: SHIPPED | OUTPATIENT
Start: 2025-06-24

## 2025-06-24 NOTE — TELEPHONE ENCOUNTER
Thee Thurston MD to \[facility]\"" Cardiology Triage  (Selected Message)      6/24/25  4:34 PM  Result Note  Please let the patient know that she has mild hypomagnesemia and mild hypercalcemia.  However, her lab work is acceptable to continue Aldactone and Bumex.  I started magnesium oxide 400 mg twice daily.  I ordered a repeat magnesium level and an ionized calcium level for 2 weeks.  Please encourage her to get a lab work at that time.  Magnesium; Basic Metabolic Panel

## 2025-06-24 NOTE — TELEPHONE ENCOUNTER
Advised daughter, Letty, of US results and Dr. Thurston's response. Letty verbalized understanding.

## 2025-06-24 NOTE — TELEPHONE ENCOUNTER
----- Message from Dr. Thee Thurston MD sent at 6/24/2025 12:20 PM EDT -----  Please let the patient know no evidence of DVT was noted.

## 2025-06-24 NOTE — TELEPHONE ENCOUNTER
Daughter Letty notified of lab results. Repeat labs needed in 2 weeks. Script for Magnesium sent to [facility] on [address]. Daughter voices understanding.

## 2025-06-26 ENCOUNTER — CARE COORDINATION (OUTPATIENT)
Dept: CARE COORDINATION | Facility: CLINIC | Age: 85
End: 2025-06-26

## 2025-06-26 NOTE — CARE COORDINATION
Care Transitions Note    Final Call     Patient Current Location:  Home: [address] [place] [address] [address]  [facility] [id number]    N Care Coordinator contacted the patient's daughter by telephone. Verified name and  as identifiers.    Patient graduated from the Care Transitions program on 2025.  Patient/family verbalizes confidence in the ability to self-manage at this time..      Advance Care Planning:   Does patient have an Advance Directive: reviewed and current.    Handoff:   Patient was not referred to the ACM team due to no additional needs identified.       Care Summary Note: Spoke with patient 's daughter, states patient is doing fine. Daughter states patient denies any acute respiratory distress during this phone call. Daughter states home health physical therapy is working with patient to help gain strength. Daughter states patient followed up with Cardiology on 2025 and visit went well. Daughter states patient is scheduled for a Generator exchange with pacemaker on . Daughter states appreciative of follow up calls.      Assessments:  Care Transitions Subsequent and Final Call    Subsequent and Final Calls  Do you have any ongoing symptoms?: No  Have your medications changed?: No  Do you have any questions related to your medications?: No  Do you currently have any active services?: No  Are you currently active with any services?: Home Health  Do you have any needs or concerns that I can assist you with?: No  Identified Barriers: None  Care Transitions Interventions  No Identified Needs  Disease Specific Clinic: Completed      Other Interventions:              Upcoming Appointments:    Future Appointments         Provider Specialty Dept Phone    2025 11:20 AM Nichole Bales MD Internal Medicine [phone]    2025 3:00 PM Thee Thurston MD Cardiology [phone]    10/6/2025 3:40 PM (Arrive by 3:25 PM) Beatrice De Santiago, APRN - CNP Pulmonology [phone]    10/21/2025 4:15

## 2025-06-30 NOTE — PROGRESS NOTES
Patient pre-assessment complete for DC PPM Generator Change scheduled for 25, arrival time 1030. Patient verified using . Patient instructed to bring a list of all home medications on the day of procedure. NPO status reinforced.  Patient instructed to HOLD Eliquis, Losartan, Bumex, and Spironolactone. Instructed they can take all other medications excluding vitamins & supplements. Patient verbalizes understanding of all instructions & denies any questions at this time.      Confirmed Eliquis hold x2 days prior to procedure.

## 2025-07-01 ENCOUNTER — HOSPITAL ENCOUNTER (OUTPATIENT)
Age: 85
Setting detail: OUTPATIENT SURGERY
Discharge: HOME | End: 2025-07-01
Attending: INTERNAL MEDICINE | Admitting: INTERNAL MEDICINE
Payer: MEDICARE

## 2025-07-01 ENCOUNTER — ANESTHESIA EVENT (OUTPATIENT)
Dept: CARDIAC CATH/INVASIVE PROCEDURES | Age: 85
End: 2025-07-01
Payer: MEDICARE

## 2025-07-01 ENCOUNTER — ANESTHESIA (OUTPATIENT)
Dept: CARDIAC CATH/INVASIVE PROCEDURES | Age: 85
End: 2025-07-01
Payer: MEDICARE

## 2025-07-01 VITALS
HEART RATE: 71 BPM | SYSTOLIC BLOOD PRESSURE: 112 MMHG | DIASTOLIC BLOOD PRESSURE: 66 MMHG | HEIGHT: 62 IN | OXYGEN SATURATION: 95 % | RESPIRATION RATE: 16 BRPM | WEIGHT: 122 LBS | BODY MASS INDEX: 22.45 KG/M2 | TEMPERATURE: 98.6 F

## 2025-07-01 DIAGNOSIS — Z45.010 PACEMAKER AT END OF BATTERY LIFE: ICD-10-CM

## 2025-07-01 LAB
ANION GAP SERPL CALC-SCNC: 10 MMOL/L (ref 7–16)
BUN SERPL-MCNC: 14 MG/DL (ref 8–23)
CALCIUM SERPL-MCNC: 10.2 MG/DL (ref 8.8–10.2)
CHLORIDE SERPL-SCNC: 103 MMOL/L (ref 98–107)
CO2 SERPL-SCNC: 25 MMOL/L (ref 20–29)
CREAT SERPL-MCNC: 1.28 MG/DL (ref 0.6–1.1)
ECHO BSA: 1.56 M2
EKG ATRIAL RATE: 77 BPM
EKG DIAGNOSIS: NORMAL
EKG P AXIS: -19 DEGREES
EKG P-R INTERVAL: 216 MS
EKG Q-T INTERVAL: 398 MS
EKG QRS DURATION: 128 MS
EKG QTC CALCULATION (BAZETT): 450 MS
EKG R AXIS: 71 DEGREES
EKG T AXIS: -82 DEGREES
EKG VENTRICULAR RATE: 77 BPM
ERYTHROCYTE [DISTWIDTH] IN BLOOD BY AUTOMATED COUNT: 16.6 % (ref 11.9–14.6)
GLUCOSE SERPL-MCNC: 84 MG/DL (ref 70–99)
HCT VFR BLD AUTO: 37.5 % (ref 35.8–46.3)
HGB BLD-MCNC: 11.4 G/DL (ref 11.7–15.4)
MCH RBC QN AUTO: 25 PG (ref 26.1–32.9)
MCHC RBC AUTO-ENTMCNC: 30.4 G/DL (ref 31.4–35)
MCV RBC AUTO: 82.2 FL (ref 82–102)
NRBC # BLD: 0 K/UL (ref 0–0.2)
PLATELET # BLD AUTO: 461 K/UL (ref 150–450)
PMV BLD AUTO: 9.6 FL (ref 9.4–12.3)
POTASSIUM SERPL-SCNC: 4.8 MMOL/L (ref 3.5–5.1)
RBC # BLD AUTO: 4.56 M/UL (ref 4.05–5.2)
SODIUM SERPL-SCNC: 138 MMOL/L (ref 136–145)
WBC # BLD AUTO: 10.4 K/UL (ref 4.3–11.1)

## 2025-07-01 PROCEDURE — 2580000003 HC RX 258: Performed by: NURSE ANESTHETIST, CERTIFIED REGISTERED

## 2025-07-01 PROCEDURE — 80048 BASIC METABOLIC PNL TOTAL CA: CPT

## 2025-07-01 PROCEDURE — 2500000003 HC RX 250 WO HCPCS: Performed by: INTERNAL MEDICINE

## 2025-07-01 PROCEDURE — 33228 REMV&REPLC PM GEN DUAL LEAD: CPT | Performed by: INTERNAL MEDICINE

## 2025-07-01 PROCEDURE — 83735 ASSAY OF MAGNESIUM: CPT

## 2025-07-01 PROCEDURE — 93005 ELECTROCARDIOGRAM TRACING: CPT

## 2025-07-01 PROCEDURE — 3700000000 HC ANESTHESIA ATTENDED CARE: Performed by: INTERNAL MEDICINE

## 2025-07-01 PROCEDURE — 85027 COMPLETE CBC AUTOMATED: CPT

## 2025-07-01 PROCEDURE — 6360000002 HC RX W HCPCS: Performed by: INTERNAL MEDICINE

## 2025-07-01 PROCEDURE — 2500000003 HC RX 250 WO HCPCS: Performed by: NURSE ANESTHETIST, CERTIFIED REGISTERED

## 2025-07-01 PROCEDURE — 6360000002 HC RX W HCPCS: Performed by: NURSE ANESTHETIST, CERTIFIED REGISTERED

## 2025-07-01 PROCEDURE — 93010 ELECTROCARDIOGRAM REPORT: CPT | Performed by: INTERNAL MEDICINE

## 2025-07-01 PROCEDURE — C1785 PMKR, DUAL, RATE-RESP: HCPCS | Performed by: INTERNAL MEDICINE

## 2025-07-01 PROCEDURE — 2720000010 HC SURG SUPPLY STERILE: Performed by: INTERNAL MEDICINE

## 2025-07-01 PROCEDURE — 3700000001 HC ADD 15 MINUTES (ANESTHESIA): Performed by: INTERNAL MEDICINE

## 2025-07-01 PROCEDURE — 93005 ELECTROCARDIOGRAM TRACING: CPT | Performed by: INTERNAL MEDICINE

## 2025-07-01 PROCEDURE — 2709999900 HC NON-CHARGEABLE SUPPLY: Performed by: INTERNAL MEDICINE

## 2025-07-01 DEVICE — IPG W1DR01 AZURE XT DR MRI USA
Type: IMPLANTABLE DEVICE | Status: FUNCTIONAL
Brand: AZURE™ XT DR MRI SURESCAN™

## 2025-07-01 RX ORDER — DEXMEDETOMIDINE HYDROCHLORIDE 4 UG/ML
INJECTION, SOLUTION INTRAVENOUS
Status: DISCONTINUED | OUTPATIENT
Start: 2025-07-01 | End: 2025-07-01 | Stop reason: SDUPTHER

## 2025-07-01 RX ORDER — SODIUM CHLORIDE, SODIUM LACTATE, POTASSIUM CHLORIDE, CALCIUM CHLORIDE 600; 310; 30; 20 MG/100ML; MG/100ML; MG/100ML; MG/100ML
INJECTION, SOLUTION INTRAVENOUS
Status: DISCONTINUED | OUTPATIENT
Start: 2025-07-01 | End: 2025-07-01 | Stop reason: SDUPTHER

## 2025-07-01 RX ORDER — PROPOFOL 10 MG/ML
INJECTION, EMULSION INTRAVENOUS
Status: DISCONTINUED | OUTPATIENT
Start: 2025-07-01 | End: 2025-07-01 | Stop reason: SDUPTHER

## 2025-07-01 RX ORDER — LIDOCAINE HYDROCHLORIDE AND EPINEPHRINE 10; 10 MG/ML; UG/ML
INJECTION, SOLUTION INFILTRATION; PERINEURAL PRN
Status: DISCONTINUED | OUTPATIENT
Start: 2025-07-01 | End: 2025-07-01 | Stop reason: HOSPADM

## 2025-07-01 RX ADMIN — PROPOFOL 50 MG: 10 INJECTION, EMULSION INTRAVENOUS at 12:33

## 2025-07-01 RX ADMIN — SODIUM CHLORIDE, SODIUM LACTATE, POTASSIUM CHLORIDE, AND CALCIUM CHLORIDE: 600; 310; 30; 20 INJECTION, SOLUTION INTRAVENOUS at 12:23

## 2025-07-01 RX ADMIN — DEXMEDETOMIDINE HYDROCHLORIDE 0.5 MCG/KG/HR: 4 INJECTION, SOLUTION INTRAVENOUS at 12:45

## 2025-07-01 RX ADMIN — Medication 2000 MG: at 12:35

## 2025-07-01 RX ADMIN — PROPOFOL 60 MCG/KG/MIN: 10 INJECTION, EMULSION INTRAVENOUS at 12:40

## 2025-07-01 ASSESSMENT — COPD QUESTIONNAIRES: CAT_SEVERITY: MILD

## 2025-07-01 ASSESSMENT — ENCOUNTER SYMPTOMS: SHORTNESS OF BREATH: 1

## 2025-07-01 NOTE — PROGRESS NOTES
Report received from Bernard RUBIN. Procedural findings communicated. Intra procedural  medication administration reviewed. Progression of care discussed.     Patient received into CPRU Cassia 7 post sheath removal.     Access site without bleeding or swelling yes    Dressing dry and intact yes    Patient instructed to limit movement to left upper extremity    Routine post procedural vital signs and site assessment initiated yes

## 2025-07-01 NOTE — PROGRESS NOTES
Peripheral IV sites dc'd without difficulty with tips intact.    Patient discharged to home with family.

## 2025-07-01 NOTE — ANESTHESIA PRE PROCEDURE
Department of Anesthesiology  Preprocedure Note       Name:  Erica Jose   Age:  84 y.o.  :  1940                                          MRN:  [id number]         Date:  2025      Surgeon: Surgeon(s):  Jet Rodriguez MD    Procedure: Procedure(s):  Remove & replace PPM gen dual lead    Medications prior to admission:   Prior to Admission medications    Medication Sig Start Date End Date Taking? Authorizing Provider   spironolactone (ALDACTONE) 25 MG tablet Take 1 tablet by mouth daily 25  Yes Ankur Jacques MD   bumetanide (BUMEX) 1 MG tablet Take 1 tablet by mouth daily 25  Yes Ankur Jacques MD   metoprolol tartrate (LOPRESSOR) 50 MG tablet Take 1 tablet by mouth 2 times daily 25  Yes Thee Thurston MD   amLODIPine (NORVASC) 10 MG tablet Take 1 tablet by mouth daily 25  Yes Thee Thurston MD   losartan (COZAAR) 25 MG tablet Take 1 tablet by mouth daily 25  Yes Thee Thurston MD   memantine (NAMENDA) 10 MG tablet Take 1 tablet by mouth 2 times daily 25  Yes Edmond Elizondo MD   gabapentin (NEURONTIN) 400 MG capsule Take 1 capsule by mouth 3 times daily. 24 Yes Edmond Elizondo MD   allopurinol (ZYLOPRIM) 100 MG tablet Take 1 tablet by mouth daily 6/3/24  Yes Nichole Bales MD   lansoprazole (PREVACID) 30 MG delayed release capsule Take 1 capsule by mouth 2 times daily 6/3/24  Yes Nichole Bales MD   magnesium oxide (MAG-OX) 400 MG tablet Take 1 tablet by mouth daily 25   Thee Thurston MD   apixaban (ELIQUIS) 2.5 MG TABS tablet Take 1 tablet by mouth 2 times daily 25   Thee Thurston MD   albuterol sulfate HFA (PROVENTIL;VENTOLIN;PROAIR) 108 (90 Base) MCG/ACT inhaler 2 puffs 4 times daily if needed for shortness of breath or wheezing.  May use generic or brand name as preferred by insurance. 24   Beatrice De Santiago, APRN - CNP   montelukast (SINGULAIR) 10 MG tablet Take 1 tablet by mouth nightly 6/3/24   Nichole Bales  unreliable 2/2 intoxication

## 2025-07-01 NOTE — PROGRESS NOTES
Patient up to bedside, vital signs and site stable. Patient ambulated to bathroom without difficulty. Patient voided without difficulty. Vascular site stable.  Discharge instructions and home medications reviewed with patient. Time allowed for questions and answers.Site stable after ambulation.

## 2025-07-01 NOTE — ANESTHESIA POSTPROCEDURE EVALUATION
Department of Anesthesiology  Postprocedure Note    Patient: Erica Jose  MRN: [id number]  YOB: 1940  Date of evaluation: 7/1/2025    Procedure Summary       Date: 07/01/25 Room / Location: [place] [address] ALL EVENTS / [facility] CARDIAC CATH LAB    Anesthesia Start: 1220 Anesthesia Stop: 1315    Procedure: Remove & replace PPM gen dual lead Diagnosis:       Pacemaker at end of battery life      (Pacemaker at end of battery life [Z45.010])    Providers: Jet Rodriguez MD Responsible Provider: LIZZETTE Kowalski MD    Anesthesia Type: TIVA ASA Status: 3            Anesthesia Type: No value filed.    Sherman Phase I: Sherman Score: 10    Sherman Phase II:      Anesthesia Post Evaluation    Patient location during evaluation: PACU  Patient participation: complete - patient participated  Level of consciousness: awake and alert  Airway patency: patent  Nausea & Vomiting: no nausea and no vomiting  Cardiovascular status: hemodynamically stable  Respiratory status: acceptable  Hydration status: euvolemic  Comments: Blood pressure (!) 115/98, pulse 74, temperature 98.6 °F (37 °C), temperature source Skin, resp. rate 21, height 1.575 m (5' 2\"), weight 55.3 kg (122 lb), SpO2 97%.    No apparent anesthetic complications.  Pt stable for discharge from PACU  Multimodal analgesia pain management approach  Pain management: adequate    There were no known notable events for this encounter.

## 2025-07-01 NOTE — PROGRESS NOTES
Patient received to CPRU room # 17  Ambulatory from [facility]. Patient scheduled for Gen change today with Dr Rodriguez. Procedure reviewed & questions answered, voiced good understanding consent obtained & placed on chart. All medications and medical history reviewed. Will prep patient per orders. Patient & family updated on plan of care.      The patient has a fraility score of 4-VULNERABLE, based on age and use of wheelchair.

## 2025-07-01 NOTE — DISCHARGE INSTRUCTIONS
Post Op Generator Change Instructions        Incision & Dressing Care   Please keep Aquacel dressing on wound until your 2 week follow up in device clinic. The dressing promotes healing and is meant to stay on the wound. Keep incision site completely dry for 48 hours. During this time you may take a sponge bath, but no shower. After 48 hours you may shower with your back to the water letting the water run over the dressing. Do not let the water directly hit the dressing, it is water resistant no water proof. Do not use any lotions, creams, or ointments on the incision.    Avoid manipulating the device or leads with your fingers. Do not massage or excessively rub the implant site. This could displace the leads           Call you doctor for any of the following:   Any signs of infection, including redness, swelling or pain at the incision site, or a   temperature of 101° F or greater.   If you have twitching chest muscles, hiccups that won't stop, or a swollen arm on the   side of the incision.   Any feelings of light-headedness, chest pain, or shortness of breath.             Please call the Device Clinic at  [phone] if you have questions or concerns about your device. Please call the hospital if it is after 5 pm or the weekend please page the on call cardiologist at [facility] at [phone]         Follow Up Appointments  You will need to have your device checked 1- 2 weeks after the procedure and should have an appointment with the device clinic. If you do not hear from them call the device clinic.      Sedation for a Medical Procedure: Care Instructions     You were given a sedative medication during your visit. While many of the effects will have worn   off before you leave; you may continue to feel some effects for several hours.      Common side effects from sedation include:  Feeling sleepy. (Your doctors and nurses will make sure you are not too sleepy to go home.)  Nausea and vomiting.

## 2025-07-01 NOTE — H&P
[facility] Cardiology/Electrophyiology Consult                Date of  Admission: No admission date for patient encounter.     Erica Jose is a 84 y.o. female admitted for Pacemaker at end of battery life [Z45.010].      84 y.o. female with a PMH of aortic stenosis, chronic HFpEF, status post PPM, nonobstructive CAD, paroxysmal atrial fibrillation, hypertension, hyperlipidemia, chronic respiratory failure, breast cancer, and dementia who presents for scheduled PM gen change.     Cardiac PMH: (Old records have been reviewed and summarized below)    Patient Active Problem List   Diagnosis    Cataract, bilateral    Recurrent major depressive disorder, in full remission    Hyperlipidemia    CAD in native artery    Normocytic anemia    Bradycardia    Impaired functional mobility, balance, gait, and endurance    Edema of left lower extremity    Allergic rhinitis    Current use of long term anticoagulation    Debility    Shortness of breath    Pacemaker generator end of life    History of tobacco use    Cardiac pacemaker in situ    Diastolic dysfunction    Recurrent UTI    Atherosclerosis of native coronary artery of native heart without angina pectoris    Sensory neuropathy    DJD (degenerative joint disease)    Gout involving toe    Cognitive disorder    Hypertension    Mitral valve insufficiency    Pulmonary hypertension (HCC)    History of recurrent UTIs    Obesity (BMI 30.0-34.9)    PAF (paroxysmal atrial fibrillation) ([facility])    Malignant neoplasm of upper-outer quadrant of right breast in female, estrogen receptor positive (HCC)    History of gout    Hypoxemia    Status cardiac pacemaker    Laryngopharyngeal reflux (LPR)    History of atrial fibrillation    Stage 3a chronic kidney disease (HCC)    Chronic heart failure with preserved ejection fraction (HFpEF) (HCC)    Dementia (HCC)    Chronic respiratory failure (HCC)    Metabolic encephalopathy    Aortic valve stenosis    Pacemaker at end of battery life    Acute  visit.    Physical Exam:  General/Constitutional:   Alert and oriented x 3, no acute distress  HEENT:   normocephalic, atraumatic, moist mucous membranes  Neck:   No JVD or carotid bruits bilaterally  Cardiovascular:   regular rate and rhythm, no rub/gallop appreciated  Pulmonary:   clear to auscultation bilaterally, no respiratory distress  Abdomen:   soft, non-tender, non-distended  Ext:   1+ LLE edema bilaterally      Cardiographics    Telemetry:   ECG (Indpendently visualized and interpreted):  Echocardiogram:     Labs: No results for input(s): \"NA\", \"K\", \"MG\", \"BUN\", \"GLU\", \"WBC\", \"HGB\", \"HCT\", \"PLT\", \"INR\", \"LDL\", \"HDL\" in the last 72 hours.    Invalid input(s): \"CREA\", \"TROPI\", \"TCHOL\", \"TRIGL\"     Assessment:      Principal Problem:    Pacemaker at end of battery life  Resolved Problems:    * No resolved hospital problems. *         Plan:   1. [employer]onic dual chamber PPM SARAY: I had a discussion today with the patient regarding the risks, benefits, and alternatives of an implantable cardiac rhythm device generator change.  I discussed with the patient the potential risks of PM gen change, including the risk of bleeding, infection, or exposure of a lead integrity issue.  I explained there is currently no evidence of a lead problem, but if something were to be exposed during the procedure would plan for lead replacement. If a new lead needed to be placed, there are risks of large blood vessel injury, lung or cardiac injury, venous occlusion, pneumothorax, cardiac tamponade, perforation, need for urgent open heart surgery or additional procedures, device/lead failure, lead dislodgement, risks of anesthesia that will be discussed in more detail the day of the procedure and even death.  After our comprehensive discussion, the patient would like to proceed    Thank you very much for this referral. We appreciate the opportunity to participate in this patient's care. We will follow along with above stated

## 2025-07-13 PROBLEM — R79.89 ELEVATED TROPONIN: Status: RESOLVED | Noted: 2025-06-13 | Resolved: 2025-07-13

## 2025-07-15 ENCOUNTER — CLINICAL SUPPORT (OUTPATIENT)
Age: 85
End: 2025-07-15
Payer: MEDICARE

## 2025-07-15 ENCOUNTER — TELEPHONE (OUTPATIENT)
Age: 85
End: 2025-07-15

## 2025-07-15 DIAGNOSIS — R00.1 BRADYCARDIA: Primary | ICD-10-CM

## 2025-07-15 DIAGNOSIS — I49.5 SSS (SICK SINUS SYNDROME) (HCC): ICD-10-CM

## 2025-07-15 DIAGNOSIS — E83.42 HYPOMAGNESEMIA: ICD-10-CM

## 2025-07-15 LAB
CA-I BLD-MCNC: 5.13 MG/DL (ref 4–5.2)
MAGNESIUM SERPL-MCNC: 2.1 MG/DL (ref 1.8–2.4)

## 2025-07-15 PROCEDURE — 93280 PM DEVICE PROGR EVAL DUAL: CPT | Performed by: INTERNAL MEDICINE

## 2025-07-15 NOTE — TELEPHONE ENCOUNTER
Patient daughter Tatianna notified of normal Calcium and Magnesium levels for patient. Daughter  voices understanding.

## 2025-07-15 NOTE — TELEPHONE ENCOUNTER
Thee Thurston MD to [facility] Cardiology Triage (Selected Message)      7/15/25  4:32 PM  Result Note  Please let the patient know Mg and Ca levels normal  Magnesium; Calcium, Ionized

## 2025-07-18 ENCOUNTER — OFFICE VISIT (OUTPATIENT)
Dept: INTERNAL MEDICINE CLINIC | Facility: CLINIC | Age: 85
End: 2025-07-18
Payer: MEDICARE

## 2025-07-18 VITALS
WEIGHT: 120.6 LBS | HEART RATE: 73 BPM | BODY MASS INDEX: 22.06 KG/M2 | DIASTOLIC BLOOD PRESSURE: 68 MMHG | OXYGEN SATURATION: 96 % | SYSTOLIC BLOOD PRESSURE: 122 MMHG

## 2025-07-18 DIAGNOSIS — E55.9 VITAMIN D DEFICIENCY: Primary | ICD-10-CM

## 2025-07-18 DIAGNOSIS — M10.079 IDIOPATHIC GOUT, UNSPECIFIED ANKLE AND FOOT: ICD-10-CM

## 2025-07-18 DIAGNOSIS — Z87.440 HISTORY OF RECURRENT UTIS: ICD-10-CM

## 2025-07-18 DIAGNOSIS — G31.83 MODERATE LEWY BODY DEMENTIA WITH PSYCHOTIC DISTURBANCE (HCC): ICD-10-CM

## 2025-07-18 DIAGNOSIS — F32.0 MAJOR DEPRESSIVE DISORDER, SINGLE EPISODE, MILD: ICD-10-CM

## 2025-07-18 DIAGNOSIS — J30.9 ALLERGIC RHINITIS, UNSPECIFIED SEASONALITY, UNSPECIFIED TRIGGER: ICD-10-CM

## 2025-07-18 DIAGNOSIS — F02.B2 MODERATE LEWY BODY DEMENTIA WITH PSYCHOTIC DISTURBANCE (HCC): ICD-10-CM

## 2025-07-18 DIAGNOSIS — K21.9 GASTRO-ESOPHAGEAL REFLUX DISEASE WITHOUT ESOPHAGITIS: ICD-10-CM

## 2025-07-18 DIAGNOSIS — G62.9 PERIPHERAL POLYNEUROPATHY: ICD-10-CM

## 2025-07-18 PROBLEM — E66.811 OBESITY (BMI 30.0-34.9): Status: RESOLVED | Noted: 2017-08-30 | Resolved: 2025-07-18

## 2025-07-18 PROBLEM — J96.01 ACUTE RESPIRATORY FAILURE WITH HYPOXIA (HCC): Status: RESOLVED | Noted: 2025-06-13 | Resolved: 2025-07-18

## 2025-07-18 PROBLEM — M10.9 GOUT INVOLVING TOE: Status: RESOLVED | Noted: 2018-01-03 | Resolved: 2025-07-18

## 2025-07-18 PROCEDURE — 1123F ACP DISCUSS/DSCN MKR DOCD: CPT | Performed by: STUDENT IN AN ORGANIZED HEALTH CARE EDUCATION/TRAINING PROGRAM

## 2025-07-18 PROCEDURE — 1036F TOBACCO NON-USER: CPT | Performed by: STUDENT IN AN ORGANIZED HEALTH CARE EDUCATION/TRAINING PROGRAM

## 2025-07-18 PROCEDURE — 1111F DSCHRG MED/CURRENT MED MERGE: CPT | Performed by: STUDENT IN AN ORGANIZED HEALTH CARE EDUCATION/TRAINING PROGRAM

## 2025-07-18 PROCEDURE — 1090F PRES/ABSN URINE INCON ASSESS: CPT | Performed by: STUDENT IN AN ORGANIZED HEALTH CARE EDUCATION/TRAINING PROGRAM

## 2025-07-18 PROCEDURE — 3078F DIAST BP <80 MM HG: CPT | Performed by: STUDENT IN AN ORGANIZED HEALTH CARE EDUCATION/TRAINING PROGRAM

## 2025-07-18 PROCEDURE — 99214 OFFICE O/P EST MOD 30 MIN: CPT | Performed by: STUDENT IN AN ORGANIZED HEALTH CARE EDUCATION/TRAINING PROGRAM

## 2025-07-18 PROCEDURE — 1160F RVW MEDS BY RX/DR IN RCRD: CPT | Performed by: STUDENT IN AN ORGANIZED HEALTH CARE EDUCATION/TRAINING PROGRAM

## 2025-07-18 PROCEDURE — 3074F SYST BP LT 130 MM HG: CPT | Performed by: STUDENT IN AN ORGANIZED HEALTH CARE EDUCATION/TRAINING PROGRAM

## 2025-07-18 PROCEDURE — 1159F MED LIST DOCD IN RCRD: CPT | Performed by: STUDENT IN AN ORGANIZED HEALTH CARE EDUCATION/TRAINING PROGRAM

## 2025-07-18 PROCEDURE — G8420 CALC BMI NORM PARAMETERS: HCPCS | Performed by: STUDENT IN AN ORGANIZED HEALTH CARE EDUCATION/TRAINING PROGRAM

## 2025-07-18 PROCEDURE — G8399 PT W/DXA RESULTS DOCUMENT: HCPCS | Performed by: STUDENT IN AN ORGANIZED HEALTH CARE EDUCATION/TRAINING PROGRAM

## 2025-07-18 PROCEDURE — G8427 DOCREV CUR MEDS BY ELIG CLIN: HCPCS | Performed by: STUDENT IN AN ORGANIZED HEALTH CARE EDUCATION/TRAINING PROGRAM

## 2025-07-18 RX ORDER — LANSOPRAZOLE 30 MG/1
30 CAPSULE, DELAYED RELEASE ORAL 2 TIMES DAILY
Qty: 180 CAPSULE | Refills: 3 | Status: SHIPPED | OUTPATIENT
Start: 2025-07-18

## 2025-07-18 RX ORDER — MEMANTINE HYDROCHLORIDE 10 MG/1
10 TABLET ORAL 2 TIMES DAILY
Qty: 180 TABLET | Refills: 3 | Status: SHIPPED | OUTPATIENT
Start: 2025-07-18

## 2025-07-18 RX ORDER — MONTELUKAST SODIUM 10 MG/1
10 TABLET ORAL NIGHTLY
Qty: 90 TABLET | Refills: 3 | Status: SHIPPED | OUTPATIENT
Start: 2025-07-18

## 2025-07-18 RX ORDER — NITROFURANTOIN MACROCRYSTALS 100 MG/1
CAPSULE ORAL
Qty: 90 CAPSULE | Refills: 3 | Status: SHIPPED | OUTPATIENT
Start: 2025-07-18

## 2025-07-18 RX ORDER — GABAPENTIN 400 MG/1
400 CAPSULE ORAL 3 TIMES DAILY
Qty: 270 CAPSULE | Refills: 3 | Status: SHIPPED | OUTPATIENT
Start: 2025-07-18 | End: 2026-07-18

## 2025-07-18 RX ORDER — CHOLECALCIFEROL (VITAMIN D3) 25 MCG
1000 TABLET ORAL DAILY
Qty: 1 TABLET | Refills: 0
Start: 2025-07-18

## 2025-07-18 RX ORDER — ALLOPURINOL 100 MG/1
100 TABLET ORAL DAILY
Qty: 90 TABLET | Refills: 3 | Status: SHIPPED | OUTPATIENT
Start: 2025-07-18

## 2025-07-18 NOTE — PROGRESS NOTES
Chief Complaint   Patient presents with    Hypertension     6 month routine follow up. Would like to discuss if we can start prescribing certain medications.          SUBJECTIVE      History of Present Illness  The patient is an 84-year-old female here for a follow-up on hypertension. Hospitalized last month for heart failure exacerbation. Accompanied by her son.    No issues with leg swelling or falls, but constant swelling in one leg. Ultrasound ordered by Dr. Thurston revealed no vte. No leg or hip surgery.    Uses oxygen at night; had difficulties during hospital stay, sent home with continuous oxygen. Weaned off daytime oxygen use. Breathing varies with activity. Pulmonologist recommended sleep study.    Receives palliative care at the [facility], with annual follow-ups. Under care of Dr. Elizondo, neurologist, prescribed memantine and gabapentin 400 mg TID for neuropathy. possible Lewy body disease, she had UTI two summers ago felt to contribute to sx. Despite suppressive medication, UTI persisted until treated with IV medications. Similar episode a few months ago likely due to dehydration, causing auditory hallucinations. Currently on Macrobid for UTI suppression.    Taking vitamin D supplements and Prevacid for acid reflux. On allopurinol for gout. No issues with depression or mood. Blood pressure readings inconsistent, sometimes normal, sometimes elevated.  Review of Systems     Current Outpatient Medications   Medication Sig Dispense Refill    montelukast (SINGULAIR) 10 MG tablet Take 1 tablet by mouth nightly 90 tablet 3    lansoprazole (PREVACID) 30 MG delayed release capsule Take 1 capsule by mouth 2 times daily 180 capsule 3    allopurinol (ZYLOPRIM) 100 MG tablet Take 1 tablet by mouth daily 90 tablet 3    sertraline (ZOLOFT) 50 MG tablet Take 1 tablet by mouth daily 90 tablet 3    nitrofurantoin (MACRODANTIN) 100 MG capsule TAKE 1 CAPSULE BY MOUTH EVERY DAY AT NIGHT 90 capsule 3    memantine (NAMENDA)

## 2025-07-22 NOTE — PROGRESS NOTES
[facility] CARDIOLOGY Follow Up                 Reason for Visit: Chronic HFpEF    Subjective:     Patient is a 84 y.o. female with a PMH of aortic stenosis, chronic HFpEF, status post PPM, nonobstructive CAD, paroxysmal atrial fibrillation, hypertension, hyperlipidemia, chronic respiratory failure, breast cancer, CKD stage III and dementia who presents for follow-up.  The patient was last seen in June 2025.  A chemistry profile was ordered.  She was noted to have mild hypomagnesemia and mild hypercalcemia.  Her lab work was noted to be acceptable to continue Aldactone and Bumex.  Magnesium oxide 400 mg twice daily was ordered.  A repeat magnesium level and ionized calcium was ordered for 2 weeks later.  Her ionized calcium was noted to be normal.  Magnesium was noted to have normalized.  A vascular duplex lower extremity venous left STAT to rule out DVT.  No evidence of DVT was noted of the LLE.  She underwent generator exchange in July 2025.  She had a limited TTE in June 2025 that noted a normal EF. Her VTI ratio was noted to be 0.42. Mild to moderate MR was noted. Her RVSP was noted to be 73 mmHg. The patient denies angina and dyspnea.    Past Medical History:   Diagnosis Date    Abdominal pain 9/29/2013    Atrial fibrillation (HCC)     Breast cancer (HCC) 2017    right    COPD (chronic obstructive pulmonary disease) (HCC)     Depression     DJD (degenerative joint disease) 9/29/2013    Esophageal reflux 9/29/2013    H/O echocardiogram 01/13/2025    Nrml L ventricular systolic fxn w/visually est EF 55 - 60%. L ventricle size nrml. Mildly increased wall thickness. Diastolic dysfxn present w/increased LAP w/nrml LV EF. Aortic Valve: Trileaflet valve. Mod sclerosis aortic valve cusps. Mild stenosis aortic valve. Noted by apical view. AV mean gradient 9 mmHg. AV peak velocity 2.0 m/s. LVOT:AV VTI Index 0.46. AV area by continuity VTI 1.5 cm2.Mi    Hearing loss     History of peripheral edema 03/27/2023    HTN

## 2025-07-23 ENCOUNTER — CLINICAL SUPPORT (OUTPATIENT)
Age: 85
End: 2025-07-23

## 2025-07-23 DIAGNOSIS — I49.5 SSS (SICK SINUS SYNDROME) (HCC): Primary | ICD-10-CM

## 2025-07-23 DIAGNOSIS — R00.1 BRADYCARDIA: ICD-10-CM

## 2025-07-24 ENCOUNTER — OFFICE VISIT (OUTPATIENT)
Age: 85
End: 2025-07-24
Payer: MEDICARE

## 2025-07-24 VITALS
SYSTOLIC BLOOD PRESSURE: 126 MMHG | WEIGHT: 117 LBS | BODY MASS INDEX: 21.4 KG/M2 | DIASTOLIC BLOOD PRESSURE: 80 MMHG | HEART RATE: 88 BPM

## 2025-07-24 DIAGNOSIS — I50.32 CHRONIC HEART FAILURE WITH PRESERVED EJECTION FRACTION (HFPEF) (HCC): ICD-10-CM

## 2025-07-24 DIAGNOSIS — I25.10 CAD IN NATIVE ARTERY: ICD-10-CM

## 2025-07-24 DIAGNOSIS — F03.90 DEMENTIA, UNSPECIFIED DEMENTIA SEVERITY, UNSPECIFIED DEMENTIA TYPE, UNSPECIFIED WHETHER BEHAVIORAL, PSYCHOTIC, OR MOOD DISTURBANCE OR ANXIETY (HCC): ICD-10-CM

## 2025-07-24 DIAGNOSIS — I10 HYPERTENSION, UNSPECIFIED TYPE: ICD-10-CM

## 2025-07-24 DIAGNOSIS — Z86.79 HISTORY OF ATRIAL FIBRILLATION: ICD-10-CM

## 2025-07-24 DIAGNOSIS — Z95.0 CARDIAC PACEMAKER IN SITU: ICD-10-CM

## 2025-07-24 DIAGNOSIS — J96.10 CHRONIC RESPIRATORY FAILURE, UNSPECIFIED WHETHER WITH HYPOXIA OR HYPERCAPNIA (HCC): ICD-10-CM

## 2025-07-24 DIAGNOSIS — I35.0 AORTIC VALVE STENOSIS, ETIOLOGY OF CARDIAC VALVE DISEASE UNSPECIFIED: Primary | ICD-10-CM

## 2025-07-24 PROCEDURE — 3074F SYST BP LT 130 MM HG: CPT | Performed by: INTERNAL MEDICINE

## 2025-07-24 PROCEDURE — 1090F PRES/ABSN URINE INCON ASSESS: CPT | Performed by: INTERNAL MEDICINE

## 2025-07-24 PROCEDURE — 1126F AMNT PAIN NOTED NONE PRSNT: CPT | Performed by: INTERNAL MEDICINE

## 2025-07-24 PROCEDURE — G8428 CUR MEDS NOT DOCUMENT: HCPCS | Performed by: INTERNAL MEDICINE

## 2025-07-24 PROCEDURE — G8420 CALC BMI NORM PARAMETERS: HCPCS | Performed by: INTERNAL MEDICINE

## 2025-07-24 PROCEDURE — 1036F TOBACCO NON-USER: CPT | Performed by: INTERNAL MEDICINE

## 2025-07-24 PROCEDURE — 1123F ACP DISCUSS/DSCN MKR DOCD: CPT | Performed by: INTERNAL MEDICINE

## 2025-07-24 PROCEDURE — 99214 OFFICE O/P EST MOD 30 MIN: CPT | Performed by: INTERNAL MEDICINE

## 2025-07-24 PROCEDURE — 3079F DIAST BP 80-89 MM HG: CPT | Performed by: INTERNAL MEDICINE

## 2025-07-24 PROCEDURE — G8399 PT W/DXA RESULTS DOCUMENT: HCPCS | Performed by: INTERNAL MEDICINE

## 2025-08-14 RX ORDER — SPIRONOLACTONE 25 MG/1
25 TABLET ORAL DAILY
Qty: 90 TABLET | Refills: 3 | Status: SHIPPED | OUTPATIENT
Start: 2025-08-14

## 2025-08-28 ENCOUNTER — TELEPHONE (OUTPATIENT)
Age: 85
End: 2025-08-28

## (undated) DEVICE — Device

## (undated) DEVICE — SUTURE PLN GUT SZ 6-0 L18IN ABSRB YELLOWISH TAN L6.5MM 1735G

## (undated) DEVICE — CONSTELLATION VISION SYSTEM 7500 CPM ULTRAVIT PROBE STRAIGHT ENDOILLUMINATOR 25+ TOTALPLUS VITRECTOMY PAK EDGEPLUS BLADE VALVED ENTRY SYSTEM: Brand: CONSTELLATION, ULTRAVIT, 25+, TOTALPLUS, EDGEPLUS

## (undated) DEVICE — SUTURE V-LOC 90 3-0 L9IN ABSRB VLT L26MM V-20 1/2 CIR TAPR VLOCM0644

## (undated) DEVICE — SYRINGE MED 50ML LUERLOCK TIP

## (undated) DEVICE — NEEDLE ANES 23GA L1.5IN RETROBLB

## (undated) DEVICE — KIT,ANTI FOG,W/SPONGE & FLUID,SOFT PACK: Brand: MEDLINE

## (undated) DEVICE — PLASMABLADE X PS210-030S-LIGHT 3.0SL: Brand: PLASMABLADE™ X

## (undated) DEVICE — SUTURE ABSORBABLE BRAIDED 2-0 CT-1 27 IN UD VICRYL J259H

## (undated) DEVICE — SUTURE PLN GUT SZ 6-0 L18IN ABSRB YELLOWISH TAN L11MM G-1 774G

## (undated) DEVICE — SUTURE PLAIN GUT 60 SE1408 18IN ABSORBABLE MONOFILAMENT G1735K

## (undated) DEVICE — DIATHERMY PROBE DSP, 25G: Brand: ALCON GRIESHABER

## (undated) DEVICE — SOLUTION IRRIGATION BAL SALT SOLUTION 500 ML BTL 6/CA BSS +

## (undated) DEVICE — DISPOSABLE BIPOLAR CODE, 12' (3.66 M): Brand: CONMED

## (undated) DEVICE — BETADINE 5% EYE SOL

## (undated) DEVICE — 25 GA ILLUMINATED FLEXIBLE CURVED LASER PROBE: Brand: ALCON, ENGAUGE

## (undated) DEVICE — DRESSING POSTOP AG PRISMASEAL 3.5X6IN